# Patient Record
Sex: MALE | Race: BLACK OR AFRICAN AMERICAN | NOT HISPANIC OR LATINO | Employment: OTHER | URBAN - METROPOLITAN AREA
[De-identification: names, ages, dates, MRNs, and addresses within clinical notes are randomized per-mention and may not be internally consistent; named-entity substitution may affect disease eponyms.]

---

## 2017-01-26 ENCOUNTER — HOSPITAL ENCOUNTER (OUTPATIENT)
Dept: RADIOLOGY | Facility: CLINIC | Age: 77
Discharge: HOME | End: 2017-01-26
Attending: UROLOGY
Payer: MEDICARE

## 2017-01-26 ENCOUNTER — OFFICE VISIT (OUTPATIENT)
Dept: UROLOGY | Facility: CLINIC | Age: 77
End: 2017-01-26
Payer: MEDICARE

## 2017-01-26 VITALS — HEART RATE: 85 BPM | DIASTOLIC BLOOD PRESSURE: 86 MMHG | SYSTOLIC BLOOD PRESSURE: 136 MMHG

## 2017-01-26 DIAGNOSIS — R97.20 ELEVATED PROSTATE SPECIFIC ANTIGEN (PSA): Primary | ICD-10-CM

## 2017-01-26 DIAGNOSIS — N28.1 RENAL CYST, RIGHT: ICD-10-CM

## 2017-01-26 DIAGNOSIS — R97.20 ELEVATED PROSTATE SPECIFIC ANTIGEN (PSA): ICD-10-CM

## 2017-01-26 DIAGNOSIS — N40.1 BENIGN NON-NODULAR PROSTATIC HYPERPLASIA WITH LOWER URINARY TRACT SYMPTOMS: ICD-10-CM

## 2017-01-26 DIAGNOSIS — N52.9 ERECTILE DYSFUNCTION, UNSPECIFIED ERECTILE DYSFUNCTION TYPE: ICD-10-CM

## 2017-01-26 LAB
BILIRUB SERPL-MCNC: ABNORMAL MG/DL
BLOOD URINE, POC: ABNORMAL
COLOR, POC UA: YELLOW
GLUCOSE UR QL STRIP: ABNORMAL
KETONES UR QL STRIP: ABNORMAL
LEUKOCYTE ESTERASE URINE, POC: ABNORMAL
NITRITE, POC UA: ABNORMAL
PH, POC UA: 5
PROTEIN, POC: ABNORMAL
SPECIFIC GRAVITY, POC UA: 1.01
UROBILINOGEN, POC UA: ABNORMAL

## 2017-01-26 PROCEDURE — 76770 US EXAM ABDO BACK WALL COMP: CPT | Mod: TC,PO

## 2017-01-26 PROCEDURE — 99999 PR PBB SHADOW E&M-EST. PATIENT-LVL II: CPT | Mod: PBBFAC,,, | Performed by: UROLOGY

## 2017-01-26 PROCEDURE — 76770 US EXAM ABDO BACK WALL COMP: CPT | Mod: 26,,, | Performed by: RADIOLOGY

## 2017-01-26 PROCEDURE — 51701 INSERT BLADDER CATHETER: CPT | Mod: S$PBB,,, | Performed by: UROLOGY

## 2017-01-26 PROCEDURE — 99214 OFFICE O/P EST MOD 30 MIN: CPT | Mod: 25,S$PBB,, | Performed by: UROLOGY

## 2017-01-26 NOTE — MR AVS SNAPSHOT
[facility]   [address]  [place] [address]  Phone: [phone]                  Tyler Todd   2017 10:30 AM   Office Visit    Description:  Male : 1940   Provider:  Nuha Soto MD   Department:  [facility]           Reason for Visit     Follow-up           Diagnoses this Visit        Comments    Elevated prostate specific antigen (PSA)    -  Primary     Renal cyst, right         Benign non-nodular prostatic hyperplasia with lower urinary tract symptoms                To Do List           Future Appointments        Provider Department Dept Phone    2017 12:00 PM [facility] [phone]      Goals (5 Years of Data)     None      [facility]     [facility] -  Assistance  Registered nurses in the [facility] provide clinical advisement, health education, appointment booking, and other advisory services.  Call for this free service at [phone].             Medications           Message regarding Medications     Verify the changes and/or additions to your medication regime listed below are the same as discussed with your clinician today.  If any of these changes or additions are incorrect, please notify your healthcare provider.        STOP taking these medications     tamsulosin (FLOMAX) 0.4 mg Cp24 Take 1 capsule (0.4 mg total) by mouth 2 (two) times daily. Take one in the morning and one in the evening.           Verify that the below list of medications is an accurate representation of the medications you are currently taking.  If none reported, the list may be blank. If incorrect, please contact your healthcare provider. Carry this list with you in case of emergency.           Current Medications     alfuzosin (UROXATRAL) 10 mg Tb24 Take 1 tablet (10 mg total) by mouth daily with breakfast.    aspirin 81 mg Tab Every day    atorvastatin (LIPITOR) 20 MG tablet Take 20 mg by  mouth once daily.    brimonidine 0.1% (ALPHAGAN P) 0.1 % Drop Place 1 drop into both eyes 3 (three) times daily.    carboxymethylcellulose (REFRESH PLUS) 0.5 % Dpet 1 drop 3 (three) times daily as needed.    diabetic supplies, miscellan. Misc No Sig Provided    dorzolamide (TRUSOPT) 2 % ophthalmic solution 1 drop 3 (three) times daily.    fish oil-omega-3 fatty acids 300-1,000 mg capsule Take 2 g by mouth once daily.    LATANOPROST OPHT Apply to eye.    metformin (GLUCOPHAGE-XR) 500 MG 24 hr tablet Take 2 tablets (1,000 mg total) by mouth 2 (two) times daily with meals.    nitroGLYCERIN (NITROSTAT) 0.4 MG SL tablet As directed    travoprost, benzalkonium, (TRAVATAN) 0.004 % ophthalmic solution 1 drop every evening.    metoprolol tartrate (LOPRESSOR) 50 MG tablet Take 1 tablet (50 mg total) by mouth 2 (two) times daily.           Clinical Reference Information           Vital Signs - Last Recorded  Most recent update: 1/26/2017 10:36 AM by Sobeida Dao MA    BP Pulse                136/86 85          Blood Pressure          Most Recent Value    BP  136/86      Allergies as of 1/26/2017     Doxycycline    Sulfa (Sulfonamide Antibiotics)      Immunizations Administered on Date of Encounter - 1/26/2017     None      Orders Placed During Today's Visit      Normal Orders This Visit    POCT URINE DIPSTICK WITHOUT MICROSCOPE     Future Labs/Procedures Expected by Expires    US Retroperitoneal Complete (Kidney and  1/26/2017 1/26/2018      Smoking Cessation     If you would like to quit smoking:   You may be eligible for free services if you are a [place] resident and started smoking cigarettes before September 1, 1988.  Call the Smoking Cessation Trust (SCT) toll free at (126) 288-9151 or (090) 829-3630.   Call 4-800-QUIT-NOW if you do not meet the above criteria.

## 2017-01-26 NOTE — PROGRESS NOTES
OFFICE NOTE    CHIEF COMPLAINT:  Elevated PSA, BPH, erectile dysfunction, right renal cyst.    HISTORY OF PRESENT ILLNESS:    This 76-year-old male returns for   routine recheck.  He has a history of elevated PSA.  He had undergone a prostate   ultrasound with biopsy in September 2015 when his PSA was 5.9 and the pathology   report revealed no evidence of any malignancy.  He has had recurring episodes   of elevated PSA and he underwent an MRI of the prostate on 12/14/2016, which   revealed him to have low-grade of only 2 probability of developing prostate   cancer and the patient was informed of this.  Prostate size was 40 mL.  The   patient also has a history of BPH for which he continues to take Uroxatral, but   he states lately it has not been as effective and he is continuing to have lower   tract irritative symptoms and slowing of the stream.  He also has a history of   right renal cyst.    The patient has a history of erectile dysfunction and he did question the   possibility of taking Cialis, but it must be noted that the patient is on   nitroglycerin on a p.r.n. basis, which will make it contraindicated for him to take   Cialis and he is aware of this.    Bladder scan revealed 0 mL of residual urine.    UA negative with pH 5.0.    His last PSA was 6.47 on 11/21/2016.    FINAL IMPRESSION:  Elevated PSA, BPH, right renal cyst and erectile dysfunction.    RECOMMENDATIONS:  Renal ultrasound, subsequent cystoscopy.  Otherwise, continue   on Uroxatral 10 mg p.o. Daily, and otherwise routine recheck in four to six   months with a repeat PSA.      ROBERT  dd: 01/26/2017 12:37:09 (CST)  td: 01/26/2017 17:40:45 (CST)  Doc ID   #[id number]  Job ID #[id number]    CC:

## 2017-01-31 ENCOUNTER — TELEPHONE (OUTPATIENT)
Dept: UROLOGY | Facility: CLINIC | Age: 77
End: 2017-01-31

## 2017-01-31 NOTE — TELEPHONE ENCOUNTER
----- Message from Nuha Soto MD sent at 1/29/2017  6:28 PM CST -----  Renal U/S - right renal cyst    Rec: Cysto under anesthesia

## 2017-02-14 ENCOUNTER — OFFICE VISIT (OUTPATIENT)
Dept: UROLOGY | Facility: CLINIC | Age: 77
End: 2017-02-14
Payer: MEDICARE

## 2017-02-14 VITALS
SYSTOLIC BLOOD PRESSURE: 140 MMHG | HEIGHT: 70 IN | RESPIRATION RATE: 18 BRPM | WEIGHT: 181.44 LBS | DIASTOLIC BLOOD PRESSURE: 79 MMHG | HEART RATE: 90 BPM | BODY MASS INDEX: 25.98 KG/M2 | TEMPERATURE: 98 F

## 2017-02-14 DIAGNOSIS — N40.1 BENIGN PROSTATIC HYPERPLASIA WITH LOWER URINARY TRACT SYMPTOMS, UNSPECIFIED MORPHOLOGY: ICD-10-CM

## 2017-02-14 DIAGNOSIS — N52.9 ERECTILE DYSFUNCTION, UNSPECIFIED ERECTILE DYSFUNCTION TYPE: ICD-10-CM

## 2017-02-14 DIAGNOSIS — R97.20 ELEVATED PROSTATE SPECIFIC ANTIGEN (PSA): Primary | ICD-10-CM

## 2017-02-14 DIAGNOSIS — N28.1 RENAL CYST, RIGHT: ICD-10-CM

## 2017-02-14 PROCEDURE — 99213 OFFICE O/P EST LOW 20 MIN: CPT | Mod: PBBFAC,PO | Performed by: UROLOGY

## 2017-02-14 PROCEDURE — 99213 OFFICE O/P EST LOW 20 MIN: CPT | Mod: S$PBB,,, | Performed by: UROLOGY

## 2017-02-14 PROCEDURE — 99999 PR PBB SHADOW E&M-EST. PATIENT-LVL III: CPT | Mod: PBBFAC,,, | Performed by: UROLOGY

## 2017-02-14 NOTE — MR AVS SNAPSHOT
[facility]  [address]  [place] [address]  Phone: [phone]                  Tyler Todd   2017 3:45 PM   Office Visit    Description:  Male : 1940   Provider:  Nuha Soto MD   Department:  [facility]           Reason for Visit     Pre-op Exam           Diagnoses this Visit        Comments    Elevated prostate specific antigen (PSA)    -  Primary            To Do List           Future Appointments        Provider Department Dept Phone    2017 10:00 AM MD [facility] [phone]      Goals (5 Years of Data)     None      [facility]     [facility] -  Assistance  Registered nurses in the [facility] provide clinical advisement, health education, appointment booking, and other advisory services.  Call for this free service at [phone].             Medications           Message regarding Medications     Verify the changes and/or additions to your medication regime listed below are the same as discussed with your clinician today.  If any of these changes or additions are incorrect, please notify your healthcare provider.             Verify that the below list of medications is an accurate representation of the medications you are currently taking.  If none reported, the list may be blank. If incorrect, please contact your healthcare provider. Carry this list with you in case of emergency.           Current Medications     alfuzosin (UROXATRAL) 10 mg Tb24 Take 1 tablet (10 mg total) by mouth daily with breakfast.    aspirin 81 mg Tab Every day    atorvastatin (LIPITOR) 20 MG tablet Take 20 mg by mouth once daily.    brimonidine 0.1% (ALPHAGAN P) 0.1 % Drop Place 1 drop into both eyes 3 (three) times daily.    carboxymethylcellulose (REFRESH PLUS) 0.5 % Dpet 1 drop 3 (three) times daily as needed.    diabetic supplies, miscellan. Misc No Sig Provided    dorzolamide (TRUSOPT)  "2 % ophthalmic solution 1 drop 3 (three) times daily.    fish oil-omega-3 fatty acids 300-1,000 mg capsule Take 2 g by mouth once daily.    LATANOPROST OPHT Apply to eye.    metformin (GLUCOPHAGE-XR) 500 MG 24 hr tablet Take 2 tablets (1,000 mg total) by mouth 2 (two) times daily with meals.    metoprolol tartrate (LOPRESSOR) 50 MG tablet Take 1 tablet (50 mg total) by mouth 2 (two) times daily.    nitroGLYCERIN (NITROSTAT) 0.4 MG SL tablet As directed    travoprost, benzalkonium, (TRAVATAN) 0.004 % ophthalmic solution 1 drop every evening.           Clinical Reference Information           Your Vitals Were     BP Pulse Temp Resp Height Weight    140/79 90 98.2 °F (36.8 °C) (Oral) 18 5' 10" (1.778 m) 82.3 kg (181 lb 7 oz)    BMI                26.03 kg/m2          Blood Pressure          Most Recent Value    BP  (!)  140/79      Allergies as of 2/14/2017     Doxycycline    Sulfa (Sulfonamide Antibiotics)      Immunizations Administered on Date of Encounter - 2/14/2017     None      Orders Placed During Today's Visit     Future Labs/Procedures Expected by Expires    PROSTATE SPECIFIC ANTIGEN, DIAGNOSTIC  2/14/2017 4/15/2018      Smoking Cessation     If you would like to quit smoking:   You may be eligible for free services if you are a [place] resident and started smoking cigarettes before September 1, 1988.  Call the Smoking Cessation Trust (RUST) toll free at (890) 484-9569 or (082) 031-3406.   Call 1-800-QUIT-NOW if you do not meet the above criteria.            Language Assistance Services     ATTENTION: Language assistance services are available, free of charge. Please call [phone].      ATENCIÓN: Si habla español, tiene a moore disposición servicios gratuitos de asistencia lingüística. Llame al [phone].     OLMAN Ý: N?u b?n nói Ti?ng Vi?t, có các d?ch v? h? tr? ngôn ng? mi?n phí dành cho b?n. G?i s? [phone].         [facility] complies with applicable Federal civil rights laws and " does not discriminate on the basis of race, color, national origin, age, disability, or sex.

## 2017-02-14 NOTE — PROGRESS NOTES
OFFICE NOTE    CHIEF COMPLAINT:  Elevated PSA, BPH, erectile dysfunction.    HISTORY OF PRESENT ILLNESS:   This 76-year-old male returns for   routine recheck.  He has a history of BPH for which he has been taking Uroxatral   in the past, most recently following his last visit of 01/26/2017, but he   states when the prescription ran out, he discontinued it and did not renew it   and he states his voiding really has not changed and overall he does not consider   his voiding symptoms any major bother.  He does have some nocturia x1-2.  At the   last visit, we have been considering proceeding with cystoscopy to evaluate his   low urinary tracts, but after further discussion with the patient today, it was   decided that we will hold off and continue to observe him.  The patient also   does have a history of elevated PSA and he had undergone a prostate ultrasound   with biopsy in September 2015 and the pathology report revealed no evidence of   any malignancy.  He did undergo an MRI of the prostate in December 2016, which   revealed him to have a low-grade probability of only 2 out of 5 of developing   prostate cancer for which we are continuing to monitor him conservatively.  His   most recent PSA was 6.47 on 11/21/2016.  In terms of his erectile dysfunction,   he is not on any medication at this time.  He had been interested in taking Cialis, but since he   is on nitroglycerin he cannot take Cialis.    FINAL IMPRESSION:  BPH with low urinary tract symptoms, elevated PSA and   erectile dysfunction.    RECOMMENDATIONS:  Continue with observation in terms of his urinary symptoms as   they seem to be of no major bother to him at this time.  Recheck in four months   with repeat PSA.  In terms of his erectile dysfunction, we will continue to   manage it conservatively and wait on the patient's decision concerning other   treatment options.  We will also hold off on any cystoscopy at this time.  It   must be also noted that  he did undergo renal ultrasound, which showed a right   renal cyst, otherwise, no other significant findings.      ROD  dd: 02/14/2017 17:26:35 (CST)  td: 02/14/2017 22:38:13 (CST)  Doc ID   #[id number]  Job ID #[id number]    CC:

## 2017-06-06 ENCOUNTER — TELEPHONE (OUTPATIENT)
Dept: UROLOGY | Facility: CLINIC | Age: 77
End: 2017-06-06

## 2017-06-06 NOTE — TELEPHONE ENCOUNTER
Attempted to contact patient to assist with rescheduling appt. Voicemail not setup unable to leave message. Several message previous lerft. Also attempted to contact wife number phone rang several times than busy

## 2017-06-12 ENCOUNTER — TELEPHONE (OUTPATIENT)
Dept: UROLOGY | Facility: CLINIC | Age: 77
End: 2017-06-12

## 2017-06-12 NOTE — TELEPHONE ENCOUNTER
Attempted to contact patient in regards to rescheduling June 14th appointment. No answer left message with call back number. Also attempted to contact on wife cell number. Both numbers rang several times than busy. X 3 attempted . Unable to leave voicemail

## 2017-06-13 ENCOUNTER — TELEPHONE (OUTPATIENT)
Dept: UROLOGY | Facility: CLINIC | Age: 77
End: 2017-06-13

## 2017-06-13 NOTE — TELEPHONE ENCOUNTER
Attempted to contact patient to assist with re scheduling appointment on several different occassions . No answer home or wife number. Unable to leave voicemail.

## 2017-06-14 ENCOUNTER — TELEPHONE (OUTPATIENT)
Dept: UROLOGY | Facility: CLINIC | Age: 77
End: 2017-06-14

## 2017-06-14 NOTE — TELEPHONE ENCOUNTER
Attempted to contact patient in regard to needing to reschedule appt. Voicemail full unable to leave message. Attempted to contact wife phone rang several times than busy x2. Several past attempts have been made in regards to rescheduling appt. Provider will not be back in clinic until 6/19/17.

## 2018-02-02 RX ORDER — METFORMIN HYDROCHLORIDE 500 MG/1
TABLET, EXTENDED RELEASE ORAL
Qty: 360 TABLET | Refills: 0 | OUTPATIENT
Start: 2018-02-02

## 2018-03-22 ENCOUNTER — OFFICE VISIT (OUTPATIENT)
Dept: UROLOGY | Facility: CLINIC | Age: 78
End: 2018-03-22
Payer: MEDICARE

## 2018-03-22 VITALS
HEART RATE: 70 BPM | SYSTOLIC BLOOD PRESSURE: 130 MMHG | BODY MASS INDEX: 25.54 KG/M2 | WEIGHT: 178.38 LBS | DIASTOLIC BLOOD PRESSURE: 75 MMHG | TEMPERATURE: 99 F | HEIGHT: 70 IN | RESPIRATION RATE: 18 BRPM

## 2018-03-22 DIAGNOSIS — N28.1 RENAL CYST: ICD-10-CM

## 2018-03-22 DIAGNOSIS — R81 GLYCOSURIA: ICD-10-CM

## 2018-03-22 DIAGNOSIS — N52.9 ERECTILE DYSFUNCTION, UNSPECIFIED ERECTILE DYSFUNCTION TYPE: ICD-10-CM

## 2018-03-22 DIAGNOSIS — N40.1 BENIGN NON-NODULAR PROSTATIC HYPERPLASIA WITH LOWER URINARY TRACT SYMPTOMS: ICD-10-CM

## 2018-03-22 DIAGNOSIS — R97.20 ELEVATED PROSTATE SPECIFIC ANTIGEN (PSA): Primary | ICD-10-CM

## 2018-03-22 LAB
BILIRUB SERPL-MCNC: ABNORMAL MG/DL
BLOOD URINE, POC: ABNORMAL
COLOR, POC UA: YELLOW
GLUCOSE UR QL STRIP: 100
KETONES UR QL STRIP: ABNORMAL
LEUKOCYTE ESTERASE URINE, POC: ABNORMAL
NITRITE, POC UA: ABNORMAL
PH, POC UA: 5
PROTEIN, POC: ABNORMAL
SPECIFIC GRAVITY, POC UA: 1.01
UROBILINOGEN, POC UA: ABNORMAL

## 2018-03-22 PROCEDURE — 99999 PR PBB SHADOW E&M-EST. PATIENT-LVL IV: CPT | Mod: PBBFAC,,, | Performed by: UROLOGY

## 2018-03-22 PROCEDURE — 51798 US URINE CAPACITY MEASURE: CPT | Mod: PBBFAC,PN | Performed by: UROLOGY

## 2018-03-22 PROCEDURE — 99214 OFFICE O/P EST MOD 30 MIN: CPT | Mod: 25,S$PBB,, | Performed by: UROLOGY

## 2018-03-22 PROCEDURE — 81000 URINALYSIS NONAUTO W/SCOPE: CPT | Mod: PBBFAC,PN | Performed by: UROLOGY

## 2018-03-22 PROCEDURE — 99214 OFFICE O/P EST MOD 30 MIN: CPT | Mod: PBBFAC,PN,25 | Performed by: UROLOGY

## 2018-03-22 PROCEDURE — 81002 URINALYSIS NONAUTO W/O SCOPE: CPT | Mod: PBBFAC,PN | Performed by: UROLOGY

## 2018-03-22 RX ORDER — SILDENAFIL 100 MG/1
100 TABLET, FILM COATED ORAL DAILY PRN
Qty: 4 TABLET | Refills: 3 | Status: ON HOLD | OUTPATIENT
Start: 2018-03-22 | End: 2019-08-05 | Stop reason: HOSPADM

## 2018-03-22 RX ORDER — TAMSULOSIN HYDROCHLORIDE 0.4 MG/1
0.4 CAPSULE ORAL DAILY
Qty: 30 CAPSULE | Refills: 11 | Status: SHIPPED | OUTPATIENT
Start: 2018-03-22 | End: 2018-04-25 | Stop reason: ALTCHOICE

## 2018-03-22 NOTE — PROGRESS NOTES
OFFICE NOTE    CHIEF COMPLAINT:  BPH, elevated PSA, renal cyst, and erectile dysfunction.    HISTORY OF PRESENT ILLNESS:  This 77-year-old male returns for routine recheck.    He has a history of BPH for which he had been taking Uroxatral and Flomax in   the past, but has not been taking any recently and he has been having some   nocturia x1 to 2 and some lower tract symptoms, and he states he is interested   in trying Flomax again.  He also has a history of elevated PSA for which he   underwent a prostate ultrasound with biopsy in September 2015, there was no   evidence of any malignancy.  He did undergo MRI of the prostate in the December 2016, which revealed a low probability of only 2/5 of developing prostatic   carcinoma.  His most recent PSA was 6.47 on 11/21/2016.  We do not have another   PSA since then.  In terms of his erectile dysfunction, we have been unable to   take Cialis or Viagra since he has been on nitroglycerin, but he   states he takes the nitroglycerin very rarely and has not taken in a while and   he is interested in trying Viagra.  I did mention that he definitely needed to   contact his cardiologist, Dr. Interiano concerning this.    Other medical history update reveals that his lung cancer which he was treated   with radiation therapy and chemotherapy in 2012 appears to be in remission and   he is due to undergo followup PET scan within the next month.    PHYSICAL EXAMINATION:  ABDOMEN:  His abdomen is soft, benign and nontender.  No masses.  No hernias, no   organomegaly.  EXTERNAL GENITALIA:  Normal phallus with adequate meatus.  Testes descended and   feel normal.  No scrotal masses.  RECTAL:  Reveals a 25 g prostate with area of increased firmness over the right   lobe.    Bladder scan revealed 21 mL of post residual.    His last PSA was 6.47 on 11/21/2016.    UA reveals 1 to 2+ sugar, no cells, pH 5.0.    FINAL IMPRESSION:  BPH with low urinary tract symptoms, elevated PSA, renal    ultrasound, erectile dysfunction, and glycosuria.    RECOMMENDATION:  We will obtain a PSA and renal ultrasound.  We will repeat a   trial of Flomax 0.4 mg p.o. daily.  In terms of the Viagra, he was given a   prescription, but he was instructed to check with Dr. Interiano to see if it safe for him   to take in terms of his cardiovascular status and his medications, and we will   contact him with the above-mentioned results.  He was also advised to follow-up with his primary care physician in terms of the glycosuria.      MD/MICHAEL  dd: 03/22/2018 15:02:05 (CDT)  td: 03/23/2018 03:45:33 (CDT)  Doc ID   #[id number]  Job ID #[id number]    CC:

## 2018-03-23 ENCOUNTER — HOSPITAL ENCOUNTER (OUTPATIENT)
Dept: RADIOLOGY | Facility: HOSPITAL | Age: 78
Discharge: HOME | End: 2018-03-23
Attending: UROLOGY
Payer: MEDICARE

## 2018-03-23 DIAGNOSIS — N28.1 RENAL CYST: ICD-10-CM

## 2018-03-23 PROCEDURE — 76770 US EXAM ABDO BACK WALL COMP: CPT | Mod: 26,,, | Performed by: RADIOLOGY

## 2018-03-23 PROCEDURE — 76770 US EXAM ABDO BACK WALL COMP: CPT | Mod: TC

## 2018-04-06 ENCOUNTER — TELEPHONE (OUTPATIENT)
Dept: UROLOGY | Facility: CLINIC | Age: 78
End: 2018-04-06

## 2018-04-06 NOTE — TELEPHONE ENCOUNTER
Spoke with patient, results given with recommendation. Patient agreed to do the TRUS w/ bx. [facility] on book [facility] at the [facility]. Informed they will call him the day before to let him know what time to be there, patient verbally understood.

## 2018-04-06 NOTE — TELEPHONE ENCOUNTER
----- Message from Nuha Soto MD sent at 3/28/2018  8:44 PM CDT -----  Renal U/S - 3 cm simple right renal cyst, stable                      9 mm left AML  PSA - 9.2 up from 6.7    Rec: Need to consider repeat TRUS with prostate bx

## 2018-04-10 DIAGNOSIS — R97.20 ELEVATED PROSTATE SPECIFIC ANTIGEN (PSA): Primary | ICD-10-CM

## 2018-04-10 DIAGNOSIS — R97.20 ELEVATED PSA: ICD-10-CM

## 2018-04-10 RX ORDER — CIPROFLOXACIN 500 MG/1
500 TABLET ORAL 2 TIMES DAILY
Qty: 14 TABLET | Refills: 0 | Status: SHIPPED | OUTPATIENT
Start: 2018-04-10 | End: 2018-04-17

## 2018-04-10 RX ORDER — CEFUROXIME AXETIL 500 MG/1
500 TABLET ORAL 2 TIMES DAILY
Qty: 14 TABLET | Refills: 0 | Status: SHIPPED | OUTPATIENT
Start: 2018-04-10 | End: 2018-04-17

## 2018-04-10 NOTE — H&P
Subjective:       Patient ID: Tyler Todd is a 77 y.o. male.    Chief Complaint: Elevated PSA of 9.2 up from 6.7.  Recent renal ultrasound reveals a simple 3 cm right renal cyst and a 9 mm left AML.    Past Medical History:   Diagnosis Date    Cancer     lung cancer chemo and radiation    COPD (chronic obstructive pulmonary disease)     Coronary artery disease     s/p 3 stents    Diabetes mellitus, type 2     Hyperlipidemia     Hypertension      Past Surgical History:   Procedure Laterality Date    ANKLE SURGERY Right 1970s     Family History   Problem Relation Age of Onset    Diabetes Mother     Peripheral vascular disease Mother     Heart attack Mother     Diabetes Father     Heart attack Father     Emphysema Father     Diabetes Sister      Social History     Social History    Marital status:      Spouse name: N/A    Number of children: N/A    Years of education: N/A     Social History Main Topics    Smoking status: Current Every Day Smoker     Packs/day: 1.00     Years: 54.00     Types: Cigarettes    Smokeless tobacco: Never Used    Alcohol use No    Drug use: No    Sexual activity: No     Other Topics Concern    Not on file     Social History Narrative    No narrative on file       Current Outpatient Prescriptions   Medication Sig Dispense Refill    alfuzosin (UROXATRAL) 10 mg Tb24 Take 1 tablet (10 mg total) by mouth daily with breakfast. 30 tablet 11    aspirin 81 mg Tab Every day      atorvastatin (LIPITOR) 20 MG tablet Take 20 mg by mouth once daily.      brimonidine 0.1% (ALPHAGAN P) 0.1 % Drop Place 1 drop into both eyes 3 (three) times daily.      carboxymethylcellulose (REFRESH PLUS) 0.5 % Dpet 1 drop 3 (three) times daily as needed.      cefUROXime (CEFTIN) 500 MG tablet Take 1 tablet (500 mg total) by mouth 2 (two) times daily. 14 tablet 0    ciprofloxacin HCl (CIPRO) 500 MG tablet Take 1 tablet (500 mg total) by mouth 2 (two) times daily. 14 tablet 0     diabetic supplies, miscellan. Misc No Sig Provided      dorzolamide (TRUSOPT) 2 % ophthalmic solution 1 drop 3 (three) times daily.      fish oil-omega-3 fatty acids 300-1,000 mg capsule Take 2 g by mouth once daily.      LATANOPROST OPHT Apply to eye.      metoprolol tartrate (LOPRESSOR) 50 MG tablet Take 1 tablet (50 mg total) by mouth 2 (two) times daily. 60 tablet 11    nitroGLYCERIN (NITROSTAT) 0.4 MG SL tablet As directed      sildenafil (VIAGRA) 100 MG tablet Take 1 tablet (100 mg total) by mouth daily as needed for Erectile Dysfunction. 4 tablet 3    tamsulosin (FLOMAX) 0.4 mg Cp24 Take 1 capsule (0.4 mg total) by mouth once daily. 30 capsule 11    travoprost, benzalkonium, (TRAVATAN) 0.004 % ophthalmic solution 1 drop every evening.       No current facility-administered medications for this visit.      Review of patient's allergies indicates:   Allergen Reactions    Doxycycline Diarrhea     Severe diarrhea    Sulfa (sulfonamide antibiotics)      Other reaction(s): Itching       Review of Systems   All other systems reviewed and are negative.      Objective:      There were no vitals filed for this visit.  Physical Exam   Cardiovascular: Normal rate.    Pulmonary/Chest: Effort normal.   Abdominal: Soft.   Genitourinary:   Genitourinary Comments: Some increased firmness over the right prostatic lobe          Assessment:       1. Elevated prostate specific antigen (PSA)    2. Elevated PSA        Plan:       Transrectal ultrasound biopsy of the prostate

## 2018-04-11 RX ORDER — GENTAMICIN SULFATE 80 MG/100ML
80 INJECTION, SOLUTION INTRAVENOUS
Status: DISCONTINUED | OUTPATIENT
Start: 2018-04-12 | End: 2018-04-11

## 2018-04-12 ENCOUNTER — HOSPITAL ENCOUNTER (OUTPATIENT)
Facility: AMBULARY SURGERY CENTER | Age: 78
Discharge: HOME | End: 2018-04-12
Attending: UROLOGY | Admitting: UROLOGY
Payer: MEDICARE

## 2018-04-12 DIAGNOSIS — R97.20 ELEVATED PROSTATE SPECIFIC ANTIGEN (PSA): ICD-10-CM

## 2018-04-12 DIAGNOSIS — R97.20 ELEVATED PSA: ICD-10-CM

## 2018-04-12 PROCEDURE — 76872 US TRANSRECTAL: CPT | Mod: 26,,, | Performed by: UROLOGY

## 2018-04-12 PROCEDURE — 76872 US TRANSRECTAL: CPT | Performed by: UROLOGY

## 2018-04-12 PROCEDURE — 88305 TISSUE EXAM BY PATHOLOGIST: CPT | Mod: 26,,,

## 2018-04-12 PROCEDURE — G8907 PT DOC NO EVENTS ON DISCHARG: HCPCS | Performed by: UROLOGY

## 2018-04-12 PROCEDURE — 76942 ECHO GUIDE FOR BIOPSY: CPT | Mod: 26,59,, | Performed by: UROLOGY

## 2018-04-12 PROCEDURE — 55700 PR BIOPSY OF PROSTATE,NEEDLE/PUNCH: CPT | Mod: ,,, | Performed by: UROLOGY

## 2018-04-12 PROCEDURE — 88305 TISSUE EXAM BY PATHOLOGIST: CPT

## 2018-04-12 PROCEDURE — 55700 HC PROSTATE NEEDLE BIOPSY: CPT | Performed by: UROLOGY

## 2018-04-12 RX ORDER — PHENAZOPYRIDINE HYDROCHLORIDE 100 MG/1
200 TABLET, FILM COATED ORAL
Qty: 20 TABLET | Refills: 0 | Status: SHIPPED | OUTPATIENT
Start: 2018-04-12 | End: 2018-04-25 | Stop reason: ALTCHOICE

## 2018-04-12 RX ORDER — GENTAMICIN SULFATE 40 MG/ML
INJECTION, SOLUTION INTRAMUSCULAR; INTRAVENOUS
Status: COMPLETED
Start: 2018-04-12 | End: 2018-04-12

## 2018-04-12 RX ORDER — PHENAZOPYRIDINE HYDROCHLORIDE 100 MG/1
200 TABLET, FILM COATED ORAL ONCE
Status: DISCONTINUED | OUTPATIENT
Start: 2018-04-12 | End: 2018-04-12 | Stop reason: HOSPADM

## 2018-04-12 RX ORDER — GENTAMICIN SULFATE 40 MG/ML
80 INJECTION, SOLUTION INTRAMUSCULAR; INTRAVENOUS ONCE
Status: COMPLETED | OUTPATIENT
Start: 2018-04-12 | End: 2018-04-12

## 2018-04-12 RX ORDER — HYDROCODONE BITARTRATE AND ACETAMINOPHEN 5; 325 MG/1; MG/1
1 TABLET ORAL EVERY 4 HOURS PRN
Status: DISCONTINUED | OUTPATIENT
Start: 2018-04-12 | End: 2018-04-12 | Stop reason: HOSPADM

## 2018-04-12 RX ORDER — LIDOCAINE HYDROCHLORIDE 20 MG/ML
JELLY TOPICAL
Status: DISCONTINUED | OUTPATIENT
Start: 2018-04-12 | End: 2018-04-12 | Stop reason: HOSPADM

## 2018-04-12 RX ADMIN — GENTAMICIN SULFATE 80 MG: 40 INJECTION, SOLUTION INTRAMUSCULAR; INTRAVENOUS at 01:04

## 2018-04-12 NOTE — BRIEF OP NOTE
Operative Note     SUMMARY     Surgery Date: 4/12/2018     Surgeon(s) and Role:     * Nuah Soto MD - Primary    Pre-op Diagnosis:  Elevated prostate specific antigen (PSA) [R97.20]    Post-op Diagnosis:  Elevated prostate specific antigen (PSA) [R97.20]    Procedure(s) (LRB):  TRANSRECTAL ULTRASOUND GUIDED PROSTATE BIOPSY (N/A)    Anesthesia: Local    Findings/Key Components:  See Op Note      Estimated Blood Loss: * No values recorded between 4/12/2018  2:19 PM and 4/12/2018  2:42 PM *         Specimens     None            Discharge Note      SUMMARY     Admit Date: 4/12/2018    Attending Physician: Nuha Soto MD     Discharge Physician: Nuha Soto MD    Discharge Date: 4/12/2018     Final Diagnosis: Elevated prostate specific antigen (PSA) [R97.20]    Hospital Course: uneventful, going home same day    Disposition: Home or Self Care    Patient Instructions:   Current Discharge Medication List      START taking these medications    Details   phenazopyridine (PYRIDIUM) 100 MG tablet Take 2 tablets (200 mg total) by mouth 3 (three) times daily with meals.  Qty: 20 tablet, Refills: 0         CONTINUE these medications which have NOT CHANGED    Details   aspirin 81 mg Tab Every day      cefUROXime (CEFTIN) 500 MG tablet Take 1 tablet (500 mg total) by mouth 2 (two) times daily.  Qty: 14 tablet, Refills: 0      metoprolol tartrate (LOPRESSOR) 50 MG tablet Take 1 tablet (50 mg total) by mouth 2 (two) times daily.  Qty: 60 tablet, Refills: 11    Associated Diagnoses: Benign essential hypertension      sildenafil (VIAGRA) 100 MG tablet Take 1 tablet (100 mg total) by mouth daily as needed for Erectile Dysfunction.  Qty: 4 tablet, Refills: 3      alfuzosin (UROXATRAL) 10 mg Tb24 Take 1 tablet (10 mg total) by mouth daily with breakfast.  Qty: 30 tablet, Refills: 11      atorvastatin (LIPITOR) 20 MG tablet Take 20 mg by mouth once daily.      brimonidine 0.1% (ALPHAGAN P) 0.1 % Drop Place 1 drop into both  eyes 3 (three) times daily.      carboxymethylcellulose (REFRESH PLUS) 0.5 % Dpet 1 drop 3 (three) times daily as needed.      ciprofloxacin HCl (CIPRO) 500 MG tablet Take 1 tablet (500 mg total) by mouth 2 (two) times daily.  Qty: 14 tablet, Refills: 0      diabetic supplies, miscellan. Misc No Sig Provided    Comments: test strips and lancets      dorzolamide (TRUSOPT) 2 % ophthalmic solution 1 drop 3 (three) times daily.      fish oil-omega-3 fatty acids 300-1,000 mg capsule Take 2 g by mouth once daily.      LATANOPROST OPHT Apply to eye.      nitroGLYCERIN (NITROSTAT) 0.4 MG SL tablet As directed      tamsulosin (FLOMAX) 0.4 mg Cp24 Take 1 capsule (0.4 mg total) by mouth once daily.  Qty: 30 capsule, Refills: 11      travoprost, benzalkonium, (TRAVATAN) 0.004 % ophthalmic solution 1 drop every evening.             Discharge Procedure Orders (must include Diet, Follow-up, Activity)  Resume previous diet.  Follow up with PCP as needed.

## 2018-04-12 NOTE — OP NOTE
TRANSRECTAL PROSTATIC ULTRASOUND, TRANSRECTAL PROSTATE BIOPSY    DATE: 2018    SURGEON: Nuha Soto MD    ANESTHESIA: Local      NAME:Tyler Todd  : 1940  Diagnosis: Elevated PSA, BPH,PROSTATE NODULE    Current PSA: 9.2    [place]: Firmness over right lobe         PROCEDURE DESCRIPTION:  After informed consent was obtained and signed, the patient was brought to the ultrasound suite, and placed into left lateral decubitus position, with hips in full flexion.  The prostate was examined and measured the findings are as indicated.  US probe was placed into the rectum.  Axial and saggital views were obtained.                        TRANSRECTAL ULTRASOUND  Measurements:   Height:  25.9 mm  Width:  55.2 mm  Length:  49.4 mm  Volume: 36.9 cm3    Seminal vesicles/Ejaculatory Ducts: Normal  Outline/Symmetry of Prostate: WNL  Central Gland/Transition Zone: BPH Hyperechoic areas  Peripheral Zone: WNL  Demarcation btw. Centra and Peripheral Zones: well demarcated  Bladder: Normal  MedianLobe: Normal    BIOPSY:  Biopsies were obtained from both lobes and total of 4 on each side including base mid glad apex and lateral aspects.  Pressure was held for several minutes on each lobe for hemostasis.  Prophylactic antibiotics were given in the form of Cipro 500 mg twice day and Cefitin 500 mg twice a day times one week. Gentamycin 80 mg IM given prior to the procedure.     IMPRESSION:  Suspicion for adenocarcinoma of the prostate, BPH, Prostatic calculi    RECOMMENDATION:  1.  Instructed to return to clinic or ED should S/S including fever, chills or the inability to urinate.  2.  Return to clinic in 7-10 days for results and follow up.  3.Continue on Ceftin and Cipro til completed.

## 2018-04-12 NOTE — PLAN OF CARE
"Stable, states feels a little "weak" and rectal "pressure".  Dr Soto wants to hold pt here for half hour.  "

## 2018-04-12 NOTE — DISCHARGE INSTRUCTIONS
After your prostate biopsy    Avoid sexual activity,lifting, strenuous physical activity or exertion for 3 days     No riding mowers, tractors, bicycles, motorcycles for 2-3 weeks    You may experience blood in your urine or stool for up to 2 weeks and in your semen for up to 6 weeks.  This is a normal side effect of the procedure and will resolve.    Drink plenty of water    Take antibiotics as prescribed    If you experience any of the following conditions, please return immediately to the clinic (during office hrs) or the Emergency Room if after hours:       Fever       Inabiltiy to urinate       Severe bleeding    You may resume aspirin, anti inflammatory and blood thinners in 3 days    Results take at minimum 10 business days.  A 2 week follow up will be scheduled to review pathology reports in the clinic    During office hours, please call  and ask to speak with the nurse if you have any questions.  If after hours, call the [facility] # to be connectied t o the doctor on call

## 2018-04-13 VITALS
DIASTOLIC BLOOD PRESSURE: 86 MMHG | HEIGHT: 70 IN | TEMPERATURE: 98 F | RESPIRATION RATE: 20 BRPM | BODY MASS INDEX: 25.48 KG/M2 | OXYGEN SATURATION: 96 % | HEART RATE: 80 BPM | WEIGHT: 178 LBS | SYSTOLIC BLOOD PRESSURE: 148 MMHG

## 2018-04-18 ENCOUNTER — TELEPHONE (OUTPATIENT)
Dept: UROLOGY | Facility: CLINIC | Age: 78
End: 2018-04-18

## 2018-04-18 NOTE — TELEPHONE ENCOUNTER
----- Message from Nuha Soto MD sent at 4/16/2018  4:39 PM CDT -----  Prostate biopsy on 4/12/2018 revealed Sebastian 6 adenocarcinoma of the prostate    Rec: Keep appointment to discuss

## 2018-04-25 ENCOUNTER — OFFICE VISIT (OUTPATIENT)
Dept: UROLOGY | Facility: CLINIC | Age: 78
End: 2018-04-25
Payer: MEDICARE

## 2018-04-25 VITALS
BODY MASS INDEX: 25.54 KG/M2 | WEIGHT: 178.38 LBS | DIASTOLIC BLOOD PRESSURE: 80 MMHG | RESPIRATION RATE: 18 BRPM | HEART RATE: 85 BPM | TEMPERATURE: 98 F | HEIGHT: 70 IN | SYSTOLIC BLOOD PRESSURE: 134 MMHG

## 2018-04-25 DIAGNOSIS — D49.59 NEOPLASM OF PROSTATE: ICD-10-CM

## 2018-04-25 DIAGNOSIS — R97.20 ELEVATED PSA: Primary | ICD-10-CM

## 2018-04-25 PROCEDURE — 99214 OFFICE O/P EST MOD 30 MIN: CPT | Mod: S$PBB,,, | Performed by: UROLOGY

## 2018-04-25 PROCEDURE — 99213 OFFICE O/P EST LOW 20 MIN: CPT | Mod: PBBFAC,PN | Performed by: UROLOGY

## 2018-04-25 PROCEDURE — 99999 PR PBB SHADOW E&M-EST. PATIENT-LVL III: CPT | Mod: PBBFAC,,, | Performed by: UROLOGY

## 2018-04-25 NOTE — PROGRESS NOTES
OFFICE NOTE    CHIEF COMPLAINT: Adenocarcinoma of the prostate    This 77-year-old male returns for routine recheck.  He had undergone a   transrectal ultrasound and biopsy of the prostate on 04/12/2018 with a PSA of   9.2 and also increased area of firmness over the right lobe noted on digital   examination.  The pathology report revealed a Laurel 6 adenocarcinoma from the   biopsy site in the right mid gland.  The rest of the biopsy sites were negative.    The patient was informed of the findings.  He otherwise has recovered well   from the procedure and not experiencing any further hematuria or voiding   problems.    PAST MEDICAL HISTORY UPDATE:  Reveals that the patient continues to be monitored   by his oncologist for his history of lung cancer for which he was treated with   chemotherapy and radiation therapy in 2012, but appeared to be in remission.  He   is scheduled to undergo a PET scan.    Further evaluation and treatment of the prostatic carcinoma was discussed with   the patient and treatment options to be considered include radical   prostatectomy, radiation therapy, brachytherapy, hormonal therapy, combination   of above and also watchful waiting and surveillance, but I did mention I would   like to complete the metastatic workup to include a CT scan of the abdomen and   pelvis and bone scan and subsequently, we will make a decision concerning these.    He stated he understood and agreed.    FINAL IMPRESSION:  Adenocarcinoma of the prostate and also lung cancer.    RECOMMENDATIONS:  We will obtain CT scan of the abdomen and pelvis and bone   scan.  He was also ordered CBC and CMP.  He will make a followup appointment to   discuss the treatment options and he will also need to be evaluated by his   oncologist taking into consideration he also is being monitored for lung cancer.    This was discussed with the patient.  He stated he understood and agreed.      MICHELLE  dd: 04/25/2018 14:38:47 (CDT)  td:  04/25/2018 23:34:58 (CDT)  Doc ID   #[id number]  Job ID #[id number]    CC:

## 2018-05-01 ENCOUNTER — TELEPHONE (OUTPATIENT)
Dept: UROLOGY | Facility: CLINIC | Age: 78
End: 2018-05-01

## 2018-05-01 NOTE — TELEPHONE ENCOUNTER
Returned call and spoke with patient, informed that the MD has his results, but has not reviewed them yet, will give him a call once done, patient verbally understood.

## 2018-05-01 NOTE — TELEPHONE ENCOUNTER
----- Message from Hellen Pelayo sent at 5/1/2018 11:34 AM CDT -----  Contact: self  Patient is calling for bone scan results. Please call patient at [phone]. Thanks!

## 2018-05-02 ENCOUNTER — TELEPHONE (OUTPATIENT)
Dept: UROLOGY | Facility: CLINIC | Age: 78
End: 2018-05-02

## 2018-05-02 NOTE — TELEPHONE ENCOUNTER
Spoke with patient, results given with recommendation. He states that he had ankle surgery many of times and it has not healed properly that's what his is already aware of and will pass on doing any more imaging, follow up appt made, patient verbally understood.

## 2018-05-02 NOTE — TELEPHONE ENCOUNTER
----- Message from Nuha Soto MD sent at 5/2/2018  2:20 PM CDT -----  CT scan and bone scan show no obvious evidence of any metastatic disease but there is some increased activity in the right ankle.    Rec: Recommend we obtain x-ray of the right ankle          Subsequent appointment to discuss further treatment plan

## 2018-05-07 ENCOUNTER — TELEPHONE (OUTPATIENT)
Dept: UROLOGY | Facility: CLINIC | Age: 78
End: 2018-05-07

## 2018-05-07 NOTE — TELEPHONE ENCOUNTER
----- Message from Tamanna Zamora sent at 5/7/2018 10:51 AM CDT -----  Contact: PT  Pt Tyler Ramirez is calling to speak nurse regarding his appt, he needs to reschedule his appt sooner than 5/25/18. Please call back and advise.    Call back# [phone]  Thanks

## 2018-05-07 NOTE — TELEPHONE ENCOUNTER
Rectal pain and tingling sensation in his penis, groin pain over the weekend.  He would like to see Dr. Soto one day this week.   Advised that I will check with [facility] staff and call him back with an appt date/time.

## 2018-05-17 ENCOUNTER — OFFICE VISIT (OUTPATIENT)
Dept: PAIN MEDICINE | Facility: CLINIC | Age: 78
End: 2018-05-17
Payer: MEDICARE

## 2018-05-17 VITALS
DIASTOLIC BLOOD PRESSURE: 76 MMHG | HEIGHT: 70 IN | HEART RATE: 67 BPM | SYSTOLIC BLOOD PRESSURE: 125 MMHG | BODY MASS INDEX: 25.54 KG/M2 | WEIGHT: 178.38 LBS

## 2018-05-17 DIAGNOSIS — M51.36 DDD (DEGENERATIVE DISC DISEASE), LUMBAR: ICD-10-CM

## 2018-05-17 DIAGNOSIS — M47.896 OTHER SPONDYLOSIS, LUMBAR REGION: Primary | ICD-10-CM

## 2018-05-17 PROCEDURE — 99214 OFFICE O/P EST MOD 30 MIN: CPT | Mod: PBBFAC,PN | Performed by: ANESTHESIOLOGY

## 2018-05-17 PROCEDURE — 99204 OFFICE O/P NEW MOD 45 MIN: CPT | Mod: S$PBB,,, | Performed by: ANESTHESIOLOGY

## 2018-05-17 PROCEDURE — 99999 PR PBB SHADOW E&M-EST. PATIENT-LVL IV: CPT | Mod: PBBFAC,,, | Performed by: ANESTHESIOLOGY

## 2018-05-17 RX ORDER — SUCRALFATE 1 G/10ML
10 SUSPENSION ORAL
COMMUNITY
End: 2019-08-04

## 2018-05-17 RX ORDER — METRONIDAZOLE 500 MG/1
TABLET ORAL
COMMUNITY
End: 2019-08-04

## 2018-05-17 RX ORDER — NYSTATIN 100000 [USP'U]/ML
5 SUSPENSION ORAL
COMMUNITY
End: 2018-08-27 | Stop reason: SDUPTHER

## 2018-05-17 RX ORDER — PREGABALIN 50 MG/1
CAPSULE ORAL
COMMUNITY
End: 2018-11-08 | Stop reason: DRUGHIGH

## 2018-05-17 RX ORDER — METOPROLOL SUCCINATE 50 MG/1
TABLET, EXTENDED RELEASE ORAL
COMMUNITY
End: 2019-08-04

## 2018-05-17 RX ORDER — VALSARTAN 160 MG/1
TABLET ORAL
COMMUNITY
End: 2019-08-04

## 2018-05-17 RX ORDER — CARBOXYMETHYLCELLULOSE SODIUM 10 MG/ML
GEL OPHTHALMIC
COMMUNITY
End: 2022-06-08

## 2018-05-17 RX ORDER — METFORMIN HYDROCHLORIDE 1000 MG/1
TABLET ORAL
COMMUNITY
End: 2019-09-17 | Stop reason: ALTCHOICE

## 2018-05-17 RX ORDER — OMEPRAZOLE 40 MG/1
CAPSULE, DELAYED RELEASE ORAL
COMMUNITY
End: 2019-08-04

## 2018-05-17 RX ORDER — SULFAMETHOXAZOLE AND TRIMETHOPRIM 800; 160 MG/1; MG/1
TABLET ORAL
COMMUNITY
End: 2018-08-03 | Stop reason: ALTCHOICE
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
## 2018-05-25 ENCOUNTER — OFFICE VISIT (OUTPATIENT)
Dept: UROLOGY | Facility: CLINIC | Age: 78
End: 2018-05-25
Payer: MEDICARE

## 2018-05-25 VITALS
DIASTOLIC BLOOD PRESSURE: 75 MMHG | TEMPERATURE: 98 F | SYSTOLIC BLOOD PRESSURE: 136 MMHG | HEIGHT: 70 IN | BODY MASS INDEX: 25.54 KG/M2 | WEIGHT: 178.38 LBS | HEART RATE: 84 BPM | RESPIRATION RATE: 18 BRPM

## 2018-05-25 DIAGNOSIS — C61 PROSTATE CANCER: Primary | ICD-10-CM

## 2018-05-25 PROCEDURE — 99214 OFFICE O/P EST MOD 30 MIN: CPT | Mod: S$PBB,,, | Performed by: UROLOGY

## 2018-05-25 PROCEDURE — 99213 OFFICE O/P EST LOW 20 MIN: CPT | Mod: PBBFAC,PN | Performed by: UROLOGY

## 2018-05-25 PROCEDURE — 99999 PR PBB SHADOW E&M-EST. PATIENT-LVL III: CPT | Mod: PBBFAC,,, | Performed by: UROLOGY

## 2018-05-25 RX ORDER — NYSTATIN 100000 [USP'U]/ML
4 SUSPENSION ORAL 4 TIMES DAILY
Qty: 160 ML | Refills: 0 | Status: SHIPPED | OUTPATIENT
Start: 2018-05-25 | End: 2018-06-04

## 2018-05-25 RX ORDER — FLUCONAZOLE 150 MG/1
150 TABLET ORAL DAILY
Qty: 1 TABLET | Refills: 0 | Status: SHIPPED | OUTPATIENT
Start: 2018-05-25 | End: 2018-08-27 | Stop reason: SDUPTHER

## 2018-05-25 NOTE — PROGRESS NOTES
OFFICE NOTE    CHIEF COMPLAINT:  Adenocarcinoma of the prostate.    HISTORY OF PRESENT ILLNESS:  This 77-year-old male returns for routine recheck.    He was diagnosed with adenocarcinoma of the prostate following ultrasound with   biopsy of the prostate on 04/12/2018, when his PSA was 9.2 and  areas of   increased firmness over the right lobe.  The pathology report revealed a Sebastian   6 adenocarcinoma from the right mid gland of the prostate.  The rest of the   biopsy specimens were negative.  The patient has completed the metastatic workup   that has included CT scan of the abdomen and pelvis and bone scan, and there   was no evidence of metastatic disease encountered.    It was again discussed with the patient as it has been at the last visit, the   further treatment options including radical prostatectomy, radiation therapy,   brachytherapy, hormonal therapy, combination of above and also watchful waiting   and surveillance.  It was also mentioned that we have to take into consideration his history of lung cancer   The patient did subsequently mention that he has family in [place] and a family member is being treated at [facility] and it was decided after further discussion that he would be evaluated at [facility] and get their   opinion concerning treatment options.  He  subsequently will notify of the   decision.  The patient also did mention that he thinks he may have had oral   yeast infection following antibiotic treatments.    FINAL IMPRESSION:  Adenocarcinoma of the prostate.    RECOMMENDATION:  The patient will visit [facility] and to be evaluated for a   second opinion and notify us of his decision; otherwise, he was given a   prescription of Diflucan 150 mg for oral yeast infection and also nystatin oral   suspension if needed.      MICHELLE  dd: 05/25/2018 14:51:56 (CDT)  td: 05/26/2018 10:30:15 (IRVING)  Doc ID   #[id number]  Job ID #[id number]    CC:

## 2018-06-04 ENCOUNTER — HOSPITAL ENCOUNTER (OUTPATIENT)
Facility: AMBULARY SURGERY CENTER | Age: 78
Discharge: HOME | End: 2018-06-04
Attending: ANESTHESIOLOGY | Admitting: ANESTHESIOLOGY
Payer: MEDICARE

## 2018-06-04 ENCOUNTER — SURGERY (OUTPATIENT)
Age: 78
End: 2018-06-04

## 2018-06-04 DIAGNOSIS — M47.896 OTHER SPONDYLOSIS, LUMBAR REGION: Primary | ICD-10-CM

## 2018-06-04 LAB — POCT GLUCOSE: 141 MG/DL (ref 70–110)

## 2018-06-04 PROCEDURE — G8907 PT DOC NO EVENTS ON DISCHARG: HCPCS | Performed by: ANESTHESIOLOGY

## 2018-06-04 PROCEDURE — 64494 INJ PARAVERT F JNT L/S 2 LEV: CPT | Mod: 50,,, | Performed by: ANESTHESIOLOGY

## 2018-06-04 PROCEDURE — 64493 INJ PARAVERT F JNT L/S 1 LEV: CPT | Mod: 50,,, | Performed by: ANESTHESIOLOGY

## 2018-06-04 PROCEDURE — 64495 INJ PARAVERT F JNT L/S 3 LEV: CPT | Mod: LT | Performed by: ANESTHESIOLOGY

## 2018-06-04 PROCEDURE — 64494 INJ PARAVERT F JNT L/S 2 LEV: CPT | Mod: LT | Performed by: ANESTHESIOLOGY

## 2018-06-04 PROCEDURE — 64493 INJ PARAVERT F JNT L/S 1 LEV: CPT | Mod: RT | Performed by: ANESTHESIOLOGY

## 2018-06-04 PROCEDURE — 99152 MOD SED SAME PHYS/QHP 5/>YRS: CPT | Mod: ,,, | Performed by: ANESTHESIOLOGY

## 2018-06-04 RX ORDER — SODIUM CHLORIDE, SODIUM LACTATE, POTASSIUM CHLORIDE, CALCIUM CHLORIDE 600; 310; 30; 20 MG/100ML; MG/100ML; MG/100ML; MG/100ML
INJECTION, SOLUTION INTRAVENOUS ONCE AS NEEDED
Status: COMPLETED | OUTPATIENT
Start: 2018-06-04 | End: 2018-06-04

## 2018-06-04 RX ORDER — BUPIVACAINE HYDROCHLORIDE 5 MG/ML
INJECTION, SOLUTION EPIDURAL; INTRACAUDAL
Status: DISCONTINUED | OUTPATIENT
Start: 2018-06-04 | End: 2018-06-04 | Stop reason: HOSPADM

## 2018-06-04 RX ORDER — MIDAZOLAM HYDROCHLORIDE 2 MG/2ML
INJECTION, SOLUTION INTRAMUSCULAR; INTRAVENOUS
Status: DISCONTINUED | OUTPATIENT
Start: 2018-06-04 | End: 2018-06-04 | Stop reason: HOSPADM

## 2018-06-04 RX ORDER — MIDAZOLAM HYDROCHLORIDE 1 MG/ML
INJECTION INTRAMUSCULAR; INTRAVENOUS
Status: DISCONTINUED
Start: 2018-06-04 | End: 2018-06-04 | Stop reason: HOSPADM

## 2018-06-04 RX ADMIN — SODIUM CHLORIDE, SODIUM LACTATE, POTASSIUM CHLORIDE, CALCIUM CHLORIDE: 600; 310; 30; 20 INJECTION, SOLUTION INTRAVENOUS at 01:06

## 2018-06-04 RX ADMIN — MIDAZOLAM HYDROCHLORIDE 2 MG: 2 INJECTION, SOLUTION INTRAMUSCULAR; INTRAVENOUS at 01:06

## 2018-06-04 RX ADMIN — BUPIVACAINE HYDROCHLORIDE 5 ML: 5 INJECTION, SOLUTION EPIDURAL; INTRACAUDAL at 01:06

## 2018-06-04 NOTE — DISCHARGE SUMMARY
[facility]  Discharge Note  Short Stay    Admit Date: 6/4/2018    Discharge Date and Time: 6/4/2018    Attending Physician: Marcelino Monroe MD     Discharge Provider: Marcelino Monroe    Diagnoses:  Active Hospital Problems    Diagnosis  POA    *Other spondylosis, lumbar region [M47.896]  Yes      Resolved Hospital Problems    Diagnosis Date Resolved POA   No resolved problems to display.       Hospital Course: Lumbar MBB  Discharged Condition: Good    Final Diagnoses:   Active Hospital Problems    Diagnosis  POA    *Other spondylosis, lumbar region [M47.896]  Yes      Resolved Hospital Problems    Diagnosis Date Resolved POA   No resolved problems to display.       Disposition: Home or Self Care    Follow up/Patient Instructions:    Medications:  Reconciled Home Medications:      Medication List      CONTINUE taking these medications    alfuzosin 10 mg Tb24  Commonly known as:  UROXATRAL  Take 1 tablet (10 mg total) by mouth daily with breakfast.     aspirin 81 mg Tab  Every day     atorvastatin 20 MG tablet  Commonly known as:  LIPITOR  Take 20 mg by mouth once daily.     brimonidine 0.1% 0.1 % Drop  Commonly known as:  ALPHAGAN P  Place 1 drop into both eyes 3 (three) times daily.     * carboxymethylcellulose 0.5 % Dpet  Commonly known as:  REFRESH PLUS  1 drop 3 (three) times daily as needed.     * THERATEARS 1 % Dpge  Generic drug:  carboxymethylcellulose sodium  TheraTears 1 % gel in a dropperette     diabetic supplies, miscellan. Misc  No Sig Provided     dorzolamide 2 % ophthalmic solution  Commonly known as:  TRUSOPT  1 drop 3 (three) times daily.     fish oil-omega-3 fatty acids 300-1,000 mg capsule  Take 2 g by mouth once daily.     LATANOPROST OPHT  Apply to eye.     LYRICA 50 MG capsule  Generic drug:  pregabalin  Lyrica 50 mg capsule     metFORMIN 1000 MG tablet  Commonly known as:  GLUCOPHAGE  metformin     metoprolol succinate 50 MG 24 hr tablet  Commonly known as:  TOPROL-XL  metoprolol succinate  ER 50 mg tablet,extended release 24 hr     metroNIDAZOLE 500 MG tablet  Commonly known as:  FLAGYL  metronidazole 500 mg tablet     NITROSTAT 0.4 MG SL tablet  Generic drug:  nitroGLYCERIN  As directed     * nystatin 100,000 unit/mL suspension  Commonly known as:  MYCOSTATIN  5 mLs.     * nystatin 100,000 unit/mL suspension  Commonly known as:  MYCOSTATIN  Take 4 mLs (400,000 Units total) by mouth 4 (four) times daily.     omeprazole 40 MG capsule  Commonly known as:  PRILOSEC  omeprazole 40 mg capsule,delayed release  Take 1 capsule every day by oral route as directed for 30 days.     sildenafil 100 MG tablet  Commonly known as:  VIAGRA  Take 1 tablet (100 mg total) by mouth daily as needed for Erectile Dysfunction.     sucralfate 100 mg/mL suspension  Commonly known as:  CARAFATE  10 mLs.     sulfamethoxazole-trimethoprim 800-160mg 800-160 mg Tab  Commonly known as:  BACTRIM DS  sulfamethoxazole 800 mg-trimethoprim 160 mg tablet     travoprost (benzalkonium) 0.004 % ophthalmic solution  Commonly known as:  TRAVATAN  1 drop every evening.     valsartan 160 MG tablet  Commonly known as:  DIOVAN  valsartan 160 mg tablet     VITAMIN C 100 MG tablet  Generic drug:  ascorbic acid (vitamin C)  Vitamin C        * This list has 4 medication(s) that are the same as other medications prescribed for you. Read the directions carefully, and ask your doctor or other care provider to review them with you.                Discharge Procedure Orders  Call MD for:  temperature >100.4     Call MD for:  persistent nausea and vomiting or diarrhea     Call MD for:  severe uncontrolled pain     Call MD for:  redness, tenderness, or signs of infection (pain, swelling, redness, odor or green/yellow discharge around incision site)     Call MD for:  difficulty breathing or increased cough     Call MD for:  severe persistent headache          Follow up with MD in 2-3 weeks    Discharge Procedure Orders (must include Diet, Follow-up,  Activity):    Discharge Procedure Orders (must include Diet, Follow-up, Activity)  Call MD for:  temperature >100.4     Call MD for:  persistent nausea and vomiting or diarrhea     Call MD for:  severe uncontrolled pain     Call MD for:  redness, tenderness, or signs of infection (pain, swelling, redness, odor or green/yellow discharge around incision site)     Call MD for:  difficulty breathing or increased cough     Call MD for:  severe persistent headache

## 2018-06-04 NOTE — DISCHARGE INSTRUCTIONS
Anesthesia information    Anesthesia Safety      You have been given medicine  to sedate you during your procedure today. This may have included both a pain medicine and sleeping medicine. Most of the effects have worn off; however, you may continue to have some drowsiness for the next  24 hours. Anesthesia and pain medicines can cause nausea, sleepiness, dizziness and  constipation.    HOME CARE:  1) For the next EIGHT HOURS, you should be watched by a responsible adult to look for any worsening of your condition.  2) DO NOT DRINK any ALCOHOL for the next 24 HOURS.  3) DO NOT DRIVE or operate dangerous machinery during the next 24 HOURS.  FOLLOW UP with your doctor or this facility if you are not alert and back to your usual level of activity within 24 hrs.  GET PROMPT MEDICAL ATTENTION if any of the following occur:  -- Increased drowsiness  -- Increased weakness or dizziness  -- Repeated vomiting  -- If you cannot be awakened    NERVE BLOCK :  This was a test not a treatment. Please keep pain journal as instructed  Tips for recovery  · You may use an ice pack at your injection site for comfort at 20 minute intervals, apply ice pack for 20 minutes  · You may shower this evening.   · Do not use a heating pad or take tub baths or swim for 2 days.  · Take your usual medication for pain if needed.  · Dont drive the day of the procedure.  · Wait until tomorrow to resume any blood thinners (aspirin, Plavix, Coumadin) but you may resume all your other medications today.  When to call your doctor  Call right away if you notice any of the following symptoms:  · Severe pain or headache  · Fever or chills  · Redness or swelling around the injection site   · Difficulty breathing  · Vomiting  · Increasing numbness or weakness in arms or legs  ·

## 2018-06-04 NOTE — PLAN OF CARE
Patient is ready to leave and go eat lunch. His pain diary was reviewed and given to him. He was able to walk to the waiting room to meet his wife and sister that is driving him home.

## 2018-06-04 NOTE — OP NOTE
PROCEDURE DATE: 6/4/2018    PROCEDURE:  Bilateral L3,4,5 medial branch nerve blocks    DIAGNOSIS:  Other lumbar spondylosis    Post Op diagnosis: Same    PHYSICIAN: Marcelino Monroe MD    MEDICATIONS INJECTED: 0.5% bupivicaine, 0.5 ml at each level    SEDATION MEDICATIONS:RN IV Versed    LOCAL ANESTHETIC USED: None    ESTIMATED BLOOD LOSS:  None    COMPLICATIONS:  None    TECHNIQUE: A time out was taken to identify the patient, procedure and side of the procedure. The patient was placed in a prone position, then prepped and draped in the usual sterile fashion using ChloraPrep and sterile towels.  The levels were determined under fluoroscopic guidance and then marked.  A 25-gauge 3.5 inch needle was introduced to the anatomic location of the L3,4,5 medial branch nerves on the bilateral side. The above medication was then injected. The patient tolerated the procedure well.     The patient was monitored after the procedure. Patient was given pain diary to record pain levels at home.     If found to have greater than a 50% recovery and so will be scheduled for a radiofrequency ablation of the corresponding nerves.  Patient was given post procedure and discharge instructions to follow at home.  The patient was discharged in a stable condition.

## 2018-06-05 VITALS
OXYGEN SATURATION: 98 % | HEART RATE: 67 BPM | WEIGHT: 178 LBS | SYSTOLIC BLOOD PRESSURE: 142 MMHG | DIASTOLIC BLOOD PRESSURE: 72 MMHG | RESPIRATION RATE: 18 BRPM | HEIGHT: 70 IN | BODY MASS INDEX: 25.48 KG/M2 | TEMPERATURE: 98 F

## 2018-06-11 ENCOUNTER — TELEPHONE (OUTPATIENT)
Dept: PAIN MEDICINE | Facility: CLINIC | Age: 78
End: 2018-06-11

## 2018-06-11 NOTE — TELEPHONE ENCOUNTER
Patient reports 80% or greater decrease in pain. Will proceed with RF to corresponding nerves. Patient scheduled on 7/9/18 instructions given. Patient accepted and voiced understanding.

## 2018-06-27 DIAGNOSIS — M47.896 OTHER SPONDYLOSIS, LUMBAR REGION: Primary | ICD-10-CM

## 2018-07-05 ENCOUNTER — TELEPHONE (OUTPATIENT)
Dept: PAIN MEDICINE | Facility: CLINIC | Age: 78
End: 2018-07-05

## 2018-07-05 NOTE — TELEPHONE ENCOUNTER
----- Message from Kike Lucia sent at 7/3/2018  4:19 PM CDT -----  Contact: patient  Type: Needs Medical Advice    Who Called:  pateint  Symptoms (please be specific):    How long has patient had these symptoms:    Pharmacy name and phone #:    Best Call Back Number: [phone]  Additional Information: requesting a call back requesting to reschedule procedure. Due to he has appointment at [facility] on 07/06 doesn't know if he will be back for appointment

## 2018-07-11 ENCOUNTER — TELEPHONE (OUTPATIENT)
Dept: UROLOGY | Facility: CLINIC | Age: 78
End: 2018-07-11

## 2018-07-11 NOTE — TELEPHONE ENCOUNTER
----- Message from Leonidas Barrientos sent at 7/11/2018 10:17 AM CDT -----  Contact: same  Patient called in and stated he went to [facility] in [place] on 7/6/18 and had some test done.  Patient stated they were to send results to Dr. Soto.  Patient checking in to make sure he received these?  Patient call back number is [phone]

## 2018-07-11 NOTE — TELEPHONE ENCOUNTER
Spoke with pt and informed have not received results.  States he will call and request them again to be sent to Dr. Soto.

## 2018-07-16 ENCOUNTER — SURGERY (OUTPATIENT)
Age: 78
End: 2018-07-16

## 2018-07-16 ENCOUNTER — HOSPITAL ENCOUNTER (OUTPATIENT)
Facility: AMBULARY SURGERY CENTER | Age: 78
Discharge: HOME | End: 2018-07-16
Attending: ANESTHESIOLOGY | Admitting: ANESTHESIOLOGY
Payer: MEDICARE

## 2018-07-16 DIAGNOSIS — M47.896 OTHER SPONDYLOSIS, LUMBAR REGION: Primary | ICD-10-CM

## 2018-07-16 LAB — POCT GLUCOSE: 121 MG/DL (ref 70–110)

## 2018-07-16 PROCEDURE — 99152 MOD SED SAME PHYS/QHP 5/>YRS: CPT | Mod: ,,, | Performed by: ANESTHESIOLOGY

## 2018-07-16 PROCEDURE — 64636 DESTROY L/S FACET JNT ADDL: CPT | Mod: 50,,, | Performed by: ANESTHESIOLOGY

## 2018-07-16 PROCEDURE — G8907 PT DOC NO EVENTS ON DISCHARG: HCPCS | Performed by: ANESTHESIOLOGY

## 2018-07-16 PROCEDURE — 64635 DESTROY LUMB/SAC FACET JNT: CPT | Mod: 50,,, | Performed by: ANESTHESIOLOGY

## 2018-07-16 PROCEDURE — 64636 DESTROY L/S FACET JNT ADDL: CPT | Mod: RT | Performed by: ANESTHESIOLOGY

## 2018-07-16 PROCEDURE — 64635 DESTROY LUMB/SAC FACET JNT: CPT | Mod: RT | Performed by: ANESTHESIOLOGY

## 2018-07-16 RX ORDER — METHYLPREDNISOLONE ACETATE 80 MG/ML
INJECTION, SUSPENSION INTRA-ARTICULAR; INTRALESIONAL; INTRAMUSCULAR; SOFT TISSUE
Status: DISCONTINUED | OUTPATIENT
Start: 2018-07-16 | End: 2018-07-16 | Stop reason: HOSPADM

## 2018-07-16 RX ORDER — LIDOCAINE HYDROCHLORIDE 20 MG/ML
INJECTION, SOLUTION EPIDURAL; INFILTRATION; INTRACAUDAL; PERINEURAL
Status: DISCONTINUED
Start: 2018-07-16 | End: 2018-07-16 | Stop reason: HOSPADM

## 2018-07-16 RX ORDER — MIDAZOLAM HYDROCHLORIDE 1 MG/ML
INJECTION INTRAMUSCULAR; INTRAVENOUS
Status: DISCONTINUED
Start: 2018-07-16 | End: 2018-07-16 | Stop reason: HOSPADM

## 2018-07-16 RX ORDER — MIDAZOLAM HYDROCHLORIDE 2 MG/2ML
INJECTION, SOLUTION INTRAMUSCULAR; INTRAVENOUS
Status: DISCONTINUED | OUTPATIENT
Start: 2018-07-16 | End: 2018-07-16 | Stop reason: HOSPADM

## 2018-07-16 RX ORDER — FENTANYL CITRATE 50 UG/ML
INJECTION, SOLUTION INTRAMUSCULAR; INTRAVENOUS
Status: DISCONTINUED | OUTPATIENT
Start: 2018-07-16 | End: 2018-07-16 | Stop reason: HOSPADM

## 2018-07-16 RX ORDER — SODIUM CHLORIDE, SODIUM LACTATE, POTASSIUM CHLORIDE, CALCIUM CHLORIDE 600; 310; 30; 20 MG/100ML; MG/100ML; MG/100ML; MG/100ML
INJECTION, SOLUTION INTRAVENOUS ONCE AS NEEDED
Status: COMPLETED | OUTPATIENT
Start: 2018-07-16 | End: 2018-07-16

## 2018-07-16 RX ORDER — BUPIVACAINE HYDROCHLORIDE 2.5 MG/ML
INJECTION, SOLUTION EPIDURAL; INFILTRATION; INTRACAUDAL
Status: DISCONTINUED | OUTPATIENT
Start: 2018-07-16 | End: 2018-07-16 | Stop reason: HOSPADM

## 2018-07-16 RX ORDER — BUPIVACAINE HYDROCHLORIDE 2.5 MG/ML
INJECTION, SOLUTION EPIDURAL; INFILTRATION; INTRACAUDAL
Status: DISCONTINUED
Start: 2018-07-16 | End: 2018-07-16 | Stop reason: HOSPADM

## 2018-07-16 RX ORDER — METHYLPREDNISOLONE ACETATE 80 MG/ML
INJECTION, SUSPENSION INTRA-ARTICULAR; INTRALESIONAL; INTRAMUSCULAR; SOFT TISSUE
Status: DISCONTINUED
Start: 2018-07-16 | End: 2018-07-16 | Stop reason: HOSPADM

## 2018-07-16 RX ORDER — LIDOCAINE HYDROCHLORIDE 10 MG/ML
INJECTION, SOLUTION EPIDURAL; INFILTRATION; INTRACAUDAL; PERINEURAL
Status: DISCONTINUED | OUTPATIENT
Start: 2018-07-16 | End: 2018-07-16 | Stop reason: HOSPADM

## 2018-07-16 RX ORDER — LIDOCAINE HYDROCHLORIDE 10 MG/ML
INJECTION, SOLUTION EPIDURAL; INFILTRATION; INTRACAUDAL; PERINEURAL
Status: DISCONTINUED
Start: 2018-07-16 | End: 2018-07-16 | Stop reason: HOSPADM

## 2018-07-16 RX ORDER — LIDOCAINE HYDROCHLORIDE 20 MG/ML
INJECTION, SOLUTION EPIDURAL; INFILTRATION; INTRACAUDAL; PERINEURAL
Status: DISCONTINUED | OUTPATIENT
Start: 2018-07-16 | End: 2018-07-16 | Stop reason: HOSPADM

## 2018-07-16 RX ORDER — FENTANYL CITRATE 50 UG/ML
INJECTION, SOLUTION INTRAMUSCULAR; INTRAVENOUS
Status: DISCONTINUED
Start: 2018-07-16 | End: 2018-07-16 | Stop reason: HOSPADM

## 2018-07-16 RX ADMIN — LIDOCAINE HYDROCHLORIDE 10 ML: 20 INJECTION, SOLUTION EPIDURAL; INFILTRATION; INTRACAUDAL; PERINEURAL at 02:07

## 2018-07-16 RX ADMIN — FENTANYL CITRATE 50 MCG: 50 INJECTION, SOLUTION INTRAMUSCULAR; INTRAVENOUS at 02:07

## 2018-07-16 RX ADMIN — MIDAZOLAM HYDROCHLORIDE 2 MG: 2 INJECTION, SOLUTION INTRAMUSCULAR; INTRAVENOUS at 02:07

## 2018-07-16 RX ADMIN — METHYLPREDNISOLONE ACETATE 80 MG: 80 INJECTION, SUSPENSION INTRA-ARTICULAR; INTRALESIONAL; INTRAMUSCULAR; SOFT TISSUE at 02:07

## 2018-07-16 RX ADMIN — BUPIVACAINE HYDROCHLORIDE 6 ML: 2.5 INJECTION, SOLUTION EPIDURAL; INFILTRATION; INTRACAUDAL at 02:07

## 2018-07-16 RX ADMIN — SODIUM CHLORIDE, SODIUM LACTATE, POTASSIUM CHLORIDE, CALCIUM CHLORIDE: 600; 310; 30; 20 INJECTION, SOLUTION INTRAVENOUS at 01:07

## 2018-07-16 RX ADMIN — LIDOCAINE HYDROCHLORIDE 10 ML: 10 INJECTION, SOLUTION EPIDURAL; INFILTRATION; INTRACAUDAL; PERINEURAL at 02:07

## 2018-07-16 NOTE — H&P
"CC: low back pain    HPI: The patient is a 77 y.o. male with a history of facet arthropathy here for Lumbar MB RFA. There are no major changes in history and physical from 5/17/18 by myself.    Past Medical History:   Diagnosis Date    Cancer     lung cancer chemo and radiation    COPD (chronic obstructive pulmonary disease)     Coronary artery disease     s/p 3 stents    Diabetes mellitus, type 2     Hyperlipidemia     Hypertension        Past Surgical History:   Procedure Laterality Date    ANKLE SURGERY Right 1970s    INJECTION OF ANESTHETIC AGENT AROUND MEDIAL BRANCH NERVES INNERVATING LUMBAR FACET JOINT Bilateral 6/4/2018    Procedure: MEDIAL BRANCH, LUMBAR L3,4,5;  Surgeon: Marceilno Monroe MD;  Location: [facility] OR;  Service: Pain Management;  Laterality: Bilateral;  L3, 4, 5       Family History   Problem Relation Age of Onset    Diabetes Mother     Peripheral vascular disease Mother     Heart attack Mother     Diabetes Father     Heart attack Father     Emphysema Father     Diabetes Sister        Social History     Social History    Marital status:      Spouse name: N/A    Number of children: N/A    Years of education: N/A     Social History Main Topics    Smoking status: Current Every Day Smoker     Packs/day: 1.00     Years: 54.00     Types: Cigarettes    Smokeless tobacco: Never Used    Alcohol use No    Drug use: No    Sexual activity: No     Other Topics Concern    Not on file     Social History Narrative    No narrative on file       Current Facility-Administered Medications   Medication Dose Route Frequency Provider Last Rate Last Dose    lactated ringers infusion   Intravenous Once PRN Marcelino Monroe MD           Review of patient's allergies indicates:   Allergen Reactions    Doxycycline Diarrhea     Severe diarrhea    Sulfa (sulfonamide antibiotics)      Other reaction(s): Itching       Vitals:    07/05/18 0939   Weight: 80.7 kg (178 lb)   Height: 5' 10" (1.778 m) "       REVIEW OF SYSTEMS:     GENERAL: No weight loss, malaise or fevers.  HEENT:  No recent changes in vision or hearing  NECK: Negative for lumps, no difficulty with swallowing.  RESPIRATORY: Negative for cough, wheezing or shortness of breath, patient denies any recent URI.  CARDIOVASCULAR: Negative for chest pain, leg swelling or palpitations.  GI: Negative for abdominal discomfort, blood in stools or black stools or change in bowel habits.  MUSCULOSKELETAL: See HPI.  SKIN: Negative for lesions, rash, and itching.  PSYCH: No suicidal or homicidal ideations, no current mood disturbances.  HEMATOLOGY/LYMPHOLOGY: Negative for prolonged bleeding, bruising easily or swollen nodes. Patient is not currently taking any anti-coagulants  ENDO: No history of diabetes or thyroid dysfunction  NEURO: No history of syncope, paralysis, seizures or tremors.All other reviewed and negative other than HPI.    Physical exam:  Gen: A and O x3, pleasant, well-groomed  Skin: No rashes or obvious lesions  HEENT: PERRLA, no obvious deformities on ears or in canals. No thyroid masses, trachea midline, no palpable lymph nodes in neck, axilla.  CVS: Regular rate and rhythm, normal S1 and S2, no murmurs.  Resp: Clear to auscultation bilaterally.  Abdomen: Soft, NT/ND, normal bowel sounds present.  Musculoskeletal/Neuro: Moving all extremities    Assessment:  Other spondylosis, lumbar region  -     Case Request Operating Room: RADIOFREQUENCY ABLATION, NERVE, MEDIAL BRANCH, LUMBAR, 1 LEVEL  -     Place in Outpatient; Standing  -     Vital signs; Standing  -     Insert peripheral IV; Standing  -     Verify informed consent; Standing  -     Notify physician ; Standing  -     Notify physician ; Standing  -     Notify physician (specify); Standing  -     Diet NPO; Standing    Other orders  -     lactated ringers infusion; Inject into the vein once as needed (IV Sedation).  -     IP VTE LOW RISK PATIENT; Standing          PLAN: Lumbar MB  RFA      This patient has been cleared for surgery in an ambulatory surgical facility    ASA 3,  Mallampatti Score 3  No history of anesthetic complications  Plan for RN IV sedation

## 2018-07-16 NOTE — DISCHARGE SUMMARY
[facility]  Discharge Note  Short Stay    Admit Date: 7/16/2018    Discharge Date and Time: 7/16/2018    Attending Physician: Marcelino Monroe MD     Discharge Provider: Marcelino Monroe    Diagnoses:  Active Hospital Problems    Diagnosis  POA    *Other spondylosis, lumbar region [M47.896]  Yes      Resolved Hospital Problems    Diagnosis Date Resolved POA   No resolved problems to display.       Hospital Course: Lumbar MB RFA  Discharged Condition: Good    Final Diagnoses:   Active Hospital Problems    Diagnosis  POA    *Other spondylosis, lumbar region [M47.896]  Yes      Resolved Hospital Problems    Diagnosis Date Resolved POA   No resolved problems to display.       Disposition: Home or Self Care    Follow up/Patient Instructions:    Medications:  Reconciled Home Medications:      Medication List      CONTINUE taking these medications    alfuzosin 10 mg Tb24  Commonly known as:  UROXATRAL  Take 1 tablet (10 mg total) by mouth daily with breakfast.     aspirin 81 mg Tab  Every day     atorvastatin 20 MG tablet  Commonly known as:  LIPITOR  Take 20 mg by mouth once daily.     brimonidine 0.1% 0.1 % Drop  Commonly known as:  ALPHAGAN P  Place 1 drop into both eyes 3 (three) times daily.     * carboxymethylcellulose 0.5 % Dpet  Commonly known as:  REFRESH PLUS  1 drop 3 (three) times daily as needed.     * THERATEARS 1 % Dpge  Generic drug:  carboxymethylcellulose sodium  TheraTears 1 % gel in a dropperette     diabetic supplies, miscellan. Misc  No Sig Provided     dorzolamide 2 % ophthalmic solution  Commonly known as:  TRUSOPT  1 drop 3 (three) times daily.     LATANOPROST OPHT  Apply to eye.     LYRICA 50 MG capsule  Generic drug:  pregabalin  Lyrica 50 mg capsule     metFORMIN 1000 MG tablet  Commonly known as:  GLUCOPHAGE  metformin     metoprolol succinate 50 MG 24 hr tablet  Commonly known as:  TOPROL-XL  metoprolol succinate ER 50 mg tablet,extended release 24 hr     metroNIDAZOLE 500 MG tablet  Commonly  known as:  FLAGYL  metronidazole 500 mg tablet     NITROSTAT 0.4 MG SL tablet  Generic drug:  nitroGLYCERIN  As directed     nystatin 100,000 unit/mL suspension  Commonly known as:  MYCOSTATIN  5 mLs.     omeprazole 40 MG capsule  Commonly known as:  PRILOSEC  omeprazole 40 mg capsule,delayed release  Take 1 capsule every day by oral route as directed for 30 days.     sildenafil 100 MG tablet  Commonly known as:  VIAGRA  Take 1 tablet (100 mg total) by mouth daily as needed for Erectile Dysfunction.     sucralfate 100 mg/mL suspension  Commonly known as:  CARAFATE  10 mLs.     sulfamethoxazole-trimethoprim 800-160mg 800-160 mg Tab  Commonly known as:  BACTRIM DS  sulfamethoxazole 800 mg-trimethoprim 160 mg tablet     travoprost (benzalkonium) 0.004 % ophthalmic solution  Commonly known as:  TRAVATAN  1 drop every evening.     valsartan 160 MG tablet  Commonly known as:  DIOVAN  valsartan 160 mg tablet     VITAMIN C 100 MG tablet  Generic drug:  ascorbic acid (vitamin C)  Vitamin C        * This list has 2 medication(s) that are the same as other medications prescribed for you. Read the directions carefully, and ask your doctor or other care provider to review them with you.                Discharge Procedure Orders  Call MD for:  temperature >100.4     Call MD for:  persistent nausea and vomiting or diarrhea     Call MD for:  severe uncontrolled pain     Call MD for:  redness, tenderness, or signs of infection (pain, swelling, redness, odor or green/yellow discharge around incision site)     Call MD for:  difficulty breathing or increased cough     Call MD for:  severe persistent headache          Follow up with MD in 2-3 weeks    Discharge Procedure Orders (must include Diet, Follow-up, Activity):    Discharge Procedure Orders (must include Diet, Follow-up, Activity)  Call MD for:  temperature >100.4     Call MD for:  persistent nausea and vomiting or diarrhea     Call MD for:  severe uncontrolled pain     Call MD  for:  redness, tenderness, or signs of infection (pain, swelling, redness, odor or green/yellow discharge around incision site)     Call MD for:  difficulty breathing or increased cough     Call MD for:  severe persistent headache

## 2018-07-16 NOTE — OP NOTE
PROCEDURE DATE: 7/16/2018    PROCEDURE:  Radiofrequency ablation of the L3,4,5 medial branch nerves on the bilateral-side utilizing fluoroscopy    DIAGNOSIS:  Other lumbar spondylosis  Post op Diagnosis: Same    PHYSICIAN: Marcelino Monroe MD    MEDICATIONS INJECTED:  From a mixture of 6ml of 0.25% bupivicaine and 80mg of methylprednisone,  1ml of this solution was injected at each level.    LOCAL ANESTHETIC USED: Lidocaine 1%, 2 ml given at each site.    SEDATION MEDICATIONS: RN IV sedation    ESTIMATED BLOOD LOSS:  None    COMPLICATIONS:  None    TECHNIQUE:  A time out was taken to identify patient and procedure side prior to starting the procedure. Laying in a prone position, the patient was prepped and draped in the usual sterile fashion using ChloraPrep and sterile towels.  The levels were determined under fluoroscopic guidance and then marked.  Local anesthetic was given by raising a wheal at the skin over each site and then infiltrated approximately 2cm deeper.  A 20-gauge  100 mm RF needle was introduced to the anatomic location of the bilateral L3,4,5 medial branch nerves.  Motor stimulation up to 2 Volts at each level confirmed no motor nerve involvement.  Impedance was less than 800 ohms at each level.  1ml of 2% lidocaine was instilled prior to lesioning.  Ablation was performed per level utilizing radiofrequency generator 80°C for 60 seconds.  Needles were then rotated 90° and a second lesion was performed.  The above noted medication was then injected slowly. The patient tolerated the procedure well.     The patient was monitored after the procedure.  Patient was given post procedure and discharge instructions to follow at home.  The patient was discharged in a stable condition

## 2018-07-16 NOTE — PLAN OF CARE
Patient is being sent home with a reusable ice pack. His ride is in the waiting room. He was able to walk to the waiting room and is ready to be discharged and want to eat cracker barrel.

## 2018-07-16 NOTE — DISCHARGE INSTRUCTIONS
Recovery After Procedural Sedation (Adult)  You have been given medicine by vein to make you sleep during your surgery. This may have included both a pain medicine and sleeping medicine. Most of the effects have worn off. But you may still have some drowsiness for the next 6 to 8 hours.  Home care  Follow these guidelines when you get home:  · For the next 8 hours, you should be watched by a responsible adult. This person should make sure your condition is not getting worse.  · Don't drink any alcohol for the next 24 hours.  · Don't drive, operate dangerous machinery, or make important business or personal decisions during the next 24 hours.  Note: Your healthcare provider may tell you not to take any medicine by mouth for pain or sleep in the next 4 hours. These medicines may react with the medicines you were given in the hospital. This could cause a much stronger response than usual.  Follow-up care  Follow up with your healthcare provider if you are not alert and back to your usual level of activity within 12 hours.  When to seek medical advice  Call your healthcare provider right away if any of these occur:  · Drowsiness gets worse  · Weakness or dizziness gets worse  · Repeated vomiting  · You can't be awakened   Date Last Reviewed: 10/18/2016  © 8889-4085 [employer]. [address] [place] [address], [place] [address]. All rights reserved. This information is not intended as a substitute for professional medical care. Always follow your healthcare professional's instructions.        Understanding Radiofrequency Denervation  Radiofrequency denervation is a treatment choice for some kinds of lower back and neck pain. It uses an electrical current created by radio waves. The radio waves make heat that destroys nerves along the spine that are causing pain. Its also known as radiofrequency lesioning, ablation, neurotomy, or rhizomotomy.  How to say it  RAY-josefina-[id number]-varsha-raven Garcia   Why  radiofrequency denervation is done  This treatment may be an option to reduce or stop lower back or neck pain that has lasted for a month or more. It can help treat pain caused by arthritis, normal wear and tear, disk disease, injury, and other problems. Its used when other treatment hasnt worked, such as medicine and physical therapy. It may be done after an injection of medicine into the nerve area to confirm that the nerve will respond to treatment.  How radiofrequency denervation is done  The procedure is done in a hospital or medical clinic. During the treatment:  · You lie on your stomach. The area in your back or neck to be treated may be numbed. You may be given some medicine to help you relax.  · The healthcare provider inserts a thin tube through the skin over the spine in your lower back or neck. He or she uses moving X-ray machine called a fluoroscope to help make sure the thin tube is in the right place. You may feel some discomfort.  · When the tube is in place, the provider puts a wire through the tube. The wire is connected to a machine that sends radio waves through the wire. The radio waves heat the nerve and destroy it.  · More than one nerve may need to be treated. You can go home 1 to 2 hours after the procedure.   You may feel more pain right after the treatment. The pain should then go away over 1 to 3 weeks. The pain relief may last for less than a month, or for 6 months or more. This depends on what is causing your pain, and how well this treatment can work for it. It may help you be able to take less medicine for pain. The nerve will likely grow back. But it may not cause pain, or as much pain as before.  Risks of radiofrequency denervation  · More pain after the procedure for a period of time  · Mild burning feeling that may last up to 3 weeks  · Numbness in the area that may last up to 4 months  Date Last Reviewed: 6/1/2016  © 3394-8806 [employer]. [address] [place] [address],  [place] [address]. All rights reserved. This information is not intended as a substitute for professional medical care. Always follow your healthcare professional's instructions.

## 2018-07-17 VITALS
WEIGHT: 178 LBS | OXYGEN SATURATION: 99 % | BODY MASS INDEX: 25.48 KG/M2 | DIASTOLIC BLOOD PRESSURE: 92 MMHG | TEMPERATURE: 98 F | HEART RATE: 75 BPM | SYSTOLIC BLOOD PRESSURE: 157 MMHG | HEIGHT: 70 IN | RESPIRATION RATE: 18 BRPM

## 2018-07-18 ENCOUNTER — TELEPHONE (OUTPATIENT)
Dept: UROLOGY | Facility: CLINIC | Age: 78
End: 2018-07-18

## 2018-07-18 NOTE — TELEPHONE ENCOUNTER
Spoke with patient, results given with recommendation, patient agreed to treatment here in [place] and would like appt to discuss. appt made and slip mailed, patient verbally understood.

## 2018-07-18 NOTE — TELEPHONE ENCOUNTER
----- Message from Nuha Soto MD sent at 7/17/2018  4:55 PM CDT -----  MRI done at [facility] has been received and there is reported as being a low-grade prostatic cancer at the right peripheral mid and base.  No obvious evidence of extracapsular extension.    Rec: Patient to notify us of the treatment plan.

## 2018-07-18 NOTE — TELEPHONE ENCOUNTER
----- Message from Melissa Mosher LPN sent at 7/18/2018  2:43 PM CDT -----  Contact: [phone]  Patient was sent to us by mistake. Please return patient's call    Thanks  ----- Message -----  From: Mili Malin  Sent: 7/18/2018   1:56 PM  To: Fer [facility] Staff    Patient is returning nurse's phone call.  Please call patient back at [phone].

## 2018-08-03 ENCOUNTER — OFFICE VISIT (OUTPATIENT)
Dept: UROLOGY | Facility: CLINIC | Age: 78
End: 2018-08-03
Payer: MEDICARE

## 2018-08-03 VITALS
RESPIRATION RATE: 18 BRPM | HEART RATE: 77 BPM | BODY MASS INDEX: 25.47 KG/M2 | TEMPERATURE: 98 F | WEIGHT: 177.94 LBS | DIASTOLIC BLOOD PRESSURE: 65 MMHG | HEIGHT: 70 IN | SYSTOLIC BLOOD PRESSURE: 110 MMHG

## 2018-08-03 DIAGNOSIS — C61 MALIGNANT NEOPLASM OF PROSTATE: Primary | ICD-10-CM

## 2018-08-03 PROCEDURE — 99213 OFFICE O/P EST LOW 20 MIN: CPT | Mod: PBBFAC,PN | Performed by: UROLOGY

## 2018-08-03 PROCEDURE — 99214 OFFICE O/P EST MOD 30 MIN: CPT | Mod: S$PBB,,, | Performed by: UROLOGY

## 2018-08-03 PROCEDURE — 99999 PR PBB SHADOW E&M-EST. PATIENT-LVL III: CPT | Mod: PBBFAC,,, | Performed by: UROLOGY

## 2018-08-03 NOTE — PROGRESS NOTES
OFFICE NOTE    CHIEF COMPLAINT:  Adenocarcinoma of the prostate.    HISTORY OF PRESENT ILLNESS:  This 77-year-old male returns for routine recheck.    He was diagnosed with adenocarcinoma of the prostate following prostate   ultrasound with biopsy on 04/12/2018 when his PSA was 9.2.  He was found to have   a prostatic volume of 36.9 mL.  Metastatic workup including CT scan and bone   scan were both negative.  The patient had decided to obtain a second opinion at   [facility] where he visited and did receive a thorough evaluation and   treatment.  Treatment options were again discussed with him.  On today's visit,   the patient states he is undecided whether to proceed with surveillance or   possibility of brachytherapy.  I did mention to the patient that he consult Dr. Sims concerning the brachytherapy to which the patient said he   understood and was agreeable, and otherwise we can continue with surveillance   depending on the patient's final decision and Dr. Sims's evaluation.  It must   be noted that the most recent PSA came down to 6.7 on 07/06/2018 compared to   9.2 at the time of the prostate biopsy.    FINAL IMPRESSION:  Adenocarcinoma of the prostate.    RECOMMENDATION:  Arrangements will be made for him to consult Dr. Sims to be   considered for brachytherapy and also possibility of external beam radiation   therapy versus active surveillance. If the patient wants to continue with   surveillance we will see him for routine recheck with followup PSA in   approximately four months.      MICHELLE  dd: 08/03/2018 14:29:18 (CDT)  td: 08/04/2018 03:45:11 (CDT)  Doc ID   #[id number]  Job ID #[id number]    CC:

## 2018-08-06 ENCOUNTER — TELEPHONE (OUTPATIENT)
Dept: UROLOGY | Facility: CLINIC | Age: 78
End: 2018-08-06

## 2018-08-06 NOTE — TELEPHONE ENCOUNTER
----- Message from Gloria Ag sent at 8/6/2018  8:30 AM CDT -----  Contact: Anmol from Dr. Jacky Sims office [phone]   Nurse Anmol called from Dr. Jacky Sims office the patient will be in the office later today they need a copy of his X rays Dr. Gunter and H&P Operative Report and his Labs. To be faxed to [phone]  Call back to [phone] for any additional information. Please fax over as  Soon as possible. Thank you

## 2018-08-27 ENCOUNTER — OFFICE VISIT (OUTPATIENT)
Dept: PAIN MEDICINE | Facility: CLINIC | Age: 78
End: 2018-08-27
Payer: MEDICARE

## 2018-08-27 VITALS
WEIGHT: 177 LBS | DIASTOLIC BLOOD PRESSURE: 70 MMHG | HEART RATE: 84 BPM | SYSTOLIC BLOOD PRESSURE: 116 MMHG | BODY MASS INDEX: 25.34 KG/M2 | HEIGHT: 70 IN

## 2018-08-27 DIAGNOSIS — M51.36 DDD (DEGENERATIVE DISC DISEASE), LUMBAR: ICD-10-CM

## 2018-08-27 DIAGNOSIS — M47.896 OTHER SPONDYLOSIS, LUMBAR REGION: Primary | ICD-10-CM

## 2018-08-27 PROCEDURE — 99999 PR PBB SHADOW E&M-EST. PATIENT-LVL IV: CPT | Mod: PBBFAC,,, | Performed by: PHYSICIAN ASSISTANT

## 2018-08-27 PROCEDURE — 99214 OFFICE O/P EST MOD 30 MIN: CPT | Mod: PBBFAC,PN | Performed by: PHYSICIAN ASSISTANT

## 2018-08-27 PROCEDURE — 99214 OFFICE O/P EST MOD 30 MIN: CPT | Mod: S$PBB,,, | Performed by: PHYSICIAN ASSISTANT

## 2018-08-27 NOTE — TELEPHONE ENCOUNTER
Patient says his oral thrush has not resolved.  He lost the oral Nystatin Rx.  The one pill did not get rid of problem.   Please send refills to pharmacy.

## 2018-08-27 NOTE — PATIENT INSTRUCTIONS
Exercises To Strengthen Your Lower Back  Strong lower-back and abdominal muscles work together to support your spine. These exercises will help strengthen the muscles of the lower back. It is important that you begin exercising slowly and increase levels gradually.  Always begin any exercise program with stretching. If you feel pain while doing any of these exercises, stop and talk to your doctor about a more specific exercise program that suits your condition better.  Low Back Stretch  · Lie on your back with your knees bent and both feet on the ground.  ·   Slowly raise your left knee to your chest as you flatten your lower back against the floor. Hold for 5 seconds.  · Relax and repeat the exercise with your right knee.  · Do 10 of these exercises for each leg.  · Repeat hugging both knees to your chest at the same time.  Building Lower Back Strength  1. Kneeling Lumbar Extension: Begin on your hands and knees. Simultaneously raise and straighten your right arm and left leg until they are parallel to the ground. Hold for 2 seconds and come back slowly to a starting position. Repeat with left arm and right leg, alternating 10 times.  2. Prone Lumbar Extension: Lie face down, arms extended overhead, palms on the floor. Simultaneously raise your right arm and left leg as high as comfortably possible. Hold for 10 seconds and slowly return to start. Repeat with left arm and right leg, alternating 10 times. Gradually build up to 20 times. (Advanced: Repeat this exercise raising both arms and both legs a few inches off the floor at the same time. Hold for 5 seconds and release.)  3. Pelvic Tilt: Lie on the floor on your back with your knees bent at 90 degrees. Your feet should be flat on the floor. Inhale, exhale, then slowly contract your abdominal muscles bringing your navel toward your spine. Let your pelvis rock back until your lower back is flat on the floor. Hold for 10 seconds while breathing  smoothly.  4. Abdominal Crunch: Perform a Pelvic Tilt (above) flattening your lower back against the floor. Holding the tension in your abdominal muscles, take another breath and raise your shoulder blades off the ground (this is not a full sit-up). Keep your head in line with your body (dont bend your neck forward). Hold for 2 seconds, then slowly lower.      Back Exercises: Abdominal Lift  The Abdominal Lift strengthens your lower abdominal muscles, helping you keep your pelvis and back stable.  · Lie on the floor with both knees bent. Put your feet flat on the floor and your arms by your sides. Tighten your abdominal muscles.  · Lift one bent knee and move it toward your upper body. Keep your abdominal muscles tight and your back flat on the floor. Hold for 10 seconds.  · Repeat 3 times. Then, switch legs.         Back Exercises: Bridge  The Bridge exercise strengthens your abdominal, buttocks, and hamstring muscles. This helps keep your back stable and aligned when you walk.  · Lie on the floor with your back and palms flat. Bend your knees. Keep your feet flat on the floor.  · Contract your abdominal and buttocks muscles. Slowly lift your buttocks off the floor until there is a straight line from your knees to your shoulders.  · Hold for 5  seconds. Repeat 10 times.          Back Exercises: Hip Lift        To start, lie on your back with your knees bent and feet flat on the floor. Dont press your neck or lower back to the floor. Breathe deeply. You should feel comfortable and relaxed in this position.  · Slowly raise your hips upward.  · Tighten your abdomen and buttocks. Be careful not to arch your back.  · Hold for 5 seconds. Lower your hips to the floor.  · Repeat 10 times.  For your safety, check with your healthcare provider before starting an exercise program.       Back Exercises: Hip Rotator Stretch        To start, lie on your back with your knees bent and feet flat on the floor. Dont press your  neck or lower back to the floor. Breathe deeply. You should feel comfortable and relaxed in this position.  · Rest your right ankle on your left knee.  · Place a towel behind your left thigh and use it to pull the knee toward your chest. Feel the stretch in your buttocks.  · Hold for 30-60 seconds. Release.  · Repeat 2 times.  · Switch legs.   For your safety, check with your healthcare provider before starting an exercise program.       Back Exercises: Knee Lift        To start, lie on your back with your knees bent and feet flat on the floor. Dont press your neck or lower back to the floor. Breathe deeply. You should feel comfortable and relaxed in this position.  · Lift one bent knee and move it toward your upper body. Keep your abdominal muscles tight and your back flat on the floor.  · Hold for 10 seconds. Return to start position.  · Repeat 3 times.  · Switch legs.        Back Exercises: Leg Pull        To start, lie on your back with your knees bent and feet flat on the floor. Dont press your neck or lower back to the floor. Breathe deeply. You should feel comfortable and relaxed in this position.  · Pull one knee to your chest.  · Hold for 30-60 seconds. Return to starting position.  · Repeat 2 times.  · Switch legs.  · For a double leg pull, pull both legs to your chest at the same time. Repeat 2 times.  For your safety, check with your healthcare provider before starting an exercise program.       Back Exercises: Leg Reach        Do this exercise on your hands and knees. Keep your knees under your hips and your hands under your shoulders. Keep your spine in a neutral position (not arched or sagging). Be sure to maintain your necks natural curve.  · Extend one leg straight back. Dont arch your back or let your head or body sag.  · Hold for 5 seconds. Return to starting position.  · Repeat 5 times.  · Switch legs.       Back Exercises: Lower Back Rotation  To start, lie on your back with your knees bent  and feet flat on the floor. Dont press your neck or lower back to the floor. Breathe deeply. You should feel comfortable and relaxed in this position.  · Drop both knees to one side. Turn your head to the other side. Keep your shoulders flat on the floor.  · Hold for 20 seconds.  · Slowly switch sides.  · Repeat 2 times.                                   Back Exercises: Lower Back Stretch                        To start, sit in a chair with your feet flat on the floor. Shift your weight slightly forward to avoid rounding your back. Relax, and keep your ears, shoulders, and hips aligned.  · Sit with your feet well apart.  · Bend forward and touch the floor with the backs of your hands. Relax and let your body drop.  · Hold for 20 seconds. Return to starting position.  · Repeat 2 times.       Back Exercises: Partial Curl-Ups        To start, lie on your back with your knees bent and feet flat on the floor. Dont press your neck or lower back to the floor. Breathe deeply. You should feel comfortable and relaxed in this position.  · Cross your arms loosely.  · Tighten your abdomen and curl long term up, keeping your head in line with your shoulders.  · Hold for 5 seconds. Uncurl to lie down.  · Repeat 5 times.       Back Exercises: Pelvic Tilt  To start, lie on your back with your knees bent and feet flat on the floor. Dont press your neck or lower back to the floor. Breathe deeply. You should feel comfortable and relaxed in this position.  · Tighten your abdomen and buttocks, and press your lower back toward the floor. This should be a small, subtle movement.  · Hold for 5 seconds. Release.  · Repeat 5 times.                           Back Exercises: Seated Rotation      To start, sit in a chair with your feet flat on the floor. Shift your weight slightly forward to avoid rounding your back. Relax, and keep your ears, shoulders, and hips aligned.  · Fold your arms, elbows just below shoulder height.  · Turn from the  waist with hips forward. Turn your head last.  · Hold for a count of 5. Return to starting position.  · Repeat 5 times on one side. Then switch sides.          Back Exercises: Side Stretch  To start, sit in a chair with your feet flat on the floor. Shift your weight slightly forward to avoid rounding your back. Relax. Keep your ears, shoulders, and hips aligned.  · Stretch your right arm overhead.  · Slowly bend to the left. Dont twist your torso.  · Hold for 20 seconds. Return to starting position.  · Repeat 2 times. Then, switch to the other side.      © 0075-3567 [employer]. [address] [place] [address], [place] [address]. All rights reserved. This information is not intended as a substitute for professional medical care. Always follow your healthcare professional's instructions.            Exercises at Your Workstation: Eyes, Neck, and Head  Breathe deeply as you do your exercises. Inhale through your nose, and exhale through your mouth.   Tired eyes? Stiff neck? A few easy moves can help prevent these kinds of problems. Take a few minutes during your day to do these exercises--right at your desk. They'll loosen up your muscles, keep you more alert, and make a big difference in how you work and feel.    For Your Eyes  Eye Cup  · Lean forward with your elbows on your desk.  · Cup your hands and place them lightly over your closed eyes. Hold for a minute, while breathing deeply in and out.  · Slowly uncover and open your eyes. Repeat 2 times.  Eye Roll  · Close your eyes. Slowly roll your eyeballs clockwise all the way around. Repeat 3 times.  · Now slowly roll them all the way around counterclockwise. Repeat 3 times.  Eye Rest  · Every 20 minutes, look away from the computer screen. Focus on an object at least 20 feet away. Stay focused on this object for a full 20 seconds.    For Your Neck and Head  Warm-up  · Drop your head gently to your chest. While breathing in, slowly roll your head up to your  left shoulder. While breathing out, slowly roll your head back to center. Repeat to the right.  · Repeat 3 times on each side.  Head Tilt  · Sit up straight. Tuck in your chin.  · Slowly tip your head to the left. Return to the center. Then, tip your head to the right.  · Repeat 3 times on each side.  If you feel pain while performing these stretching exercises, please stop and consult your doctor.   Head Turn  · Sit up straight.  · Slowly turn your head and look over your left shoulder. Hold for a few seconds. Go back to the center, then repeat to your right.  · Repeat 3 times on each side.    Neck Exercises: Active Neck Rotation    To start, lie on your back, knees bent and feet flat on the floor. Keep your ears, shoulders, and hips aligned, but dont press your lower back to the floor. Rest your hands on your pelvis. Breathe deeply and relax.    · Use your neck muscles to turn your head to one side until you feel a stretch in the muscles.  · Hold for 5 seconds. Then turn to the other side.  · Repeat 5 times on each side.  Note: Keep your shoulders on the floor. Dont lift or tuck your chin as you turn your head.      Neck Exercises: Arm Lift            To start, lie on your back, knees bent and feet flat on the floor. Keep your ears, shoulders, and hips aligned, but dont press your lower back to the floor. Rest your hands on your pelvis. Breathe deeply and relax.  · Raise one arm overhead, then lower it. As you lower that arm, raise the other arm.  · Continue to move both arms in slow, smooth arcs. Keep your arms straight and your head and neck relaxed.  · Repeat 10 times with each arm.  For your safety, check with your healthcare provider before starting an exercise program.     Neck Exercises: Head Lifts     Do this exercise on your hands and knees. Keep your knees under your hips and your hands under your shoulders. Keep your spine in a neutral position (not arched or sagging). Keep your ears in line with  your shoulders. Hold for a few seconds before starting the exercise.  · Keeping your back straight, slowly drop your chin toward your chest. Tuck in your chin.  · Hold for 5 seconds. Then lift your head until your neck is level with your back.  · Hold for 5 seconds. Repeat 5 times.      Neck Exercises: Neck Flex      To start, sit in a chair with your feet flat on the floor. Your weight should be slightly forward so that youre balanced evenly on your buttocks. Relax your shoulders and keep your head level. Avoid arching your back or rounding your shoulders. Using a chair with arms may help you keep your balance.  · Rest the back of your left hand against your lower back. Place your right palm on the top of your head.  · Gently pull your head forward and down until you feel a stretch in the neck muscles. Dont force the motion.  · Hold for 20 seconds, then return to starting position. Switch arms.  © 7493-8105 [employer] [address] [place] [address]. All rights reserved. This information is not intended as a substitute for professional medical care. Always follow your healthcare professional's instructions.          Exercises: Neck Isometrics  To start, sit in a chair with your feet flat on the floor. Your weight should be slightly forward so that youre balanced evenly on your buttocks. Relax your shoulders and keep your head level. Using a chair with arms may help you keep your balance.  1. Press your palm against your forehead. Resist with your neck muscles. Hold for 10 seconds. Relax. Repeat 5 times.  2. Do the exercise again, pressing on the side of your head. Repeat 5 times. Switch sides.  3. Do the exercise again, pressing on the back of your head. Repeat 5 times.       For your safety, check with your healthcare provider before starting an exercise program.       Neck Exercises: Passive Neck Rotation        To start, lie on your back, knees bent and feet flat on the floor. Keep  your ears, shoulders, and hips aligned, but dont press your lower back to the floor. Rest your hands on your pelvis. Breathe deeply and relax.  · With your neck relaxed, place the palm of one hand on your forehead. Use your hand to turn your head to one side until you feel a stretch in the neck muscles. Do not push through pain.  · Hold for 5 seconds. Then turn to the other side.  · Repeat 5 times on each side.   Note: Keep your shoulders on the floor. Dont lift your chin as you turn your head.    Neck Exercises: Neck Glide  To start, lie on your back, knees bent and feet flat on the floor. Keep your ears, shoulders, and hips aligned. Dont press your lower back to the floor. Rest your hands on your pelvis. Breathe deeply and relax.  · Gently flatten the curve of your neck against the floor.  · Lengthen your neck as though youre growing taller. Dont jam your chin into your chest, and dont push too hard.  · Hold for 5 seconds. Release. Repeat 3 times.      © 5285-7482 [employer] [address] [place] [address], [place] [address]. All rights reserved. This information is not intended as a substitute for professional medical care. Always follow your healthcare professional's instructions.
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
## 2018-08-28 RX ORDER — NYSTATIN 100000 [USP'U]/ML
5 SUSPENSION ORAL 3 TIMES DAILY PRN
Qty: 60 ML | Refills: 0 | Status: SHIPPED | OUTPATIENT
Start: 2018-08-28 | End: 2020-02-19

## 2018-08-28 RX ORDER — FLUCONAZOLE 150 MG/1
150 TABLET ORAL DAILY
Qty: 3 TABLET | Refills: 0 | Status: SHIPPED | OUTPATIENT
Start: 2018-08-28 | End: 2018-08-29

## 2018-08-31 ENCOUNTER — TELEPHONE (OUTPATIENT)
Dept: PAIN MEDICINE | Facility: CLINIC | Age: 78
End: 2018-08-31

## 2018-08-31 NOTE — TELEPHONE ENCOUNTER
Latonia with [facility] office given the number to medical records both the phone and fax numbers. Informed Latonia that due to the fact that we do not have a release signed by the patient to discuss his medical record with her I am unable to provide her with any information. Instructed her to send request to Medical records. Voiced understanding.

## 2018-08-31 NOTE — TELEPHONE ENCOUNTER
----- Message from Elena Mata sent at 8/31/2018 10:10 AM CDT -----  Contact: Latonia andrew/ Vinay andrew/ Vinay HAAS calling to speak to the Nurse regarding patient. Please advise.   Call back    Thanks!

## 2018-11-08 ENCOUNTER — LAB VISIT (OUTPATIENT)
Dept: LAB | Facility: HOSPITAL | Age: 78
End: 2018-11-08
Attending: UROLOGY
Payer: MEDICARE

## 2018-11-08 ENCOUNTER — OFFICE VISIT (OUTPATIENT)
Dept: UROLOGY | Facility: CLINIC | Age: 78
End: 2018-11-08
Payer: MEDICARE

## 2018-11-08 VITALS
SYSTOLIC BLOOD PRESSURE: 132 MMHG | RESPIRATION RATE: 18 BRPM | DIASTOLIC BLOOD PRESSURE: 77 MMHG | BODY MASS INDEX: 26.61 KG/M2 | TEMPERATURE: 98 F | HEIGHT: 70 IN | HEART RATE: 78 BPM | WEIGHT: 185.88 LBS

## 2018-11-08 DIAGNOSIS — C61 MALIGNANT NEOPLASM OF PROSTATE: Primary | ICD-10-CM

## 2018-11-08 DIAGNOSIS — C61 MALIGNANT NEOPLASM OF PROSTATE: ICD-10-CM

## 2018-11-08 LAB
BILIRUB SERPL-MCNC: ABNORMAL MG/DL
BLOOD URINE, POC: ABNORMAL
COLOR, POC UA: ABNORMAL
COMPLEXED PSA SERPL-MCNC: 7.1 NG/ML
GLUCOSE UR QL STRIP: 100
KETONES UR QL STRIP: ABNORMAL
LEUKOCYTE ESTERASE URINE, POC: ABNORMAL
NITRITE, POC UA: ABNORMAL
PH, POC UA: 5
PROTEIN, POC: ABNORMAL
SPECIFIC GRAVITY, POC UA: 1.01
UROBILINOGEN, POC UA: ABNORMAL

## 2018-11-08 PROCEDURE — 84153 ASSAY OF PSA TOTAL: CPT

## 2018-11-08 PROCEDURE — 99213 OFFICE O/P EST LOW 20 MIN: CPT | Mod: S$PBB,,, | Performed by: UROLOGY

## 2018-11-08 PROCEDURE — 99999 PR PBB SHADOW E&M-EST. PATIENT-LVL III: CPT | Mod: PBBFAC,,, | Performed by: UROLOGY

## 2018-11-08 PROCEDURE — 81000 URINALYSIS NONAUTO W/SCOPE: CPT | Mod: PBBFAC,PN | Performed by: UROLOGY

## 2018-11-08 PROCEDURE — 36415 COLL VENOUS BLD VENIPUNCTURE: CPT

## 2018-11-08 PROCEDURE — 99213 OFFICE O/P EST LOW 20 MIN: CPT | Mod: PBBFAC,PN | Performed by: UROLOGY

## 2018-11-08 PROCEDURE — 81002 URINALYSIS NONAUTO W/O SCOPE: CPT | Mod: PBBFAC,PN | Performed by: UROLOGY

## 2018-11-08 RX ORDER — PREGABALIN 100 MG/1
100 CAPSULE ORAL 2 TIMES DAILY
Status: ON HOLD | COMMUNITY
Start: 2018-09-17 | End: 2022-06-12 | Stop reason: HOSPADM

## 2018-11-08 NOTE — PROGRESS NOTES
OFFICE NOTE    CHIEF COMPLAINT:  Adenocarcinoma of the prostate.    HISTORY OF PRESENT ILLNESS:  This 78-year-old male returns for routine recheck.    He had undergone transrectal ultrasound biopsy of the prostate on 04/12/2018   when his PSA was 9.2 and the pathology report revealed a [place] 6   adenocarcinoma in 1 out of the 8 specimens.  He has been evaluated by MD Parks and more recently by Dr. Sims for possible radiation therapy, and   both MD Parks and Dr. Sims concluded that the recommendation is to   continue with watchful waiting and close surveillance.  There are no further   additional treatments necessary at this time.  The patient is otherwise doing   well and denies any complaints.  [facility] but   we are unable to access these.  So it was finally decided that he will have   another PSA done when he leaves the office today.    UA negative with pH 5.0.    FINAL IMPRESSION:  Adenocarcinoma of the prostate.    RECOMMENDATION:  PSA and continue with active surveillance as discussed above,   and we will contact him with the PSA report.      MICHELLE  dd: 11/08/2018 11:57:07 (CST)  td: 11/09/2018 05:19:36 (CST)  Doc ID   #[id number]  Job ID #[id number]    CC:

## 2018-11-12 ENCOUNTER — TELEPHONE (OUTPATIENT)
Dept: PAIN MEDICINE | Facility: CLINIC | Age: 78
End: 2018-11-12

## 2018-11-12 NOTE — TELEPHONE ENCOUNTER
----- Message from Krishan Mayorga sent at 11/12/2018  2:27 PM CST -----  Contact: self   Patient want to speak with a nurse regarding doctor treating neck pain and burning the nerves in his neck or what type of treatment you can do for him please call back at [phone] (home)

## 2018-11-13 NOTE — TELEPHONE ENCOUNTER
Pt called in requesting a cervical MBB on his neck. He stated he has had neck pain for a few years and saw a doctor at [facility]. He will sign a KELLEY at his appt.

## 2018-11-19 ENCOUNTER — OFFICE VISIT (OUTPATIENT)
Dept: PAIN MEDICINE | Facility: CLINIC | Age: 78
End: 2018-11-19
Payer: MEDICARE

## 2018-11-19 ENCOUNTER — TELEPHONE (OUTPATIENT)
Dept: PAIN MEDICINE | Facility: CLINIC | Age: 78
End: 2018-11-19

## 2018-11-19 VITALS
BODY MASS INDEX: 26.48 KG/M2 | HEIGHT: 70 IN | DIASTOLIC BLOOD PRESSURE: 75 MMHG | HEART RATE: 78 BPM | WEIGHT: 185 LBS | SYSTOLIC BLOOD PRESSURE: 129 MMHG

## 2018-11-19 DIAGNOSIS — M79.18 MYOFASCIAL PAIN: ICD-10-CM

## 2018-11-19 DIAGNOSIS — M50.30 DDD (DEGENERATIVE DISC DISEASE), CERVICAL: Primary | ICD-10-CM

## 2018-11-19 PROCEDURE — 99999 PR PBB SHADOW E&M-EST. PATIENT-LVL IV: CPT | Mod: PBBFAC,,, | Performed by: PHYSICIAN ASSISTANT

## 2018-11-19 PROCEDURE — 99214 OFFICE O/P EST MOD 30 MIN: CPT | Mod: S$PBB,,, | Performed by: PHYSICIAN ASSISTANT

## 2018-11-19 PROCEDURE — 99214 OFFICE O/P EST MOD 30 MIN: CPT | Mod: PBBFAC,PN | Performed by: PHYSICIAN ASSISTANT

## 2018-11-19 RX ORDER — MIRTAZAPINE 30 MG/1
15 TABLET, FILM COATED ORAL
COMMUNITY
End: 2019-08-04

## 2018-11-19 RX ORDER — CYCLOBENZAPRINE HCL 10 MG
10 TABLET ORAL 3 TIMES DAILY PRN
Qty: 45 TABLET | Refills: 0 | Status: SHIPPED | OUTPATIENT
Start: 2018-11-19 | End: 2018-12-19

## 2018-11-19 NOTE — TELEPHONE ENCOUNTER
Informed patient of X-ray results and recommendations per BA Parsons.  Patient accepted and voiced understanding.

## 2018-11-19 NOTE — TELEPHONE ENCOUNTER
Cervical CT showed mild DDD  Cervical xray showed disc height loss at C4-5 and C5-6. Arthritis was not noted. We can try a cervical RACH if no improvement with PT.
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
## 2019-02-05 ENCOUNTER — OFFICE VISIT (OUTPATIENT)
Dept: PAIN MEDICINE | Facility: CLINIC | Age: 79
End: 2019-02-05
Payer: MEDICARE

## 2019-02-05 VITALS
DIASTOLIC BLOOD PRESSURE: 76 MMHG | BODY MASS INDEX: 26.48 KG/M2 | SYSTOLIC BLOOD PRESSURE: 135 MMHG | HEIGHT: 70 IN | HEART RATE: 92 BPM | WEIGHT: 185 LBS

## 2019-02-05 DIAGNOSIS — M47.896 OTHER SPONDYLOSIS, LUMBAR REGION: ICD-10-CM

## 2019-02-05 DIAGNOSIS — M50.30 DDD (DEGENERATIVE DISC DISEASE), CERVICAL: Primary | ICD-10-CM

## 2019-02-05 DIAGNOSIS — M54.12 CERVICAL RADICULOPATHY: Primary | ICD-10-CM

## 2019-02-05 DIAGNOSIS — M54.12 CERVICAL RADICULITIS: ICD-10-CM

## 2019-02-05 PROCEDURE — 99214 OFFICE O/P EST MOD 30 MIN: CPT | Mod: S$PBB,,, | Performed by: ANESTHESIOLOGY

## 2019-02-05 PROCEDURE — 99999 PR PBB SHADOW E&M-EST. PATIENT-LVL IV: CPT | Mod: PBBFAC,,, | Performed by: ANESTHESIOLOGY

## 2019-02-05 PROCEDURE — 99214 OFFICE O/P EST MOD 30 MIN: CPT | Mod: PBBFAC,PN | Performed by: ANESTHESIOLOGY

## 2019-02-05 PROCEDURE — 99999 PR PBB SHADOW E&M-EST. PATIENT-LVL IV: ICD-10-PCS | Mod: PBBFAC,,, | Performed by: ANESTHESIOLOGY

## 2019-02-05 PROCEDURE — 99214 PR OFFICE/OUTPT VISIT, EST, LEVL IV, 30-39 MIN: ICD-10-PCS | Mod: S$PBB,,, | Performed by: ANESTHESIOLOGY
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
## 2019-02-19 ENCOUNTER — HOSPITAL ENCOUNTER (OUTPATIENT)
Facility: AMBULARY SURGERY CENTER | Age: 79
Discharge: HOME | End: 2019-02-19
Attending: ANESTHESIOLOGY | Admitting: ANESTHESIOLOGY
Payer: MEDICARE

## 2019-02-19 DIAGNOSIS — M54.12 CERVICAL RADICULITIS: Primary | ICD-10-CM

## 2019-02-19 LAB — POCT GLUCOSE: 239 MG/DL (ref 70–110)

## 2019-02-19 PROCEDURE — 62321 NJX INTERLAMINAR CRV/THRC: CPT | Mod: ,,, | Performed by: ANESTHESIOLOGY

## 2019-02-19 PROCEDURE — 62321 PR INJ CERV/THORAC, W/GUIDANCE: ICD-10-PCS | Mod: ,,, | Performed by: ANESTHESIOLOGY

## 2019-02-19 PROCEDURE — 99152 MOD SED SAME PHYS/QHP 5/>YRS: CPT | Mod: ,,, | Performed by: ANESTHESIOLOGY

## 2019-02-19 PROCEDURE — 62321 NJX INTERLAMINAR CRV/THRC: CPT | Performed by: ANESTHESIOLOGY

## 2019-02-19 PROCEDURE — 99152 PR MOD CONSCIOUS SEDATION, SAME PHYS, 5+ YRS, FIRST 15 MIN: ICD-10-PCS | Mod: ,,, | Performed by: ANESTHESIOLOGY

## 2019-02-19 RX ORDER — CEFAZOLIN SODIUM 1 G/3ML
INJECTION, POWDER, FOR SOLUTION INTRAMUSCULAR; INTRAVENOUS
Status: DISCONTINUED
Start: 2019-02-19 | End: 2019-02-19 | Stop reason: HOSPADM

## 2019-02-19 RX ORDER — LIDOCAINE HYDROCHLORIDE 10 MG/ML
INJECTION, SOLUTION EPIDURAL; INFILTRATION; INTRACAUDAL; PERINEURAL
Status: DISCONTINUED
Start: 2019-02-19 | End: 2019-02-19 | Stop reason: HOSPADM

## 2019-02-19 RX ORDER — LIDOCAINE HYDROCHLORIDE 10 MG/ML
INJECTION, SOLUTION EPIDURAL; INFILTRATION; INTRACAUDAL; PERINEURAL
Status: DISCONTINUED | OUTPATIENT
Start: 2019-02-19 | End: 2020-07-18

## 2019-02-19 RX ORDER — DEXAMETHASONE SODIUM PHOSPHATE 10 MG/ML
INJECTION INTRAMUSCULAR; INTRAVENOUS
Status: DISCONTINUED | OUTPATIENT
Start: 2019-02-19 | End: 2019-02-19 | Stop reason: HOSPADM

## 2019-02-19 RX ORDER — DEXAMETHASONE SODIUM PHOSPHATE 10 MG/ML
INJECTION INTRAMUSCULAR; INTRAVENOUS
Status: DISCONTINUED
Start: 2019-02-19 | End: 2019-02-19 | Stop reason: HOSPADM

## 2019-02-19 RX ORDER — MIDAZOLAM HYDROCHLORIDE 2 MG/2ML
INJECTION, SOLUTION INTRAMUSCULAR; INTRAVENOUS
Status: DISCONTINUED | OUTPATIENT
Start: 2019-02-19 | End: 2019-02-19 | Stop reason: HOSPADM

## 2019-02-19 RX ORDER — SODIUM CHLORIDE 9 MG/ML
INJECTION, SOLUTION INTRAMUSCULAR; INTRAVENOUS; SUBCUTANEOUS
Status: SHIPPED | OUTPATIENT
Start: 2019-02-19

## 2019-02-19 RX ORDER — FENTANYL CITRATE 50 UG/ML
INJECTION, SOLUTION INTRAMUSCULAR; INTRAVENOUS
Status: DISCONTINUED | OUTPATIENT
Start: 2019-02-19 | End: 2019-02-19 | Stop reason: HOSPADM

## 2019-02-19 RX ORDER — SODIUM CHLORIDE, SODIUM LACTATE, POTASSIUM CHLORIDE, CALCIUM CHLORIDE 600; 310; 30; 20 MG/100ML; MG/100ML; MG/100ML; MG/100ML
INJECTION, SOLUTION INTRAVENOUS CONTINUOUS
Status: DISCONTINUED | OUTPATIENT
Start: 2019-02-19 | End: 2019-02-19 | Stop reason: HOSPADM

## 2019-02-19 RX ADMIN — SODIUM CHLORIDE, SODIUM LACTATE, POTASSIUM CHLORIDE, CALCIUM CHLORIDE: 600; 310; 30; 20 INJECTION, SOLUTION INTRAVENOUS at 11:02

## 2019-02-19 NOTE — PLAN OF CARE
Pt states ready to go home , stable, regis po fluids, ambulatory, denies pain, Leg raises performed in bed without diff and instructed on fall risk. PT and spouse verbalized understanding

## 2019-02-19 NOTE — DISCHARGE INSTRUCTIONS
Before leaving, please make sure you have all your personal belongings such as glasses, purses, wallets, keys, cell phones, jewelry, jackets etc Pain injection instructions:     This procedure may take a couple weeks to relieve pain    No driving for 24 hrs.   Activity as tolerated- gradually increase activities.  Dont lift over 10 lbs for 24 hrs   No heat at injection sites x 2 days. No heating pads, hot tubs, saunas, or swimming in any body of water or pool for 2 days.  Use ice pack for mild swelling and for comfort , apply for 20 minutes, remove for 20 minute intervals. No direct contact of ice itself  to skin.  May shower today. No tub baths for two days.      Resume Aspirin, Plavix, or Coumadin the day after the procedure unless otherwise instructed.   If diabetic,monitor your glucose carefully as steroids can increase your glucose level    Seek immediate medical help for:   Severe increase in your usual pain or appearance of new pain.  Prolonged (mor than 8 hours) or increasing weakness or numbness in the legs or arms.  .    Fever above 101 ,Drainage,redness,active bleeding, or increased swelling at the injection site.  Headache, shortness of breath, chest pain, or breathing problems.

## 2019-02-19 NOTE — DISCHARGE SUMMARY
[facility]  Discharge Note  Short Stay    Admit Date: 2/19/2019    Discharge Date and Time: 2/19/2019    Attending Physician: Marcelino Monroe MD     Discharge Provider: Marcelino Monroe    Diagnoses:  Active Hospital Problems    Diagnosis  POA    *Cervical radiculitis [M54.12]  Yes      Resolved Hospital Problems   No resolved problems to display.       Hospital Course: Cervical RACH  Discharged Condition: Good    Final Diagnoses:   Active Hospital Problems    Diagnosis  POA    *Cervical radiculitis [M54.12]  Yes      Resolved Hospital Problems   No resolved problems to display.       Disposition: Home or Self Care    Follow up/Patient Instructions:    Medications:  Reconciled Home Medications:      Medication List      CONTINUE taking these medications    alfuzosin 10 mg Tb24  Commonly known as:  UROXATRAL  Take 1 tablet (10 mg total) by mouth daily with breakfast.     aspirin 81 mg Tab  Every day     aspirin-calcium carbonate 81 mg-300 mg calcium(777 mg) Tab  Take 81 mg by mouth.     atorvastatin 20 MG tablet  Commonly known as:  LIPITOR  Take 20 mg by mouth once daily.     brimonidine 0.1% 0.1 % Drop  Commonly known as:  ALPHAGAN P  Place 1 drop into both eyes 3 (three) times daily.     * carboxymethylcellulose 0.5 % Dpet  Commonly known as:  REFRESH PLUS  1 drop 3 (three) times daily as needed.     * THERATEARS 1 % Dpge  Generic drug:  carboxymethylcellulose sodium  TheraTears 1 % gel in a dropperette     diabetic supplies, miscellan. Misc  No Sig Provided     dorzolamide 2 % ophthalmic solution  Commonly known as:  TRUSOPT  1 drop 3 (three) times daily.     LATANOPROST OPHT  Apply to eye.     metFORMIN 1000 MG tablet  Commonly known as:  GLUCOPHAGE  metformin     metoprolol succinate 50 MG 24 hr tablet  Commonly known as:  TOPROL-XL  metoprolol succinate ER 50 mg tablet,extended release 24 hr     metroNIDAZOLE 500 MG tablet  Commonly known as:  FLAGYL  metronidazole 500 mg tablet     mirtazapine 30 MG  tablet  Commonly known as:  REMERON  Take 15 mg by mouth.     NITROSTAT 0.4 MG SL tablet  Generic drug:  nitroGLYCERIN  As directed     nystatin 100,000 unit/mL suspension  Commonly known as:  MYCOSTATIN  Take 5 mLs (500,000 Units total) by mouth 3 (three) times daily as needed.     omeprazole 40 MG capsule  Commonly known as:  PRILOSEC  omeprazole 40 mg capsule,delayed release   Take 1 capsule every day by oral route as directed for 30 days.     pregabalin 100 MG capsule  Commonly known as:  LYRICA  Take 100 mg by mouth.     sildenafil 100 MG tablet  Commonly known as:  VIAGRA  Take 1 tablet (100 mg total) by mouth daily as needed for Erectile Dysfunction.     sucralfate 100 mg/mL suspension  Commonly known as:  CARAFATE  10 mLs.     travoprost (benzalkonium) 0.004 % ophthalmic solution  Commonly known as:  TRAVATAN  1 drop every evening.     valsartan 160 MG tablet  Commonly known as:  DIOVAN  valsartan 160 mg tablet     VITAMIN C 100 MG tablet  Generic drug:  ascorbic acid (vitamin C)  Vitamin C         * This list has 2 medication(s) that are the same as other medications prescribed for you. Read the directions carefully, and ask your doctor or other care provider to review them with you.              Discharge Procedure Orders   Call MD for:  temperature >100.4     Call MD for:  persistent nausea and vomiting or diarrhea     Call MD for:  severe uncontrolled pain     Call MD for:  redness, tenderness, or signs of infection (pain, swelling, redness, odor or green/yellow discharge around incision site)     Call MD for:  difficulty breathing or increased cough     Call MD for:  severe persistent headache        Follow up with MD in 2-3 weeks    Discharge Procedure Orders (must include Diet, Follow-up, Activity):   Discharge Procedure Orders (must include Diet, Follow-up, Activity)   Call MD for:  temperature >100.4     Call MD for:  persistent nausea and vomiting or diarrhea     Call MD for:  severe uncontrolled  pain     Call MD for:  redness, tenderness, or signs of infection (pain, swelling, redness, odor or green/yellow discharge around incision site)     Call MD for:  difficulty breathing or increased cough     Call MD for:  severe persistent headache

## 2019-02-19 NOTE — OP NOTE
PROCEDURE DATE: 2/19/2019    Procedure: C7-T1 cervical interlaminar epidural steroid injection under utilizing fluoroscopy.    Diagnosis: Cervical Degenerative Disc Diease; Cervical Radiculitis  POSTOP DIAGNOSIS: SAME    Physician: Marcelino Monroe MD    Medications injected:  Dexamethasone 10mg followed by a slow injection of 4 mL sterile, preservative-free normal saline.    Local anesthetic used: Lidocaine 1%, 2 ml.    Sedation Medications: RN IV sedation    Complications:  None    Estimated blood loss: None    Technique:  A time-out was taken to identify patient and procedure prior to starting the procedure.  With the patient laying in a prone position with the neck in a mid-flexed forward position, the area was prepped and draped in the usual sterile fashion using ChloraPrep and a fenestrated drape.  The area was determined under AP fluoroscopic guidance.  Local anesthetic was given using a 25-gauge 1.5 inch needle by raising a wheal and then infiltrating ventrally.  A 3.5 inch 20-gauge Touhy needle was introduced under fluoroscopic guidance to meet the lamina of C7.  The needle was then hinged under the lamina then advanced using loss of resistance technique.  Once the tip of the needle was in the desired position, the 1ml contrast dye Omnipaque was injected to determine placement and no uptake.  The steroid was then injected slowly followed by a slow injection of 4 mL of the sterile preservative-free normal saline.  The patient tolerated the procedure well.    The patient was monitored after the procedure and was given post-procedure and discharge instructions to follow at home. The patient was discharged in a stable condition.

## 2019-02-20 VITALS
WEIGHT: 185 LBS | HEIGHT: 70 IN | OXYGEN SATURATION: 99 % | BODY MASS INDEX: 26.48 KG/M2 | RESPIRATION RATE: 18 BRPM | HEART RATE: 64 BPM | SYSTOLIC BLOOD PRESSURE: 148 MMHG | TEMPERATURE: 97 F | DIASTOLIC BLOOD PRESSURE: 74 MMHG

## 2019-03-06 ENCOUNTER — TELEPHONE (OUTPATIENT)
Dept: UROLOGY | Facility: CLINIC | Age: 79
End: 2019-03-06

## 2019-03-06 NOTE — TELEPHONE ENCOUNTER
----- Message from Leroy Ang sent at 3/6/2019  1:38 PM CST -----  Contact: Patient  Type:  Sooner Apoointment Request    Caller is requesting a sooner appointment.  Caller declined first available appointment listed below.  Caller will not accept being placed on the waitlist and is requesting a message be sent to doctor.    Name of Caller:  patient  When is the first available appointment?  4/12/19  Symptoms:  3 month f/u /vf  Best Call Back Number:  [phone]  Additional Information:  Patient is scheduled for 3/7/19 and needs to reschedule appointment for next week. Patient

## 2019-03-12 ENCOUNTER — TELEPHONE (OUTPATIENT)
Dept: PAIN MEDICINE | Facility: CLINIC | Age: 79
End: 2019-03-12

## 2019-03-12 ENCOUNTER — OFFICE VISIT (OUTPATIENT)
Dept: PAIN MEDICINE | Facility: CLINIC | Age: 79
End: 2019-03-12
Payer: MEDICARE

## 2019-03-12 VITALS
DIASTOLIC BLOOD PRESSURE: 74 MMHG | BODY MASS INDEX: 26.48 KG/M2 | WEIGHT: 185 LBS | HEART RATE: 66 BPM | SYSTOLIC BLOOD PRESSURE: 131 MMHG | HEIGHT: 70 IN

## 2019-03-12 DIAGNOSIS — M47.896 OTHER SPONDYLOSIS, LUMBAR REGION: ICD-10-CM

## 2019-03-12 DIAGNOSIS — M79.18 MYOFASCIAL PAIN: Primary | ICD-10-CM

## 2019-03-12 DIAGNOSIS — M50.30 DDD (DEGENERATIVE DISC DISEASE), CERVICAL: ICD-10-CM

## 2019-03-12 PROCEDURE — 99213 OFFICE O/P EST LOW 20 MIN: CPT | Mod: S$PBB,,, | Performed by: PHYSICIAN ASSISTANT

## 2019-03-12 PROCEDURE — 99213 PR OFFICE/OUTPT VISIT, EST, LEVL III, 20-29 MIN: ICD-10-PCS | Mod: S$PBB,,, | Performed by: PHYSICIAN ASSISTANT

## 2019-03-12 PROCEDURE — 99999 PR PBB SHADOW E&M-EST. PATIENT-LVL IV: CPT | Mod: PBBFAC,,, | Performed by: PHYSICIAN ASSISTANT

## 2019-03-12 PROCEDURE — 99999 PR PBB SHADOW E&M-EST. PATIENT-LVL IV: ICD-10-PCS | Mod: PBBFAC,,, | Performed by: PHYSICIAN ASSISTANT

## 2019-03-12 PROCEDURE — 99214 OFFICE O/P EST MOD 30 MIN: CPT | Mod: PBBFAC,PN | Performed by: PHYSICIAN ASSISTANT

## 2019-03-12 NOTE — TELEPHONE ENCOUNTER
----- Message from Krishan Mayorga sent at 3/12/2019  9:28 AM CDT -----  Contact: self   Patient will be 10 mins late for appointment any questions please call back at [phone] (home)
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
## 2019-03-19 ENCOUNTER — OFFICE VISIT (OUTPATIENT)
Dept: UROLOGY | Facility: CLINIC | Age: 79
End: 2019-03-19
Payer: MEDICARE

## 2019-03-19 ENCOUNTER — LAB VISIT (OUTPATIENT)
Dept: LAB | Facility: HOSPITAL | Age: 79
End: 2019-03-19
Attending: UROLOGY
Payer: MEDICARE

## 2019-03-19 VITALS
HEIGHT: 70 IN | TEMPERATURE: 99 F | RESPIRATION RATE: 18 BRPM | SYSTOLIC BLOOD PRESSURE: 126 MMHG | WEIGHT: 184.94 LBS | DIASTOLIC BLOOD PRESSURE: 78 MMHG | BODY MASS INDEX: 26.48 KG/M2 | HEART RATE: 66 BPM

## 2019-03-19 DIAGNOSIS — C61 MALIGNANT NEOPLASM OF PROSTATE: ICD-10-CM

## 2019-03-19 DIAGNOSIS — C61 MALIGNANT NEOPLASM OF PROSTATE: Primary | ICD-10-CM

## 2019-03-19 DIAGNOSIS — N52.9 ERECTILE DYSFUNCTION, UNSPECIFIED ERECTILE DYSFUNCTION TYPE: ICD-10-CM

## 2019-03-19 LAB — COMPLEXED PSA SERPL-MCNC: 8.1 NG/ML

## 2019-03-19 PROCEDURE — 99214 PR OFFICE/OUTPT VISIT, EST, LEVL IV, 30-39 MIN: ICD-10-PCS | Mod: S$PBB,,, | Performed by: UROLOGY

## 2019-03-19 PROCEDURE — 36415 COLL VENOUS BLD VENIPUNCTURE: CPT

## 2019-03-19 PROCEDURE — 99214 OFFICE O/P EST MOD 30 MIN: CPT | Mod: S$PBB,,, | Performed by: UROLOGY

## 2019-03-19 PROCEDURE — 99999 PR PBB SHADOW E&M-EST. PATIENT-LVL III: CPT | Mod: PBBFAC,,, | Performed by: UROLOGY

## 2019-03-19 PROCEDURE — 84153 ASSAY OF PSA TOTAL: CPT

## 2019-03-19 PROCEDURE — 99999 PR PBB SHADOW E&M-EST. PATIENT-LVL III: ICD-10-PCS | Mod: PBBFAC,,, | Performed by: UROLOGY

## 2019-03-19 PROCEDURE — 99213 OFFICE O/P EST LOW 20 MIN: CPT | Mod: PBBFAC,PN | Performed by: UROLOGY
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
## 2019-04-09 ENCOUNTER — LAB VISIT (OUTPATIENT)
Dept: LAB | Facility: HOSPITAL | Age: 79
End: 2019-04-09
Attending: INTERNAL MEDICINE
Payer: MEDICARE

## 2019-04-09 DIAGNOSIS — C34.90: ICD-10-CM

## 2019-04-09 LAB
ALBUMIN SERPL BCP-MCNC: 3.9 G/DL (ref 3.5–5.2)
ALP SERPL-CCNC: 75 U/L (ref 38–145)
ALT SERPL W/O P-5'-P-CCNC: 9 U/L (ref 10–44)
ANION GAP SERPL CALC-SCNC: 8 MMOL/L (ref 8–16)
AST SERPL-CCNC: 16 U/L (ref 17–59)
BASOPHILS # BLD AUTO: 0.04 K/UL (ref 0–0.2)
BASOPHILS NFR BLD: 0.5 % (ref 0–1.9)
BILIRUB SERPL-MCNC: 0.4 MG/DL (ref 0.2–1.3)
BUN SERPL-MCNC: 17 MG/DL (ref 9–21)
CALCIUM SERPL-MCNC: 8.9 MG/DL (ref 8.4–10.2)
CHLORIDE SERPL-SCNC: 104 MMOL/L (ref 95–110)
CO2 SERPL-SCNC: 26 MMOL/L (ref 22–31)
CREAT SERPL-MCNC: 1.36 MG/DL (ref 0.5–1.4)
DIFFERENTIAL METHOD: ABNORMAL
EOSINOPHIL # BLD AUTO: 0.1 K/UL (ref 0–0.5)
EOSINOPHIL NFR BLD: 1.4 % (ref 0–8)
ERYTHROCYTE [DISTWIDTH] IN BLOOD BY AUTOMATED COUNT: 14.3 % (ref 11.5–14.5)
EST. GFR  (AFRICAN AMERICAN): 58 ML/MIN/1.73 M^2
EST. GFR  (NON AFRICAN AMERICAN): 50 ML/MIN/1.73 M^2
GLUCOSE SERPL-MCNC: 212 MG/DL (ref 70–110)
HCT VFR BLD AUTO: 39.4 % (ref 40–54)
HGB BLD-MCNC: 13.1 G/DL (ref 14–18)
IMM GRANULOCYTES # BLD AUTO: 0.04 K/UL (ref 0–0.04)
IMM GRANULOCYTES NFR BLD AUTO: 0.5 % (ref 0–0.5)
LYMPHOCYTES # BLD AUTO: 1.2 K/UL (ref 1–4.8)
LYMPHOCYTES NFR BLD: 16.1 % (ref 18–48)
MCH RBC QN AUTO: 32.1 PG (ref 27–31)
MCHC RBC AUTO-ENTMCNC: 33.2 G/DL (ref 32–36)
MCV RBC AUTO: 97 FL (ref 82–98)
MONOCYTES # BLD AUTO: 0.7 K/UL (ref 0.3–1)
MONOCYTES NFR BLD: 9.4 % (ref 4–15)
NEUTROPHILS # BLD AUTO: 5.5 K/UL (ref 1.8–7.7)
NEUTROPHILS NFR BLD: 72.1 % (ref 38–73)
NRBC BLD-RTO: 0 /100 WBC
PLATELET # BLD AUTO: 225 K/UL (ref 150–350)
PMV BLD AUTO: 9.5 FL (ref 9.2–12.9)
POTASSIUM SERPL-SCNC: 4.1 MMOL/L (ref 3.5–5.1)
PROT SERPL-MCNC: 6.8 G/DL (ref 6–8.4)
RBC # BLD AUTO: 4.08 M/UL (ref 4.6–6.2)
SODIUM SERPL-SCNC: 138 MMOL/L (ref 136–145)
WBC # BLD AUTO: 7.69 K/UL (ref 3.9–12.7)

## 2019-04-09 PROCEDURE — 85025 COMPLETE CBC W/AUTO DIFF WBC: CPT

## 2019-04-09 PROCEDURE — 85025 COMPLETE CBC W/AUTO DIFF WBC: CPT | Mod: PN

## 2019-04-09 PROCEDURE — 36415 COLL VENOUS BLD VENIPUNCTURE: CPT | Mod: PN

## 2019-04-09 PROCEDURE — 80053 COMPREHEN METABOLIC PANEL: CPT

## 2019-04-09 PROCEDURE — 80053 COMPREHEN METABOLIC PANEL: CPT | Mod: PN

## 2019-08-04 ENCOUNTER — HOSPITAL ENCOUNTER (OUTPATIENT)
Facility: HOSPITAL | Age: 79
Discharge: HOME | End: 2019-08-05
Attending: EMERGENCY MEDICINE | Admitting: INTERNAL MEDICINE
Payer: MEDICARE

## 2019-08-04 DIAGNOSIS — R07.89 ATYPICAL CHEST PAIN: ICD-10-CM

## 2019-08-04 DIAGNOSIS — R07.9 CHEST PAIN, UNSPECIFIED TYPE: Primary | ICD-10-CM

## 2019-08-04 DIAGNOSIS — E11.641 UNCONTROLLED TYPE 2 DIABETES MELLITUS WITH HYPOGLYCEMIA AND COMA: ICD-10-CM

## 2019-08-04 DIAGNOSIS — R07.9 CHEST PAIN: ICD-10-CM

## 2019-08-04 DIAGNOSIS — I25.10 CAD (CORONARY ARTERY DISEASE): ICD-10-CM

## 2019-08-04 LAB
ALBUMIN SERPL BCP-MCNC: 3.5 G/DL (ref 3.5–5.2)
ALP SERPL-CCNC: 59 U/L (ref 55–135)
ALT SERPL W/O P-5'-P-CCNC: 8 U/L (ref 10–44)
ANION GAP SERPL CALC-SCNC: 7 MMOL/L (ref 8–16)
AST SERPL-CCNC: 15 U/L (ref 10–40)
BASOPHILS # BLD AUTO: 0.05 K/UL (ref 0–0.2)
BASOPHILS NFR BLD: 0.8 % (ref 0–1.9)
BILIRUB SERPL-MCNC: 0.6 MG/DL (ref 0.1–1)
BNP SERPL-MCNC: 91 PG/ML (ref 0–99)
BUN SERPL-MCNC: 18 MG/DL (ref 8–23)
CALCIUM SERPL-MCNC: 8.6 MG/DL (ref 8.7–10.5)
CHLORIDE SERPL-SCNC: 109 MMOL/L (ref 95–110)
CO2 SERPL-SCNC: 24 MMOL/L (ref 23–29)
CREAT SERPL-MCNC: 1.2 MG/DL (ref 0.5–1.4)
DIFFERENTIAL METHOD: ABNORMAL
EOSINOPHIL # BLD AUTO: 0.2 K/UL (ref 0–0.5)
EOSINOPHIL NFR BLD: 3.7 % (ref 0–8)
ERYTHROCYTE [DISTWIDTH] IN BLOOD BY AUTOMATED COUNT: 14.6 % (ref 11.5–14.5)
EST. GFR  (AFRICAN AMERICAN): >60 ML/MIN/1.73 M^2
EST. GFR  (NON AFRICAN AMERICAN): 58.4 ML/MIN/1.73 M^2
ESTIMATED AVG GLUCOSE: 148 MG/DL (ref 68–131)
GLUCOSE SERPL-MCNC: 122 MG/DL (ref 70–110)
GLUCOSE SERPL-MCNC: 143 MG/DL (ref 70–110)
GLUCOSE SERPL-MCNC: 144 MG/DL (ref 70–110)
HBA1C MFR BLD HPLC: 6.8 % (ref 4.5–6.2)
HCT VFR BLD AUTO: 38.8 % (ref 40–54)
HGB BLD-MCNC: 12.7 G/DL (ref 14–18)
IMM GRANULOCYTES # BLD AUTO: 0.01 K/UL (ref 0–0.04)
IMM GRANULOCYTES NFR BLD AUTO: 0.2 % (ref 0–0.5)
LYMPHOCYTES # BLD AUTO: 1.4 K/UL (ref 1–4.8)
LYMPHOCYTES NFR BLD: 22.4 % (ref 18–48)
MCH RBC QN AUTO: 30.5 PG (ref 27–31)
MCHC RBC AUTO-ENTMCNC: 32.7 G/DL (ref 32–36)
MCV RBC AUTO: 93 FL (ref 82–98)
MONOCYTES # BLD AUTO: 0.7 K/UL (ref 0.3–1)
MONOCYTES NFR BLD: 11.6 % (ref 4–15)
NEUTROPHILS # BLD AUTO: 3.8 K/UL (ref 1.8–7.7)
NEUTROPHILS NFR BLD: 61.3 % (ref 38–73)
NRBC BLD-RTO: 0 /100 WBC
PLATELET # BLD AUTO: 208 K/UL (ref 150–350)
PMV BLD AUTO: 10.2 FL (ref 9.2–12.9)
POTASSIUM SERPL-SCNC: 4.1 MMOL/L (ref 3.5–5.1)
PROT SERPL-MCNC: 6.4 G/DL (ref 6–8.4)
RBC # BLD AUTO: 4.17 M/UL (ref 4.6–6.2)
SODIUM SERPL-SCNC: 140 MMOL/L (ref 136–145)
TROPONIN I SERPL DL<=0.01 NG/ML-MCNC: <0.03 NG/ML (ref 0.02–0.5)
WBC # BLD AUTO: 6.21 K/UL (ref 3.9–12.7)

## 2019-08-04 PROCEDURE — 84484 ASSAY OF TROPONIN QUANT: CPT | Mod: 91

## 2019-08-04 PROCEDURE — G0378 HOSPITAL OBSERVATION PER HR: HCPCS

## 2019-08-04 PROCEDURE — 80053 COMPREHEN METABOLIC PANEL: CPT

## 2019-08-04 PROCEDURE — 25000003 PHARM REV CODE 250: Performed by: EMERGENCY MEDICINE

## 2019-08-04 PROCEDURE — 93005 ELECTROCARDIOGRAM TRACING: CPT

## 2019-08-04 PROCEDURE — 96372 THER/PROPH/DIAG INJ SC/IM: CPT | Mod: 59

## 2019-08-04 PROCEDURE — 96374 THER/PROPH/DIAG INJ IV PUSH: CPT

## 2019-08-04 PROCEDURE — 83880 ASSAY OF NATRIURETIC PEPTIDE: CPT

## 2019-08-04 PROCEDURE — 94761 N-INVAS EAR/PLS OXIMETRY MLT: CPT

## 2019-08-04 PROCEDURE — 83036 HEMOGLOBIN GLYCOSYLATED A1C: CPT

## 2019-08-04 PROCEDURE — 99285 EMERGENCY DEPT VISIT HI MDM: CPT

## 2019-08-04 PROCEDURE — 63600175 PHARM REV CODE 636 W HCPCS: Performed by: EMERGENCY MEDICINE

## 2019-08-04 PROCEDURE — 85025 COMPLETE CBC W/AUTO DIFF WBC: CPT

## 2019-08-04 PROCEDURE — 25000003 PHARM REV CODE 250: Performed by: NURSE PRACTITIONER

## 2019-08-04 RX ORDER — ATORVASTATIN CALCIUM 10 MG/1
10 TABLET, FILM COATED ORAL DAILY
Status: DISCONTINUED | OUTPATIENT
Start: 2019-08-04 | End: 2019-08-05 | Stop reason: HOSPADM

## 2019-08-04 RX ORDER — NAPROXEN SODIUM 220 MG/1
243 TABLET, FILM COATED ORAL
Status: DISCONTINUED | OUTPATIENT
Start: 2019-08-04 | End: 2019-08-04

## 2019-08-04 RX ORDER — PANTOPRAZOLE SODIUM 40 MG/1
40 TABLET, DELAYED RELEASE ORAL DAILY
Status: DISCONTINUED | OUTPATIENT
Start: 2019-08-04 | End: 2019-08-05 | Stop reason: HOSPADM

## 2019-08-04 RX ORDER — NAPROXEN SODIUM 220 MG/1
324 TABLET, FILM COATED ORAL
Status: COMPLETED | OUTPATIENT
Start: 2019-08-04 | End: 2019-08-04

## 2019-08-04 RX ORDER — PREGABALIN 50 MG/1
100 CAPSULE ORAL 2 TIMES DAILY
Status: DISCONTINUED | OUTPATIENT
Start: 2019-08-04 | End: 2019-08-05 | Stop reason: HOSPADM

## 2019-08-04 RX ORDER — IBUPROFEN 800 MG/1
800 TABLET ORAL EVERY 6 HOURS PRN
Status: ON HOLD | COMMUNITY
End: 2020-03-12 | Stop reason: HOSPADM

## 2019-08-04 RX ORDER — ACETAMINOPHEN 325 MG/1
650 TABLET ORAL EVERY 8 HOURS PRN
Status: DISCONTINUED | OUTPATIENT
Start: 2019-08-04 | End: 2019-08-05 | Stop reason: HOSPADM

## 2019-08-04 RX ORDER — ASPIRIN 325 MG
325 TABLET ORAL DAILY
Status: DISCONTINUED | OUTPATIENT
Start: 2019-08-04 | End: 2019-08-05 | Stop reason: HOSPADM

## 2019-08-04 RX ORDER — AMLODIPINE BESYLATE 5 MG/1
5 TABLET ORAL DAILY
COMMUNITY
End: 2020-07-18 | Stop reason: CLARIF

## 2019-08-04 RX ORDER — ONDANSETRON 2 MG/ML
4 INJECTION INTRAMUSCULAR; INTRAVENOUS EVERY 8 HOURS PRN
Status: DISCONTINUED | OUTPATIENT
Start: 2019-08-04 | End: 2019-08-05 | Stop reason: HOSPADM

## 2019-08-04 RX ORDER — METOPROLOL TARTRATE 1 MG/ML
5 INJECTION, SOLUTION INTRAVENOUS
Status: COMPLETED | OUTPATIENT
Start: 2019-08-04 | End: 2019-08-04

## 2019-08-04 RX ORDER — NITROGLYCERIN 0.4 MG/1
0.4 TABLET SUBLINGUAL EVERY 5 MIN PRN
Status: DISCONTINUED | OUTPATIENT
Start: 2019-08-04 | End: 2019-08-05 | Stop reason: HOSPADM

## 2019-08-04 RX ORDER — SODIUM CHLORIDE 0.9 % (FLUSH) 0.9 %
10 SYRINGE (ML) INJECTION
Status: DISCONTINUED | OUTPATIENT
Start: 2019-08-04 | End: 2019-08-05 | Stop reason: HOSPADM

## 2019-08-04 RX ORDER — DORZOLAMIDE HCL 20 MG/ML
1 SOLUTION/ DROPS OPHTHALMIC 3 TIMES DAILY
Status: DISCONTINUED | OUTPATIENT
Start: 2019-08-04 | End: 2019-08-05 | Stop reason: HOSPADM

## 2019-08-04 RX ORDER — IRBESARTAN 300 MG/1
300 TABLET ORAL DAILY
Status: ON HOLD | COMMUNITY
End: 2020-03-12 | Stop reason: SDUPTHER

## 2019-08-04 RX ORDER — MORPHINE SULFATE 2 MG/ML
2 INJECTION, SOLUTION INTRAMUSCULAR; INTRAVENOUS EVERY 4 HOURS PRN
Status: DISCONTINUED | OUTPATIENT
Start: 2019-08-04 | End: 2019-08-05 | Stop reason: HOSPADM

## 2019-08-04 RX ORDER — IRBESARTAN 150 MG/1
300 TABLET ORAL NIGHTLY
Status: DISCONTINUED | OUTPATIENT
Start: 2019-08-04 | End: 2019-08-05 | Stop reason: HOSPADM

## 2019-08-04 RX ORDER — BRIMONIDINE TARTRATE 1.5 MG/ML
1 SOLUTION/ DROPS OPHTHALMIC 3 TIMES DAILY
Status: DISCONTINUED | OUTPATIENT
Start: 2019-08-04 | End: 2019-08-05 | Stop reason: HOSPADM

## 2019-08-04 RX ORDER — GLUCAGON 1 MG
1 KIT INJECTION
Status: DISCONTINUED | OUTPATIENT
Start: 2019-08-04 | End: 2019-08-05 | Stop reason: HOSPADM

## 2019-08-04 RX ORDER — ALOGLIPTIN 12.5 MG/1
TABLET, FILM COATED ORAL
COMMUNITY
End: 2020-07-18 | Stop reason: CLARIF

## 2019-08-04 RX ORDER — ALFUZOSIN HYDROCHLORIDE 10 MG/1
10 TABLET, EXTENDED RELEASE ORAL
Status: DISCONTINUED | OUTPATIENT
Start: 2019-08-05 | End: 2019-08-04

## 2019-08-04 RX ORDER — AMLODIPINE BESYLATE 5 MG/1
5 TABLET ORAL DAILY
Status: DISCONTINUED | OUTPATIENT
Start: 2019-08-04 | End: 2019-08-05 | Stop reason: HOSPADM

## 2019-08-04 RX ORDER — ASCORBIC ACID 500 MG
500 TABLET ORAL DAILY
Status: DISCONTINUED | OUTPATIENT
Start: 2019-08-04 | End: 2019-08-04

## 2019-08-04 RX ORDER — IBUPROFEN 200 MG
16 TABLET ORAL
Status: DISCONTINUED | OUTPATIENT
Start: 2019-08-04 | End: 2019-08-05 | Stop reason: HOSPADM

## 2019-08-04 RX ORDER — HYDROMORPHONE HYDROCHLORIDE 1 MG/ML
1 INJECTION, SOLUTION INTRAMUSCULAR; INTRAVENOUS; SUBCUTANEOUS EVERY 4 HOURS PRN
Status: DISCONTINUED | OUTPATIENT
Start: 2019-08-04 | End: 2019-08-05 | Stop reason: HOSPADM

## 2019-08-04 RX ORDER — ENOXAPARIN SODIUM 100 MG/ML
1 INJECTION SUBCUTANEOUS
Status: COMPLETED | OUTPATIENT
Start: 2019-08-04 | End: 2019-08-04

## 2019-08-04 RX ORDER — INSULIN ASPART 100 [IU]/ML
0-5 INJECTION, SOLUTION INTRAVENOUS; SUBCUTANEOUS
Status: DISCONTINUED | OUTPATIENT
Start: 2019-08-04 | End: 2019-08-05 | Stop reason: HOSPADM

## 2019-08-04 RX ORDER — BISACODYL 10 MG
10 SUPPOSITORY, RECTAL RECTAL DAILY PRN
Status: DISCONTINUED | OUTPATIENT
Start: 2019-08-04 | End: 2019-08-05 | Stop reason: HOSPADM

## 2019-08-04 RX ORDER — IBUPROFEN 200 MG
24 TABLET ORAL
Status: DISCONTINUED | OUTPATIENT
Start: 2019-08-04 | End: 2019-08-05 | Stop reason: HOSPADM

## 2019-08-04 RX ADMIN — BRIMONIDINE TARTRATE 1 DROP: 1.5 SOLUTION OPHTHALMIC at 08:08

## 2019-08-04 RX ADMIN — AMLODIPINE BESYLATE 5 MG: 5 TABLET ORAL at 05:08

## 2019-08-04 RX ADMIN — OXYCODONE HYDROCHLORIDE AND ACETAMINOPHEN 500 MG: 500 TABLET ORAL at 05:08

## 2019-08-04 RX ADMIN — ENOXAPARIN SODIUM 80 MG: 100 INJECTION SUBCUTANEOUS at 10:08

## 2019-08-04 RX ADMIN — ASPIRIN 81 MG 324 MG: 81 TABLET ORAL at 07:08

## 2019-08-04 RX ADMIN — PANTOPRAZOLE SODIUM 40 MG: 40 TABLET, DELAYED RELEASE ORAL at 05:08

## 2019-08-04 RX ADMIN — PREGABALIN 100 MG: 50 CAPSULE ORAL at 08:08

## 2019-08-04 RX ADMIN — METOPROLOL TARTRATE 5 MG: 1 INJECTION, SOLUTION INTRAVENOUS at 10:08

## 2019-08-04 RX ADMIN — ATORVASTATIN CALCIUM 10 MG: 10 TABLET, FILM COATED ORAL at 05:08

## 2019-08-04 RX ADMIN — IRBESARTAN 300 MG: 150 TABLET ORAL at 08:08

## 2019-08-04 RX ADMIN — NITROGLYCERIN 0.5 INCH: 20 OINTMENT TOPICAL at 07:08

## 2019-08-04 RX ADMIN — DORZOLAMIDE HCL 1 DROP: 20 SOLUTION/ DROPS OPHTHALMIC at 08:08

## 2019-08-04 NOTE — SUBJECTIVE & OBJECTIVE
Past Medical History:   Diagnosis Date    Cancer     lung cancer chemo and radiation    COPD (chronic obstructive pulmonary disease)     Coronary artery disease     s/p 3 stents    Diabetes mellitus, type 2     Hyperlipidemia     Hypertension        Past Surgical History:   Procedure Laterality Date    ANKLE SURGERY Right 1970s    Injection-steroid-epidural-cervical C7-T1 N/A 2/19/2019    Performed by Marcelino Monroe MD at [facility] OR    MEDIAL BRANCH, LUMBAR L3,4,5 Bilateral 6/4/2018    Performed by Marcelino Monroe [facility] at [facility]    RADIOFREQUENCY ABLATION, NERVE, MEDIAL BRANCH, LUMBAR, 1 LEVEL Bilateral 7/16/2018    Performed by Marcelino Monroe MD at [facility]    TRANSRECTAL ULTRASOUND GUIDED PROSTATE BIOPSY N/A 4/12/2018    Performed by Nuha Soto MD at [facility] OR       Review of patient's allergies indicates:   Allergen Reactions    Doxycycline Diarrhea     Severe diarrhea    Sulfa (sulfonamide antibiotics) Rash     Other reaction(s): Itching    Sulfasalazine Rash     Rash and itching mild       Current Facility-Administered Medications on File Prior to Encounter   Medication    lidocaine (PF) 10 mg/ml (1%) injection    omnipaque 300 iohexol    sodium chloride 0.9% injection     Current Outpatient Medications on File Prior to Encounter   Medication Sig    alogliptin (NESINA) 12.5 mg Tab Take by mouth.    amLODIPine (NORVASC) 5 MG tablet Take 5 mg by mouth once daily.    aspirin 81 mg Tab Every day    atorvastatin (LIPITOR) 20 MG tablet Take 10 mg by mouth once daily.     ibuprofen (ADVIL,MOTRIN) 800 MG tablet Take 800 mg by mouth every 6 (six) hours as needed for Pain.    irbesartan (AVAPRO) 300 MG tablet Take 300 mg by mouth every evening.    LATANOPROST OPHT Apply to eye.    metFORMIN (GLUCOPHAGE) 1000 MG tablet metformin    METOPROLOL SUCCINATE ORAL Take 50 mg by mouth 2 (two) times daily.    nitroGLYCERIN (NITROSTAT) 0.4 MG SL tablet As directed    pregabalin (LYRICA) 100 MG capsule Take 100 mg  by mouth.    alfuzosin (UROXATRAL) 10 mg Tb24 Take 1 tablet (10 mg total) by mouth daily with breakfast.    ascorbic acid, vitamin C, (VITAMIN C) 100 MG tablet Vitamin C    aspirin-calcium carbonate 81 mg-300 mg calcium(777 mg) Tab Take 81 mg by mouth.    brimonidine 0.1% (ALPHAGAN P) 0.1 % Drop Place 1 drop into both eyes 3 (three) times daily.    carboxymethylcellulose (REFRESH PLUS) 0.5 % Dpet 1 drop 3 (three) times daily as needed.    carboxymethylcellulose sodium (THERATEARS) 1 % DpGe TheraTears 1 % gel in a dropperette    diabetic supplies, miscellBrille24. Misc No Sig Provided    dorzolamide (TRUSOPT) 2 % ophthalmic solution 1 drop 3 (three) times daily.    nystatin (MYCOSTATIN) 100,000 unit/mL suspension Take 5 mLs (500,000 Units total) by mouth 3 (three) times daily as needed.    sildenafil (VIAGRA) 100 MG tablet Take 1 tablet (100 mg total) by mouth daily as needed for Erectile Dysfunction.    [DISCONTINUED] metoprolol succinate (TOPROL-XL) 50 MG 24 hr tablet metoprolol succinate ER 50 mg tablet,extended release 24 hr    [DISCONTINUED] metroNIDAZOLE (FLAGYL) 500 MG tablet metronidazole 500 mg tablet    [DISCONTINUED] mirtazapine (REMERON) 30 MG tablet Take 15 mg by mouth.    [DISCONTINUED] omeprazole (PRILOSEC) 40 MG capsule omeprazole 40 mg capsule,delayed release   Take 1 capsule every day by oral route as directed for 30 days.    [DISCONTINUED] sucralfate (CARAFATE) 100 mg/mL suspension 10 mLs.    [DISCONTINUED] travoprost, benzalkonium, (TRAVATAN) 0.004 % ophthalmic solution 1 drop every evening.    [DISCONTINUED] valsartan (DIOVAN) 160 MG tablet valsartan 160 mg tablet     Family History     Problem Relation (Age of Onset)    Diabetes Mother, Father, Sister    Emphysema Father    Heart attack Mother, Father    Peripheral vascular disease Mother        Tobacco Use    Smoking status: Current Every Day Smoker     Packs/day: 1.00     Years: 54.00     Pack years: 54.00     Types: Cigarettes     Smokeless tobacco: Never Used   Substance and Sexual Activity    Alcohol use: No    Drug use: No    Sexual activity: Never     Review of Systems   Constitutional: Negative for appetite change, chills, diaphoresis, fatigue, fever and unexpected weight change.   HENT: Negative for congestion, dental problem, drooling, ear discharge, ear pain, facial swelling, hearing loss, mouth sores, nosebleeds, postnasal drip, rhinorrhea, sinus pressure, sinus pain, sneezing, sore throat, tinnitus, trouble swallowing and voice change.    Eyes: Negative for photophobia, pain, discharge, redness, itching and visual disturbance.   Respiratory: Positive for chest tightness. Negative for apnea, cough, choking, shortness of breath, wheezing and stridor.    Cardiovascular: Positive for chest pain. Negative for palpitations and leg swelling.   Gastrointestinal: Negative for abdominal distention, abdominal pain, blood in stool, constipation, diarrhea, nausea and vomiting.        Ingestion type pain   Endocrine: Negative for polydipsia, polyphagia and polyuria.   Genitourinary: Negative for decreased urine volume, difficulty urinating, dysuria, flank pain, frequency, hematuria and urgency.   Musculoskeletal: Negative for arthralgias, back pain, gait problem, joint swelling, neck pain and neck stiffness.   Skin: Negative for rash and wound.   Neurological: Negative for dizziness, tremors, seizures, syncope, speech difficulty, weakness, light-headedness, numbness and headaches.   Hematological: Does not bruise/bleed easily.   Psychiatric/Behavioral: Negative for agitation, behavioral problems, confusion, hallucinations and sleep disturbance. The patient is not nervous/anxious.      Objective:     Vital Signs (Most Recent):  Temp: 97.7 °F (36.5 °C) (08/04/19 1503)  Pulse: 67 (08/04/19 1503)  Resp: 20 (08/04/19 1503)  BP: 123/74 (08/04/19 1510)  SpO2: 100 % (08/04/19 1503) Vital Signs (24h Range):  Temp:  [97.7 °F (36.5 °C)-98.2 °F (36.8 °C)]  97.7 °F (36.5 °C)  Pulse:  [65-78] 67  Resp:  [16-29] 20  SpO2:  [98 %-100 %] 100 %  BP: (109-136)/(57-89) 123/74     Weight: 78.6 kg (173 lb 4.5 oz)  Body mass index is 24.86 kg/m².    Physical Exam   Constitutional: He is oriented to person, place, and time. He appears well-developed.   HENT:   Head: Normocephalic.   Eyes: Pupils are equal, round, and reactive to light.   Neck: Normal range of motion.   Cardiovascular: Normal rate, regular rhythm and normal heart sounds. Exam reveals no gallop and no friction rub.   No murmur heard.  Pulmonary/Chest: Effort normal and breath sounds normal. No stridor. No respiratory distress. He has no wheezes. He has no rales. He exhibits no tenderness.   Abdominal: Soft. Bowel sounds are normal. There is no tenderness. There is no rebound and no guarding.   Neurological: He is alert and oriented to person, place, and time.   Skin: Skin is warm and dry. Capillary refill takes less than 2 seconds.   Psychiatric: He has a normal mood and affect.   Vitals reviewed.        CRANIAL NERVES     CN III, IV, VI   Pupils are equal, round, and reactive to light.    CN V   Facial sensation intact.     CN VII   Facial expression full, symmetric.     CN VIII   CN VIII normal.     CN IX, X   CN IX normal.     CN XII   CN XII normal.        Significant Labs:   BMP:   Recent Labs   Lab 08/04/19  0610   *      K 4.1      CO2 24   BUN 18   CREATININE 1.2   CALCIUM 8.6*     CBC:   Recent Labs   Lab 08/04/19  0610   WBC 6.21   HGB 12.7*   HCT 38.8*        CMP:   Recent Labs   Lab 08/04/19  0610      K 4.1      CO2 24   *   BUN 18   CREATININE 1.2   CALCIUM 8.6*   PROT 6.4   ALBUMIN 3.5   BILITOT 0.6   ALKPHOS 59   AST 15   ALT 8*   ANIONGAP 7*   EGFRNONAA 58.4*     Cardiac Markers:   Recent Labs   Lab 08/04/19  0610   BNP 91     Coagulation: No results for input(s): PT, INR, APTT in the last 48 hours.  Lipid Panel: No results for input(s): CHOL, HDL,  LDLCALC, TRIG, CHOLHDL in the last 48 hours.  Magnesium: No results for input(s): MG in the last 48 hours.  Prealbumin: No results for input(s): PREALBUMIN in the last 48 hours.  Troponin:   Recent Labs   Lab 08/04/19  0610 08/04/19  1040   TROPONINI <0.030 <0.030     TSH: No results for input(s): TSH in the last 4320 hours.  Urine Studies: No results for input(s): COLORU, APPEARANCEUA, PHUR, SPECGRAV, PROTEINUA, GLUCUA, KETONESU, BILIRUBINUA, OCCULTUA, NITRITE, UROBILINOGEN, LEUKOCYTESUR, RBCUA, WBCUA, BACTERIA, SQUAMEPITHEL, HYALINECASTS in the last 48 hours.    Invalid input(s): EMILYR  All pertinent labs within the past 24 hours have been reviewed.    Significant Imaging:

## 2019-08-04 NOTE — PLAN OF CARE
Problem: Adult Inpatient Plan of Care  Goal: Plan of Care Review  Outcome: Ongoing (interventions implemented as appropriate)     08/04/19 1700   Plan of Care Review   Plan of Care Reviewed With patient   Progress improving   Outcome Summary oriented pt to unit, POC discussed, qustions answered

## 2019-08-04 NOTE — ED PROVIDER NOTES
Encounter Date: 8/4/2019       History     Chief Complaint   Patient presents with    Chest Pain     78-year-old male who has a history of coronary artery disease for which she has 3 stents, diabetes mellitus with neuropathy, hypercholesterolemia, prior lung cancer, current prostate cancer, presents emergency room with a history that he woke this morning approximately 2:00 a.m. with precordial chest pain. Patient states it felt like indigestion and pressure.  He said he tried belching with no success to relieve his pain. He did take nitroglycerin with inadequate relief.  Patient did not have any vomiting on had diaphoresis.  His pain is not affected by respiration or movement.  He does admit to some recent poor exercise tolerance.  His cardiologist is .  No fever chills.        Review of patient's allergies indicates:   Allergen Reactions    Doxycycline Diarrhea     Severe diarrhea    Sulfa (sulfonamide antibiotics) Rash     Other reaction(s): Itching    Sulfasalazine Rash     Rash and itching mild     Past Medical History:   Diagnosis Date    Cancer     lung cancer chemo and radiation    COPD (chronic obstructive pulmonary disease)     Coronary artery disease     s/p 3 stents    Diabetes mellitus, type 2     Hyperlipidemia     Hypertension      Past Surgical History:   Procedure Laterality Date    ANKLE SURGERY Right 1970s    Injection-steroid-epidural-cervical C7-T1 N/A 2/19/2019    Performed by [facility] at [facility]    MEDIAL BRANCH, LUMBAR L3,4,5 Bilateral 6/4/2018    Performed by [facility] at [facility]    RADIOFREQUENCY ABLATION, NERVE, MEDIAL BRANCH, LUMBAR, 1 LEVEL Bilateral 7/16/2018    Performed by [facility]ino [facility] at [facility]    TRANSRECTAL ULTRASOUND GUIDED PROSTATE BIOPSY N/A 4/12/2018    Performed by Nuha Soto MD at [facility] OR     Family History   Problem Relation Age of Onset    Diabetes Mother     Peripheral vascular disease Mother     Heart attack Mother      Diabetes Father     Heart attack Father     Emphysema Father     Diabetes Sister      Social History     Tobacco Use    Smoking status: Current Every Day Smoker     Packs/day: 1.00     Years: 54.00     Pack years: 54.00     Types: Cigarettes    Smokeless tobacco: Never Used   Substance Use Topics    Alcohol use: No    Drug use: No     Review of Systems   Constitutional: Negative for fatigue and fever.   HENT: Negative for congestion, ear pain, rhinorrhea, sinus pain and sore throat.    Eyes: Negative for pain.   Respiratory: Positive for cough and chest tightness. Negative for shortness of breath.    Cardiovascular: Positive for chest pain. Negative for palpitations and leg swelling.   Gastrointestinal: Positive for diarrhea and nausea. Negative for abdominal pain, constipation and vomiting.   Genitourinary: Positive for difficulty urinating. Negative for flank pain, frequency and hematuria.   Musculoskeletal: Negative for back pain and neck pain.   Skin: Negative for pallor, rash and wound.   Neurological: Negative for headaches.   All other systems reviewed and are negative.      Physical Exam     Initial Vitals [08/04/19 0544]   BP Pulse Resp Temp SpO2   120/63 78 16 98.2 °F (36.8 °C) 98 %      MAP       --         Physical Exam    Constitutional: He appears well-developed and well-nourished. He is not diaphoretic. No distress.   HENT:   Head: Normocephalic and atraumatic.   Nose: Nose normal.   Mouth/Throat: Oropharynx is clear and moist.   Eyes: Conjunctivae and EOM are normal. Pupils are equal, round, and reactive to light. Right eye exhibits no discharge. Left eye exhibits no discharge. No scleral icterus.   Neck: Normal range of motion. Neck supple. No tracheal deviation present. No JVD present.   Cardiovascular: Normal rate, regular rhythm, normal heart sounds and intact distal pulses. Exam reveals no gallop and no friction rub.    No murmur heard.  Pulmonary/Chest: Breath sounds normal. No  respiratory distress. He has no wheezes. He has no rhonchi. He has no rales. He exhibits no tenderness.   Abdominal: Soft. Bowel sounds are normal. He exhibits no distension and no mass. There is no tenderness. There is no rebound and no guarding.   Musculoskeletal: Normal range of motion. He exhibits no edema or tenderness.   Lymphadenopathy:     He has no cervical adenopathy.   Neurological: He is alert and oriented to person, place, and time. He has normal strength. No cranial nerve deficit or sensory deficit. GCS score is 15. GCS eye subscore is 4. GCS verbal subscore is 5. GCS motor subscore is 6.   Skin: Skin is warm and dry. Capillary refill takes less than 2 seconds. No rash noted. No erythema. No pallor.   Psychiatric: He has a normal mood and affect. His behavior is normal. Judgment and thought content normal.         ED Course   Procedures  Labs Reviewed   CBC W/ AUTO DIFFERENTIAL   COMPREHENSIVE METABOLIC PANEL   TROPONIN I   B-TYPE NATRIURETIC PEPTIDE          Imaging Results    None                       Attending Attestation:             Attending ED Notes:   This 78-year-old male who has a history of coronary artery disease, diabetes and current prostate cancer, presented with complaints of chest pain. His initial EKG showed no evidence of any acute abnormalities.  The EKG has a sinus rhythm with no ectopy or ischemic changes.  Initial cardiac enzymes are normal. During the ED course the patient was given aspirin topical nitrates.  I later spoke to Dr. Pimentel who is covering for Dr. Jayant villalta the requested the patient be given full-dose Lovenox as well as 1 dose of Lopressor IV with plans on admitting the patient to the [facility] medicine service.  The patient's clinical presentation is consistent with an anginal episode.  At 10:43 a.m. I did speak [facility] Medicine who has accepted the patient for admission.             Clinical Impression:       ICD-10-CM ICD-9-CM   1. Chest pain, unspecified type  R07.9 786.50   2. Chest pain R07.9 786.50                                Lazaro Packer Jr., MD  08/04/19 1044

## 2019-08-04 NOTE — HOSPITAL COURSE
He was admitted for further evaluation of ACS. Monitored on cardiac telemetry, EKG with non specific ST changes, cardiac enzymes trended and non elevated. Cardiology was consulted and he did have nuclear stress test without any reversible ischemia or infarct, no regional wall motion abnormalities. His chest pain resolved prior to discharge. He stated his diabetes has been uncontrolled recently with HBA1C 9% with recent increase in metformin dosage however he was experiencing weight loss and diarrhea related to same, counseled on taking medication with food to reduce GI side effects as well as given contact details for endocrinologist as per request. Etiology of chest discomfort likely GI vs MSK. He was identified as current smoker and smoking cessation counseling performed, prescribed nicotine patch. Medically stable for discharge. Discharge plan including medications, follow up as well as return precautions explained to the patient on discharge.     Discharge examination:  Lying in bed, comfortable, calm  Regular heart rhythm, no reproducible chest wall tenderness to palpation  Not on supplemental O2, good air entry bilaterally  Abdomen soft and non tender

## 2019-08-04 NOTE — CONSULTS
This 78-year-old  gentleman is admitted with chest discomfort.  The patient awoke at 4:00 a.m. with a pressure/balloon type sensation in the left lower parasternal area that lasted 4-5 sec.  He tried to to belch with little success.  Pain recurred again for 5 sec.  In the ED, he had another bout of pain.  EKGs and troponin x2 within normal limits.  He was given metoprolol 5 mg IV in the ED.  He is on nitroglycerin ointment.  He has been pain-free since.  Approximately 1 week ago, the patient had similar pain when he lifted a heavy box.  He has been asymptomatic since. he reported relief of the discomfort with belching, finally.   The patient had a dizzy spell around 2000 -2002 when 2 coronary stents were deployed.  He had another weak spell-new syncope in 20,04-,1/3 stent was deployed.  He has had no chest pain or tightness.  He does not use nitroglycerin.  The patient's exercise capacity is limited by shortness of breath to 2-3 block walk.  There is no exertional chest pain or tightness.    Past history:  The patient does have diabetes mellitus, hypertension and dyslipidemia.  COPD.  Cervical radiculopathy and rhizotomy.  Good.  Prostate CA-watchful observation.  Lung cancer in 2014; the patient had chemotherapy and radiation; PET scan 3 months ago was unremarkable.  GERD.    Social history:  The patient stopped smoking approximately 5-6 days ago.  He has a nicotine patch.  He used to work as a .    Review of systems:  Patient has lost approximately 6-8 lb in the last 6 months or so.  He attributes this to metformin which is increased from 1 g daily to 2 g daily.  Worsened he does have diarrhea.  He has reflux and is on PPI.  He does get water brash.  There is no history of hematochezia melena.    Medication;  Amlodipine 10 12.5 mg daily amlodipine 5 mg daily aspirin 81 mg daily atorvastatin 20 mg daily ibuprofen 800 mg every 6 hr p.r.n. for pain there was at 1039 mg daily metformin 2 g daily  metoprolol ER 50 mg daily nitroglycerin p.r.n. sublingual.  Pregabalin 100 mg P. alfuzosin 10 mg daily.  Vitamin-C.  Aspirin calcium carbonate calcium daily.  Brimonidine drops in each eye.  Carboxymethylcellulose.  Dorzolamide 2.5%.  Nystatin suspension p.r.n..  Sildenafil p.r.n..  Metronidazole 500 mg mirtazapine 15 mg p.r.n..  Omeprazole 40 mg daily    Physical examination:  This is a well-built   gentleman in no acute distress  Eyes:  [place] conjunctiva; there is no scleral icterus or pallor.  Throat:  No masses are observed.  Neck:  Carotids equal without bruits.  There is no jugular venous distention thyromegaly.  Chest:  Air entry is equal bilaterally.  There are no rales or rhonchi.  Heart:  S1-S2 are normal.  The where no murmurs  gallops or rubs.  Abdomen:  Liver is not palpable.  Extremities:  There is no clubbing cyanosis or edema.    ECG reveals sinus rhythm with mild nonspecific ST-T abnormality.  Hemoglobin 12.7 hematocrit 38.8 WBC count 6200 pain control and 8000.  Sodium 140 potassium 4.1 BUN 18 creatinine 1.2 calcium 8.6 total protein 6.4 albumin 3.5.  Troponin x2 are within normal limits.    Impression:  1.  Atypical chest pain-probably musculoskeletal pain  2.  Coronary stents in 2002 and 2004  3.  Hypertension; dyslipidemia; diabetes mellitus; glaucoma    The patient's pain is atypical for ACS/angina.  The best course of action would be a Lexiscan Myoview stress test in the morning for further evaluation.  Nitroglycerin paste will be discontinued

## 2019-08-04 NOTE — H&P
[facility]  History & Physical    Patient Name: Tyler Todd  MRN: [id number]  Admission Date: 8/4/2019  Attending Physician: Jose Thompson MD   Primary Care Provider: Andres Diane MD         Patient information was obtained from patient and ER records.     Subjective:     Principal Problem:Chest pain    Chief Complaint:   Chief Complaint   Patient presents with    Chest Pain        HPI: 78-year-old male who has a history of coronary artery disease for which she has 3 stents, diabetes mellitus with neuropathy, hypercholesterolemia, prior lung cancer, current prostate cancer, presents emergency room with a history that he woke this morning approximately 2:00 a.m. with precordial chest pain. Patient states it felt like indigestion and pressure.  He said he tried belching with no success to relieve his pain. He did take nitroglycerin with inadequate relief.  Patient did not have any vomiting on had diaphoresis.  His pain is not affected by respiration or movement.  He does admit to some recent poor exercise tolerance.  His cardiologist is .  No fever chills.       Past Medical History:   Diagnosis Date    Cancer     lung cancer chemo and radiation    COPD (chronic obstructive pulmonary disease)     Coronary artery disease     s/p 3 stents    Diabetes mellitus, type 2     Hyperlipidemia     Hypertension        Past Surgical History:   Procedure Laterality Date    ANKLE SURGERY Right 1970s    Injection-steroid-epidural-cervical C7-T1 N/A 2/19/2019    Performed by Marcelino Monroe MD at [facility] OR    MEDIAL BRANCH, LUMBAR L3,4,5 Bilateral 6/4/2018    Performed by Marcelino Monroe MD at [facility] OR    RADIOFREQUENCY ABLATION, NERVE, MEDIAL BRANCH, LUMBAR, 1 LEVEL Bilateral 7/16/2018    Performed by Marcelino Monroe MD at [facility] OR    TRANSRECTAL ULTRASOUND GUIDED PROSTATE BIOPSY N/A 4/12/2018    Performed by Nuha Soto MD at [facility] OR       Review of patient's allergies indicates:    Allergen Reactions    Doxycycline Diarrhea     Severe diarrhea    Sulfa (sulfonamide antibiotics) Rash     Other reaction(s): Itching    Sulfasalazine Rash     Rash and itching mild       Current Facility-Administered Medications on File Prior to Encounter   Medication    lidocaine (PF) 10 mg/ml (1%) injection    omnipaque 300 iohexol    sodium chloride 0.9% injection     Current Outpatient Medications on File Prior to Encounter   Medication Sig    alogliptin (NESINA) 12.5 mg Tab Take by mouth.    amLODIPine (NORVASC) 5 MG tablet Take 5 mg by mouth once daily.    aspirin 81 mg Tab Every day    atorvastatin (LIPITOR) 20 MG tablet Take 10 mg by mouth once daily.     ibuprofen (ADVIL,MOTRIN) 800 MG tablet Take 800 mg by mouth every 6 (six) hours as needed for Pain.    irbesartan (AVAPRO) 300 MG tablet Take 300 mg by mouth every evening.    LATANOPROST OPHT Apply to eye.    metFORMIN (GLUCOPHAGE) 1000 MG tablet metformin    METOPROLOL SUCCINATE ORAL Take 50 mg by mouth 2 (two) times daily.    nitroGLYCERIN (NITROSTAT) 0.4 MG SL tablet As directed    pregabalin (LYRICA) 100 MG capsule Take 100 mg by mouth.    alfuzosin (UROXATRAL) 10 mg Tb24 Take 1 tablet (10 mg total) by mouth daily with breakfast.    ascorbic acid, vitamin C, (VITAMIN C) 100 MG tablet Vitamin C    aspirin-calcium carbonate 81 mg-300 mg calcium(777 mg) Tab Take 81 mg by mouth.    brimonidine 0.1% (ALPHAGAN P) 0.1 % Drop Place 1 drop into both eyes 3 (three) times daily.    carboxymethylcellulose (REFRESH PLUS) 0.5 % Dpet 1 drop 3 (three) times daily as needed.    carboxymethylcellulose sodium (THERATEARS) 1 % DpGe TheraTears 1 % gel in a dropperette    diabetic supplies, miscellan. Misc No Sig Provided    dorzolamide (TRUSOPT) 2 % ophthalmic solution 1 drop 3 (three) times daily.    nystatin (MYCOSTATIN) 100,000 unit/mL suspension Take 5 mLs (500,000 Units total) by mouth 3 (three) times daily as needed.    sildenafil  (VIAGRA) 100 MG tablet Take 1 tablet (100 mg total) by mouth daily as needed for Erectile Dysfunction.    [DISCONTINUED] metoprolol succinate (TOPROL-XL) 50 MG 24 hr tablet metoprolol succinate ER 50 mg tablet,extended release 24 hr    [DISCONTINUED] metroNIDAZOLE (FLAGYL) 500 MG tablet metronidazole 500 mg tablet    [DISCONTINUED] mirtazapine (REMERON) 30 MG tablet Take 15 mg by mouth.    [DISCONTINUED] omeprazole (PRILOSEC) 40 MG capsule omeprazole 40 mg capsule,delayed release   Take 1 capsule every day by oral route as directed for 30 days.    [DISCONTINUED] sucralfate (CARAFATE) 100 mg/mL suspension 10 mLs.    [DISCONTINUED] travoprost, benzalkonium, (TRAVATAN) 0.004 % ophthalmic solution 1 drop every evening.    [DISCONTINUED] valsartan (DIOVAN) 160 MG tablet valsartan 160 mg tablet     Family History     Problem Relation (Age of Onset)    Diabetes Mother, Father, Sister    Emphysema Father    Heart attack Mother, Father    Peripheral vascular disease Mother        Tobacco Use    Smoking status: Current Every Day Smoker     Packs/day: 1.00     Years: 54.00     Pack years: 54.00     Types: Cigarettes    Smokeless tobacco: Never Used   Substance and Sexual Activity    Alcohol use: No    Drug use: No    Sexual activity: Never     Review of Systems   Constitutional: Negative for appetite change, chills, diaphoresis, fatigue, fever and unexpected weight change.   HENT: Negative for congestion, dental problem, drooling, ear discharge, ear pain, facial swelling, hearing loss, mouth sores, nosebleeds, postnasal drip, rhinorrhea, sinus pressure, sinus pain, sneezing, sore throat, tinnitus, trouble swallowing and voice change.    Eyes: Negative for photophobia, pain, discharge, redness, itching and visual disturbance.   Respiratory: Positive for chest tightness. Negative for apnea, cough, choking, shortness of breath, wheezing and stridor.    Cardiovascular: Positive for chest pain. Negative for palpitations  and leg swelling.   Gastrointestinal: Negative for abdominal distention, abdominal pain, blood in stool, constipation, diarrhea, nausea and vomiting.        Ingestion type pain   Endocrine: Negative for polydipsia, polyphagia and polyuria.   Genitourinary: Negative for decreased urine volume, difficulty urinating, dysuria, flank pain, frequency, hematuria and urgency.   Musculoskeletal: Negative for arthralgias, back pain, gait problem, joint swelling, neck pain and neck stiffness.   Skin: Negative for rash and wound.   Neurological: Negative for dizziness, tremors, seizures, syncope, speech difficulty, weakness, light-headedness, numbness and headaches.   Hematological: Does not bruise/bleed easily.   Psychiatric/Behavioral: Negative for agitation, behavioral problems, confusion, hallucinations and sleep disturbance. The patient is not nervous/anxious.      Objective:     Vital Signs (Most Recent):  Temp: 97.7 °F (36.5 °C) (08/04/19 1503)  Pulse: 67 (08/04/19 1503)  Resp: 20 (08/04/19 1503)  BP: 123/74 (08/04/19 1510)  SpO2: 100 % (08/04/19 1503) Vital Signs (24h Range):  Temp:  [97.7 °F (36.5 °C)-98.2 °F (36.8 °C)] 97.7 °F (36.5 °C)  Pulse:  [65-78] 67  Resp:  [16-29] 20  SpO2:  [98 %-100 %] 100 %  BP: (109-136)/(57-89) 123/74     Weight: 78.6 kg (173 lb 4.5 oz)  Body mass index is 24.86 kg/m².    Physical Exam   Constitutional: He is oriented to person, place, and time. He appears well-developed.   HENT:   Head: Normocephalic.   Eyes: Pupils are equal, round, and reactive to light.   Neck: Normal range of motion.   Cardiovascular: Normal rate, regular rhythm and normal heart sounds. Exam reveals no gallop and no friction rub.   No murmur heard.  Pulmonary/Chest: Effort normal and breath sounds normal. No stridor. No respiratory distress. He has no wheezes. He has no rales. He exhibits no tenderness.   Abdominal: Soft. Bowel sounds are normal. There is no tenderness. There is no rebound and no guarding.    Neurological: He is alert and oriented to person, place, and time.   Skin: Skin is warm and dry. Capillary refill takes less than 2 seconds.   Psychiatric: He has a normal mood and affect.   Vitals reviewed.        CRANIAL NERVES     CN III, IV, VI   Pupils are equal, round, and reactive to light.    CN V   Facial sensation intact.     CN VII   Facial expression full, symmetric.     CN VIII   CN VIII normal.     CN IX, X   CN IX normal.     CN XII   CN XII normal.        Significant Labs:   BMP:   Recent Labs   Lab 08/04/19  0610   *      K 4.1      CO2 24   BUN 18   CREATININE 1.2   CALCIUM 8.6*     CBC:   Recent Labs   Lab 08/04/19  0610   WBC 6.21   HGB 12.7*   HCT 38.8*        CMP:   Recent Labs   Lab 08/04/19  0610      K 4.1      CO2 24   *   BUN 18   CREATININE 1.2   CALCIUM 8.6*   PROT 6.4   ALBUMIN 3.5   BILITOT 0.6   ALKPHOS 59   AST 15   ALT 8*   ANIONGAP 7*   EGFRNONAA 58.4*     Cardiac Markers:   Recent Labs   Lab 08/04/19  0610   BNP 91     Coagulation: No results for input(s): PT, INR, APTT in the last 48 hours.  Lipid Panel: No results for input(s): CHOL, HDL, LDLCALC, TRIG, CHOLHDL in the last 48 hours.  Magnesium: No results for input(s): MG in the last 48 hours.  Prealbumin: No results for input(s): PREALBUMIN in the last 48 hours.  Troponin:   Recent Labs   Lab 08/04/19  0610 08/04/19  1040   TROPONINI <0.030 <0.030     TSH: No results for input(s): TSH in the last 4320 hours.  Urine Studies: No results for input(s): COLORU, APPEARANCEUA, PHUR, SPECGRAV, PROTEINUA, GLUCUA, KETONESU, BILIRUBINUA, OCCULTUA, NITRITE, UROBILINOGEN, LEUKOCYTESUR, RBCUA, WBCUA, BACTERIA, SQUAMEPITHEL, HYALINECASTS in the last 48 hours.    Invalid input(s): WRIGHTSUR  All pertinent labs within the past 24 hours have been reviewed.    Significant Imaging:      Assessment/Plan:     * Chest pain  - trend CE/Trop  - 2 D echo complete  - Consult Cards Dr. Pedro Pablo Pimentel  -  ASA,O2-statin-nitrates  - LMWH      Diabetes mellitus type II, uncontrolled  - diabetic cardiac diet  - low dose novolog s/s insulin  - Accuchecks       CKD (chronic kidney disease), stage III  - monitor- appears stable      Thrombocytopenia  - monitor        VTE Risk Mitigation (From admission, onward)        Ordered     IP VTE LOW RISK PATIENT  Once      08/04/19 1501     Place sequential compression device  Until discontinued      08/04/19 1501             Candace Bermudez NP  Department of Hospital Medicine   [facility]

## 2019-08-04 NOTE — HPI
Mr. Todd is a 78 years old male who presented to the ED with chief complaint of chest pain. Patient reported he awoke his chest discomfort, felt like pressure sensation and ingestion. He said he tried to belch without any success. He took NGT without any improvement. He states he had similar episode last week when he lifted heavy box. He has known history of CAD with previous PCI, follows with Dr. Interiano.

## 2019-08-04 NOTE — ASSESSMENT & PLAN NOTE
- trend CE/Trop  - 2 D echo complete  - Consult Cards Dr. Pedro Pablo Pimentel  - ASA,O2-statin-nitrates  - LMWH

## 2019-08-05 ENCOUNTER — CLINICAL SUPPORT (OUTPATIENT)
Dept: CARDIOLOGY | Facility: HOSPITAL | Age: 79
End: 2019-08-05
Attending: SPECIALIST
Payer: MEDICARE

## 2019-08-05 ENCOUNTER — HOSPITAL ENCOUNTER (OUTPATIENT)
Dept: RADIOLOGY | Facility: HOSPITAL | Age: 79
Discharge: HOME | End: 2019-08-05
Attending: SPECIALIST
Payer: MEDICARE

## 2019-08-05 VITALS
WEIGHT: 171.88 LBS | DIASTOLIC BLOOD PRESSURE: 66 MMHG | BODY MASS INDEX: 24.61 KG/M2 | HEIGHT: 70 IN | TEMPERATURE: 97 F | HEART RATE: 64 BPM | OXYGEN SATURATION: 99 % | RESPIRATION RATE: 17 BRPM | SYSTOLIC BLOOD PRESSURE: 108 MMHG

## 2019-08-05 VITALS — WEIGHT: 171.94 LBS | HEIGHT: 70 IN | BODY MASS INDEX: 24.61 KG/M2

## 2019-08-05 PROBLEM — Z72.0 NICOTINE ABUSE: Chronic | Status: ACTIVE | Noted: 2019-08-05

## 2019-08-05 PROBLEM — I25.10 CAD (CORONARY ARTERY DISEASE): Chronic | Status: ACTIVE | Noted: 2019-08-05

## 2019-08-05 PROBLEM — R07.89 ATYPICAL CHEST PAIN: Status: RESOLVED | Noted: 2019-08-05 | Resolved: 2019-08-05

## 2019-08-05 PROBLEM — Z71.6 ENCOUNTER FOR SMOKING CESSATION COUNSELING: Chronic | Status: ACTIVE | Noted: 2019-08-05

## 2019-08-05 PROBLEM — R07.9 CHEST PAIN: Status: RESOLVED | Noted: 2019-08-04 | Resolved: 2019-08-05

## 2019-08-05 PROBLEM — R07.89 ATYPICAL CHEST PAIN: Status: ACTIVE | Noted: 2019-08-05

## 2019-08-05 LAB
ALBUMIN SERPL BCP-MCNC: 3.3 G/DL (ref 3.5–5.2)
ALP SERPL-CCNC: 59 U/L (ref 55–135)
ALT SERPL W/O P-5'-P-CCNC: 8 U/L (ref 10–44)
ANION GAP SERPL CALC-SCNC: 6 MMOL/L (ref 8–16)
AST SERPL-CCNC: 15 U/L (ref 10–40)
BASOPHILS # BLD AUTO: 0.04 K/UL (ref 0–0.2)
BASOPHILS NFR BLD: 0.6 % (ref 0–1.9)
BILIRUB SERPL-MCNC: 0.6 MG/DL (ref 0.1–1)
BUN SERPL-MCNC: 20 MG/DL (ref 8–23)
CALCIUM SERPL-MCNC: 8.5 MG/DL (ref 8.7–10.5)
CHLORIDE SERPL-SCNC: 105 MMOL/L (ref 95–110)
CO2 SERPL-SCNC: 27 MMOL/L (ref 23–29)
CREAT SERPL-MCNC: 1.3 MG/DL (ref 0.5–1.4)
DIFFERENTIAL METHOD: ABNORMAL
EOSINOPHIL # BLD AUTO: 0.2 K/UL (ref 0–0.5)
EOSINOPHIL NFR BLD: 3.5 % (ref 0–8)
ERYTHROCYTE [DISTWIDTH] IN BLOOD BY AUTOMATED COUNT: 14.5 % (ref 11.5–14.5)
EST. GFR  (AFRICAN AMERICAN): >60 ML/MIN/1.73 M^2
EST. GFR  (NON AFRICAN AMERICAN): 53 ML/MIN/1.73 M^2
GLUCOSE SERPL-MCNC: 131 MG/DL (ref 70–110)
GLUCOSE SERPL-MCNC: 156 MG/DL (ref 70–110)
GLUCOSE SERPL-MCNC: 157 MG/DL (ref 70–110)
HCT VFR BLD AUTO: 34.7 % (ref 40–54)
HGB BLD-MCNC: 11.3 G/DL (ref 14–18)
IMM GRANULOCYTES # BLD AUTO: 0.01 K/UL (ref 0–0.04)
IMM GRANULOCYTES NFR BLD AUTO: 0.2 % (ref 0–0.5)
LYMPHOCYTES # BLD AUTO: 1.6 K/UL (ref 1–4.8)
LYMPHOCYTES NFR BLD: 23.7 % (ref 18–48)
MCH RBC QN AUTO: 30.5 PG (ref 27–31)
MCHC RBC AUTO-ENTMCNC: 32.6 G/DL (ref 32–36)
MCV RBC AUTO: 94 FL (ref 82–98)
MONOCYTES # BLD AUTO: 0.8 K/UL (ref 0.3–1)
MONOCYTES NFR BLD: 11.9 % (ref 4–15)
NEUTROPHILS # BLD AUTO: 3.9 K/UL (ref 1.8–7.7)
NEUTROPHILS NFR BLD: 60.1 % (ref 38–73)
NRBC BLD-RTO: 0 /100 WBC
PLATELET # BLD AUTO: 193 K/UL (ref 150–350)
PMV BLD AUTO: 10.1 FL (ref 9.2–12.9)
POTASSIUM SERPL-SCNC: 4 MMOL/L (ref 3.5–5.1)
PROT SERPL-MCNC: 5.9 G/DL (ref 6–8.4)
RBC # BLD AUTO: 3.7 M/UL (ref 4.6–6.2)
SODIUM SERPL-SCNC: 138 MMOL/L (ref 136–145)
WBC # BLD AUTO: 6.55 K/UL (ref 3.9–12.7)

## 2019-08-05 PROCEDURE — 85025 COMPLETE CBC W/AUTO DIFF WBC: CPT

## 2019-08-05 PROCEDURE — 25000003 PHARM REV CODE 250: Performed by: NURSE PRACTITIONER

## 2019-08-05 PROCEDURE — G0378 HOSPITAL OBSERVATION PER HR: HCPCS

## 2019-08-05 PROCEDURE — 80053 COMPREHEN METABOLIC PANEL: CPT

## 2019-08-05 PROCEDURE — 93306 TTE W/DOPPLER COMPLETE: CPT

## 2019-08-05 PROCEDURE — 63600175 PHARM REV CODE 636 W HCPCS: Performed by: SPECIALIST

## 2019-08-05 PROCEDURE — A9502 TC99M TETROFOSMIN: HCPCS

## 2019-08-05 PROCEDURE — 93017 CV STRESS TEST TRACING ONLY: CPT

## 2019-08-05 RX ORDER — REGADENOSON 0.08 MG/ML
0.4 INJECTION, SOLUTION INTRAVENOUS ONCE
Status: COMPLETED | OUTPATIENT
Start: 2019-08-05 | End: 2019-08-05

## 2019-08-05 RX ORDER — IBUPROFEN 200 MG
1 TABLET ORAL DAILY
Qty: 14 PATCH | Refills: 0 | Status: SHIPPED | OUTPATIENT
Start: 2019-08-05 | End: 2019-08-19

## 2019-08-05 RX ADMIN — ASPIRIN 325 MG ORAL TABLET 325 MG: 325 PILL ORAL at 09:08

## 2019-08-05 RX ADMIN — PREGABALIN 100 MG: 50 CAPSULE ORAL at 09:08

## 2019-08-05 RX ADMIN — ATORVASTATIN CALCIUM 10 MG: 10 TABLET, FILM COATED ORAL at 09:08

## 2019-08-05 RX ADMIN — AMLODIPINE BESYLATE 5 MG: 5 TABLET ORAL at 09:08

## 2019-08-05 RX ADMIN — REGADENOSON 0.4 MG: 0.08 INJECTION, SOLUTION INTRAVENOUS at 11:08

## 2019-08-05 RX ADMIN — DORZOLAMIDE HCL 1 DROP: 20 SOLUTION/ DROPS OPHTHALMIC at 09:08

## 2019-08-05 RX ADMIN — PANTOPRAZOLE SODIUM 40 MG: 40 TABLET, DELAYED RELEASE ORAL at 05:08

## 2019-08-05 RX ADMIN — BRIMONIDINE TARTRATE 1 DROP: 1.5 SOLUTION OPHTHALMIC at 09:08

## 2019-08-05 NOTE — PROGRESS NOTES
MYOCARDIAL PERFUSION STRESS IMAGES OBTAINED AT 12:30    -MYOCARDIAL PERFUSION STRESS TEST COMPLETED    NEIL RUSH

## 2019-08-05 NOTE — PLAN OF CARE
08/04/19 1903   Patient Assessment/Suction   Level of Consciousness (AVPU) alert   Respiratory Effort Normal   Expansion/Accessory Muscles/Retractions no use of accessory muscles   All Lung Fields Breath Sounds clear   PRE-TX-O2   O2 Device (Oxygen Therapy) room air   SpO2 99 %   $ Pulse Oximetry - Multiple Charge Pulse Oximetry - Multiple   Pulse 72   Resp 16   d/c pulse ox/o2 not indicated

## 2019-08-05 NOTE — NURSING
Pt d/c instructions given, pt verbalized understanding, pt has all personal items, pt d/c via w/c to private vehicle

## 2019-08-05 NOTE — ASSESSMENT & PLAN NOTE
HBA1C 9.3% as per patient, metformin dose increased but experiencing weight loss and diarrhea.   Counseled take metformin with meals to decrease GI side effects, referral to endocrinology

## 2019-08-05 NOTE — DISCHARGE SUMMARY
Replaced by [facility]  Discharge Summary      Patient Name: Tyler Todd  MRN: [id number]  Admission Date: 8/4/2019  Hospital Length of Stay: 0 days  Discharge Date and Time:  08/05/2019 1:49 PM  Attending Physician: Eliana Flores MD   Discharging Provider: Eliana Flores MD  Primary Care Provider: Andres Diane MD      HPI:   Mr. Todd is a 78 years old male who presented to the ED with chief complaint of chest pain. Patient reported he awoke his chest discomfort, felt like pressure sensation and ingestion. He said he tried to belch without any success. He took NGT without any improvement. He states he had similar episode last week when he lifted heavy box. He has known history of CAD with previous PCI, follows with Dr. Interiano.     * No surgery found *      Hospital Course:   He was admitted for further evaluation of ACS. Monitored on cardiac telemetry, EKG with non specific ST changes, cardiac enzymes trended and non elevated. Cardiology was consulted and he did have nuclear stress test without any reversible ischemia or infarct, no regional wall motion abnormalities. His chest pain resolved prior to discharge. He stated his diabetes has been uncontrolled recently with HBA1C 9% with recent increase in metformin dosage however he was experiencing weight loss and diarrhea related to same, counseled on taking medication with food to reduce GI side effects as well as given contact details for endocrinologist as per request. Etiology of chest discomfort likely GI vs MSK. He was identified as current smoker and smoking cessation counseling performed, prescribed nicotine patch. Medically stable for discharge. Discharge plan including medications, follow up as well as return precautions explained to the patient on discharge.     Discharge examination:  Lying in bed, comfortable, calm  Regular heart rhythm, no reproducible chest wall tenderness to palpation  Not on supplemental O2, good air  entry bilaterally  Abdomen soft and non tender     Consults:   Consults (From admission, onward)        Status Ordering Provider     Inpatient consult to Cardiology  Once     Provider:  Chavo Pimentel MD    Completed MYCHAL HAMPTON     Inpatient consult to Cardiology  Once     Provider:  Chavo Pimentel MD    Acknowledged MYCHAL HAMPTON     IP consult to case management  Once     Provider:  (Not yet assigned)    Completed JOSLYN BROWN          * Atypical chest pain  Likely GI vs MSK. Nuclear stress negative. Resolved prior to discharge.       CAD (coronary artery disease)  CAD with prior PCI 2002 and 2004. Follow up Dr. Interiano.       Diabetes mellitus type II, uncontrolled  HBA1C 9.3% as per patient, metformin dose increased but experiencing weight loss and diarrhea.   Counseled take metformin with meals to decrease GI side effects, referral to endocrinology      Nicotine abuse  At least 1 ppd      Encounter for smoking cessation counseling  Counseled on smoking cessation, nicotine patch prescribed.         Final Active Diagnoses:    Diagnosis Date Noted POA    PRINCIPAL PROBLEM:  Atypical chest pain [R07.89] 08/05/2019 Yes    CAD (coronary artery disease) [I25.10] 08/05/2019 Yes     Chronic    Diabetes mellitus type II, uncontrolled [E11.65] 11/19/2012 Yes    Nicotine abuse [Z72.0] 08/05/2019 Yes     Chronic    Encounter for smoking cessation counseling [Z71.6] 08/05/2019 Not Applicable     Chronic      Problems Resolved During this Admission:       Discharged Condition: good    Disposition: Home or Self Care    Follow Up:  Follow-up Information     Jayson Interiano MD. Schedule an appointment as soon as possible for a visit in 2 weeks.    Specialty:  Cardiology  Contact information:  [address] [address]  [phone]             Randi Breaux MD. Schedule an appointment as soon as possible for a visit in 2 weeks.    Specialty:  Endocrinology  Why:  DM  Contact  information:  [address]  [address]  [facility] [address]  [phone]             Andres Diane MD. Schedule an appointment as soon as possible for a visit in 2 weeks.    Specialty:  Internal Medicine  Contact information:  [address]  [place] [id number] [phone]                 Patient Instructions:      Ambulatory referral to Endocrinology   Referral Priority: Routine Referral Type: Consultation   Requested Specialty: Endocrinology   Number of Visits Requested: 1     Diet diabetic     Activity as tolerated       Significant Diagnostic Studies: Labs:   BMP:   Recent Labs   Lab 08/04/19 0610 08/05/19 0429   * 157*    138   K 4.1 4.0    105   CO2 24 27   BUN 18 20   CREATININE 1.2 1.3   CALCIUM 8.6* 8.5*   , CMP   Recent Labs   Lab 08/04/19 0610 08/05/19 0429    138   K 4.1 4.0    105   CO2 24 27   * 157*   BUN 18 20   CREATININE 1.2 1.3   CALCIUM 8.6* 8.5*   PROT 6.4 5.9*   ALBUMIN 3.5 3.3*   BILITOT 0.6 0.6   ALKPHOS 59 59   AST 15 15   ALT 8* 8*   ANIONGAP 7* 6*   ESTGFRAFRICA >60.0 >60.0   EGFRNONAA 58.4* 53.0*   , Lipid Panel   Lab Results   Component Value Date    CHOL 149 11/20/2012    HDL 31 (L) 11/20/2012    LDLCALC 98.0 11/20/2012    TRIG 98 11/20/2012    CHOLHDL 20.8 11/20/2012   , Troponin   Recent Labs   Lab 08/04/19  2044   TROPONINI <0.030    and A1C:   Recent Labs   Lab 08/04/19  1517   HGBA1C 6.8*   X-ray Chest Ap Portable    Result Date: 8/5/2019  EXAMINATION: XR CHEST AP PORTABLE CLINICAL HISTORY: sob; FINDINGS: Portable chest at 854 compared with 01/27/2019 shows normal cardiomediastinal silhouette. Lungs are clear. Pulmonary vasculature is normal. No acute osseous abnormality.     Negative chest. Electronically signed by: Eddy Smith MD Date:    08/05/2019 Time:    10:24    Nm Myocardial Perfusion Spect Multi Pharmacologic    Result Date: 8/5/2019  EXAMINATION: NM MYOCARDIAL PERFUSION SPECT MULTI PHARM CLINICAL HISTORY: Chest pain,  chronic, low to mod probability of CAD; TECHNIQUE: 10.5 mCi technetium 99m tetrofosmin was administered IV for the rest portion of the exam, with 26.3 mCi for the stress portion of the exam, following a 1 day protocol. The patient was stressed with regadenoson 0.4 mg IV. FINDINGS: No prior studies for comparison.  There is normal and homogeneous radiotracer uptake by the left ventricular myocardium, with no photopenic defects to suggest pharmacologically induced reversible ischemia or infarct. There are no wall motion abnormalities on the gated cine images, with no abnormal transient left ventricular dilatation on stress images. Ejection fraction is calculated at 54 %. The polar map images confirm the planar SPECT findings.     1.   No evidence of pharmacologically induced reversible ischemia or infarct. 2.   Normal left ventricular wall motion. 3.   Calculated ejection fraction of 54 %. Electronically signed by: Bo Hendrix MD Date:    08/05/2019 Time:    13:11        Pending Diagnostic Studies:     Procedure Component Value Units Date/Time    Transthoracic echo (TTE) Complete [[id number]]  (Abnormal) Resulted:  08/05/19 0826    Order Status:  Sent Lab Status:  In process Updated:  08/05/19 0826     BSA 1.96 m2      TDI SEPTAL 0.04 m/s      LV LATERAL E/E' RATIO 8.86 m/s      LV SEPTAL E/E' RATIO 15.50 m/s      AORTIC VALVE CUSP SEPERATION 1.77 cm      TDI LATERAL 0.07 m/s      PV PEAK VELOCITY 79.44 cm/s      LVIDD 4.42 cm      IVS 1.36 cm      PW 1.36 cm      Ao root annulus 3.33 cm      LVIDS 3.57 cm      FS 19 %      LV mass 231.60 g      LA size 3.47 cm      RVDD 182.00 cm      Left Ventricle Relative Wall Thickness 0.62 cm      AV mean gradient 2 mmHg      AV valve area 2.91 cm2      AV Velocity Ratio 76.30     AV index (prosthetic) 0.78     E/A ratio 0.86     Mean e' 0.06 m/s      E wave decelartion time 236.20 msec      IVRT 93.93 msec      LVOT diameter 2.18 cm      LVOT area 3.7 cm2      LVOT peak angelica  81.64 m/s      LVOT peak VTI 16.27 cm      Ao peak julián 1.07 m/s      Ao VTI 20.87 cm      LVOT stroke volume 60.70 cm3      AV peak gradient 5 mmHg      E/E' ratio 11.27 m/s      MV Peak E Julián 0.62 m/s      TR Max Julián 2.38 m/s      MV Peak A Julián 0.72 m/s      LV Mass Index 118 g/m2      Triscuspid Valve Regurgitation Peak Gradient 23 mmHg     Narrative:       · Concentric left ventricular hypertrophy.       Impression:                Medications:  Reconciled Home Medications:      Medication List      START taking these medications    nicotine 21 mg/24 hr  Commonly known as:  NICODERM CQ  Place 1 patch onto the skin once daily. for 14 days        CHANGE how you take these medications    METOPROLOL SUCCINATE ORAL  Take 50 mg by mouth 2 (two) times daily.  What changed:  Another medication with the same name was removed. Continue taking this medication, and follow the directions you see here.        CONTINUE taking these medications    alfuzosin 10 mg Tb24  Commonly known as:  UROXATRAL  Take 1 tablet (10 mg total) by mouth daily with breakfast.     alogliptin 12.5 mg Tab  Commonly known as:  NESINA  Take by mouth.     amLODIPine 5 MG tablet  Commonly known as:  NORVASC  Take 5 mg by mouth once daily.     aspirin 81 mg Tab  Every day     aspirin-calcium carbonate 81 mg-300 mg calcium(777 mg) Tab  Take 81 mg by mouth.     atorvastatin 20 MG tablet  Commonly known as:  LIPITOR  Take 10 mg by mouth once daily.     brimonidine 0.1% 0.1 % Drop  Commonly known as:  ALPHAGAN P  Place 1 drop into both eyes 3 (three) times daily.     * carboxymethylcellulose 0.5 % Dpet  Commonly known as:  REFRESH PLUS  1 drop 3 (three) times daily as needed.     * THERATEARS 1 % Dpge  Generic drug:  carboxymethylcellulose sodium  TheraTears 1 % gel in a dropperette     diabetic supplies, miscellan. Misc  No Sig Provided     dorzolamide 2 % ophthalmic solution  Commonly known as:  TRUSOPT  1 drop 3 (three) times daily.     ibuprofen 800 MG  tablet  Commonly known as:  ADVIL,MOTRIN  Take 800 mg by mouth every 6 (six) hours as needed for Pain.     irbesartan 300 MG tablet  Commonly known as:  AVAPRO  Take 300 mg by mouth every evening.     LATANOPROST OPHT  Apply to eye.     metFORMIN 1000 MG tablet  Commonly known as:  GLUCOPHAGE  metformin     NITROSTAT 0.4 MG SL tablet  Generic drug:  nitroGLYCERIN  As directed     nystatin 100,000 unit/mL suspension  Commonly known as:  MYCOSTATIN  Take 5 mLs (500,000 Units total) by mouth 3 (three) times daily as needed.     pregabalin 100 MG capsule  Commonly known as:  LYRICA  Take 100 mg by mouth.     VITAMIN C 100 MG tablet  Generic drug:  ascorbic acid (vitamin C)  Vitamin C         * This list has 2 medication(s) that are the same as other medications prescribed for you. Read the directions carefully, and ask your doctor or other care provider to review them with you.            STOP taking these medications    metroNIDAZOLE 500 MG tablet  Commonly known as:  FLAGYL     mirtazapine 30 MG tablet  Commonly known as:  REMERON     omeprazole 40 MG capsule  Commonly known as:  PRILOSEC     sildenafil 100 MG tablet  Commonly known as:  VIAGRA     sucralfate 100 mg/mL suspension  Commonly known as:  CARAFATE     travoprost (benzalkonium) 0.004 % ophthalmic solution  Commonly known as:  TRAVATAN     valsartan 160 MG tablet  Commonly known as:  DIOVAN            Indwelling Lines/Drains at time of discharge:   Lines/Drains/Airways          None          Time spent on the discharge of patient: 34 minutes  Patient was seen and examined on the date of discharge and determined to be suitable for discharge.         Eliana Flores MD  Department of Hospital Medicine  Counts include 234 beds at the [facility]

## 2019-08-05 NOTE — PLAN OF CARE
Problem: Fall Injury Risk  Goal: Absence of Fall and Fall-Related Injury  Outcome: Ongoing (interventions implemented as appropriate)  Fall precautions explained to pt. Pt verbalized  understanding of precautions and safety instructions. Bed in low, locked position, call light within reach, bed alarm active and audible, instructed to call nursing staff for assistance with ambulation. Personal items within a safe distance for reach. Pt remains free from falls this shift.

## 2019-08-07 LAB
AORTIC ROOT ANNULUS: 3.33 CM
AORTIC VALVE CUSP SEPERATION: 1.77 CM
AV INDEX (PROSTH): 0.78
AV MEAN GRADIENT: 2 MMHG
AV PEAK GRADIENT: 5 MMHG
AV VALVE AREA: 2.91 CM2
AV VELOCITY RATIO: 76.3
BSA FOR ECHO PROCEDURE: 1.96 M2
CV ECHO LV RWT: 0.62 CM
CV STRESS BASE HR: 66 BPM
DIASTOLIC BLOOD PRESSURE: 67 MMHG
DOP CALC AO PEAK VEL: 1.07 M/S
DOP CALC AO VTI: 20.87 CM
DOP CALC LVOT AREA: 3.7 CM2
DOP CALC LVOT DIAMETER: 2.18 CM
DOP CALC LVOT PEAK VEL: 81.64 M/S
DOP CALC LVOT STROKE VOLUME: 60.7 CM3
DOP CALCLVOT PEAK VEL VTI: 16.27 CM
E WAVE DECELERATION TIME: 236.2 MSEC
E/A RATIO: 0.86
E/E' RATIO: 11.27 M/S
ECHO LV POSTERIOR WALL: 1.36 CM (ref 0.6–1.1)
FRACTIONAL SHORTENING: 19 % (ref 28–44)
INTERVENTRICULAR SEPTUM: 1.36 CM (ref 0.6–1.1)
IVRT: 93.93 MSEC
LEFT ATRIUM SIZE: 3.47 CM
LEFT INTERNAL DIMENSION IN SYSTOLE: 3.57 CM (ref 2.1–4)
LEFT VENTRICLE MASS INDEX: 118 G/M2
LEFT VENTRICULAR INTERNAL DIMENSION IN DIASTOLE: 4.42 CM (ref 3.5–6)
LEFT VENTRICULAR MASS: 231.6 G
LV LATERAL E/E' RATIO: 8.86 M/S
LV SEPTAL E/E' RATIO: 15.5 M/S
MV PEAK A VEL: 0.72 M/S
MV PEAK E VEL: 0.62 M/S
OHS CV CPX 85 PERCENT MAX PREDICTED HEART RATE MALE: 122
OHS CV CPX MAX PREDICTED HEART RATE: 144
OHS CV CPX PATIENT IS FEMALE: 0
OHS CV CPX PATIENT IS MALE: 1
OHS CV CPX PEAK DIASTOLIC BLOOD PRESSURE: 61 MMHG
OHS CV CPX PEAK HEAR RATE: 105 BPM
OHS CV CPX PEAK RATE PRESSURE PRODUCT: NORMAL
OHS CV CPX PEAK SYSTOLIC BLOOD PRESSURE: 118 MMHG
OHS CV CPX PERCENT MAX PREDICTED HEART RATE ACHIEVED: 73
OHS CV CPX RATE PRESSURE PRODUCT PRESENTING: 6666
PISA TR MAX VEL: 2.38 M/S
PV PEAK VELOCITY: 79.44 CM/S
RIGHT VENTRICULAR END-DIASTOLIC DIMENSION: 182 CM
STRESS ECHO TARGET HR: 122.4 BPM
SYSTOLIC BLOOD PRESSURE: 101 MMHG
TDI LATERAL: 0.07 M/S
TDI SEPTAL: 0.04 M/S
TDI: 0.06 M/S
TR MAX PG: 23 MMHG

## 2019-09-17 ENCOUNTER — OFFICE VISIT (OUTPATIENT)
Dept: UROLOGY | Facility: CLINIC | Age: 79
End: 2019-09-17
Payer: MEDICARE

## 2019-09-17 VITALS
HEART RATE: 72 BPM | RESPIRATION RATE: 18 BRPM | HEIGHT: 70 IN | WEIGHT: 177.5 LBS | DIASTOLIC BLOOD PRESSURE: 71 MMHG | TEMPERATURE: 99 F | SYSTOLIC BLOOD PRESSURE: 145 MMHG | BODY MASS INDEX: 25.41 KG/M2

## 2019-09-17 DIAGNOSIS — R97.20 ELEVATED PSA: ICD-10-CM

## 2019-09-17 DIAGNOSIS — C61 PROSTATE CANCER: Primary | ICD-10-CM

## 2019-09-17 PROCEDURE — 99999 PR PBB SHADOW E&M-EST. PATIENT-LVL III: CPT | Mod: PBBFAC,,, | Performed by: UROLOGY

## 2019-09-17 PROCEDURE — 99213 OFFICE O/P EST LOW 20 MIN: CPT | Mod: PBBFAC,PN | Performed by: UROLOGY

## 2019-09-17 PROCEDURE — 99999 PR PBB SHADOW E&M-EST. PATIENT-LVL III: ICD-10-PCS | Mod: PBBFAC,,, | Performed by: UROLOGY

## 2019-09-17 PROCEDURE — 99214 OFFICE O/P EST MOD 30 MIN: CPT | Mod: S$PBB,,, | Performed by: UROLOGY

## 2019-09-17 PROCEDURE — 99214 PR OFFICE/OUTPT VISIT, EST, LEVL IV, 30-39 MIN: ICD-10-PCS | Mod: S$PBB,,, | Performed by: UROLOGY

## 2019-09-17 RX ORDER — LATANOPROST 50 UG/ML
1 SOLUTION/ DROPS OPHTHALMIC NIGHTLY
COMMUNITY
End: 2022-06-08

## 2019-09-17 RX ORDER — METFORMIN HYDROCHLORIDE 1000 MG/1
1000 TABLET ORAL
COMMUNITY
End: 2020-02-06 | Stop reason: ALTCHOICE

## 2019-09-17 RX ORDER — TADALAFIL 5 MG/1
5 TABLET ORAL DAILY
Qty: 30 TABLET | Refills: 11 | Status: SHIPPED | OUTPATIENT
Start: 2019-09-17 | End: 2020-02-06

## 2019-09-17 RX ORDER — ATORVASTATIN CALCIUM 10 MG/1
10 TABLET, FILM COATED ORAL DAILY
Refills: 3 | COMMUNITY
Start: 2019-06-20 | End: 2021-02-02 | Stop reason: SDUPTHER

## 2019-09-17 NOTE — PROGRESS NOTES
OFFICE NOTE  [unfilled]  [id number]  9/17/2019       CHIEF COMPLAINT:.   Adenocarcinoma prostate     HISTORY OF PRESENT ILLNESS:  .  This 78-year-old male returns for recheck.  He has history of adenocarcinoma prostate that was diagnosed on 04/12/2018 when the PSA was 9.2 and prostatic volume was 36.9 mL.  Most recent PSA was 7.0 on 07/2019 that compares to 8.1 on 03/2019.  He was evaluated by Dr. Sims and also at [facility] and it was eventually decided to manage him with watchful waiting and active surveillance.  He has been experiencing some difficulty emptying the bladder and slowing of urinary stream but he has been unable to tolerate Flomax in the past as it causes him some dizziness and we did discuss a trial on Cialis 5 mg p.o. q.d. to which he appeared agreeable.  On today's visit he states he is showing some interest in possibly undergoing brachytherapy or external beam radiation therapy for which he will be seeing Dr. Sims to discuss this.    No other changes general health since his last visit    Physical exam:  Abdomen - soft benign nontender no masses no hernias no organomegaly                             External genitalia - normal phallus with adequate meatus testes descended and feel normal no scrotal mass                             Rectal - 25 g smooth prostate no nodules normal sphincter tone         PSA - 7.0 on 07/2019       FINAL IMPRESSION:  Adenocarcinoma prostate, lower urinary tract symptoms.       RECOMMENDATIONS:  Patient will make an appointment with Dr. Sims to discuss external beam radiation therapy or brachytherapy.  Trial of Cialis 5 mg p.o. q.d. to see if this helps his urinary symptoms.                                           Otherwise routine recheck in 6 months and patient will notify us of the response to the Cialis.

## 2019-09-30 ENCOUNTER — HOSPITAL ENCOUNTER (OUTPATIENT)
Dept: RADIOLOGY | Facility: HOSPITAL | Age: 79
Discharge: HOME | End: 2019-09-30
Attending: UROLOGY
Payer: MEDICARE

## 2019-09-30 DIAGNOSIS — C61 PROSTATE CANCER: ICD-10-CM

## 2019-09-30 PROCEDURE — 76770 US EXAM ABDO BACK WALL COMP: CPT | Mod: 26,,, | Performed by: RADIOLOGY

## 2019-09-30 PROCEDURE — 76770 US EXAM ABDO BACK WALL COMP: CPT | Mod: TC

## 2019-09-30 PROCEDURE — 76770 US RETROPERITONEAL COMPLETE: ICD-10-PCS | Mod: 26,,, | Performed by: RADIOLOGY

## 2019-10-02 ENCOUNTER — TELEPHONE (OUTPATIENT)
Dept: UROLOGY | Facility: CLINIC | Age: 79
End: 2019-10-02

## 2019-10-02 NOTE — TELEPHONE ENCOUNTER
Call placed to give US results and advise keep 6 month f/u israel. No answer, message left with call back number.

## 2019-10-02 NOTE — TELEPHONE ENCOUNTER
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

## 2019-11-11 ENCOUNTER — LAB VISIT (OUTPATIENT)
Dept: LAB | Facility: HOSPITAL | Age: 79
End: 2019-11-11
Attending: INTERNAL MEDICINE
Payer: MEDICARE

## 2019-11-11 DIAGNOSIS — C34.90 CARCINOMA OF LUNG, UNSPECIFIED LATERALITY: ICD-10-CM

## 2019-11-11 DIAGNOSIS — C34.90 MALIGNANT NEOPLASM OF BRONCHUS AND LUNG: Primary | ICD-10-CM

## 2019-11-11 LAB
ALBUMIN SERPL BCP-MCNC: 3.7 G/DL (ref 3.5–5.2)
ALP SERPL-CCNC: 55 U/L (ref 55–135)
ALT SERPL W/O P-5'-P-CCNC: 9 U/L (ref 10–44)
ANION GAP SERPL CALC-SCNC: 10 MMOL/L (ref 8–16)
AST SERPL-CCNC: 15 U/L (ref 10–40)
BASOPHILS # BLD AUTO: 0.04 K/UL (ref 0–0.2)
BASOPHILS NFR BLD: 0.6 % (ref 0–1.9)
BILIRUB SERPL-MCNC: 0.6 MG/DL (ref 0.1–1)
BUN SERPL-MCNC: 16 MG/DL (ref 8–23)
CALCIUM SERPL-MCNC: 8.5 MG/DL (ref 8.7–10.5)
CHLORIDE SERPL-SCNC: 105 MMOL/L (ref 95–110)
CO2 SERPL-SCNC: 26 MMOL/L (ref 23–29)
CREAT SERPL-MCNC: 1.4 MG/DL (ref 0.5–1.4)
DIFFERENTIAL METHOD: ABNORMAL
EOSINOPHIL # BLD AUTO: 0.2 K/UL (ref 0–0.5)
EOSINOPHIL NFR BLD: 2.4 % (ref 0–8)
ERYTHROCYTE [DISTWIDTH] IN BLOOD BY AUTOMATED COUNT: 15.2 % (ref 11.5–14.5)
EST. GFR  (AFRICAN AMERICAN): 55.6 ML/MIN/1.73 M^2
EST. GFR  (NON AFRICAN AMERICAN): 48.1 ML/MIN/1.73 M^2
GLUCOSE SERPL-MCNC: 171 MG/DL (ref 70–110)
HCT VFR BLD AUTO: 39.1 % (ref 40–54)
HGB BLD-MCNC: 12.8 G/DL (ref 14–18)
IMM GRANULOCYTES # BLD AUTO: 0.02 K/UL (ref 0–0.04)
IMM GRANULOCYTES NFR BLD AUTO: 0.3 % (ref 0–0.5)
LYMPHOCYTES # BLD AUTO: 1.2 K/UL (ref 1–4.8)
LYMPHOCYTES NFR BLD: 19.8 % (ref 18–48)
MCH RBC QN AUTO: 31.1 PG (ref 27–31)
MCHC RBC AUTO-ENTMCNC: 32.7 G/DL (ref 32–36)
MCV RBC AUTO: 95 FL (ref 82–98)
MONOCYTES # BLD AUTO: 0.7 K/UL (ref 0.3–1)
MONOCYTES NFR BLD: 10.4 % (ref 4–15)
NEUTROPHILS # BLD AUTO: 4.2 K/UL (ref 1.8–7.7)
NEUTROPHILS NFR BLD: 66.5 % (ref 38–73)
NRBC BLD-RTO: 0 /100 WBC
PLATELET # BLD AUTO: 197 K/UL (ref 150–350)
PMV BLD AUTO: 9 FL (ref 9.2–12.9)
POTASSIUM SERPL-SCNC: 4.1 MMOL/L (ref 3.5–5.1)
PROT SERPL-MCNC: 6.5 G/DL (ref 6–8.4)
RBC # BLD AUTO: 4.11 M/UL (ref 4.6–6.2)
SODIUM SERPL-SCNC: 141 MMOL/L (ref 136–145)
WBC # BLD AUTO: 6.26 K/UL (ref 3.9–12.7)

## 2019-11-11 PROCEDURE — 36415 COLL VENOUS BLD VENIPUNCTURE: CPT

## 2019-11-11 PROCEDURE — 80053 COMPREHEN METABOLIC PANEL: CPT

## 2019-11-11 PROCEDURE — 85025 COMPLETE CBC W/AUTO DIFF WBC: CPT

## 2019-12-15 ENCOUNTER — HOSPITAL ENCOUNTER (EMERGENCY)
Facility: HOSPITAL | Age: 79
Discharge: HOME | End: 2019-12-15
Attending: EMERGENCY MEDICINE
Payer: MEDICARE

## 2019-12-15 VITALS
OXYGEN SATURATION: 100 % | HEIGHT: 70 IN | TEMPERATURE: 98 F | DIASTOLIC BLOOD PRESSURE: 63 MMHG | HEART RATE: 74 BPM | RESPIRATION RATE: 16 BRPM | SYSTOLIC BLOOD PRESSURE: 126 MMHG | WEIGHT: 172 LBS | BODY MASS INDEX: 24.62 KG/M2

## 2019-12-15 DIAGNOSIS — N41.0 ACUTE PROSTATITIS: Primary | ICD-10-CM

## 2019-12-15 LAB
ALBUMIN SERPL BCP-MCNC: 3.8 G/DL (ref 3.5–5.2)
ALP SERPL-CCNC: 65 U/L (ref 55–135)
ALT SERPL W/O P-5'-P-CCNC: 7 U/L (ref 10–44)
ANION GAP SERPL CALC-SCNC: 5 MMOL/L (ref 8–16)
APTT PPP: 31.1 SEC (ref 23.6–33.3)
AST SERPL-CCNC: 15 U/L (ref 10–40)
BASOPHILS # BLD AUTO: 0.05 K/UL (ref 0–0.2)
BASOPHILS NFR BLD: 0.8 % (ref 0–1.9)
BILIRUB SERPL-MCNC: 0.8 MG/DL (ref 0.1–1)
BILIRUB UR QL STRIP: NEGATIVE
BUN SERPL-MCNC: 12 MG/DL (ref 8–23)
CALCIUM SERPL-MCNC: 8.6 MG/DL (ref 8.7–10.5)
CHLORIDE SERPL-SCNC: 104 MMOL/L (ref 95–110)
CLARITY UR: CLEAR
CO2 SERPL-SCNC: 30 MMOL/L (ref 23–29)
COLOR UR: YELLOW
CREAT SERPL-MCNC: 1.2 MG/DL (ref 0.5–1.4)
DIFFERENTIAL METHOD: ABNORMAL
EOSINOPHIL # BLD AUTO: 0.1 K/UL (ref 0–0.5)
EOSINOPHIL NFR BLD: 2.1 % (ref 0–8)
ERYTHROCYTE [DISTWIDTH] IN BLOOD BY AUTOMATED COUNT: 14.7 % (ref 11.5–14.5)
EST. GFR  (AFRICAN AMERICAN): >60 ML/MIN/1.73 M^2
EST. GFR  (NON AFRICAN AMERICAN): 58 ML/MIN/1.73 M^2
GLUCOSE SERPL-MCNC: 140 MG/DL (ref 70–110)
GLUCOSE UR QL STRIP: NEGATIVE
HCT VFR BLD AUTO: 39.7 % (ref 40–54)
HGB BLD-MCNC: 13 G/DL (ref 14–18)
HGB UR QL STRIP: NEGATIVE
IMM GRANULOCYTES # BLD AUTO: 0.02 K/UL (ref 0–0.04)
IMM GRANULOCYTES NFR BLD AUTO: 0.3 % (ref 0–0.5)
INR PPP: 1.1
KETONES UR QL STRIP: NEGATIVE
LEUKOCYTE ESTERASE UR QL STRIP: NEGATIVE
LYMPHOCYTES # BLD AUTO: 1.2 K/UL (ref 1–4.8)
LYMPHOCYTES NFR BLD: 19.5 % (ref 18–48)
MCH RBC QN AUTO: 30.8 PG (ref 27–31)
MCHC RBC AUTO-ENTMCNC: 32.7 G/DL (ref 32–36)
MCV RBC AUTO: 94 FL (ref 82–98)
MONOCYTES # BLD AUTO: 0.6 K/UL (ref 0.3–1)
MONOCYTES NFR BLD: 10.1 % (ref 4–15)
NEUTROPHILS # BLD AUTO: 4.1 K/UL (ref 1.8–7.7)
NEUTROPHILS NFR BLD: 67.2 % (ref 38–73)
NITRITE UR QL STRIP: NEGATIVE
NRBC BLD-RTO: 0 /100 WBC
PH UR STRIP: 8 [PH] (ref 5–8)
PLATELET # BLD AUTO: 215 K/UL (ref 150–350)
PMV BLD AUTO: 9.7 FL (ref 9.2–12.9)
POTASSIUM SERPL-SCNC: 4 MMOL/L (ref 3.5–5.1)
PROT SERPL-MCNC: 6.7 G/DL (ref 6–8.4)
PROT UR QL STRIP: NEGATIVE
PROTHROMBIN TIME: 14 SEC (ref 10.6–14.8)
RBC # BLD AUTO: 4.22 M/UL (ref 4.6–6.2)
SODIUM SERPL-SCNC: 139 MMOL/L (ref 136–145)
SP GR UR STRIP: 1.01 (ref 1–1.03)
URN SPEC COLLECT METH UR: NORMAL
UROBILINOGEN UR STRIP-ACNC: NEGATIVE EU/DL
WBC # BLD AUTO: 6.05 K/UL (ref 3.9–12.7)

## 2019-12-15 PROCEDURE — 85025 COMPLETE CBC W/AUTO DIFF WBC: CPT

## 2019-12-15 PROCEDURE — 99285 EMERGENCY DEPT VISIT HI MDM: CPT | Mod: 25

## 2019-12-15 PROCEDURE — 51798 US URINE CAPACITY MEASURE: CPT

## 2019-12-15 PROCEDURE — 85610 PROTHROMBIN TIME: CPT

## 2019-12-15 PROCEDURE — 80053 COMPREHEN METABOLIC PANEL: CPT

## 2019-12-15 PROCEDURE — 85730 THROMBOPLASTIN TIME PARTIAL: CPT

## 2019-12-15 PROCEDURE — 81003 URINALYSIS AUTO W/O SCOPE: CPT

## 2019-12-15 RX ORDER — CEPHALEXIN 500 MG/1
500 CAPSULE ORAL EVERY 8 HOURS
Qty: 30 CAPSULE | Refills: 0 | Status: SHIPPED | OUTPATIENT
Start: 2019-12-15 | End: 2019-12-25

## 2019-12-15 RX ORDER — NYSTATIN 100000 [USP'U]/ML
500000 SUSPENSION ORAL 4 TIMES DAILY
Qty: 1 BOTTLE | Refills: 0 | Status: SHIPPED | OUTPATIENT
Start: 2019-12-15 | End: 2019-12-25

## 2019-12-15 NOTE — ED PROVIDER NOTES
Encounter Date: 12/15/2019       History     Chief Complaint   Patient presents with    Urinary Retention     TROUBLE URINATING X COUPLE WEEKS     Patient with a history of lung cancer and prostate cancer.  Presumed remission currently.  Patient reports 2-3 day history of slow urination.  Patient has some vague suprapubic and right flank pain. No fever or chills. Currently pain is very mild.  Patient has been intolerant to Flomax.  Patient prescribed Cialis for ED BPH but could not afford prescription.  Patient believes he is not emptying his bladder fully.  There is no fever or chills. No diarrhea.        Review of patient's allergies indicates:   Allergen Reactions    Doxycycline Diarrhea     Severe diarrhea    Sulfa (sulfonamide antibiotics) Rash     Other reaction(s): Itching    Sulfasalazine Rash     Rash and itching mild     Past Medical History:   Diagnosis Date    Cancer     lung cancer chemo and radiation    COPD (chronic obstructive pulmonary disease)     Coronary artery disease     s/p 3 stents    Diabetes mellitus, type 2     Hyperlipidemia     Hypertension     Prostate CA      Past Surgical History:   Procedure Laterality Date    ANKLE SURGERY Right 1970s    EPIDURAL STEROID INJECTION INTO CERVICAL SPINE N/A 2/19/2019    Procedure: Injection-steroid-epidural-cervical C7-T1;  Surgeon: Marcelino Monroe MD;  Location: [facility] OR;  Service: Pain Management;  Laterality: N/A;    INJECTION OF ANESTHETIC AGENT AROUND MEDIAL BRANCH NERVES INNERVATING LUMBAR FACET JOINT Bilateral 6/4/2018    Procedure: MEDIAL BRANCH, LUMBAR L3,4,5;  Surgeon: Marcelino Monroe MD;  Location: [facility] OR;  Service: Pain Management;  Laterality: Bilateral;  L3, 4, 5    RADIOFREQUENCY ABLATION OF LUMBAR MEDIAL BRANCH NERVE AT SINGLE LEVEL Bilateral 7/16/2018    Procedure: RADIOFREQUENCY ABLATION, NERVE, MEDIAL BRANCH, LUMBAR, 1 LEVEL;  Surgeon: Marcelino Monroe MD;  Location: [facility] OR;  Service: Pain Management;  Laterality: Bilateral;  L3,  4, 5  lumbar  [facility]   [id number]  80 degrees for 75 seconds x2     Family History   Problem Relation Age of Onset    Diabetes Mother     Peripheral vascular disease Mother     Heart attack Mother     Diabetes Father     Heart attack Father     Emphysema Father     Diabetes Sister      Social History     Tobacco Use    Smoking status: Current Every Day Smoker     Packs/day: 1.00     Years: 54.00     Pack years: 54.00     Types: Cigarettes    Smokeless tobacco: Never Used   Substance Use Topics    Alcohol use: No    Drug use: No     Review of Systems   Constitutional: Negative for chills and fever.   HENT: Negative for sore throat.    Eyes: Negative for photophobia and visual disturbance.   Respiratory: Negative for shortness of breath.    Cardiovascular: Negative for chest pain.   Gastrointestinal: Positive for diarrhea. Negative for vomiting.   Genitourinary: Positive for flank pain and frequency. Negative for dysuria and hematuria.   Musculoskeletal: Negative for joint swelling.   Skin: Negative for rash.   Neurological: Negative for weakness and headaches.   Psychiatric/Behavioral: Negative for confusion.       Physical Exam     Initial Vitals [12/15/19 1048]   BP Pulse Resp Temp SpO2   127/65 76 18 98.3 °F (36.8 °C) 99 %      MAP       --         Physical Exam    Nursing note and vitals reviewed.  Constitutional: He is not diaphoretic. No distress.   HENT:   Head: Normocephalic and atraumatic.   Eyes: Conjunctivae are normal.   Neck: Normal range of motion.   Cardiovascular: Regular rhythm.   Pulmonary/Chest: Breath sounds normal.   Abdominal: Soft. Bowel sounds are normal.   Mild suprapubic tenderness with no guarding or rebound   Genitourinary:   Genitourinary Comments: Normal male external genitalia   Musculoskeletal: Normal range of motion.   Skin: No rash noted.   Psychiatric: He has a normal mood and affect.         ED Course   Procedures  Labs Reviewed   CBC  W/ AUTO DIFFERENTIAL - Abnormal; Notable for the following components:       Result Value    RBC 4.22 (*)     Hemoglobin 13.0 (*)     Hematocrit 39.7 (*)     RDW 14.7 (*)     All other components within normal limits   COMPREHENSIVE METABOLIC PANEL - Abnormal; Notable for the following components:    CO2 30 (*)     Glucose 140 (*)     Calcium 8.6 (*)     ALT 7 (*)     Anion Gap 5 (*)     eGFR if non  58.0 (*)     All other components within normal limits   APTT   PROTIME-INR   URINALYSIS, REFLEX TO URINE CULTURE          Imaging Results          CT Renal Stone Study ABD Pelvis WO (Final result)  Result time 12/15/19 13:08:27    Final result by Bo Hendrix MD (12/15/19 13:08:27)                 Impression:      1. A 4 mm nonobstructing left renal calculus, with no ureteral calculi or hydroureteronephrosis.  2. Additional observations as above.      Electronically signed by: Bo Hendrix MD  Date:    12/15/2019  Time:    13:08             Narrative:    EXAMINATION:  CT RENAL STONE STUDY ABD PELVIS WO    CLINICAL HISTORY:  Urinary retention, flank pain, recurrent stone disease suspected;Flank pain, stone disease suspected;    TECHNIQUE:  CMS MANDATED QUALITY DATA-CT RADIATION DOSE-436    All CT scans at this facility use dose modulation, iterative reconstruction, and or weight-based dosing when appropriate to reduce radiation dose to as low as reasonably achievable.    Non-contrast axial images were obtained. Non-enhanced study is tailored for the detection of urolithiasis, and is insensitive for abnormalities of the solid organs, vasculature and hollow viscera.    COMPARISON:  Multiple prior exams including ultrasound of 09/30/2019.    FINDINGS:  CT ABDOMEN: The lung bases are clear.  There is a small sliding-type hiatal hernia.  There are no calcified gallstones, with the unenhanced liver, spleen, pancreas and adrenal glands unremarkable.  There is a 4 mm nonobstructing left upper pole renal  calculus, with no right renal calculi, ureteral calculi, or hydroureteronephrosis.  Circumscribed low-density right renal lesion is suggestive of a cyst is unchanged, with stable bilateral perinephric fluid density stranding.  The unenhanced kidneys are otherwise unremarkable.    Scattered calcified plaque involves normal-caliber abdominal aorta and iliac arteries, with no bowel obstruction, ascites, or intraperitoneal free air.  The appendix is within normal limits.    There is no ascites or intraperitoneal free air.  Tiny fat containing umbilical hernia is without complicating features.    CT PELVIS: There are no distal ureteral or bladder calculi, with scattered pelvic arterial vascular calcifications.  The unenhanced sigmoid colon, rectum and urinary bladder unremarkable, with the prostate gland mildly enlarged.  There are small bilateral fat containing inguinal hernias without complicating features.    The unenhanced extraperitoneal soft tissues are unremarkable.  There is degenerative facet arthropathy in the lumbar spine, with degenerative narrowing of both hip joint spaces.  No acute osseous abnormality.                                 Medical Decision Making:   History:   Old Medical Records: I decided to obtain old medical records.  Clinical Tests:   Lab Tests: Reviewed  Radiological Study: Reviewed  ED Management:  Patient presents with urinary symptoms.  Bladder scan with no significant urinary retention.  Given suprapubic with flank pain CT obtained to rule out other urinary pathology.  This is unremarkable for ureteral calculus.  Patient with possible prostatitis.  Patient states he is intolerant to most BPH medication.  Will defer further BPH medicines to urologist.  He has see Dr. Soto tomorrow.  Will begin antibiotics for possible prostatitis.                                 Clinical Impression:       ICD-10-CM ICD-9-CM   1. Acute prostatitis N41.0 601.0                             Rudy VILLALOBOS  MD [place]  12/15/19 0635       Rudy Cochran MD  12/15/19 0211

## 2020-01-06 ENCOUNTER — TELEPHONE (OUTPATIENT)
Dept: UROLOGY | Facility: CLINIC | Age: 80
End: 2020-01-06

## 2020-01-06 NOTE — TELEPHONE ENCOUNTER
Returned call and spoke with patient, states he wants to discuss with the MD treatment options and let him know what other MDs advised, soonest appt made, patient verbally understood.

## 2020-01-06 NOTE — TELEPHONE ENCOUNTER
----- Message from Ml Farmer sent at 1/6/2020 10:14 AM CST -----  Contact: Patient  Type:  Sooner Apoointment Request    Caller is requesting a sooner appointment.  Caller declined first available appointment listed below.  Caller will not accept being placed on the waitlist and is requesting a message be sent to doctor.    Name of Caller:  Pt  When is the first available appointment? 3/17/2020  Symptoms: Follow up   Best Call Back Number: [phone]  Additional Information:  Pt states he needs a sooner appt and can't wait until first available appt on 317/2020. Pt prefers an appt this week. Please call and advise.

## 2020-01-15 ENCOUNTER — TELEPHONE (OUTPATIENT)
Dept: UROLOGY | Facility: CLINIC | Age: 80
End: 2020-01-15

## 2020-01-15 NOTE — TELEPHONE ENCOUNTER
----- Message from Mili Malin sent at 1/15/2020  3:40 PM CST -----  Contact: vitaly andrew/ [facility] ph#[phone] ext [phone]  Time sensitive   vitaly andrew/ [facility] ph#[phone] ext [phone]  Requesting a most recent progress note  Patient is there waiting   Fax#[phone]

## 2020-01-16 ENCOUNTER — TELEPHONE (OUTPATIENT)
Dept: UROLOGY | Facility: CLINIC | Age: 80
End: 2020-01-16

## 2020-01-16 DIAGNOSIS — D49.59 NEOPLASM OF PROSTATE: ICD-10-CM

## 2020-01-16 NOTE — TELEPHONE ENCOUNTER
Call placed to inform patient besides the CT, an order for bone scan has been placed message left with number given to radiology to contact to schedule. Will attempt to call back again.

## 2020-01-21 ENCOUNTER — HOSPITAL ENCOUNTER (OUTPATIENT)
Dept: RADIOLOGY | Facility: HOSPITAL | Age: 80
Discharge: HOME | End: 2020-01-21
Attending: UROLOGY
Payer: MEDICARE

## 2020-01-21 DIAGNOSIS — D49.59 NEOPLASM OF PROSTATE: ICD-10-CM

## 2020-01-21 PROCEDURE — 25500020 PHARM REV CODE 255: Performed by: UROLOGY

## 2020-01-21 PROCEDURE — 74178 CT ABD&PLV WO CNTR FLWD CNTR: CPT | Mod: 26,,, | Performed by: RADIOLOGY

## 2020-01-21 PROCEDURE — 74178 CT ABDOMEN PELVIS W WO CONTRAST: ICD-10-PCS | Mod: 26,,, | Performed by: RADIOLOGY

## 2020-01-21 PROCEDURE — 74178 CT ABD&PLV WO CNTR FLWD CNTR: CPT | Mod: TC

## 2020-01-21 RX ADMIN — IOHEXOL 1000 ML: 9 SOLUTION ORAL at 11:01

## 2020-01-21 RX ADMIN — IOHEXOL 75 ML: 350 INJECTION, SOLUTION INTRAVENOUS at 11:01

## 2020-01-23 ENCOUNTER — HOSPITAL ENCOUNTER (OUTPATIENT)
Dept: RADIOLOGY | Facility: HOSPITAL | Age: 80
Discharge: HOME | End: 2020-01-23
Attending: UROLOGY
Payer: MEDICARE

## 2020-01-23 DIAGNOSIS — D49.59 NEOPLASM OF PROSTATE: ICD-10-CM

## 2020-01-23 PROCEDURE — A9503 TC99M MEDRONATE: HCPCS

## 2020-01-23 PROCEDURE — 78306 BONE IMAGING WHOLE BODY: CPT | Mod: 26,,, | Performed by: RADIOLOGY

## 2020-01-23 PROCEDURE — 78306 NM BONE SCAN WHOLE BODY: ICD-10-PCS | Mod: 26,,, | Performed by: RADIOLOGY

## 2020-02-06 ENCOUNTER — OFFICE VISIT (OUTPATIENT)
Dept: UROLOGY | Facility: CLINIC | Age: 80
End: 2020-02-06
Payer: MEDICARE

## 2020-02-06 VITALS
TEMPERATURE: 98 F | BODY MASS INDEX: 24.61 KG/M2 | HEART RATE: 85 BPM | HEIGHT: 70 IN | DIASTOLIC BLOOD PRESSURE: 72 MMHG | RESPIRATION RATE: 18 BRPM | WEIGHT: 171.94 LBS | SYSTOLIC BLOOD PRESSURE: 139 MMHG

## 2020-02-06 DIAGNOSIS — D49.59 NEOPLASM OF PROSTATE: Primary | ICD-10-CM

## 2020-02-06 DIAGNOSIS — N32.89 BLADDER WALL THICKENING: ICD-10-CM

## 2020-02-06 DIAGNOSIS — C61 PROSTATE CANCER: ICD-10-CM

## 2020-02-06 PROCEDURE — 99214 PR OFFICE/OUTPT VISIT, EST, LEVL IV, 30-39 MIN: ICD-10-PCS | Mod: S$PBB,,, | Performed by: UROLOGY

## 2020-02-06 PROCEDURE — 99999 PR PBB SHADOW E&M-EST. PATIENT-LVL III: ICD-10-PCS | Mod: PBBFAC,,, | Performed by: UROLOGY

## 2020-02-06 PROCEDURE — 99999 PR PBB SHADOW E&M-EST. PATIENT-LVL III: CPT | Mod: PBBFAC,,, | Performed by: UROLOGY

## 2020-02-06 PROCEDURE — 99213 OFFICE O/P EST LOW 20 MIN: CPT | Mod: PBBFAC,PN | Performed by: UROLOGY

## 2020-02-06 PROCEDURE — 99214 OFFICE O/P EST MOD 30 MIN: CPT | Mod: S$PBB,,, | Performed by: UROLOGY

## 2020-02-06 RX ORDER — METOPROLOL SUCCINATE 50 MG/1
TABLET, EXTENDED RELEASE ORAL
Status: ON HOLD | COMMUNITY
Start: 2017-11-01 | End: 2020-03-12 | Stop reason: SDUPTHER

## 2020-02-06 RX ORDER — METFORMIN HYDROCHLORIDE 500 MG/1
1000 TABLET, EXTENDED RELEASE ORAL 2 TIMES DAILY
Status: ON HOLD | COMMUNITY
Start: 2020-01-28 | End: 2020-03-12 | Stop reason: SDUPTHER

## 2020-02-06 NOTE — PROGRESS NOTES
OFFICE NOTE  [unfilled]  [id number]  2/6/2020       CHIEF COMPLAINT:   adenocarcinoma prostate     HISTORY OF PRESENT ILLNESS:   this 79-year-old male returns routine recheck.  He has history adenocarcinoma prostate that was diagnosed on 04/12/2018 when the PSA was 9.2 any other prostatic volume of 36.9 mL..  Metastatic workup was negative.  He had been evaluated by Dr. Goodwin and also at [facility] eventually was decided to continue to manage him with active surveillance and watchful waiting.  On follow-up with Dr. Sims the patient states that Dr. Sims had suggested repeating a biopsy but the patient  is refusing to undergo another biopsy.  On today's visit he states he is leaning towards undergoing external beam radiation therapy but would like to do it in [place].  His most recent PSA was 8.52 on 12/23/2019 that compares to 8.1 on 03/19/2019.  It must be noted that the CT scan that he had done did reveal a nonobstructing 3 mm left renal calculus and some bladder wall thickening and it had been suggested that he undergo cystoscopy to evaluate the bladder wall thickening.       PSA  - 8.52 on 12/23/2019 compared to 8.1 on 03/19/2019       FINAL IMPRESSION:   adenocarcinoma prostate, bladder wall thickening on CT scan     RECOMMENDATIONS:   consult Dr. Escobar concerning radiation therapy.  Cystoscopy that the patient prefers to do under general anesthesia that we scheduled for 02/24/2020 to complete evaluation of the bladder.

## 2020-02-17 ENCOUNTER — SOCIAL WORK (OUTPATIENT)
Dept: HEMATOLOGY/ONCOLOGY | Facility: CLINIC | Age: 80
End: 2020-02-17

## 2020-02-17 ENCOUNTER — DOCUMENTATION ONLY (OUTPATIENT)
Dept: RADIATION ONCOLOGY | Facility: CLINIC | Age: 80
End: 2020-02-17

## 2020-02-17 ENCOUNTER — OFFICE VISIT (OUTPATIENT)
Dept: RADIATION ONCOLOGY | Facility: CLINIC | Age: 80
End: 2020-02-17
Payer: MEDICARE

## 2020-02-17 ENCOUNTER — TELEPHONE (OUTPATIENT)
Dept: UROLOGY | Facility: CLINIC | Age: 80
End: 2020-02-17

## 2020-02-17 VITALS
DIASTOLIC BLOOD PRESSURE: 73 MMHG | BODY MASS INDEX: 25.83 KG/M2 | WEIGHT: 180 LBS | OXYGEN SATURATION: 99 % | SYSTOLIC BLOOD PRESSURE: 119 MMHG | HEART RATE: 72 BPM | RESPIRATION RATE: 18 BRPM | TEMPERATURE: 98 F

## 2020-02-17 DIAGNOSIS — C61 ADENOCARCINOMA OF PROSTATE: Primary | ICD-10-CM

## 2020-02-17 PROCEDURE — 99205 OFFICE O/P NEW HI 60 MIN: CPT | Mod: S$GLB,,, | Performed by: RADIOLOGY

## 2020-02-17 PROCEDURE — 99205 PR OFFICE/OUTPT VISIT, NEW, LEVL V, 60-74 MIN: ICD-10-PCS | Mod: S$GLB,,, | Performed by: RADIOLOGY

## 2020-02-17 NOTE — PROGRESS NOTES
Tyler Todd  [id number]  1940  2/17/2020  No referring provider defined for this encounter.    DIAGNOSIS: Cancer Staging  Adenocarcinoma of prostate  Staging form: Prostate, AJCC 8th Edition  - Clinical: Stage I (cT1c, cN0, cM0, PSA: 8.5, Grade Group: 1) - Signed by Rudy Escobar Jr., MD on 2/17/2020    Additional HPI   PSA  5/5/20  4.9  7/20/20 5.6    TREATMENT SITE(S): prostate/prox SVs    INTENT: CURATIVE    TREATMENT SETTING: RT ALONE (no ADT)     MODALITY: PHOTON    TECHNIQUE:  INTENSITY MODULATED RADIOTHERAPY (IMRT)    IMRT MEDICAL NECESSITY: Target volume irregular shape/proximate to critical structures, Narrow margins needed to protect adjacent structures, High precision necessary, Conventional planning maneuvers insufficient, Concave target volume with critical tissues in concavity and Dose escalation exceeds conventional treatment planning    I have personally performed treatment planning for the patient, reviewing relevant history/physical and imaging. I have defined GTV, CTV, PTV and organs at risk.     In order to accomplish this plan, I am ordering:  SIMULATION: CT SIM FOR PLACEMENT OF TREATMENT FIELDS    CONTRAST: none (CKD)    TO ACCOMPLISH REPRODUCIBLE POSITION: VACLOC and FULL BLADDER    DEVICES FOR BEAM SHAPING: CUSTOMIZED MLC    CUSTOMIZED BOLUS: none    IMAGING: CBCT daily    I have ordered a weekly physics check.    SPECIAL PHYSICS CONSULT: NO  REASON: N/A    SPECIAL TREATMENT CIRCUMSTANCE: NO  Concurrent or recent administration of chemotherapeutic agents which are known potent radiosensitizers and thus will require vigilant monitoring for exaggerated radiation toxicities.    LABS: PSA LAST WEEK OF RT    ANTICIPATED PRESCRIPTION TO ISOCENTER: 5400cGy/30frx to prostate/prox SVs + 2520cGy/14frx to prostate    ENERGY: 6X    TREATMENT: DAILY    PHYSICIAN: Rudy Escobar Jr, MD

## 2020-02-17 NOTE — PROGRESS NOTES
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  Dr. Soto took the patient for  transrectal ultrasound biopsy in September 2015 with 0 of 8 cores containing disease.  He continued to be maintained on an active surveillance protocol with PSAs with as below with repeat transrectal ultrasound and biopsy from April 2018 revealed:   - [place] 3 + 3 adenocarcinoma: RM 30%   - 1/8 cores+    He presented to [facility] for 2nd opinion, noting clear AFRICA by Dr. Villeda. He underwent MRI of the prostate revealing low-grade prostate cancer at right peripheral mid and base without extracapsular extension.  There is broad capsular abutment however.  A slightly prominent left obturator lymph node 6 x 8 mm in size is appreciated.  Ultimately he was recommended to continue to undergo active surveillance.    Patient was recommended to undergo repeat biopsy by Dr. Sims @ [facility] but is refusing; he recommended EBRT standard fractionation should patient be incline to treatment. Staging CT abdomen pelvis demonstrates enlarged prostate gland with diffuse bladder wall thickening, no lymphadenopathy.  Bone scan was negative.    Patient discussed with Dr. Soto and is inquiring about proceeding with radiotherapy.  He is planned for cystoscopy 2/24/20.  He is not taking Flomax or daily Cialis as prescribed.    PSA  12/19 8.52  3/19 8.1  11/18 7.1  3/18 9.2  2/16 6.7  7/15 5.9  9/14 4.8    AUA 31  QOL 4  IIEF 10    PMHx: [id number] mediastinal (lung) carcinoma s/p CRT to 66Gy (+carbo-taxol) ending 4/20/12 @ [facility] + 4c consolidative carbo-taxol with clear PET/CT 4/19 (per Dr. Mast).    Review of Systems    Constitutional: Negative for appetite change, chills, fever and unexpected weight change.   HENT:   Negative for lump/mass, mouth sores, sore throat, trouble swallowing and voice change.    Eyes: Negative for eye problems and icterus.   Respiratory: Negative for chest tightness, cough, hemoptysis and shortness of breath.    Cardiovascular: Negative for chest pain and leg swelling.   Gastrointestinal: Negative for abdominal distention, abdominal pain, blood in stool, constipation, diarrhea, nausea and vomiting.   Genitourinary: Positive for difficulty urinating, dysuria, frequency and nocturia. Negative for bladder incontinence and hematuria.    Musculoskeletal: Negative for back pain, gait problem, neck pain and neck stiffness.   Neurological: Negative for extremity weakness, gait problem, headaches, numbness and seizures.   Hematological: Negative for adenopathy.     Past Medical History:   Diagnosis Date    Cancer     lung cancer chemo and radiation    COPD (chronic obstructive pulmonary disease)     Coronary artery disease     s/p 3 stents    Diabetes mellitus, type 2     Hyperlipidemia     Hypertension     Prostate CA      Past Surgical History:   Procedure Laterality Date    ANKLE SURGERY Right 1970s    EPIDURAL STEROID INJECTION INTO CERVICAL SPINE N/A 2/19/2019    Procedure: Injection-steroid-epidural-cervical C7-T1;  Surgeon: Marcelino Monroe MD;  Location: [facility] OR;  Service: Pain Management;  Laterality: N/A;    INJECTION OF ANESTHETIC AGENT AROUND MEDIAL BRANCH NERVES INNERVATING LUMBAR FACET JOINT Bilateral 6/4/2018    Procedure: MEDIAL BRANCH, LUMBAR L3,4,5;  Surgeon: Marcelino Monroe MD;  Location: [facility] OR;  Service: Pain Management;  Laterality: Bilateral;  L3, 4, 5    RADIOFREQUENCY ABLATION OF LUMBAR MEDIAL BRANCH NERVE AT SINGLE LEVEL Bilateral 7/16/2018    Procedure: RADIOFREQUENCY ABLATION, NERVE, MEDIAL BRANCH, LUMBAR, 1 LEVEL;  Surgeon: Marcelino Monroe MD;  Location: [facility] OR;  Service: Pain  Management;  Laterality: Bilateral;  L3, 4, 5  lumbar  [employer] pain management generator   [id number]  80 degrees for 75 seconds x2     Social History     Socioeconomic History    Marital status:      Spouse name: Not on file    Number of children: Not on file    Years of education: Not on file    Highest education level: Not on file   Occupational History    Not on file   Social Needs    Financial resource strain: Not on file    Food insecurity:     Worry: Not on file     Inability: Not on file    Transportation needs:     Medical: Not on file     Non-medical: Not on file   Tobacco Use    Smoking status: Current Every Day Smoker     Packs/day: 1.00     Years: 54.00     Pack years: 54.00     Types: Cigarettes    Smokeless tobacco: Never Used   Substance and Sexual Activity    Alcohol use: No    Drug use: No    Sexual activity: Never   Lifestyle    Physical activity:     Days per week: Not on file     Minutes per session: Not on file    Stress: Not on file   Relationships    Social connections:     Talks on phone: Not on file     Gets together: Not on file     Attends Yazdanism service: Not on file     Active member of club or organization: Not on file     Attends meetings of clubs or organizations: Not on file     Relationship status: Not on file   Other Topics Concern    Not on file   Social History Narrative    Not on file     Family History   Problem Relation Age of Onset    Diabetes Mother     Peripheral vascular disease Mother     Heart attack Mother     Diabetes Father     Heart attack Father     Emphysema Father     Diabetes Sister        PRIOR HISTORY OF CHEMOTHERAPY OR RADIOTHERAPY: Please see HPI for patients prior oncologic history.    Medication List with Changes/Refills   Current Medications    ALOGLIPTIN (NESINA) 12.5 MG TAB    Take by mouth.    AMLODIPINE (NORVASC) 5 MG TABLET    Take 5 mg by mouth once daily.    ASCORBIC ACID, VITAMIN C, (VITAMIN C) 100 MG  TABLET    Vitamin C    ASPIRIN 81 MG TAB    Every day    ASPIRIN-CALCIUM CARBONATE 81 MG-300 MG CALCIUM(777 MG) TAB    Take 81 mg by mouth.    ATORVASTATIN (LIPITOR) 10 MG TABLET    Take 10 mg by mouth once daily.    BRIMONIDINE 0.1% (ALPHAGAN P) 0.1 % DROP    Place 1 drop into both eyes 3 (three) times daily.    CARBOXYMETHYLCELLULOSE (REFRESH PLUS) 0.5 % DPET    1 drop 3 (three) times daily as needed.    CARBOXYMETHYLCELLULOSE SODIUM (THERATEARS) 1 % DPGE    TheraTears 1 % gel in a dropperette    DIABETIC SUPPLIES, MISCELLAN. MISC    No Sig Provided    DORZOLAMIDE (TRUSOPT) 2 % OPHTHALMIC SOLUTION    1 drop 3 (three) times daily.    IBUPROFEN (ADVIL,MOTRIN) 800 MG TABLET    Take 800 mg by mouth every 6 (six) hours as needed for Pain.    IRBESARTAN (AVAPRO) 300 MG TABLET    Take 300 mg by mouth every evening.    LATANOPROST 0.005 % OPHTHALMIC SOLUTION    Apply 1 drop to eye.    METFORMIN (GLUCOPHAGE-XR) 500 MG XR 24HR TABLET    Take 1,000 mg by mouth 2 (two) times daily.    METOPROLOL SUCCINATE (TOPROL-XL) 50 MG 24 HR TABLET    metoprolol succinate ER 50 mg tablet,extended release 24 hr    NITROGLYCERIN (NITROSTAT) 0.4 MG SL TABLET    As directed    NYSTATIN (MYCOSTATIN) 100,000 UNIT/ML SUSPENSION    Take 5 mLs (500,000 Units total) by mouth 3 (three) times daily as needed.    PREGABALIN (LYRICA) 100 MG CAPSULE    Take 100 mg by mouth.     Review of patient's allergies indicates:   Allergen Reactions    Doxycycline Diarrhea     Severe diarrhea    Sulfa (sulfonamide antibiotics) Rash     Other reaction(s): Itching    Sulfasalazine Rash     Rash and itching mild       QUALITY OF LIFE: 80%- Normal Activity with Effort: Some Symptoms of Disease    Vitals:    02/17/20 1415   BP: 119/73   Pulse: 72   Resp: 18   Temp: 97.6 °F (36.4 °C)   TempSrc: Oral   SpO2: 99%   Weight: 81.6 kg (180 lb)   PainSc: 0-No pain     Body mass index is 25.83 kg/m².    PHYSICAL EXAM:   GENERAL: alert; in no apparent distress.   HEAD:  normocephalic, atraumatic.  EYES: pupils are equal, round, reactive to light and accommodation. Sclera anicteric. Conjunctiva not injected.   NOSE/THROAT: no nasal erythema or rhinorrhea. Oropharynx pink, without erythema, ulcerations or thrush.   NECK: no cervical motion rigidity; supple with no masses.  CHEST: Patient is speaking comfortably on room air with normal work of breathing without using accessory muscles of respiration.  ABDOMEN: soft, nontender, nondistended.   MUSCULOSKELETAL: no tenderness to palpation along the spine or scapulae. Normal range of motion.  NEUROLOGIC: cranial nerves II-XII intact bilaterally. Strength 5/5 in bilateral upper and lower extremities. No sensory deficits appreciated. Normal gait.  EXTREMITIES: no clubbing, cyanosis, edema.  SKIN: no erythema, rashes, ulcerations noted.     REVIEW OF IMAGING/PATHOLOGY/LABS: Please see HPI. All images reviewed personally by dictating physician.     ASSESSMENT: 79 y.o. male with very low risk prostate adenocarcinoma, [id number] GS 3+3 (4/18: single core, 30%), iPSA 8.52.  PLAN:  Tyler Todd presents with slowly rising PSA as documented above most recently 8.5 in December of 2019.  He has a positive biopsy from April 2018 revealing low volume [place] 3 + 3 disease with a clear digital rectal exam at that time.  He presented to [facility] for 2nd opinion with MRI of the prostate revealing a low-grade tumor without extracapsular extension however noting broad capsular abutment and a incidental left obturator lymph node for which he was recommended to continue on surveillance.  He then followed up with Dr. Sims at [facility] who recommended repeat biopsy which he is refusing.  He discussed treatment at that time recommending external beam radiotherapy should patient elect to proceed.  He has continued to follow with Dr. Soto and has clear staging studies and now would like to undergo treatment.  To further clarify his circumstance I employed the  [facility] nomogram with results as below:    15yr PCSS 99%  10yr bPFS 85%  OC  60%  EPE  39%  LN+  1%  SVI  1%    The predictive model illustrate his low risk status with minimal likelihood of lymphatic or seminal vesicle involvement and exceptional prostate cancer specific survival.  I relayed this information to the patient today taking note of his medical history including a diagnosis of lung cancer status post definitive chemoradiation therapy followed by consolidative systemic therapy and thus far clear PET-CT scans.  I explained that this malignancy should not be expected to compromise his life but better information could be obtained by updated biopsy, again he is refusing.  I discussed the option of further surveillance by PSA in which case crossing the threshold of 10 would prompt us to recommend treatment.  Again he is refusing further surveillance.  He would like to undergo treatment.      Today we discussed options of surgery, noting he is felt to be a poor surgical candidate as well as cryotherapy and brachytherapy both of which I would recommend against given his AUA symptomatology score > 30.  If he would like to proceed with treatment and is refusing further workup by repeat biopsy, I would recommend definitive radiotherapy to 7740 cGy in 43 fractions given his poor urinary function which I am concerned would be exacerbated by a hypofractionated treatment.  I explained that daily radiotherapy is delivered with an image guided technique requiring gold fiducial placement and he does have a cystoscopy planned by Dr. Soto to further investigate his poor urinary function.  Perhaps fiduicial could be placed concurrently.  I do not recommend hormone deprivation in his case.  Lastly I recommended taking his medications as directed as this may help with his urinary function.  At the end of our discussion, he expressed understanding and would like to proceed with radiotherapy.  I will carbon  copy Dr. Soto to coordinate care    I carefully explained the process of simulation and treatment delivery with weekly physician visits.     We discussed the risks and benefits of the above treatment and have gone over in detail the acute and late toxicities of radiation therapy to the prostate/prox SVs. The patient expressed  understanding and has signed a consent form which is included in the patients chart.     The patient has our contact information and understands that they are free to contact us at any time with questions or concerns regarding radiation therapy.    DISPOSITION: RTC FOR CT SIM after fiducials    I have personally seen and evaluated this patient. Greater than 50% of this time was spent discussing coordination of care and/or counseling.    PHYSICIAN: Rudy Escobar Jr, MD    Thank you for the opportunity to meet and consult with Tyler Todd.   Please feel free to contact me to discuss the above recommendation further.

## 2020-02-17 NOTE — Clinical Note
Dr. HAMILTON--Pt refusing further w/u and wants to undergo RT.No ADT needed.Place fiducials at planned cystoscopy?

## 2020-02-19 ENCOUNTER — HOSPITAL ENCOUNTER (OUTPATIENT)
Dept: PREADMISSION TESTING | Facility: HOSPITAL | Age: 80
Discharge: HOME | End: 2020-02-19
Attending: UROLOGY
Payer: MEDICARE

## 2020-02-19 DIAGNOSIS — N41.1 CHRONIC PROSTATITIS: Primary | ICD-10-CM

## 2020-02-19 LAB
ALBUMIN SERPL BCP-MCNC: 3.9 G/DL (ref 3.5–5.2)
ALP SERPL-CCNC: 88 U/L (ref 55–135)
ALT SERPL W/O P-5'-P-CCNC: 9 U/L (ref 10–44)
ANION GAP SERPL CALC-SCNC: 9 MMOL/L (ref 8–16)
APTT BLDCRRT: 30.5 SEC (ref 21–32)
AST SERPL-CCNC: 13 U/L (ref 10–40)
BASOPHILS # BLD AUTO: 0.05 K/UL (ref 0–0.2)
BASOPHILS NFR BLD: 0.7 % (ref 0–1.9)
BILIRUB SERPL-MCNC: 0.6 MG/DL (ref 0.1–1)
BUN SERPL-MCNC: 18 MG/DL (ref 8–23)
CALCIUM SERPL-MCNC: 9.2 MG/DL (ref 8.7–10.5)
CHLORIDE SERPL-SCNC: 105 MMOL/L (ref 95–110)
CO2 SERPL-SCNC: 26 MMOL/L (ref 23–29)
CREAT SERPL-MCNC: 1.5 MG/DL (ref 0.5–1.4)
DIFFERENTIAL METHOD: ABNORMAL
EOSINOPHIL # BLD AUTO: 0.2 K/UL (ref 0–0.5)
EOSINOPHIL NFR BLD: 2.4 % (ref 0–8)
ERYTHROCYTE [DISTWIDTH] IN BLOOD BY AUTOMATED COUNT: 14.6 % (ref 11.5–14.5)
EST. GFR  (AFRICAN AMERICAN): 51 ML/MIN/1.73 M^2
EST. GFR  (NON AFRICAN AMERICAN): 44 ML/MIN/1.73 M^2
GLUCOSE SERPL-MCNC: 168 MG/DL (ref 70–110)
HCT VFR BLD AUTO: 41.7 % (ref 40–54)
HGB BLD-MCNC: 13.4 G/DL (ref 14–18)
IMM GRANULOCYTES # BLD AUTO: 0.02 K/UL (ref 0–0.04)
INR PPP: 1 (ref 0.8–1.2)
LYMPHOCYTES # BLD AUTO: 1.4 K/UL (ref 1–4.8)
LYMPHOCYTES NFR BLD: 20.2 % (ref 18–48)
MCH RBC QN AUTO: 30.9 PG (ref 27–31)
MCHC RBC AUTO-ENTMCNC: 32.1 G/DL (ref 32–36)
MCV RBC AUTO: 96 FL (ref 82–98)
MONOCYTES # BLD AUTO: 0.7 K/UL (ref 0.3–1)
MONOCYTES NFR BLD: 10.3 % (ref 4–15)
NEUTROPHILS # BLD AUTO: 4.5 K/UL (ref 1.8–7.7)
NEUTROPHILS NFR BLD: 66.1 % (ref 38–73)
NRBC BLD-RTO: 0 /100 WBC
PLATELET # BLD AUTO: 210 K/UL (ref 150–350)
PMV BLD AUTO: 9.4 FL (ref 9.2–12.9)
POTASSIUM SERPL-SCNC: 4.2 MMOL/L (ref 3.5–5.1)
PROT SERPL-MCNC: 7.1 G/DL (ref 6–8.4)
PROTHROMBIN TIME: 10.6 SEC (ref 9–12.5)
RBC # BLD AUTO: 4.34 M/UL (ref 4.6–6.2)
SODIUM SERPL-SCNC: 140 MMOL/L (ref 136–145)
WBC # BLD AUTO: 6.79 K/UL (ref 3.9–12.7)

## 2020-02-19 PROCEDURE — 36415 COLL VENOUS BLD VENIPUNCTURE: CPT

## 2020-02-19 PROCEDURE — 99900104 DSU ONLY-NO CHARGE-EA ADD'L HR (STAT)

## 2020-02-19 PROCEDURE — 99900103 DSU ONLY-NO CHARGE-INITIAL HR (STAT)

## 2020-02-19 PROCEDURE — 85610 PROTHROMBIN TIME: CPT

## 2020-02-19 PROCEDURE — 80053 COMPREHEN METABOLIC PANEL: CPT

## 2020-02-19 PROCEDURE — 85730 THROMBOPLASTIN TIME PARTIAL: CPT

## 2020-02-19 PROCEDURE — 85025 COMPLETE CBC W/AUTO DIFF WBC: CPT

## 2020-02-19 NOTE — DISCHARGE INSTRUCTIONS
To confirm, Your doctor has instructed you that surgery is scheduled for:   2/24/20  Deena  [phone]    Please report to [facility], [facility] the morning of surgery. You must check-in and receive a wristband before going to your procedure.    Pre-Op will call the afternoon prior to surgery between 1:00 and 6:00 PM with the final arrival time.  Phone number: [phone]    PLEASE NOTE:  The surgery schedule has many variables which may affect the time of your surgery case.  Family members should be available if your surgery time changes.  Plan to be here the day of your procedure between 4-6 hours.    MEDICATIONS:  TAKE ONLY THESE MEDICATIONS WITH A SMALL SIP OF WATER THE MORNING OF YOUR PROCEDURE:    LYRICA, LIPITOR, METOPROLOL    DO NOT TAKE THESE MEDICATIONS 5-7 DAYS PRIOR to your procedure or per your surgeon's request: ASPIRIN, ALEVE, ADVIL, IBUPROFEN, FISH OIL VITAMIN E(AREDS2) , HERBALS AS OF NOW.   (May take Tylenol)                                          NO METFORMIN EVENTING OR MORNING BEFORE SURGERY.                                          NO IRBESARTAN OR DIABETIC MEDICINE AM OF SURGERY.     ONLY if you are prescribed any types of blood thinners such as:  Aspirin, Coumadin, Plavix, Pradaxa, Xarelto, Aggrenox, Effient, Eliquis, Savasya, Brilinta, or any other, ask your surgeon whether you should stop taking them and how long before surgery you should stop.  You may also need to verify with the prescribing physician if it is ok to stop your medication.      INSTRUCTIONS IMPORTANT!!  · Do not eat or drink anything between midnight and the time of your procedure- this includes gum, mints, and candy.  · Do not smoke or drink alcoholic beverages 24 hours prior to your procedure.  · Shower the night before AND the morning of your procedure with a Chlorhexidine wash such as Hibiclens or Dial antibacterial soap from the neck down.  Do not get it on your face or in your eyes.  You  may use your own shampoo and face wash. This helps your skin to be as bacteria free as possible.    · If you wear contact lenses, dentures, hearing aids or glasses, bring a container to put them in during surgery and give to a family member for safe keeping.  Please leave all jewelry, piercing's and valuables at home.   · DO NOT remove hair from the surgery site.  Do not shave the incision site unless you are given specific instructions to do so.    · ONLY if you have been diagnosed with sleep apnea please bring your C-PAP machine.  · ONLY if you wear home oxygen please bring your portable oxygen tank the day of your procedure.  · ONLY if you have a history of OPEN HEART SURGERY you will need a clearance from your Cardiologist per Anesthesia.      · ONLY for patients requiring bowel prep, written instructions will be given by your doctor's office.  · ONLY if you have a neuro stimulator, please bring the controller with you the morning of surgery  · ONLY if a type and screen test is needed before surgery, please return:  · If your doctor has scheduled you for an overnight stay, bring a small overnight bag with any personal items you need.  · Make arrangements in advance for transportation home by a responsible adult.  It is not safe to drive a vehicle during the 24 hours after anesthesia.      · Visiting hours are 10:00AM to 8:30PM.  For the safety of all patients, visitors under the age of 12 are not allowed above the first floor of the hospital.    · All [facility] facilities and properties are tobacco free.  Smoking is NOT allowed.       If you have any questions about these instructions, call Pre-Op Admit  Nursing at [phone] or the Pre-Op Day Surgery Unit at [phone].

## 2020-02-20 DIAGNOSIS — N32.89 BLADDER WALL THICKENING: ICD-10-CM

## 2020-02-20 DIAGNOSIS — C61 PROSTATE CANCER: Primary | ICD-10-CM

## 2020-02-20 NOTE — H&P
Subjective:       Patient ID: Tyler Todd is a 79 y.o. male.    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  He was evaluated by Radiation Oncology and the plan is from to receive external beam radiation therapy but will need placement of prostate markers before hand.  He was also noted to have some thickening of the bladder wall on CT scan also noted to have a 3 mm left renal calculus.  He is scheduled to undergo cystoscopy and transrectal ultrasound will placement of gold markers.    Past Medical History:   Diagnosis Date    Cancer 2012    lung cancer chemo and radiation    COPD (chronic obstructive pulmonary disease)     Coronary artery disease 2002, 2004    s/p 3 stents;  Dr. Interiano    Diabetes mellitus, type 2     Hyperlipidemia     Hypertension     Prostate CA      Past Surgical History:   Procedure Laterality Date    ANKLE SURGERY Right 1970s    EPIDURAL STEROID INJECTION INTO CERVICAL SPINE N/A 2/19/2019    Procedure: Injection-steroid-epidural-cervical C7-T1;  Surgeon: Marcelino Monroe MD;  Location: [facility] OR;  Service: Pain Management;  Laterality: N/A;    INJECTION OF ANESTHETIC AGENT AROUND MEDIAL BRANCH NERVES INNERVATING LUMBAR FACET JOINT Bilateral 6/4/2018    Procedure: MEDIAL BRANCH, LUMBAR L3,4,5;  Surgeon: Marcelino Monroe MD;  Location: [facility] OR;  Service: Pain Management;  Laterality: Bilateral;  L3, 4, 5    RADIOFREQUENCY ABLATION OF LUMBAR MEDIAL BRANCH NERVE AT SINGLE LEVEL Bilateral 7/16/2018    Procedure: RADIOFREQUENCY ABLATION, NERVE, MEDIAL BRANCH, LUMBAR, 1 LEVEL;  Surgeon: Marcelino Monroe MD;  Location: [facility] OR;  Service: Pain Management;  Laterality: Bilateral;  L3, 4, 5  lumbar  [employer] pain management generator  SN [id number]  80 degrees for 75 seconds x2     Family History   Problem Relation Age of Onset    Diabetes Mother     Peripheral vascular disease Mother     Heart attack Mother     Diabetes Father     Heart  attack Father     Emphysema Father     Diabetes Sister      Social History     Socioeconomic History    Marital status:      Spouse name: Not on file    Number of children: Not on file    Years of education: Not on file    Highest education level: Not on file   Occupational History    Not on file   Social Needs    Financial resource strain: Not on file    Food insecurity:     Worry: Not on file     Inability: Not on file    Transportation needs:     Medical: Not on file     Non-medical: Not on file   Tobacco Use    Smoking status: Current Every Day Smoker     Packs/day: 1.00     Years: 57.00     Pack years: 57.00     Types: Cigarettes    Smokeless tobacco: Never Used   Substance and Sexual Activity    Alcohol use: No    Drug use: No    Sexual activity: Never   Lifestyle    Physical activity:     Days per week: Not on file     Minutes per session: Not on file    Stress: Not on file   Relationships    Social connections:     Talks on phone: Not on file     Gets together: Not on file     Attends Yarsanism service: Not on file     Active member of club or organization: Not on file     Attends meetings of clubs or organizations: Not on file     Relationship status: Not on file   Other Topics Concern    Not on file   Social History Narrative    Not on file       Current Outpatient Medications   Medication Sig Dispense Refill    alogliptin (NESINA) 12.5 mg Tab Take by mouth.      amLODIPine (NORVASC) 5 MG tablet Take 5 mg by mouth once daily.      ascorbic acid, vitamin C, (VITAMIN C) 100 MG tablet Vitamin C      aspirin 81 mg Tab Take 1 tablet by mouth once daily. Every day      atorvastatin (LIPITOR) 10 MG tablet Take 10 mg by mouth once daily.  3    brimonidine 0.1% (ALPHAGAN P) 0.1 % Drop Place 1 drop into both eyes 3 (three) times daily.      carboxymethylcellulose (REFRESH PLUS) 0.5 % Dpet 1 drop 3 (three) times daily as needed.      carboxymethylcellulose sodium (THERATEARS) 1 %  DpGe TheraTears 1 % gel in a dropperette      diabetic supplies, miscellan. Misc No Sig Provided      dorzolamide (TRUSOPT) 2 % ophthalmic solution 1 drop 3 (three) times daily.      ibuprofen (ADVIL,MOTRIN) 800 MG tablet Take 800 mg by mouth every 6 (six) hours as needed for Pain.      irbesartan (AVAPRO) 300 MG tablet Take 300 mg by mouth once daily.       latanoprost 0.005 % ophthalmic solution Apply 1 drop to eye.      metFORMIN (GLUCOPHAGE-XR) 500 MG XR 24hr tablet Take 1,000 mg by mouth 2 (two) times daily.      metoprolol succinate (TOPROL-XL) 50 MG 24 hr tablet metoprolol succinate ER 50 mg tablet,extended release 24 hr      nitroGLYCERIN (NITROSTAT) 0.4 MG SL tablet As directed      pregabalin (LYRICA) 100 MG capsule Take 100 mg by mouth 2 (two) times daily.       vitC-E-zn- [id number] 250 mg-200 unit -12.5 mg-1 mg Cap Take by mouth.       No current facility-administered medications for this visit.      Facility-Administered Medications Ordered in Other Visits   Medication Dose Route Frequency Provider Last Rate Last Dose    lidocaine (PF) 10 mg/ml (1%) injection    PRN Marcelino Monroe MD   3 mL at 02/19/19 1233    sodium chloride 0.9% injection    PRN Marcelino Monroe MD   4 mL at 02/19/19 1234     Review of patient's allergies indicates:   Allergen Reactions    Doxycycline Diarrhea     Severe diarrhea    Sulfa (sulfonamide antibiotics) Rash     Other reaction(s): Itching    Sulfasalazine Rash     Rash and itching mild       Review of Systems   All other systems reviewed and are negative.      Objective:      There were no vitals filed for this visit.  Physical Exam   Cardiovascular: Normal rate.   Pulmonary/Chest: Effort normal.   Abdominal: Soft.   Genitourinary: Prostate normal and penis normal.          Assessment:       1. Prostate cancer    2. Bladder wall thickening        Plan:       Cystoscopy and transrectal ultrasound with gold marker placements

## 2020-02-21 ENCOUNTER — ANESTHESIA EVENT (OUTPATIENT)
Dept: SURGERY | Facility: HOSPITAL | Age: 80
End: 2020-02-21
Payer: MEDICARE

## 2020-02-21 NOTE — PROGRESS NOTES
Met with patient to complete the [facility]CC[facility] Distress Screening; he indicated a rating of 3.  Patient stated that he is diagnosed with PTSD and [facility]  I provided him with my contact information in the event he needs supportive services.

## 2020-02-24 ENCOUNTER — ANESTHESIA (OUTPATIENT)
Dept: SURGERY | Facility: HOSPITAL | Age: 80
End: 2020-02-24
Payer: MEDICARE

## 2020-02-24 ENCOUNTER — HOSPITAL ENCOUNTER (OUTPATIENT)
Facility: HOSPITAL | Age: 80
Discharge: HOME | End: 2020-02-24
Attending: UROLOGY | Admitting: UROLOGY
Payer: MEDICARE

## 2020-02-24 DIAGNOSIS — N32.89 BLADDER WALL THICKENING: ICD-10-CM

## 2020-02-24 DIAGNOSIS — C61 PROSTATE CANCER: ICD-10-CM

## 2020-02-24 PROCEDURE — 25000003 PHARM REV CODE 250: Performed by: ANESTHESIOLOGY

## 2020-02-24 PROCEDURE — 27200651 HC AIRWAY, LMA: Performed by: ANESTHESIOLOGY

## 2020-02-24 PROCEDURE — 55876 PLACE RT DEVICE/MARKER PROS: CPT | Mod: 51,,, | Performed by: UROLOGY

## 2020-02-24 PROCEDURE — 71000039 HC RECOVERY, EACH ADD'L HOUR: Performed by: UROLOGY

## 2020-02-24 PROCEDURE — 99900103 DSU ONLY-NO CHARGE-INITIAL HR (STAT): Performed by: UROLOGY

## 2020-02-24 PROCEDURE — 37000009 HC ANESTHESIA EA ADD 15 MINS: Performed by: UROLOGY

## 2020-02-24 PROCEDURE — 63600175 PHARM REV CODE 636 W HCPCS: Performed by: UROLOGY

## 2020-02-24 PROCEDURE — 36000706: Performed by: UROLOGY

## 2020-02-24 PROCEDURE — 71000033 HC RECOVERY, INTIAL HOUR: Performed by: UROLOGY

## 2020-02-24 PROCEDURE — 63600175 PHARM REV CODE 636 W HCPCS: Performed by: NURSE ANESTHETIST, CERTIFIED REGISTERED

## 2020-02-24 PROCEDURE — 71000015 HC POSTOP RECOV 1ST HR: Performed by: UROLOGY

## 2020-02-24 PROCEDURE — 76872 US TRANSRECTAL: CPT | Mod: 26,,, | Performed by: UROLOGY

## 2020-02-24 PROCEDURE — 74420 UROGRAPHY RTRGR +-KUB: CPT | Mod: 26,59,, | Performed by: UROLOGY

## 2020-02-24 PROCEDURE — 74420 PR  X-RAY RETROGRADE PYELOGRAM: ICD-10-PCS | Mod: 26,59,, | Performed by: UROLOGY

## 2020-02-24 PROCEDURE — D9220A PRA ANESTHESIA: ICD-10-PCS | Mod: ANES,,, | Performed by: ANESTHESIOLOGY

## 2020-02-24 PROCEDURE — 36000707: Performed by: UROLOGY

## 2020-02-24 PROCEDURE — D9220A PRA ANESTHESIA: Mod: ANES,,, | Performed by: ANESTHESIOLOGY

## 2020-02-24 PROCEDURE — A4648 IMPLANTABLE TISSUE MARKER: HCPCS | Performed by: UROLOGY

## 2020-02-24 PROCEDURE — D9220A PRA ANESTHESIA: Mod: CRNA,,, | Performed by: NURSE ANESTHETIST, CERTIFIED REGISTERED

## 2020-02-24 PROCEDURE — 52005 PR CYSTOURETHROSCOPY,URETER CATHETER: ICD-10-PCS | Mod: ,,, | Performed by: UROLOGY

## 2020-02-24 PROCEDURE — C1758 CATHETER, URETERAL: HCPCS | Performed by: UROLOGY

## 2020-02-24 PROCEDURE — 76872 PR US TRANSRECTAL: ICD-10-PCS | Mod: 26,,, | Performed by: UROLOGY

## 2020-02-24 PROCEDURE — D9220A PRA ANESTHESIA: ICD-10-PCS | Mod: CRNA,,, | Performed by: NURSE ANESTHETIST, CERTIFIED REGISTERED

## 2020-02-24 PROCEDURE — 37000008 HC ANESTHESIA 1ST 15 MINUTES: Performed by: UROLOGY

## 2020-02-24 PROCEDURE — 25000003 PHARM REV CODE 250: Performed by: UROLOGY

## 2020-02-24 PROCEDURE — 52005 CYSTO W/URTRL CATHJ: CPT | Mod: ,,, | Performed by: UROLOGY

## 2020-02-24 PROCEDURE — 99900104 DSU ONLY-NO CHARGE-EA ADD'L HR (STAT): Performed by: UROLOGY

## 2020-02-24 PROCEDURE — 55876 PR PLACE RADIOTHER DEVICE/MARKER, PROSTATE: ICD-10-PCS | Mod: 51,,, | Performed by: UROLOGY

## 2020-02-24 DEVICE — MARKERS GOLD SOFT TISSUE: Type: IMPLANTABLE DEVICE | Site: PROSTATE | Status: FUNCTIONAL

## 2020-02-24 RX ORDER — LIDOCAINE HYDROCHLORIDE 20 MG/ML
INJECTION INTRAVENOUS
Status: DISCONTINUED | OUTPATIENT
Start: 2020-02-24 | End: 2020-02-24

## 2020-02-24 RX ORDER — GENTAMICIN SULFATE 80 MG/100ML
80 INJECTION, SOLUTION INTRAVENOUS
Status: COMPLETED | OUTPATIENT
Start: 2020-02-24 | End: 2020-02-24

## 2020-02-24 RX ORDER — PHENYLEPHRINE HYDROCHLORIDE 10 MG/ML
INJECTION INTRAVENOUS
Status: DISCONTINUED | OUTPATIENT
Start: 2020-02-24 | End: 2020-02-24

## 2020-02-24 RX ORDER — CEFUROXIME AXETIL 500 MG/1
500 TABLET ORAL 2 TIMES DAILY
Qty: 20 TABLET | Refills: 0 | Status: ON HOLD | OUTPATIENT
Start: 2020-02-24 | End: 2020-03-13 | Stop reason: HOSPADM

## 2020-02-24 RX ORDER — MIDAZOLAM HYDROCHLORIDE 1 MG/ML
INJECTION, SOLUTION INTRAMUSCULAR; INTRAVENOUS
Status: DISCONTINUED | OUTPATIENT
Start: 2020-02-24 | End: 2020-02-24

## 2020-02-24 RX ORDER — HYDROCODONE BITARTRATE AND ACETAMINOPHEN 5; 325 MG/1; MG/1
1 TABLET ORAL
Qty: 20 TABLET | Refills: 0 | Status: ON HOLD | OUTPATIENT
Start: 2020-02-24 | End: 2020-03-12 | Stop reason: SDUPTHER

## 2020-02-24 RX ORDER — ONDANSETRON 2 MG/ML
4 INJECTION INTRAMUSCULAR; INTRAVENOUS ONCE AS NEEDED
Status: DISCONTINUED | OUTPATIENT
Start: 2020-02-24 | End: 2020-02-24 | Stop reason: HOSPADM

## 2020-02-24 RX ORDER — LIDOCAINE HYDROCHLORIDE 20 MG/ML
JELLY TOPICAL
Status: DISCONTINUED | OUTPATIENT
Start: 2020-02-24 | End: 2020-02-24 | Stop reason: HOSPADM

## 2020-02-24 RX ORDER — OXYCODONE HYDROCHLORIDE 5 MG/1
5 TABLET ORAL ONCE AS NEEDED
Status: COMPLETED | OUTPATIENT
Start: 2020-02-24 | End: 2020-02-24

## 2020-02-24 RX ORDER — LIDOCAINE HYDROCHLORIDE 10 MG/ML
1 INJECTION, SOLUTION EPIDURAL; INFILTRATION; INTRACAUDAL; PERINEURAL ONCE
Status: COMPLETED | OUTPATIENT
Start: 2020-02-24 | End: 2020-02-24

## 2020-02-24 RX ORDER — FENTANYL CITRATE 50 UG/ML
25 INJECTION, SOLUTION INTRAMUSCULAR; INTRAVENOUS EVERY 5 MIN PRN
Status: DISCONTINUED | OUTPATIENT
Start: 2020-02-24 | End: 2020-02-24 | Stop reason: HOSPADM

## 2020-02-24 RX ORDER — PROPOFOL 10 MG/ML
VIAL (ML) INTRAVENOUS
Status: DISCONTINUED | OUTPATIENT
Start: 2020-02-24 | End: 2020-02-24

## 2020-02-24 RX ORDER — CIPROFLOXACIN 500 MG/1
500 TABLET ORAL 2 TIMES DAILY
Qty: 20 TABLET | Refills: 0 | Status: SHIPPED | OUTPATIENT
Start: 2020-02-24 | End: 2020-03-10 | Stop reason: ALTCHOICE

## 2020-02-24 RX ORDER — FENTANYL CITRATE 50 UG/ML
INJECTION, SOLUTION INTRAMUSCULAR; INTRAVENOUS
Status: DISCONTINUED | OUTPATIENT
Start: 2020-02-24 | End: 2020-02-24

## 2020-02-24 RX ORDER — ONDANSETRON 2 MG/ML
INJECTION INTRAMUSCULAR; INTRAVENOUS
Status: DISCONTINUED | OUTPATIENT
Start: 2020-02-24 | End: 2020-02-24

## 2020-02-24 RX ORDER — PHENAZOPYRIDINE HYDROCHLORIDE 100 MG/1
200 TABLET, FILM COATED ORAL
Qty: 20 TABLET | Refills: 0 | Status: SHIPPED | OUTPATIENT
Start: 2020-02-24 | End: 2020-07-18 | Stop reason: CLARIF

## 2020-02-24 RX ORDER — HYDROCODONE BITARTRATE AND ACETAMINOPHEN 5; 325 MG/1; MG/1
1 TABLET ORAL EVERY 4 HOURS PRN
Status: CANCELLED | OUTPATIENT
Start: 2020-02-24

## 2020-02-24 RX ORDER — PHENAZOPYRIDINE HYDROCHLORIDE 200 MG/1
200 TABLET, FILM COATED ORAL ONCE
Status: CANCELLED | OUTPATIENT
Start: 2020-02-24 | End: 2020-02-24

## 2020-02-24 RX ADMIN — FENTANYL CITRATE 100 MCG: 50 INJECTION, SOLUTION INTRAMUSCULAR; INTRAVENOUS at 07:02

## 2020-02-24 RX ADMIN — SODIUM CHLORIDE, SODIUM GLUCONATE, SODIUM ACETATE, POTASSIUM CHLORIDE, MAGNESIUM CHLORIDE, SODIUM PHOSPHATE, DIBASIC, AND POTASSIUM PHOSPHATE: .53; .5; .37; .037; .03; .012; .00082 INJECTION, SOLUTION INTRAVENOUS at 06:02

## 2020-02-24 RX ADMIN — LIDOCAINE HYDROCHLORIDE 100 MG: 20 INJECTION, SOLUTION INTRAVENOUS at 07:02

## 2020-02-24 RX ADMIN — LIDOCAINE HYDROCHLORIDE 10 MG: 10 INJECTION, SOLUTION EPIDURAL; INFILTRATION; INTRACAUDAL; PERINEURAL at 06:02

## 2020-02-24 RX ADMIN — GENTAMICIN SULFATE 80 MG: 80 INJECTION, SOLUTION INTRAVENOUS at 07:02

## 2020-02-24 RX ADMIN — OXYCODONE 5 MG: 5 TABLET ORAL at 09:02

## 2020-02-24 RX ADMIN — PHENYLEPHRINE HYDROCHLORIDE 100 MCG: 10 INJECTION INTRAVENOUS at 07:02

## 2020-02-24 RX ADMIN — ONDANSETRON 4 MG: 2 INJECTION, SOLUTION INTRAMUSCULAR; INTRAVENOUS at 07:02

## 2020-02-24 RX ADMIN — MIDAZOLAM 2 MG: 1 INJECTION INTRAMUSCULAR; INTRAVENOUS at 07:02

## 2020-02-24 RX ADMIN — PROPOFOL 120 MG: 10 INJECTION, EMULSION INTRAVENOUS at 07:02

## 2020-02-24 NOTE — ANESTHESIA PREPROCEDURE EVALUATION
02/24/2020  Tyler Todd is a 79 y.o., male.    Anesthesia Evaluation    I have reviewed the Patient Summary Reports.    I have reviewed the Nursing Notes.   I have reviewed the Medications.     Review of Systems  Anesthesia Hx:  No problems with previous Anesthesia    Social:  Smoker    Hematology/Oncology:        Current/Recent Cancer. -- Cancer in past history:  Other (see Oncology comments)   EENT/Dental:EENT/Dental Normal   Cardiovascular:   Hypertension CAD   hyperlipidemia    Pulmonary:   COPD Carcinoma of lung    Renal/:   Chronic Renal Disease, CRI    Musculoskeletal:   Arthritis     Neurological:   Neuromuscular Disease,    Endocrine:   Diabetes, type 2        Physical Exam  General:  Well nourished    Airway/Jaw/Neck:  Airway Findings: Mouth Opening: Normal Tongue: Normal  General Airway Assessment: Adult  Mallampati: II  Improves to I with phonation.  TM Distance: Normal, at least 6 cm        Eyes/Ears/Nose:  EYES/EARS/NOSE FINDINGS: Normal   Dental:  Dental Findings: In tact, Periodontal disease, Severe   Chest/Lungs:  Chest/Lungs Findings: Clear to auscultation, Normal Respiratory Rate     Heart/Vascular:  Heart Findings: Rate: Normal  Rhythm: Regular Rhythm        Mental Status:  Mental Status Findings:  Cooperative, Alert and Oriented         Anesthesia Plan  Type of Anesthesia, risks & benefits discussed:  Anesthesia Type:  general  Patient's Preference:   Intra-op Monitoring Plan: standard ASA monitors  Intra-op Monitoring Plan Comments:   Post Op Pain Control Plan: IV/PO Opioids PRN and multimodal analgesia  Post Op Pain Control Plan Comments:   Induction:   IV  Beta Blocker:  Patient is on a Beta-Blocker and has received one dose within the past 24 hours (No further documentation required).       Informed Consent: Patient understands risks and agrees with Anesthesia plan.  Questions  answered. Anesthesia consent signed with patient.  ASA Score: 3     Day of Surgery Review of History & Physical: I have interviewed and examined the patient. I have reviewed the patient's H&P dated:    H&P update referred to the surgeon.         Ready For Surgery From Anesthesia Perspective.

## 2020-02-24 NOTE — OP NOTE
Operative note -     Preoperative diagnosis - adenocarcinoma prostate. Bladder wall thickening.     Postop diagnosis - adenocarcinoma prostate, complete right ureteral duplication    Procedure -  cystourethroscopy right retrograde pyelograms, transrectal ultrasound with prostatic gold marker placements.     Surgeon- Dr. Soto     Anesthesia- general    Procedure - the patient was brought to the cystoscopy suite and after adequate induction of general anesthesia he was placed in lithotomy position. Genitalia were prepped and draped in usual manner.  Plane fluoroscopic images were obtained of the abdomen and pelvis and no significant findings were encountered.  A 22 [place] cystoscope sheath with Foroblique lens was inserted under direct vision into the urethra.  Examination of the anterior urethra was unremarkable.  Insert was advanced into the prostatic urethra where the verumontanum was encountered.  There was evidence of some moderate trilobar prostatic hyperplasia no actual significant obstructing.  The interior of the bladder was then examined.  Examination of the trigone revealed a single slit-like left ureteral orifice but on the right side there was evidence of 2 right ureteral orifices lying adjacent to each other.  The bladder mucosa was examined with the 30 and 70 degree lenses and bladder mucosa was completely smooth throughout with no trabeculation or diverticula no cellules and no lesions and no hemorrhagic sites.  An 8 [place] cone-tip ureteral catheter was then introduced initially into the more medially located right ureteral orifice and contrast was injected and in this fashion a right retrograde pyelogram was obtained of this ureter.  Ureter had normal appearance and contrast passed up into the upper pole collecting system.  Subsequently a right retrograde pyelogram was obtained of the other right ureteral orifice and this was consistent with the collecting system to the lower pole of the right  kidney.  There is clear evidence of complete right ureteral duplication but otherwise but collecting systems and ureters had essentially normal appearance.  Prior imaging studies had revealed an unremarkable left kidney.  Subsequently the cystoscope was removed and the transrectal ultrasound probe was inserted into the rectum.  The seminal vesicles and the prostate within fully visualized.  Seminal vesicles had a normal symmetrical appearance.  Examination of the prostate was able to visualize the central gland and the peripheral zone with clear demarcation between them.  Prostatic measurements were then obtained which revealed a width of 52.7 mm height of 30.2 mm length of 47 mm and a volume of 39.2 cc.  The transrectal ultrasound probe was then used to position gold markers in the prostate 1 in the left base 1 in the left apex and 1 in the right lateral lobe.  Plain x-rays were obtained which revealed good positioning of all the gold markers.  Patient having tolerated procedure well was then transferred to the recovery room in stable condition.

## 2020-02-24 NOTE — DISCHARGE SUMMARY
Operative Note     SUMMARY     Surgery Date: 2/24/2020     Surgeon(s) and Role:     * Nuha Soto MD - Primary    Pre-op Diagnosis:  Prostate cancer [C61]  Bladder wall thickening [N32.89]    Post-op Diagnosis:  Prostate cancer [C61]  Bladder wall thickening [N32.89]    Procedure(s) (LRB):  CYSTOSCOPY (N/A)  ULTRASOUND, PROSTATE, RECTAL APPROACH, WITH GOLD FIDUCIAL MARKER INSERTION (N/A)    Anesthesia: General    Findings/Key Components:  See Op Note      Estimated Blood Loss: * No values recorded between 2/24/2020 12:00 AM and 2/24/2020  7:19 AM *         Specimens (From admission, onward)    None            Discharge Note      SUMMARY     Admit Date: 2/24/2020    Attending Physician: Nuha Soto MD     Discharge Physician: Nuha Soto MD    Discharge Date: 2/24/2020     Final Diagnosis: Prostate cancer [C61]  Bladder wall thickening [N32.89]    Hospital Course: uneventful, going home same day    Disposition: Home or Self Care    Patient Instructions:   Current Discharge Medication List      CONTINUE these medications which have NOT CHANGED    Details   alogliptin (NESINA) 12.5 mg Tab Take by mouth.      amLODIPine (NORVASC) 5 MG tablet Take 5 mg by mouth once daily.      ascorbic acid, vitamin C, (VITAMIN C) 100 MG tablet Vitamin C      aspirin 81 mg Tab Take 1 tablet by mouth once daily. Every day      brimonidine 0.1% (ALPHAGAN P) 0.1 % Drop Place 1 drop into both eyes 3 (three) times daily.      carboxymethylcellulose (REFRESH PLUS) 0.5 % Dpet 1 drop 3 (three) times daily as needed.      carboxymethylcellulose sodium (THERATEARS) 1 % DpGe TheraTears 1 % gel in a dropperette      diabetic supplies, miscellan. Misc No Sig Provided    Comments: test strips and lancets      dorzolamide (TRUSOPT) 2 % ophthalmic solution 1 drop 3 (three) times daily.      ibuprofen (ADVIL,MOTRIN) 800 MG tablet Take 800 mg by mouth every 6 (six) hours as needed for Pain.      irbesartan (AVAPRO) 300 MG tablet Take 300  mg by mouth once daily.       latanoprost 0.005 % ophthalmic solution Apply 1 drop to eye.      metFORMIN (GLUCOPHAGE-XR) 500 MG XR 24hr tablet Take 1,000 mg by mouth 2 (two) times daily.      metoprolol succinate (TOPROL-XL) 50 MG 24 hr tablet metoprolol succinate ER 50 mg tablet,extended release 24 hr      pregabalin (LYRICA) 100 MG capsule Take 100 mg by mouth 2 (two) times daily.       vitC-E-zn- AVP-xjzo-zdxcre 250 mg-200 unit -12.5 mg-1 mg Cap Take by mouth.      atorvastatin (LIPITOR) 10 MG tablet Take 10 mg by mouth once daily.  Refills: 3      nitroGLYCERIN (NITROSTAT) 0.4 MG SL tablet As directed             Discharge Procedure Orders (must include Diet, Follow-up, Activity)  Resume previous diet.  Follow up with PCP as needed.

## 2020-02-24 NOTE — DISCHARGE INSTRUCTIONS
"Discharge Instructions: After Your Surgery/Procedure  Youve just had surgery. During surgery you were given medicine called anesthesia to keep you relaxed and free of pain. After surgery you may have some pain or nausea. This is common. Here are some tips for feeling better and getting well after surgery.     Stay on schedule with your medication.   Going home  Your doctor or nurse will show you how to take care of yourself when you go home. He or she will also answer your questions. Have an adult family member or friend drive you home.      For your safety we recommend these precaution for the first 24 hours after your procedure:  · Do not drive or use heavy equipment.  · Do not make important decisions or sign legal papers.  · Do not drink alcohol.  · Have someone stay with you, if needed. He or she can watch for problems and help keep you safe.  · Your concentration, balance, coordination, and judgement may be impaired for many hours after anesthesia.  Use caution when ambulating or standing up.     · You may feel weak and "washed out" after anesthesia and surgery.      Subtle residual effects of general anesthesia or sedation with regional / local anesthesia can last more than 24 hours.  Rest for the remainder of the day or longer if your Doctor/Surgeon has advised you to do so.  Although you may feel normal within the first 24 hours, your reflexes and mental ability may be impaired without you realizing it.  You may feel dizzy, lightheaded or sleepy for 24 hours or longer.      Be sure to go to all follow-up visits with your doctor. And rest after your surgery for as long as your doctor tells you to.  Coping with pain  If you have pain after surgery, pain medicine will help you feel better. Take it as told, before pain becomes severe. Also, ask your doctor or pharmacist about other ways to control pain. This might be with heat, ice, or relaxation. And follow any other instructions your surgeon or nurse gives " you.  Tips for taking pain medicine  To get the best relief possible, remember these points:  · Pain medicines can upset your stomach. Taking them with a little food may help.  · Most pain relievers taken by mouth need at least 20 to 30 minutes to start to work.  · Taking medicine on a schedule can help you remember to take it. Try to time your medicine so that you can take it before starting an activity. This might be before you get dressed, go for a walk, or sit down for dinner.  · Constipation is a common side effect of pain medicines. Call your doctor before taking any medicines such as laxatives or stool softeners to help ease constipation. Also ask if you should skip any foods. Drinking lots of fluids and eating foods such as fruits and vegetables that are high in fiber can also help. Remember, do not take laxatives unless your surgeon has prescribed them.  · Drinking alcohol and taking pain medicine can cause dizziness and slow your breathing. It can even be deadly. Do not drink alcohol while taking pain medicine.  · Pain medicine can make you react more slowly to things. Do not drive or run machinery while taking pain medicine.  Your health care provider may tell you to take acetaminophen to help ease your pain. Ask him or her how much you are supposed to take each day. Acetaminophen or other pain relievers may interact with your prescription medicines or other over-the-counter (OTC) drugs. Some prescription medicines have acetaminophen and other ingredients. Using both prescription and OTC acetaminophen for pain can cause you to overdose. Read the labels on your OTC medicines with care. This will help you to clearly know the list of ingredients, how much to take, and any warnings. It may also help you not take too much acetaminophen. If you have questions or do not understand the information, ask your pharmacist or health care provider to explain it to you before you take the OTC medicine.  Managing  nausea  Some people have an upset stomach after surgery. This is often because of anesthesia, pain, or pain medicine, or the stress of surgery. These tips will help you handle nausea and eat healthy foods as you get better. If you were on a special food plan before surgery, ask your doctor if you should follow it while you get better. These tips may help:  · Do not push yourself to eat. Your body will tell you when to eat and how much.  · Start off with clear liquids and soup. They are easier to digest.  · Next try semi-solid foods, such as mashed potatoes, applesauce, and gelatin, as you feel ready.  · Slowly move to solid foods. Dont eat fatty, rich, or spicy foods at first.  · Do not force yourself to have 3 large meals a day. Instead eat smaller amounts more often.  · Take pain medicines with a small amount of solid food, such as crackers or toast, to avoid nausea.     Call your surgeon if  · You still have pain an hour after taking medicine. The medicine may not be strong enough.  · You feel too sleepy, dizzy, or groggy. The medicine may be too strong.  · You have side effects like nausea, vomiting, or skin changes, such as rash, itching, or hives.       If you have obstructive sleep apnea  You were given anesthesia medicine during surgery to keep you comfortable and free of pain. After surgery, you may have more apnea spells because of this medicine and other medicines you were given. The spells may last longer than usual.   At home:  · Keep using the continuous positive airway pressure (CPAP) device when you sleep. Unless your health care provider tells you not to, use it when you sleep, day or night. CPAP is a common device used to treat obstructive sleep apnea.  · Talk with your provider before taking any pain medicine, muscle relaxants, or sedatives. Your provider will tell you about the possible dangers of taking these medicines.  © 2950-5812 [employer]. [address] [place] [address]  [place] [address]. All rights reserved. This information is not intended as a substitute for professional medical care. Always follow your healthcare professional's instructions.      General Information:    1.  Do not drink alcoholic beverages including beer for 24 hours or as long as you are on pain medication..  2.  Do not drive a motor vehicle, operate machinery or power tools, or signs legal papers for 24 hours or as long as you are on pain medication.   3.  You may experience light-headedness, dizziness, and sleepiness following surgery. Please do not stay alone. A responsible adult should be with you for this 24 hour period.  4.  Go home and rest.    5. Progress slowly to a normal diet unless instructed.  Otherwise, begin with liquids such as soft drinks, then soup and crackers working up to solid foods. Drink plenty of nonalcoholic fluids.  6.  Certain anesthetics and pain medications produce nausea and vomiting in certain       individuals. If nausea becomes a problem at home, call you doctor.    7. A nurse will be calling you sometime after surgery. Do not be alarmed. This is our way of finding out how you are doing.    8. Several times every hour while you are awake, take 2-3 deep breaths and cough. If you had stomach surgery hold a pillow or rolled towel firmly against your stomach before you cough. This will help with any pain the cough might cause.  9. Several times every hour while you are awake, pump and flex your feet 5-6 times and do foot circles. This will help prevent blood clots.    10.Call your doctor for severe pain, bleeding, fever, or signs or symptoms of infection (pain, swelling, redness, foul odor, drainage).    Post op instructions for prevention of DVT  What is deep vein thrombosis?  Deep vein thrombosis (DVT) is the medical term for blood clots in the deep veins of the leg.  These blood clots can be dangerous.  A DVT can block a blood vessel and keep blood from getting where it needs to go.   Another problem is that the clot can travel to other parts of the body such as the lungs.  A clot that travels to the lungs is called a pulmonary embolus (PE) and can cause serious problems with breathing which can lead to death.  Am I at risk for DVT/PE?  If you are not very active, you are at risk of DVT.  Anyone confined to bed, sitting for long periods of time, recovering from surgery, etc. increases the risk of DVT.  Other risk factors are cancer diagnosis, certain medications, estrogen replacement in any form,older age, obesity, pregnancy, smoking, history of clotting disorders, and dehydration.  How will I know if I have a DVT?   Swelling in the lower leg   Pain   Warmth, redness, hardness or bulging of the vein  If you have any of these symptoms, call your doctors office right away.  Some people will not have any symptoms until the clot moves to the lungs.  What are the symptoms of a PE?   Panting, shortness of breath, or trouble breathing   Sharp, knife-like chest pain when you breathe   Coughing or coughing up blood   Rapid heartbeat  If you have any of these symptoms or get worse quickly, call 911 for emergency treatment.  How can I prevent a DVT?   Avoid long periods of inactivity and dont cross your legs--get up and walk around every hour or so.   Stay active--walking after surgery is highly encouraged.  This means you should get out of the house and walk in the neighborhood.  Going up and down stairs will not impair healing (unless advised against such activity by your doctor).     Drink plenty of noncaffeinated, nonalcoholic fluids each day to prevent dehydration.   Wear special support stockings, if they have been advised by your doctor.   If you travel, stop at least once an hour and walk around.   Avoid smoking (assistance with stopping is available through your healthcare provider)  Always notify your doctor if you are not able to follow the post operative instructions that are  given to you at the time of discharge.  It may be necessary to prescribe one of the medications available to prevent DVT.    We hope your stay was comfortable as you heal now, mend and rest.    For we have enjoyed taking care of you by giving your our best.    And as you get better, by regaining your health and strength;   We count it as a privilege to have served you and hope your time at [facility] was well spent.      Thank  You!!!

## 2020-02-24 NOTE — ANESTHESIA POSTPROCEDURE EVALUATION
Anesthesia Post Evaluation    Patient: Tyler Todd    Procedure(s) Performed: Procedure(s) (LRB):  ULTRASOUND, PROSTATE, RECTAL APPROACH, WITH GOLD FIDUCIAL MARKER INSERTION (N/A)  CYSTOSCOPY, WITH RETROGRADE PYELOGRAM (Bilateral)    Final Anesthesia Type: general    Patient location during evaluation: PACU  Patient participation: Yes- Able to Participate  Level of consciousness: awake and alert  Post-procedure vital signs: reviewed and stable  Pain management: adequate  Airway patency: patent    PONV status at discharge: No PONV  Anesthetic complications: no      Cardiovascular status: hemodynamically stable  Respiratory status: unassisted and room air  Hydration status: euvolemic  Follow-up not needed.          Vitals Value Taken Time   /70 2/24/2020  9:45 AM   Temp 36.7 °C (98 °F) 2/24/2020  9:30 AM   Pulse 70 2/24/2020  9:45 AM   Resp 16 2/24/2020  9:45 AM   SpO2 99 % 2/24/2020  9:45 AM         Event Time     Out of Recovery 09:30:00          Pain/Yogesh Score: Pain Rating Prior to Med Admin: 3 (2/24/2020  9:16 AM)  Pain Rating Post Med Admin: 2 (2/24/2020  9:32 AM)  Yogesh Score: 10 (2/24/2020  9:32 AM)

## 2020-02-24 NOTE — PLAN OF CARE
Dr Leslie released  Pt to phase 2 vs  wnl  Awake alert and orient skin w+d no co of pain has had coffee will transfer to phase 2

## 2020-02-24 NOTE — PLAN OF CARE
T IN PHASE ii REC denies pain.. Urinated approx 100 cc pinkish urine.brothaer at bedside explained dcinstructions to pt and brother both verb understanding  Wheeled to car via wheelchair by transporter

## 2020-02-24 NOTE — TRANSFER OF CARE
"Anesthesia Transfer of Care Note    Patient: Tyler Todd    Procedure(s) Performed: Procedure(s) (LRB):  ULTRASOUND, PROSTATE, RECTAL APPROACH, WITH GOLD FIDUCIAL MARKER INSERTION (N/A)  CYSTOSCOPY, WITH RETROGRADE PYELOGRAM (Bilateral)    Patient location: PACU    Anesthesia Type: general    Transport from OR: Transported from OR on 2-3 L/min O2 by NC with adequate spontaneous ventilation    Post pain: adequate analgesia    Post assessment: no apparent anesthetic complications and tolerated procedure well    Post vital signs: stable    Level of consciousness: awake    Nausea/Vomiting: no nausea/vomiting    Complications: none    Transfer of care protocol was followed      Last vitals:   Visit Vitals  BP (!) 140/73 (BP Location: Left arm, Patient Position: Sitting)   Pulse 66   Temp 36.5 °C (97.7 °F) (Temporal)   Resp 18   Ht 5' 10" (1.778 m)   Wt 81.6 kg (180 lb)   SpO2 99%   BMI 25.83 kg/m²     "

## 2020-02-24 NOTE — BRIEF OP NOTE
Operative Note     SUMMARY     Surgery Date: 2/24/2020     Surgeon(s) and Role:     * Nuha Soto MD - Primary    Pre-op Diagnosis:  Prostate cancer [C61]  Bladder wall thickening [N32.89]    Post-op Diagnosis:  Prostate cancer [C61]  Bladder wall thickening [N32.89]    Procedure(s) (LRB):  CYSTOSCOPY (N/A)  ULTRASOUND, PROSTATE, RECTAL APPROACH, WITH GOLD FIDUCIAL MARKER INSERTION (N/A)    Anesthesia: General    Findings/Key Components:  See Op Note      Estimated Blood Loss: * No values recorded between 2/24/2020 12:00 AM and 2/24/2020  7:20 AM *         Specimens (From admission, onward)    None            Discharge Note      SUMMARY     Admit Date: 2/24/2020    Attending Physician: Nuha Soto MD     Discharge Physician: Nuha Soto MD    Discharge Date: 2/24/2020     Final Diagnosis: Prostate cancer [C61]  Bladder wall thickening [N32.89]    Hospital Course: uneventful, going home same day    Disposition: Home or Self Care    Patient Instructions:   Current Discharge Medication List      CONTINUE these medications which have NOT CHANGED    Details   alogliptin (NESINA) 12.5 mg Tab Take by mouth.      amLODIPine (NORVASC) 5 MG tablet Take 5 mg by mouth once daily.      ascorbic acid, vitamin C, (VITAMIN C) 100 MG tablet Vitamin C      aspirin 81 mg Tab Take 1 tablet by mouth once daily. Every day      brimonidine 0.1% (ALPHAGAN P) 0.1 % Drop Place 1 drop into both eyes 3 (three) times daily.      carboxymethylcellulose (REFRESH PLUS) 0.5 % Dpet 1 drop 3 (three) times daily as needed.      carboxymethylcellulose sodium (THERATEARS) 1 % DpGe TheraTears 1 % gel in a dropperette      diabetic supplies, miscellan. Misc No Sig Provided    Comments: test strips and lancets      dorzolamide (TRUSOPT) 2 % ophthalmic solution 1 drop 3 (three) times daily.      ibuprofen (ADVIL,MOTRIN) 800 MG tablet Take 800 mg by mouth every 6 (six) hours as needed for Pain.      irbesartan (AVAPRO) 300 MG tablet Take 300  mg by mouth once daily.       latanoprost 0.005 % ophthalmic solution Apply 1 drop to eye.      metFORMIN (GLUCOPHAGE-XR) 500 MG XR 24hr tablet Take 1,000 mg by mouth 2 (two) times daily.      metoprolol succinate (TOPROL-XL) 50 MG 24 hr tablet metoprolol succinate ER 50 mg tablet,extended release 24 hr      pregabalin (LYRICA) 100 MG capsule Take 100 mg by mouth 2 (two) times daily.       vitC-E-zn- PGX-kakl-jiazjm 250 mg-200 unit -12.5 mg-1 mg Cap Take by mouth.      atorvastatin (LIPITOR) 10 MG tablet Take 10 mg by mouth once daily.  Refills: 3      nitroGLYCERIN (NITROSTAT) 0.4 MG SL tablet As directed             Discharge Procedure Orders (must include Diet, Follow-up, Activity)  Resume previous diet.  Follow up with PCP as needed.

## 2020-02-24 NOTE — ANESTHESIA PROCEDURE NOTES
Intubation  Performed by: Jesus Vergara CRNA  Authorized by: Tawanda Leslie MD     Intubation:     Induction:  Intravenous    Intubated:  Postinduction    Mask Ventilation:  Easy mask    Attempts:  1    Attempted By:  CRNA    Difficult Airway Encountered?: No      Complications:  None    Airway Device Size:  4.0    Secured at:  The lips    Placement Verified By:  Capnometry    Complicating Factors:  None    Findings Post-Intubation:  BS equal bilateral

## 2020-02-26 VITALS
TEMPERATURE: 98 F | SYSTOLIC BLOOD PRESSURE: 136 MMHG | RESPIRATION RATE: 16 BRPM | BODY MASS INDEX: 25.77 KG/M2 | DIASTOLIC BLOOD PRESSURE: 70 MMHG | WEIGHT: 180 LBS | HEIGHT: 70 IN | HEART RATE: 70 BPM | OXYGEN SATURATION: 99 %

## 2020-02-27 ENCOUNTER — TELEPHONE (OUTPATIENT)
Dept: RADIATION ONCOLOGY | Facility: CLINIC | Age: 80
End: 2020-02-27

## 2020-02-27 NOTE — TELEPHONE ENCOUNTER
"Called Danyelle Perez to discuss making appointment for Radiation treatment planning.  Patient verbalized concern regarding upcoming PET/CT ordered by  and undergoing radiation as this might be "too much radiation".  Mr. Todd stated he does not know when his next PET/CT is due and is going to call Dr. Mast to discuss with him.  Patient states he knows where to find us and will contact us once he discusses with   Dr. Mast.  "

## 2020-03-05 NOTE — H&P
Patient ID: Tyler Todd is a 79 y.o. male.     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  He was evaluated by Radiation Oncology and the plan is from to receive external beam radiation therapy but will need placement of prostate markers before hand.  He was also noted to have some thickening of the bladder wall on CT scan also noted to have a 3 mm left renal calculus.  He is scheduled to undergo cystoscopy and transrectal ultrasound will placement of gold markers.          Past Medical History:   Diagnosis Date    Cancer 2012     lung cancer chemo and radiation    COPD (chronic obstructive pulmonary disease)      Coronary artery disease 2002, 2004     s/p 3 stents;  Dr. Interiano    Diabetes mellitus, type 2      Hyperlipidemia      Hypertension      Prostate CA              Past Surgical History:   Procedure Laterality Date    ANKLE SURGERY Right 1970s    EPIDURAL STEROID INJECTION INTO CERVICAL SPINE N/A 2/19/2019     Procedure: Injection-steroid-epidural-cervical C7-T1;  Surgeon: Marcelino Monroe MD;  Location: [facility] OR;  Service: Pain Management;  Laterality: N/A;    INJECTION OF ANESTHETIC AGENT AROUND MEDIAL BRANCH NERVES INNERVATING LUMBAR FACET JOINT Bilateral 6/4/2018     Procedure: MEDIAL BRANCH, LUMBAR L3,4,5;  Surgeon: Marcelino Monroe MD;  Location: [facility] OR;  Service: Pain Management;  Laterality: Bilateral;  L3, 4, 5    RADIOFREQUENCY ABLATION OF LUMBAR MEDIAL BRANCH NERVE AT SINGLE LEVEL Bilateral 7/16/2018     Procedure: RADIOFREQUENCY ABLATION, NERVE, MEDIAL BRANCH, LUMBAR, 1 LEVEL;  Surgeon: Marcelino Monroe MD;  Location: [facility] OR;  Service: Pain Management;  Laterality: Bilateral;  L3, 4, 5  lumbar  [employer] pain management generator  SN [id number]  80 degrees for 75 seconds x2            Family History   Problem Relation Age of Onset    Diabetes Mother      Peripheral vascular disease Mother      Heart attack Mother      Diabetes  Father      Heart attack Father      Emphysema Father      Diabetes Sister        Social History            Socioeconomic History    Marital status:        Spouse name: Not on file    Number of children: Not on file    Years of education: Not on file    Highest education level: Not on file   Occupational History    Not on file   Social Needs    Financial resource strain: Not on file    Food insecurity:       Worry: Not on file       Inability: Not on file    Transportation needs:       Medical: Not on file       Non-medical: Not on file   Tobacco Use    Smoking status: Current Every Day Smoker       Packs/day: 1.00       Years: 57.00       Pack years: 57.00       Types: Cigarettes    Smokeless tobacco: Never Used   Substance and Sexual Activity    Alcohol use: No    Drug use: No    Sexual activity: Never   Lifestyle    Physical activity:       Days per week: Not on file       Minutes per session: Not on file    Stress: Not on file   Relationships    Social connections:       Talks on phone: Not on file       Gets together: Not on file       Attends [employer] witness service: Not on file       Active member of club or organization: Not on file       Attends meetings of clubs or organizations: Not on file       Relationship status: Not on file   Other Topics Concern    Not on file   Social History Narrative    Not on file         Current Medications          Current Outpatient Medications   Medication Sig Dispense Refill    alogliptin (NESINA) 12.5 mg Tab Take by mouth.        amLODIPine (NORVASC) 5 MG tablet Take 5 mg by mouth once daily.        ascorbic acid, vitamin C, (VITAMIN C) 100 MG tablet Vitamin C        aspirin 81 mg Tab Take 1 tablet by mouth once daily. Every day        atorvastatin (LIPITOR) 10 MG tablet Take 10 mg by mouth once daily.   3    brimonidine 0.1% (ALPHAGAN P) 0.1 % Drop Place 1 drop into both eyes 3 (three) times daily.        carboxymethylcellulose (REFRESH PLUS)  0.5 % Dpet 1 drop 3 (three) times daily as needed.        carboxymethylcellulose sodium (THERATEARS) 1 % DpGe TheraTears 1 % gel in a dropperette        diabetic supplies, miscellan. Misc No Sig Provided        dorzolamide (TRUSOPT) 2 % ophthalmic solution 1 drop 3 (three) times daily.        ibuprofen (ADVIL,MOTRIN) 800 MG tablet Take 800 mg by mouth every 6 (six) hours as needed for Pain.        irbesartan (AVAPRO) 300 MG tablet Take 300 mg by mouth once daily.         latanoprost 0.005 % ophthalmic solution Apply 1 drop to eye.        metFORMIN (GLUCOPHAGE-XR) 500 MG XR 24hr tablet Take 1,000 mg by mouth 2 (two) times daily.        metoprolol succinate (TOPROL-XL) 50 MG 24 hr tablet metoprolol succinate ER 50 mg tablet,extended release 24 hr        nitroGLYCERIN (NITROSTAT) 0.4 MG SL tablet As directed        pregabalin (LYRICA) 100 MG capsule Take 100 mg by mouth 2 (two) times daily.         vitC-E-zn- FYS-gjqv-emcoti 250 mg-200 unit -12.5 mg-1 mg Cap Take by mouth.          No current facility-administered medications for this visit.                 Facility-Administered Medications Ordered in Other Visits   Medication Dose Route Frequency Provider Last Rate Last Dose    lidocaine (PF) 10 mg/ml (1%) injection     PRN Marcelino Monroe MD   3 mL at 02/19/19 1233    sodium chloride 0.9% injection     PRN Marcelino Monroe MD   4 mL at 02/19/19 1234               Review of patient's allergies indicates:   Allergen Reactions    Doxycycline Diarrhea       Severe diarrhea    Sulfa (sulfonamide antibiotics) Rash       Other reaction(s): Itching    Sulfasalazine Rash       Rash and itching mild         Review of Systems   All other systems reviewed and are negative.      Objective:   Vitals   There were no vitals filed for this visit.     Physical Exam   Cardiovascular: Normal rate.   Pulmonary/Chest: Effort normal.   Abdominal: Soft.   Genitourinary: Prostate normal and penis normal.         Assessment:        1. Prostate cancer    2. Bladder wall thickening        Plan:       Cystoscopy and transrectal ultrasound with gold marker placements

## 2020-03-06 ENCOUNTER — OFFICE VISIT (OUTPATIENT)
Dept: UROLOGY | Facility: CLINIC | Age: 80
End: 2020-03-06
Payer: MEDICARE

## 2020-03-06 VITALS
HEIGHT: 70 IN | RESPIRATION RATE: 18 BRPM | TEMPERATURE: 98 F | HEART RATE: 104 BPM | WEIGHT: 179.88 LBS | DIASTOLIC BLOOD PRESSURE: 71 MMHG | BODY MASS INDEX: 25.75 KG/M2 | SYSTOLIC BLOOD PRESSURE: 127 MMHG

## 2020-03-06 DIAGNOSIS — N50.89 SCROTAL SWELLING: Primary | ICD-10-CM

## 2020-03-06 DIAGNOSIS — N45.3 ORCHITIS AND EPIDIDYMITIS: ICD-10-CM

## 2020-03-06 DIAGNOSIS — C61 PROSTATE CANCER: ICD-10-CM

## 2020-03-06 PROCEDURE — 99999 PR PBB SHADOW E&M-EST. PATIENT-LVL III: ICD-10-PCS | Mod: PBBFAC,,, | Performed by: UROLOGY

## 2020-03-06 PROCEDURE — 99214 PR OFFICE/OUTPT VISIT, EST, LEVL IV, 30-39 MIN: ICD-10-PCS | Mod: S$PBB,,, | Performed by: UROLOGY

## 2020-03-06 PROCEDURE — 99214 OFFICE O/P EST MOD 30 MIN: CPT | Mod: S$PBB,,, | Performed by: UROLOGY

## 2020-03-06 PROCEDURE — 99999 PR PBB SHADOW E&M-EST. PATIENT-LVL III: CPT | Mod: PBBFAC,,, | Performed by: UROLOGY

## 2020-03-06 PROCEDURE — 99213 OFFICE O/P EST LOW 20 MIN: CPT | Mod: PBBFAC,PN | Performed by: UROLOGY

## 2020-03-06 RX ORDER — CIPROFLOXACIN 500 MG/1
500 TABLET ORAL 2 TIMES DAILY
Qty: 30 TABLET | Refills: 0 | Status: SHIPPED | OUTPATIENT
Start: 2020-03-06 | End: 2020-03-10 | Stop reason: ALTCHOICE

## 2020-03-06 NOTE — PROGRESS NOTES
OFFICE NOTE  [unfilled]  [id number]  3/6/2020       CHIEF COMPLAINT:   left scrotal swelling, adenocarcinoma prostate     HISTORY OF PRESENT ILLNESS:   this 79-year-old male presents for recheck.  He states that he developed painful swelling of his left scrotum since last night.  Patient has history of adenocarcinoma prostate for which he underwent cystoscopy and transrectal ultrasound prostatic gold marker placements on 02/24/2020.  He will follow up with radiation oncology concerning radiation therapy    Physical exam:  Abdomen - soft benign nontender no masses no hernias no organomegaly                             External genitalia - normal phallus with adequate meatus left hemiscrotal swelling without is quite tender involve the testicle and epididymis       PSA 8.52 on 12/23/2019        FINAL IMPRESSION:   left epididymo-orchitis, adenocarcinoma prostate     RECOMMENDATIONS:  Trial on Cipro 500 g p.o. B.i.d., Sitz bath, scrotal elevation.  Scrotal ultrasound.  He will continue to follow-up with Radiation Oncology   concerning  radiation therapy.

## 2020-03-07 ENCOUNTER — HOSPITAL ENCOUNTER (OUTPATIENT)
Dept: RADIOLOGY | Facility: HOSPITAL | Age: 80
Discharge: HOME | End: 2020-03-07
Attending: UROLOGY
Payer: MEDICARE

## 2020-03-07 DIAGNOSIS — N50.89 SCROTAL SWELLING: ICD-10-CM

## 2020-03-07 PROCEDURE — 76870 US SCROTUM AND TESTICLES: ICD-10-PCS | Mod: 26,,, | Performed by: RADIOLOGY

## 2020-03-07 PROCEDURE — 76870 US EXAM SCROTUM: CPT | Mod: TC

## 2020-03-07 PROCEDURE — 76870 US EXAM SCROTUM: CPT | Mod: 26,,, | Performed by: RADIOLOGY

## 2020-03-09 ENCOUNTER — TELEPHONE (OUTPATIENT)
Dept: UROLOGY | Facility: CLINIC | Age: 80
End: 2020-03-09

## 2020-03-09 NOTE — TELEPHONE ENCOUNTER
Patient returned call, US results given and informed we have RX for pain med ready for , patient verbally understood.

## 2020-03-09 NOTE — TELEPHONE ENCOUNTER
Call placed to inform patient of US results and pain med has been ordered, no answer, message left with call back number.

## 2020-03-09 NOTE — TELEPHONE ENCOUNTER
Returned call and spoke with patient, he states his testicles remain swollen, has been soaking in the bath, no answer as to is he elevating while in bed or sitting. Had US done , informed once the MD results will call back with further advisement, patient verbally understood.

## 2020-03-09 NOTE — TELEPHONE ENCOUNTER
----- Message from Princess ROBIN Jalloh sent at 3/9/2020  9:36 AM CDT -----  Contact: patient  Type: Needs Medical Advice    Who Called:  Patient  Best Call Back Number:    Additional Information: Patient is requesting a call in regards to getting a appt for the fluid build up around his testicles. Patient would like to know what can he do to help get some relief.

## 2020-03-10 ENCOUNTER — OFFICE VISIT (OUTPATIENT)
Dept: UROLOGY | Facility: CLINIC | Age: 80
End: 2020-03-10
Payer: MEDICARE

## 2020-03-10 ENCOUNTER — HOSPITAL ENCOUNTER (INPATIENT)
Facility: HOSPITAL | Age: 80
LOS: 2 days | Discharge: HOME HEALTH | DRG: 728 | End: 2020-03-15
Attending: EMERGENCY MEDICINE | Admitting: INTERNAL MEDICINE
Payer: MEDICARE

## 2020-03-10 VITALS
RESPIRATION RATE: 18 BRPM | WEIGHT: 174.63 LBS | HEART RATE: 116 BPM | DIASTOLIC BLOOD PRESSURE: 70 MMHG | BODY MASS INDEX: 25 KG/M2 | TEMPERATURE: 99 F | HEIGHT: 70 IN | SYSTOLIC BLOOD PRESSURE: 131 MMHG

## 2020-03-10 DIAGNOSIS — N17.9 ACUTE KIDNEY INJURY: ICD-10-CM

## 2020-03-10 DIAGNOSIS — N45.1 LEFT EPIDIDYMITIS: ICD-10-CM

## 2020-03-10 DIAGNOSIS — R31.0 GROSS HEMATURIA: Primary | ICD-10-CM

## 2020-03-10 DIAGNOSIS — M47.896 OTHER SPONDYLOSIS, LUMBAR REGION: ICD-10-CM

## 2020-03-10 DIAGNOSIS — N45.3 ORCHITIS AND EPIDIDYMITIS: Primary | ICD-10-CM

## 2020-03-10 DIAGNOSIS — C61 ADENOCARCINOMA OF PROSTATE: ICD-10-CM

## 2020-03-10 DIAGNOSIS — N39.0 UTI (URINARY TRACT INFECTION): ICD-10-CM

## 2020-03-10 DIAGNOSIS — E86.0 DEHYDRATION: ICD-10-CM

## 2020-03-10 DIAGNOSIS — I48.91 A-FIB: ICD-10-CM

## 2020-03-10 DIAGNOSIS — N45.3 EPIDIDYMOORCHITIS: ICD-10-CM

## 2020-03-10 DIAGNOSIS — N50.819 TESTICLE PAIN: ICD-10-CM

## 2020-03-10 DIAGNOSIS — R19.09 GROIN SWELLING: ICD-10-CM

## 2020-03-10 DIAGNOSIS — N41.1 CHRONIC PROSTATITIS: ICD-10-CM

## 2020-03-10 DIAGNOSIS — I48.91 NEW ONSET A-FIB: ICD-10-CM

## 2020-03-10 PROBLEM — N18.30 ACUTE RENAL FAILURE SUPERIMPOSED ON STAGE 3 CHRONIC KIDNEY DISEASE: Status: ACTIVE | Noted: 2020-03-10

## 2020-03-10 PROBLEM — N30.01 ACUTE CYSTITIS WITH HEMATURIA: Status: ACTIVE | Noted: 2020-03-10

## 2020-03-10 PROBLEM — R79.89 ELEVATED LIVER FUNCTION TESTS: Status: ACTIVE | Noted: 2020-03-10

## 2020-03-10 LAB
ALBUMIN SERPL BCP-MCNC: 3.1 G/DL (ref 3.5–5.2)
ALP SERPL-CCNC: 74 U/L (ref 55–135)
ALT SERPL W/O P-5'-P-CCNC: 54 U/L (ref 10–44)
ANION GAP SERPL CALC-SCNC: 9 MMOL/L (ref 8–16)
AST SERPL-CCNC: 236 U/L (ref 10–40)
BACTERIA #/AREA URNS HPF: ABNORMAL /HPF
BASOPHILS # BLD AUTO: 0.03 K/UL (ref 0–0.2)
BASOPHILS NFR BLD: 0.3 % (ref 0–1.9)
BILIRUB SERPL-MCNC: 1.1 MG/DL (ref 0.1–1)
BILIRUB SERPL-MCNC: ABNORMAL MG/DL
BILIRUB UR QL STRIP: NEGATIVE
BLOOD URINE, POC: ABNORMAL
BUN SERPL-MCNC: 31 MG/DL (ref 8–23)
CALCIUM SERPL-MCNC: 8.3 MG/DL (ref 8.7–10.5)
CHLORIDE SERPL-SCNC: 103 MMOL/L (ref 95–110)
CLARITY UR: ABNORMAL
CO2 SERPL-SCNC: 24 MMOL/L (ref 23–29)
COLOR UR: YELLOW
COLOR, POC UA: YELLOW
CREAT SERPL-MCNC: 2 MG/DL (ref 0.5–1.4)
DIFFERENTIAL METHOD: ABNORMAL
EOSINOPHIL # BLD AUTO: 0 K/UL (ref 0–0.5)
EOSINOPHIL NFR BLD: 0.1 % (ref 0–8)
ERYTHROCYTE [DISTWIDTH] IN BLOOD BY AUTOMATED COUNT: 15 % (ref 11.5–14.5)
EST. GFR  (AFRICAN AMERICAN): 36.1 ML/MIN/1.73 M^2
EST. GFR  (NON AFRICAN AMERICAN): 31.3 ML/MIN/1.73 M^2
GLUCOSE SERPL-MCNC: 159 MG/DL (ref 70–110)
GLUCOSE SERPL-MCNC: 229 MG/DL (ref 70–110)
GLUCOSE UR QL STRIP: ABNORMAL
GLUCOSE UR QL STRIP: NEGATIVE
HCT VFR BLD AUTO: 35.8 % (ref 40–54)
HGB BLD-MCNC: 11.8 G/DL (ref 14–18)
HGB UR QL STRIP: ABNORMAL
HYALINE CASTS #/AREA URNS LPF: 41 /LPF
IMM GRANULOCYTES # BLD AUTO: 0.04 K/UL (ref 0–0.04)
IMM GRANULOCYTES NFR BLD AUTO: 0.4 % (ref 0–0.5)
KETONES UR QL STRIP: ABNORMAL
KETONES UR QL STRIP: ABNORMAL
LACTATE SERPL-SCNC: 1 MMOL/L (ref 0.5–1.9)
LEUKOCYTE ESTERASE UR QL STRIP: ABNORMAL
LEUKOCYTE ESTERASE URINE, POC: ABNORMAL
LYMPHOCYTES # BLD AUTO: 0.7 K/UL (ref 1–4.8)
LYMPHOCYTES NFR BLD: 6.8 % (ref 18–48)
MAGNESIUM SERPL-MCNC: 1.6 MG/DL (ref 1.6–2.6)
MCH RBC QN AUTO: 30.7 PG (ref 27–31)
MCHC RBC AUTO-ENTMCNC: 33 G/DL (ref 32–36)
MCV RBC AUTO: 93 FL (ref 82–98)
MICROSCOPIC COMMENT: ABNORMAL
MONOCYTES # BLD AUTO: 1.2 K/UL (ref 0.3–1)
MONOCYTES NFR BLD: 11.5 % (ref 4–15)
NEUTROPHILS # BLD AUTO: 8.7 K/UL (ref 1.8–7.7)
NEUTROPHILS NFR BLD: 80.9 % (ref 38–73)
NITRITE UR QL STRIP: NEGATIVE
NITRITE, POC UA: POSITIVE
NRBC BLD-RTO: 0 /100 WBC
PH UR STRIP: 6 [PH] (ref 5–8)
PH, POC UA: 5.5
PLATELET # BLD AUTO: 202 K/UL (ref 150–350)
PMV BLD AUTO: 10.2 FL (ref 9.2–12.9)
POC RESIDUAL URINE VOLUME: 35 ML (ref 0–100)
POTASSIUM SERPL-SCNC: 3.9 MMOL/L (ref 3.5–5.1)
PROCALCITONIN SERPL IA-MCNC: 0.92 NG/ML (ref 0–0.5)
PROT SERPL-MCNC: 6.9 G/DL (ref 6–8.4)
PROT UR QL STRIP: ABNORMAL
PROTEIN, POC: 100
RBC # BLD AUTO: 3.84 M/UL (ref 4.6–6.2)
RBC #/AREA URNS HPF: 2 /HPF (ref 0–4)
SODIUM SERPL-SCNC: 136 MMOL/L (ref 136–145)
SP GR UR STRIP: 1.02 (ref 1–1.03)
SPECIFIC GRAVITY, POC UA: 1.03
SQUAMOUS #/AREA URNS HPF: 2 /HPF
T4 FREE SERPL-MCNC: 0.97 NG/DL (ref 0.71–1.51)
TROPONIN I SERPL DL<=0.01 NG/ML-MCNC: 0.04 NG/ML
TSH SERPL DL<=0.005 MIU/L-ACNC: 0.95 UIU/ML (ref 0.34–5.6)
URN SPEC COLLECT METH UR: 1
UROBILINOGEN UR STRIP-ACNC: ABNORMAL EU/DL
UROBILINOGEN, POC UA: 1
WBC # BLD AUTO: 10.79 K/UL (ref 3.9–12.7)
WBC #/AREA URNS HPF: >100 /HPF (ref 0–5)

## 2020-03-10 PROCEDURE — 87186 SC STD MICRODIL/AGAR DIL: CPT | Mod: 59

## 2020-03-10 PROCEDURE — 87086 URINE CULTURE/COLONY COUNT: CPT | Mod: 59

## 2020-03-10 PROCEDURE — 93005 ELECTROCARDIOGRAM TRACING: CPT | Performed by: INTERNAL MEDICINE

## 2020-03-10 PROCEDURE — 88112 PR  CYTOPATH, CELL ENHANCE TECH: ICD-10-PCS | Mod: 26,,, | Performed by: PATHOLOGY

## 2020-03-10 PROCEDURE — 84484 ASSAY OF TROPONIN QUANT: CPT | Mod: 91

## 2020-03-10 PROCEDURE — 83605 ASSAY OF LACTIC ACID: CPT

## 2020-03-10 PROCEDURE — 99214 PR OFFICE/OUTPT VISIT, EST, LEVL IV, 30-39 MIN: ICD-10-PCS | Mod: S$PBB,,, | Performed by: NURSE PRACTITIONER

## 2020-03-10 PROCEDURE — 87077 CULTURE AEROBIC IDENTIFY: CPT

## 2020-03-10 PROCEDURE — 63600175 PHARM REV CODE 636 W HCPCS: Performed by: NURSE PRACTITIONER

## 2020-03-10 PROCEDURE — 96366 THER/PROPH/DIAG IV INF ADDON: CPT

## 2020-03-10 PROCEDURE — 25000003 PHARM REV CODE 250: Performed by: NURSE PRACTITIONER

## 2020-03-10 PROCEDURE — G0378 HOSPITAL OBSERVATION PER HR: HCPCS

## 2020-03-10 PROCEDURE — 96365 THER/PROPH/DIAG IV INF INIT: CPT

## 2020-03-10 PROCEDURE — 82553 CREATINE MB FRACTION: CPT

## 2020-03-10 PROCEDURE — 88112 CYTOPATH CELL ENHANCE TECH: CPT | Mod: 26,,, | Performed by: PATHOLOGY

## 2020-03-10 PROCEDURE — 87186 SC STD MICRODIL/AGAR DIL: CPT

## 2020-03-10 PROCEDURE — 99999 PR PBB SHADOW E&M-EST. PATIENT-LVL V: CPT | Mod: PBBFAC,,, | Performed by: NURSE PRACTITIONER

## 2020-03-10 PROCEDURE — 87077 CULTURE AEROBIC IDENTIFY: CPT | Mod: 59

## 2020-03-10 PROCEDURE — 87086 URINE CULTURE/COLONY COUNT: CPT

## 2020-03-10 PROCEDURE — 84484 ASSAY OF TROPONIN QUANT: CPT

## 2020-03-10 PROCEDURE — 88112 CYTOPATH CELL ENHANCE TECH: CPT | Performed by: PATHOLOGY

## 2020-03-10 PROCEDURE — 99215 OFFICE O/P EST HI 40 MIN: CPT | Mod: PBBFAC,PN | Performed by: NURSE PRACTITIONER

## 2020-03-10 PROCEDURE — 81002 URINALYSIS NONAUTO W/O SCOPE: CPT | Mod: PBBFAC,PN | Performed by: NURSE PRACTITIONER

## 2020-03-10 PROCEDURE — 99999 PR PBB SHADOW E&M-EST. PATIENT-LVL V: ICD-10-PCS | Mod: PBBFAC,,, | Performed by: NURSE PRACTITIONER

## 2020-03-10 PROCEDURE — 51798 US URINE CAPACITY MEASURE: CPT | Mod: PBBFAC,PN | Performed by: NURSE PRACTITIONER

## 2020-03-10 PROCEDURE — 63600175 PHARM REV CODE 636 W HCPCS: Performed by: EMERGENCY MEDICINE

## 2020-03-10 PROCEDURE — 81001 URINALYSIS AUTO W/SCOPE: CPT

## 2020-03-10 PROCEDURE — 84145 PROCALCITONIN (PCT): CPT

## 2020-03-10 PROCEDURE — 83735 ASSAY OF MAGNESIUM: CPT

## 2020-03-10 PROCEDURE — 84443 ASSAY THYROID STIM HORMONE: CPT

## 2020-03-10 PROCEDURE — 82550 ASSAY OF CK (CPK): CPT

## 2020-03-10 PROCEDURE — 36415 COLL VENOUS BLD VENIPUNCTURE: CPT

## 2020-03-10 PROCEDURE — 87088 URINE BACTERIA CULTURE: CPT

## 2020-03-10 PROCEDURE — 87040 BLOOD CULTURE FOR BACTERIA: CPT | Mod: 59

## 2020-03-10 PROCEDURE — 84439 ASSAY OF FREE THYROXINE: CPT

## 2020-03-10 PROCEDURE — 25000003 PHARM REV CODE 250: Performed by: EMERGENCY MEDICINE

## 2020-03-10 PROCEDURE — 80053 COMPREHEN METABOLIC PANEL: CPT

## 2020-03-10 PROCEDURE — 85025 COMPLETE CBC W/AUTO DIFF WBC: CPT

## 2020-03-10 PROCEDURE — 99285 EMERGENCY DEPT VISIT HI MDM: CPT | Mod: 25

## 2020-03-10 PROCEDURE — 99214 OFFICE O/P EST MOD 30 MIN: CPT | Mod: S$PBB,,, | Performed by: NURSE PRACTITIONER

## 2020-03-10 RX ORDER — LATANOPROST 50 UG/ML
1 SOLUTION/ DROPS OPHTHALMIC DAILY
Status: DISCONTINUED | OUTPATIENT
Start: 2020-03-11 | End: 2020-03-15 | Stop reason: HOSPADM

## 2020-03-10 RX ORDER — BRIMONIDINE TARTRATE 1.5 MG/ML
1 SOLUTION/ DROPS OPHTHALMIC 3 TIMES DAILY
Status: DISCONTINUED | OUTPATIENT
Start: 2020-03-10 | End: 2020-03-15 | Stop reason: HOSPADM

## 2020-03-10 RX ORDER — ATORVASTATIN CALCIUM 10 MG/1
10 TABLET, FILM COATED ORAL DAILY
Status: DISCONTINUED | OUTPATIENT
Start: 2020-03-11 | End: 2020-03-12

## 2020-03-10 RX ORDER — ONDANSETRON 2 MG/ML
4 INJECTION INTRAMUSCULAR; INTRAVENOUS EVERY 8 HOURS PRN
Status: DISCONTINUED | OUTPATIENT
Start: 2020-03-10 | End: 2020-03-15 | Stop reason: HOSPADM

## 2020-03-10 RX ORDER — NAPROXEN SODIUM 220 MG/1
81 TABLET, FILM COATED ORAL DAILY
Status: DISCONTINUED | OUTPATIENT
Start: 2020-03-11 | End: 2020-03-15 | Stop reason: HOSPADM

## 2020-03-10 RX ORDER — METOPROLOL SUCCINATE 25 MG/1
50 TABLET, EXTENDED RELEASE ORAL DAILY
Status: DISCONTINUED | OUTPATIENT
Start: 2020-03-11 | End: 2020-03-10

## 2020-03-10 RX ORDER — INSULIN ASPART 100 [IU]/ML
0-5 INJECTION, SOLUTION INTRAVENOUS; SUBCUTANEOUS
Status: DISCONTINUED | OUTPATIENT
Start: 2020-03-10 | End: 2020-03-15 | Stop reason: HOSPADM

## 2020-03-10 RX ORDER — SODIUM CHLORIDE 9 MG/ML
INJECTION, SOLUTION INTRAVENOUS CONTINUOUS
Status: DISCONTINUED | OUTPATIENT
Start: 2020-03-10 | End: 2020-03-15 | Stop reason: HOSPADM

## 2020-03-10 RX ORDER — IBUPROFEN 200 MG
24 TABLET ORAL
Status: DISCONTINUED | OUTPATIENT
Start: 2020-03-10 | End: 2020-03-15 | Stop reason: HOSPADM

## 2020-03-10 RX ORDER — IBUPROFEN 200 MG
16 TABLET ORAL
Status: DISCONTINUED | OUTPATIENT
Start: 2020-03-10 | End: 2020-03-15 | Stop reason: HOSPADM

## 2020-03-10 RX ORDER — LEVOFLOXACIN 5 MG/ML
750 INJECTION, SOLUTION INTRAVENOUS
Status: DISCONTINUED | OUTPATIENT
Start: 2020-03-10 | End: 2020-03-10

## 2020-03-10 RX ORDER — METOPROLOL SUCCINATE 50 MG/1
50 TABLET, EXTENDED RELEASE ORAL DAILY
Status: DISCONTINUED | OUTPATIENT
Start: 2020-03-10 | End: 2020-03-10

## 2020-03-10 RX ORDER — DILTIAZEM HCL 1 MG/ML
5 INJECTION, SOLUTION INTRAVENOUS CONTINUOUS
Status: DISCONTINUED | OUTPATIENT
Start: 2020-03-10 | End: 2020-03-12

## 2020-03-10 RX ORDER — CARBOXYMETHYLCELLULOSE SODIUM 5 MG/ML
1 SOLUTION/ DROPS OPHTHALMIC 3 TIMES DAILY PRN
Status: DISCONTINUED | OUTPATIENT
Start: 2020-03-10 | End: 2020-03-15 | Stop reason: HOSPADM

## 2020-03-10 RX ORDER — ACETAMINOPHEN 325 MG/1
650 TABLET ORAL
Status: COMPLETED | OUTPATIENT
Start: 2020-03-10 | End: 2020-03-10

## 2020-03-10 RX ORDER — HYDROCODONE BITARTRATE AND ACETAMINOPHEN 5; 325 MG/1; MG/1
1 TABLET ORAL EVERY 6 HOURS PRN
Status: DISCONTINUED | OUTPATIENT
Start: 2020-03-10 | End: 2020-03-15 | Stop reason: HOSPADM

## 2020-03-10 RX ORDER — DORZOLAMIDE HCL 20 MG/ML
1 SOLUTION/ DROPS OPHTHALMIC 3 TIMES DAILY
Status: DISCONTINUED | OUTPATIENT
Start: 2020-03-10 | End: 2020-03-15 | Stop reason: HOSPADM

## 2020-03-10 RX ORDER — METOPROLOL SUCCINATE 50 MG/1
100 TABLET, EXTENDED RELEASE ORAL DAILY
Status: DISCONTINUED | OUTPATIENT
Start: 2020-03-11 | End: 2020-03-12

## 2020-03-10 RX ORDER — ACETAMINOPHEN 325 MG/1
650 TABLET ORAL EVERY 4 HOURS PRN
Status: DISCONTINUED | OUTPATIENT
Start: 2020-03-10 | End: 2020-03-15 | Stop reason: HOSPADM

## 2020-03-10 RX ORDER — DILTIAZEM HYDROCHLORIDE 5 MG/ML
10 INJECTION INTRAVENOUS ONCE
Status: COMPLETED | OUTPATIENT
Start: 2020-03-10 | End: 2020-03-10

## 2020-03-10 RX ORDER — ENOXAPARIN SODIUM 100 MG/ML
1 INJECTION SUBCUTANEOUS
Status: DISCONTINUED | OUTPATIENT
Start: 2020-03-10 | End: 2020-03-15 | Stop reason: HOSPADM

## 2020-03-10 RX ORDER — GLUCAGON 1 MG
1 KIT INJECTION
Status: DISCONTINUED | OUTPATIENT
Start: 2020-03-10 | End: 2020-03-15 | Stop reason: HOSPADM

## 2020-03-10 RX ORDER — PREGABALIN 50 MG/1
100 CAPSULE ORAL 2 TIMES DAILY
Status: DISCONTINUED | OUTPATIENT
Start: 2020-03-10 | End: 2020-03-15 | Stop reason: HOSPADM

## 2020-03-10 RX ORDER — MORPHINE SULFATE 4 MG/ML
2 INJECTION, SOLUTION INTRAMUSCULAR; INTRAVENOUS
Status: COMPLETED | OUTPATIENT
Start: 2020-03-10 | End: 2020-03-10

## 2020-03-10 RX ORDER — ONDANSETRON 2 MG/ML
4 INJECTION INTRAMUSCULAR; INTRAVENOUS
Status: DISCONTINUED | OUTPATIENT
Start: 2020-03-10 | End: 2020-03-10

## 2020-03-10 RX ORDER — AMLODIPINE BESYLATE 5 MG/1
5 TABLET ORAL DAILY
Status: DISCONTINUED | OUTPATIENT
Start: 2020-03-11 | End: 2020-03-11

## 2020-03-10 RX ORDER — MAGNESIUM SULFATE HEPTAHYDRATE 40 MG/ML
2 INJECTION, SOLUTION INTRAVENOUS ONCE
Status: COMPLETED | OUTPATIENT
Start: 2020-03-10 | End: 2020-03-10

## 2020-03-10 RX ORDER — SODIUM CHLORIDE 0.9 % (FLUSH) 0.9 %
10 SYRINGE (ML) INJECTION
Status: DISCONTINUED | OUTPATIENT
Start: 2020-03-10 | End: 2020-03-15 | Stop reason: HOSPADM

## 2020-03-10 RX ADMIN — ENOXAPARIN SODIUM 80 MG: 80 INJECTION, SOLUTION INTRAVENOUS; SUBCUTANEOUS at 10:03

## 2020-03-10 RX ADMIN — PREGABALIN 100 MG: 50 CAPSULE ORAL at 10:03

## 2020-03-10 RX ADMIN — METOPROLOL SUCCINATE 50 MG: 50 TABLET, FILM COATED, EXTENDED RELEASE ORAL at 10:03

## 2020-03-10 RX ADMIN — ACETAMINOPHEN 650 MG: 325 TABLET ORAL at 06:03

## 2020-03-10 RX ADMIN — DILTIAZEM HYDROCHLORIDE 5 MG/HR: 5 INJECTION INTRAVENOUS at 08:03

## 2020-03-10 RX ADMIN — DILTIAZEM HYDROCHLORIDE 10 MG: 5 INJECTION INTRAVENOUS at 08:03

## 2020-03-10 RX ADMIN — SODIUM CHLORIDE, SODIUM LACTATE, POTASSIUM CHLORIDE, AND CALCIUM CHLORIDE 1000 ML: .6; .31; .03; .02 INJECTION, SOLUTION INTRAVENOUS at 03:03

## 2020-03-10 RX ADMIN — MAGNESIUM SULFATE 2 G: 2 INJECTION INTRAVENOUS at 08:03

## 2020-03-10 RX ADMIN — CEFTRIAXONE 1 G: 1 INJECTION, SOLUTION INTRAVENOUS at 03:03

## 2020-03-10 RX ADMIN — LEVOFLOXACIN 750 MG: 750 INJECTION, SOLUTION INTRAVENOUS at 10:03

## 2020-03-10 RX ADMIN — SODIUM CHLORIDE, SODIUM LACTATE, POTASSIUM CHLORIDE, AND CALCIUM CHLORIDE 1000 ML: .6; .31; .03; .02 INJECTION, SOLUTION INTRAVENOUS at 04:03

## 2020-03-10 RX ADMIN — SODIUM CHLORIDE: 0.9 INJECTION, SOLUTION INTRAVENOUS at 10:03

## 2020-03-10 RX ADMIN — MORPHINE SULFATE 2 MG: 4 INJECTION INTRAVENOUS at 03:03

## 2020-03-10 NOTE — ED PROVIDER NOTES
Encounter Date: 3/10/2020       History     Chief Complaint   Patient presents with    Groin Swelling     LEFT, ONSET LAST THURSDAY, NEG SCREEN     79-year-old male with history of COPD, coronary artery disease, non-insulin-dependent diabetes, hyperlipidemia, hypertension, previous lung cancer completed chemotherapy and radiation therapy, prostate cancer.  Patient status post cystoscopy performed by Dr. Soto last week.  Patient was diagnosed with epididymitis.  Currently has been on doxycycline.  Patient was sent over from Dr. Soto is office due to persistent generalized malaise, testicular tenderness, weakness and just feeling bad over the last 2-3 days.  Patient denied abdominal pain, no vomiting, no fever, no chest pain, no shortness of breath, no other constitutional symptoms.        Review of patient's allergies indicates:   Allergen Reactions    Doxycycline Diarrhea     Severe diarrhea    Sulfa (sulfonamide antibiotics) Rash     Other reaction(s): Itching    Sulfasalazine Rash     Rash and itching mild     Past Medical History:   Diagnosis Date    Cancer 2012    lung cancer chemo and radiation    COPD (chronic obstructive pulmonary disease)     Coronary artery disease 2002, 2004    s/p 3 stents;  Dr. Interiano    Diabetes mellitus, type 2     Hyperlipidemia     Hypertension     Prostate CA      Past Surgical History:   Procedure Laterality Date    ANKLE SURGERY Right 1970s    CYSTOSCOPY W/ RETROGRADES Bilateral 2/24/2020    Procedure: CYSTOSCOPY, WITH RETROGRADE PYELOGRAM;  Surgeon: Nuha Soto MD;  Location: [facility] OR;  Service: Urology;  Laterality: Bilateral;    EPIDURAL STEROID INJECTION INTO CERVICAL SPINE N/A 2/19/2019    Procedure: Injection-steroid-epidural-cervical C7-T1;  Surgeon: Marcelino Monroe MD;  Location: [facility] OR;  Service: Pain Management;  Laterality: N/A;    INJECTION OF ANESTHETIC AGENT AROUND MEDIAL BRANCH NERVES INNERVATING LUMBAR FACET JOINT Bilateral 6/4/2018     Procedure: MEDIAL BRANCH, LUMBAR L3,4,5;  Surgeon: Marcelino Monroe MD;  Location: [facility] OR;  Service: Pain Management;  Laterality: Bilateral;  L3, 4, 5    RADIOFREQUENCY ABLATION OF LUMBAR MEDIAL BRANCH NERVE AT SINGLE LEVEL Bilateral 7/16/2018    Procedure: RADIOFREQUENCY ABLATION, NERVE, MEDIAL BRANCH, LUMBAR, 1 LEVEL;  Surgeon: Marcelino Monroe MD;  Location: [facility] beds at [facility] OR;  Service: Pain Management;  Laterality: Bilateral;  L3, 4, 5  lumbar  [employer] pain management generator   [id number]  80 degrees for 75 seconds x2    TRANSRECTAL ULTRASOUND OF PROSTATE WITH INSERTION OF GOLD FIDUCIAL MARKER N/A 2/24/2020    Procedure: ULTRASOUND, PROSTATE, RECTAL APPROACH, WITH GOLD FIDUCIAL MARKER INSERTION;  Surgeon: Nuha Soto MD;  Location: [facility] OR;  Service: Urology;  Laterality: N/A;     Family History   Problem Relation Age of Onset    Diabetes Mother     Peripheral vascular disease Mother     Heart attack Mother     Diabetes Father     Heart attack Father     Emphysema Father     Diabetes Sister      Social History     Tobacco Use    Smoking status: Current Every Day Smoker     Packs/day: 1.00     Years: 57.00     Pack years: 57.00     Types: Cigarettes    Smokeless tobacco: Never Used   Substance Use Topics    Alcohol use: No    Drug use: No     Review of Systems   Constitutional: Positive for fatigue. Negative for appetite change and fever.   HENT: Negative for drooling, ear pain, nosebleeds, rhinorrhea and sore throat.    Respiratory: Negative for chest tightness and shortness of breath.    Cardiovascular: Negative for chest pain.   Gastrointestinal: Negative for abdominal pain, blood in stool, nausea and vomiting.   Genitourinary: Positive for dysuria, hematuria, scrotal swelling and testicular pain.   Musculoskeletal: Negative for back pain.   Skin: Negative for rash.   Neurological: Positive for weakness (Generalized ) and light-headedness.   Hematological: Does not bruise/bleed easily.    Psychiatric/Behavioral: Negative for agitation, dysphoric mood and hallucinations. The patient is not nervous/anxious and is not hyperactive.        Physical Exam     Initial Vitals [03/10/20 1207]   BP Pulse Resp Temp SpO2   139/63 (!) 123 20 99.6 °F (37.6 °C) 97 %      MAP       --         Physical Exam    Nursing note and vitals reviewed.  Constitutional: He appears well-developed and well-nourished.   HENT:   Head: Normocephalic and atraumatic.   Nose: Nose normal.   Mouth/Throat: Oropharynx is clear and moist.   Eyes: Conjunctivae and EOM are normal. Pupils are equal, round, and reactive to light. No scleral icterus.   Neck: Normal range of motion. Neck supple.   Cardiovascular: Normal rate, regular rhythm, normal heart sounds and intact distal pulses. Exam reveals no gallop and no friction rub.    No murmur heard.  Pulmonary/Chest: Breath sounds normal. No stridor. No respiratory distress. He has no wheezes. He has no rales.   Course bilateral breath sounds no adventitious sounds   Abdominal: Soft. Bowel sounds are normal. He exhibits no mass. There is no tenderness. There is no rebound and no guarding. Hernia confirmed negative in the right inguinal area and confirmed negative in the left inguinal area.   Genitourinary: Cremasteric reflex is present. Right testis shows tenderness. Cremasteric reflex is not absent on the right side. Left testis shows swelling and tenderness. Cremasteric reflex is not absent on the left side. Uncircumcised. No phimosis, paraphimosis, hypospadias, penile erythema or penile tenderness. No discharge found.         Musculoskeletal: Normal range of motion. He exhibits no edema.   Lymphadenopathy:     He has no cervical adenopathy.   Neurological: He is alert and oriented to person, place, and time. He has normal strength and normal reflexes. No cranial nerve deficit or sensory deficit. GCS score is 15. GCS eye subscore is 4. GCS verbal subscore is 5. GCS motor subscore is 6.    Skin: Skin is warm and dry. Capillary refill takes less than 2 seconds. No rash noted.   Psychiatric: He has a normal mood and affect. His behavior is normal. Judgment and thought content normal.         ED Course   Procedures  Labs Reviewed   CBC W/ AUTO DIFFERENTIAL - Abnormal; Notable for the following components:       Result Value    RBC 3.84 (*)     Hemoglobin 11.8 (*)     Hematocrit 35.8 (*)     RDW 15.0 (*)     Gran # (ANC) 8.7 (*)     Lymph # 0.7 (*)     Mono # 1.2 (*)     Gran% 80.9 (*)     Lymph% 6.8 (*)     All other components within normal limits   COMPREHENSIVE METABOLIC PANEL - Abnormal; Notable for the following components:    Glucose 159 (*)     BUN, Bld 31 (*)     Creatinine 2.0 (*)     Calcium 8.3 (*)     Albumin 3.1 (*)     Total Bilirubin 1.1 (*)      (*)     ALT 54 (*)     eGFR if  36.1 (*)     eGFR if non  31.3 (*)     All other components within normal limits   URINALYSIS, REFLEX TO URINE CULTURE - Abnormal; Notable for the following components:    Appearance, UA Cloudy (*)     Protein, UA 2+ (*)     Ketones, UA 1+ (*)     Occult Blood UA 3+ (*)     Urobilinogen, UA 2.0-3.0 (*)     Leukocytes, UA 3+ (*)     All other components within normal limits    Narrative:     Preferred Collection Type->Urine, Clean Catch  Specimen Source->Urine   TROPONIN I - Abnormal; Notable for the following components:    Troponin I 0.045 (*)     All other components within normal limits   PROCALCITONIN - Abnormal; Notable for the following components:    Procalcitonin 0.92 (*)     All other components within normal limits   URINALYSIS MICROSCOPIC - Abnormal; Notable for the following components:    WBC, UA >100 (*)     Hyaline Casts, UA 41 (*)     All other components within normal limits    Narrative:     Preferred Collection Type->Urine, Clean Catch  Specimen Source->Urine   CULTURE, BLOOD   CULTURE, BLOOD   CULTURE, URINE   LACTIC ACID, PLASMA   MAGNESIUM   URINALYSIS,  REFLEX TO URINE CULTURE   LACTIC ACID, PLASMA        ECG Results          EKG 12-lead (In process)  Result time 03/10/20 16:13:31    In process by Interface, Lab In [facility] (03/10/20 16:13:31)                 Narrative:    Test Reason : N39.0,    Vent. Rate : 099 BPM     Atrial Rate : 099 BPM     P-R Int : 144 ms          QRS Dur : 072 ms      QT Int : 340 ms       P-R-T Axes : 071 -07 063 degrees     QTc Int : 436 ms    Normal sinus rhythm  Normal ECG  When compared with ECG of 04-AUG-2019 05:48,  No significant change was found    Referred By: [email or web address]   SELF           Confirmed By:                             Imaging Results          X-Ray Chest AP Portable (Final result)  Result time 03/10/20 14:22:17    Final result by Smita Lo IV, MD (03/10/20 14:22:17)                 Impression:      No acute cardiopulmonary disease.      Electronically signed by: Smita Lo IV., MD  Date:    03/10/2020  Time:    14:22             Narrative:    EXAMINATION:  XR CHEST AP PORTABLE    CLINICAL HISTORY:  Sepsis;    COMPARISON:  08/05/2019.    FINDINGS:  The heart and mediastinum appear unremarkable. There is no acute pulmonary vascular engorgement.  There is arteriosclerotic calcification within the aortic arch.    The lungs are clear with no infiltrate, volume loss or pleural fluid accumulation observed.                               US Scrotum And Testicles (Final result)  Result time 03/10/20 13:20:45    Final result by Julien Brian MD (03/10/20 13:20:45)                 Impression:      1.  Sonographic findings of moderate left-sided epididymo-orchitis.    2.  Moderate left and small right hydrocele.    3.  Tiny left-sided epididymal head cyst.    4.  Small cyst adjacent to the right testicle (favored to represent epididymal cyst and less likely a tuna coal or intra testicular cyst).    5.  No evidence of spermatic cord torsion, and no intra testicular mass.      Electronically signed by: Julien Brian  MD  Date:    03/10/2020  Time:    13:20             Narrative:    CLINICAL HISTORY:  ([id number])78 y/o  (1940) M    Edema, unspecified    TECHNIQUE:  (A#[id number], exam time 3/10/2020 13:11)    US SCROTUM AND TESTICLES [id number]    Ultrasound examination of the genitalia was performed using grayscale and color Doppler    COMPARISON:  Ultrasound most recently from 03/07/2020.    FINDINGS:  RIGHT:    Right testicle: Normal in size and echotexture. Normal vascularity on color Doppler.  8 x 8 x 7 mm cyst medially adjacent to the right testicle within linear internal septation, favored to represent an epididymal cyst and less likely a tuna coal or intra testicular cyst.    Testicular size: 4.2 x 2.7 x 1.9 cm    Right epididymis: Visualized portions are normal. Normal vascularity on color Doppler.    Epididymal head size: 9 mm    Other: There is a small right-sided hydrocele.  Tiny right-sided varicocele was better appreciated on the previous exam.    LEFT:    Left testicle: Increased in vascularity and heterogeneous in echotexture with no discrete intra testicular mass identified.    Testicular size: 4.3 x 3.0 x 2.7 cm    Left epididymis: Increased vascularity, size and heterogeneous echotexture.  3 mm epididymal head cyst.    Epididymal head size: 12 mm    Other: Moderate-sized left hydrocele.                                 Medical Decision Making:   Initial Assessment:   79-year-old male with history of COPD, coronary artery disease, non-insulin-dependent diabetes, hypertension, hyperlipidemia, epididymitis.  Patient sent from Neurology our office with complaint of by lateral testicular pain generalized malaise over the last 3-4 days.  Differential Diagnosis:   Epididymo-orchitis, bacteremia, pyelonephritis, cystitis,  Clinical Tests:   Lab Tests: Ordered and Reviewed  Radiological Study: Ordered and Reviewed  Medical Tests: Ordered and Reviewed                                 Clinical Impression:       ICD-10-CM  ICD-9-CM   1. Orchitis and epididymitis N45.3 604.90   2. UTI (urinary tract infection) N39.0 599.0   3. Acute kidney injury N17.9 584.9             ED Disposition Condition    Admit                           Rudy Moore MD  03/10/20 1820       Rudy Moore MD  03/10/20 1822

## 2020-03-10 NOTE — ED NOTES
Reports testicular swelling, unresolved. States testicular and bladder US several days ago at another facility.  Reluctant to repeat US.  Otherwise MAEW. Alert and ambulatory.

## 2020-03-10 NOTE — PROGRESS NOTES
"[facility] Note  Staff: ANTONY Ayala-C    PCP: Dr. Andres Diane    Chief Complaint: "Walk-in"--Continuous testicle swelling and pain-unrelieved    Subjective:        HPI: Tyler Todd is a 79 y.o. male presents today as a "walk-in" pt demanding to be seen today for gross hematuria and testicle pains.  He is accompanied by family member to office visit.  The pt initially contacted our office yesterday c/o testicles remaining swollen, called pt back yesterday at 1:25 pm to notify him that there would be a RX for pain medication by Dr. Soto waiting for him to .  Pt never picked up pain RX yesterday.  Therefore he shows up today wanting to be seen immediately for continuous testicle pain and swelling.  Pt here today requesting if fluid from testicles can be "drained" FOR HIM TODAY.  I explained to this pt and family today that we do not do that kind of treatment in the office, especially during a possible infection at this time due to worsening of his complaints.    The pt was last seen by Dr. Soto on 03/06/2020 for left scrotal swelling, adenocarcinoma of the prostate.  Pt was prescribed Cipro 500 mg BID x 03/06/2020 for infection, but pt states today he stopped taking antibiotic after two days due to upset stomach.    US of Scrotum and Testicles performed on 03/07/2020 showed:  IMPRESSION:  1. Findings suspicious for epididymitis on the left.  Associated moderate   left hydrocele.  2. Right epididymal head cyst.  3. Cluster of cysts versus septated cystic lesion in the right testicle.  4. Isolated right varicocele, which could indicate more proximal occlusion   in the retroperitoneum.    REVIEW OF SYSTEMS:  A comprehensive 10 system review was performed and is negative except as noted above in HPI    PMHx:  Past Medical History:   Diagnosis Date    Cancer 2012    lung cancer chemo and radiation    COPD (chronic obstructive pulmonary disease)     Coronary artery disease " 2002, 2004    s/p 3 stents;  Dr. Interiano    Diabetes mellitus, type 2     Hyperlipidemia     Hypertension     Prostate CA      PSHx:  Past Surgical History:   Procedure Laterality Date    ANKLE SURGERY Right 1970s    CYSTOSCOPY W/ RETROGRADES Bilateral 2/24/2020    Procedure: CYSTOSCOPY, WITH RETROGRADE PYELOGRAM;  Surgeon: Nuha Soto MD;  Location: [facility];  Service: Urology;  Laterality: Bilateral;    EPIDURAL STEROID INJECTION INTO CERVICAL SPINE N/A 2/19/2019    Procedure: Injection-steroid-epidural-cervical C7-T1;  Surgeon: Marcelino Monroe MD;  Location: [facility];  Service: Pain Management;  Laterality: N/A;    INJECTION OF ANESTHETIC AGENT AROUND MEDIAL BRANCH NERVES INNERVATING LUMBAR FACET JOINT Bilateral 6/4/2018    Procedure: MEDIAL BRANCH, LUMBAR L3,4,5;  Surgeon: Marcelino Monroe MD;  Location: [facility];  Service: Pain Management;  Laterality: Bilateral;  L3, 4, 5    RADIOFREQUENCY ABLATION OF LUMBAR MEDIAL BRANCH NERVE AT SINGLE LEVEL Bilateral 7/16/2018    Procedure: RADIOFREQUENCY ABLATION, NERVE, MEDIAL BRANCH, LUMBAR, 1 LEVEL;  Surgeon: Marcelino Monroe MD;  Location: [facility];  Service: Pain Management;  Laterality: Bilateral;  L3, 4, 5  lumbar  payever pain management generator  SN [id number]  80 degrees for 75 seconds x2    TRANSRECTAL ULTRASOUND OF PROSTATE WITH INSERTION OF GOLD FIDUCIAL MARKER N/A 2/24/2020    Procedure: ULTRASOUND, PROSTATE, RECTAL APPROACH, WITH GOLD FIDUCIAL MARKER INSERTION;  Surgeon: Nuha Soto MD;  Location: [facility] OR;  Service: Urology;  Laterality: N/A;     Allergies:  Doxycycline; Sulfa (sulfonamide antibiotics); and Sulfasalazine    Medications: reviewed   Objective:     Vitals:    03/10/20 1035   BP: 131/70   Pulse: (!) 116   Resp: 18   Temp: 99 °F (37.2 °C)     General:WDWN in NAD  Eyes: PERRLA, normal conjunctiva  Respiratory: no increased work on breathing, clear to auscultation  Cardiovascular: regular rate and rhythm. No obvious extremity edema.  GI:  palpation of masses. No tenderness. No hepatosplenomegaly to palpation.  Musculoskeletal: normal range of motion of bilateral upper extremities. Normal muscle strength and tone.  Skin: no obvious rashes or lesions. No tightening of skin noted.  Neurologic: CN grossly normal. Normal sensation.   Psychiatric: awake, alert and oriented x 3. Mood and affect normal. Cooperative.     Exam performed by Dr. Soto on 03/06/2020:  Abdomen - soft benign nontender no masses no hernias no organomegaly                             External genitalia - normal phallus with adequate meatus left hemiscrotal swelling without is quite tender involve the testicle and epididymis    PVR by bladder scan performed by MA today:  35 mL    LABS REVIEW:  UA today:  Clean Catch-abnormal  Color:Yellow  Spec. Grav.  1.030  PH  5.5  Leukocytes-trace  Nitrites-positive  100 Protein  Trace ketones  Small bilirubin  Large amt of blood  Assessment:       1. Gross hematuria    2. Testicle pain    3. Left epididymitis          Plan:   Ongoing testicle swelling and pain:    1.  Urine culture and cytology to be performed due to gross hematuria today, but more than likely originates from current Epididymitis since he has not been taking his antibiotics at this time.    2.  The following instructions were given to the patient to assist with discomfort:  -Use jockstrap or the best supportive underwear he can get for relief of pressure.  -Alternate ice packs/heating pad to areas.  -Take OTC anti-inflammatories  -Sit in a warm tub of water greater than 15 minutes  -Elevate Scrotal Sac    *I have consulted with Dr. Soto during clinic visit today due to pt and family requests.  Dr. Soto has spoken with pt and family in office today and both have agreed that best POC at this time is to go through [facility] emergency room for further evaluation and treatment including but not limited to--possible IV antibiotic therapy if his symptoms are worsening.      F/u  TBD [facility]: Francisco Curtis, ANTONY-C

## 2020-03-11 ENCOUNTER — CLINICAL SUPPORT (OUTPATIENT)
Dept: CARDIOLOGY | Facility: HOSPITAL | Age: 80
DRG: 728 | End: 2020-03-11
Attending: INTERNAL MEDICINE
Payer: MEDICARE

## 2020-03-11 LAB
ALBUMIN SERPL BCP-MCNC: 2.6 G/DL (ref 3.5–5.2)
ALP SERPL-CCNC: 66 U/L (ref 55–135)
ALT SERPL W/O P-5'-P-CCNC: 51 U/L (ref 10–44)
ANION GAP SERPL CALC-SCNC: 12 MMOL/L (ref 8–16)
AORTIC ROOT ANNULUS: 3.2 CM
AORTIC VALVE CUSP SEPERATION: 1.78 CM
AST SERPL-CCNC: 198 U/L (ref 10–40)
AV INDEX (PROSTH): 0.77
AV MEAN GRADIENT: 4 MMHG
AV PEAK GRADIENT: 7 MMHG
AV VALVE AREA: 3.18 CM2
AV VELOCITY RATIO: 80.85
BASOPHILS # BLD AUTO: 0.03 K/UL (ref 0–0.2)
BASOPHILS NFR BLD: 0.3 % (ref 0–1.9)
BILIRUB SERPL-MCNC: 0.8 MG/DL (ref 0.1–1)
BSA FOR ECHO PROCEDURE: 1.99 M2
BUN SERPL-MCNC: 28 MG/DL (ref 8–23)
CALCIUM SERPL-MCNC: 7.9 MG/DL (ref 8.7–10.5)
CHLORIDE SERPL-SCNC: 104 MMOL/L (ref 95–110)
CK MB SERPL-MCNC: 2.8 NG/ML (ref 0.1–6.5)
CK MB SERPL-MCNC: 3.2 NG/ML (ref 0.1–6.5)
CK MB SERPL-MCNC: 3.3 NG/ML (ref 0.1–6.5)
CK MB SERPL-MCNC: 4.1 NG/ML (ref 0.1–6.5)
CK MB SERPL-MCNC: 4.8 NG/ML (ref 0.1–6.5)
CK SERPL-CCNC: 4999 U/L (ref 20–200)
CK SERPL-CCNC: 5696 U/L (ref 20–200)
CK SERPL-CCNC: 6133 U/L (ref 20–200)
CK SERPL-CCNC: 7203 U/L (ref 20–200)
CK SERPL-CCNC: 7533 U/L (ref 20–200)
CO2 SERPL-SCNC: 21 MMOL/L (ref 23–29)
CREAT SERPL-MCNC: 1.7 MG/DL (ref 0.5–1.4)
CRP SERPL-MCNC: 20.61 MG/DL (ref 0–0.75)
CV ECHO LV RWT: 0.86 CM
DIFFERENTIAL METHOD: ABNORMAL
DOP CALC AO PEAK VEL: 1.3 M/S
DOP CALC AO VTI: 27.48 CM
DOP CALC LVOT AREA: 4.2 CM2
DOP CALC LVOT DIAMETER: 2.3 CM
DOP CALC LVOT PEAK VEL: 105.11 M/S
DOP CALC LVOT STROKE VOLUME: 87.37 CM3
DOP CALCLVOT PEAK VEL VTI: 21.04 CM
E WAVE DECELERATION TIME: 265.04 MSEC
E/A RATIO: 0.83
E/E' RATIO: 11.18 M/S
ECHO LV POSTERIOR WALL: 1.68 CM (ref 0.6–1.1)
EOSINOPHIL # BLD AUTO: 0 K/UL (ref 0–0.5)
EOSINOPHIL NFR BLD: 0.3 % (ref 0–8)
ERYTHROCYTE [DISTWIDTH] IN BLOOD BY AUTOMATED COUNT: 15 % (ref 11.5–14.5)
EST. GFR  (AFRICAN AMERICAN): 44 ML/MIN/1.73 M^2
EST. GFR  (NON AFRICAN AMERICAN): 38.1 ML/MIN/1.73 M^2
FRACTIONAL SHORTENING: 30 % (ref 28–44)
GLUCOSE SERPL-MCNC: 182 MG/DL (ref 70–110)
GLUCOSE SERPL-MCNC: 247 MG/DL (ref 70–110)
GLUCOSE SERPL-MCNC: 248 MG/DL (ref 70–110)
GLUCOSE SERPL-MCNC: 250 MG/DL (ref 70–110)
HCT VFR BLD AUTO: 34.3 % (ref 40–54)
HGB BLD-MCNC: 11.2 G/DL (ref 14–18)
IMM GRANULOCYTES # BLD AUTO: 0.06 K/UL (ref 0–0.04)
IMM GRANULOCYTES NFR BLD AUTO: 0.6 % (ref 0–0.5)
INTERVENTRICULAR SEPTUM: 1.35 CM (ref 0.6–1.1)
IVRT: 74.59 MSEC
LEFT ATRIUM SIZE: 3.7 CM
LEFT INTERNAL DIMENSION IN SYSTOLE: 2.72 CM (ref 2.1–4)
LEFT VENTRICLE DIASTOLIC VOLUME INDEX: 31.7 ML/M2
LEFT VENTRICLE DIASTOLIC VOLUME: 62.9 ML
LEFT VENTRICLE MASS INDEX: 115 G/M2
LEFT VENTRICLE SYSTOLIC VOLUME INDEX: 12.3 ML/M2
LEFT VENTRICLE SYSTOLIC VOLUME: 24.5 ML
LEFT VENTRICULAR INTERNAL DIMENSION IN DIASTOLE: 3.91 CM (ref 3.5–6)
LEFT VENTRICULAR MASS: 228.97 G
LV LATERAL E/E' RATIO: 10.56 M/S
LV SEPTAL E/E' RATIO: 11.88 M/S
LYMPHOCYTES # BLD AUTO: 1 K/UL (ref 1–4.8)
LYMPHOCYTES NFR BLD: 10.8 % (ref 18–48)
MAGNESIUM SERPL-MCNC: 1.8 MG/DL (ref 1.6–2.6)
MCH RBC QN AUTO: 30.4 PG (ref 27–31)
MCHC RBC AUTO-ENTMCNC: 32.7 G/DL (ref 32–36)
MCV RBC AUTO: 93 FL (ref 82–98)
MONOCYTES # BLD AUTO: 1.1 K/UL (ref 0.3–1)
MONOCYTES NFR BLD: 11.9 % (ref 4–15)
MV PEAK A VEL: 1.14 M/S
MV PEAK E VEL: 0.95 M/S
NEUTROPHILS # BLD AUTO: 7.3 K/UL (ref 1.8–7.7)
NEUTROPHILS NFR BLD: 76.1 % (ref 38–73)
NRBC BLD-RTO: 0 /100 WBC
PISA TR MAX VEL: 2.93 M/S
PLATELET # BLD AUTO: 187 K/UL (ref 150–350)
PMV BLD AUTO: 10.2 FL (ref 9.2–12.9)
POTASSIUM SERPL-SCNC: 3.8 MMOL/L (ref 3.5–5.1)
PROT SERPL-MCNC: 5.9 G/DL (ref 6–8.4)
PULM VEIN S/D RATIO: 1.36
PV PEAK D VEL: 48.5 M/S
PV PEAK S VEL: 66.1 M/S
PV PEAK VELOCITY: 88 CM/S
RA PRESSURE: 3 MMHG
RBC # BLD AUTO: 3.68 M/UL (ref 4.6–6.2)
RIGHT VENTRICULAR END-DIASTOLIC DIMENSION: 267 CM
SODIUM SERPL-SCNC: 137 MMOL/L (ref 136–145)
TDI LATERAL: 0.09 M/S
TDI SEPTAL: 0.08 M/S
TDI: 0.09 M/S
TR MAX PG: 34 MMHG
TROPONIN I SERPL DL<=0.01 NG/ML-MCNC: 0.07 NG/ML
TROPONIN I SERPL DL<=0.01 NG/ML-MCNC: 0.15 NG/ML
TROPONIN I SERPL DL<=0.01 NG/ML-MCNC: 0.2 NG/ML
TROPONIN I SERPL DL<=0.01 NG/ML-MCNC: 0.27 NG/ML
TROPONIN I SERPL DL<=0.01 NG/ML-MCNC: 0.35 NG/ML
TV REST PULMONARY ARTERY PRESSURE: 37 MMHG
WBC # BLD AUTO: 9.58 K/UL (ref 3.9–12.7)

## 2020-03-11 PROCEDURE — G0378 HOSPITAL OBSERVATION PER HR: HCPCS

## 2020-03-11 PROCEDURE — 63600175 PHARM REV CODE 636 W HCPCS: Performed by: NURSE PRACTITIONER

## 2020-03-11 PROCEDURE — 36415 COLL VENOUS BLD VENIPUNCTURE: CPT

## 2020-03-11 PROCEDURE — 83735 ASSAY OF MAGNESIUM: CPT

## 2020-03-11 PROCEDURE — 99223 PR INITIAL HOSPITAL CARE,LEVL III: ICD-10-PCS | Mod: ,,, | Performed by: UROLOGY

## 2020-03-11 PROCEDURE — 63600175 PHARM REV CODE 636 W HCPCS: Performed by: INTERNAL MEDICINE

## 2020-03-11 PROCEDURE — 80053 COMPREHEN METABOLIC PANEL: CPT

## 2020-03-11 PROCEDURE — 86140 C-REACTIVE PROTEIN: CPT

## 2020-03-11 PROCEDURE — 25000003 PHARM REV CODE 250: Performed by: NURSE PRACTITIONER

## 2020-03-11 PROCEDURE — 94761 N-INVAS EAR/PLS OXIMETRY MLT: CPT

## 2020-03-11 PROCEDURE — 99900035 HC TECH TIME PER 15 MIN (STAT)

## 2020-03-11 PROCEDURE — 82553 CREATINE MB FRACTION: CPT | Mod: 91

## 2020-03-11 PROCEDURE — 99223 1ST HOSP IP/OBS HIGH 75: CPT | Mod: ,,, | Performed by: UROLOGY

## 2020-03-11 PROCEDURE — 93005 ELECTROCARDIOGRAM TRACING: CPT | Performed by: INTERNAL MEDICINE

## 2020-03-11 PROCEDURE — 82550 ASSAY OF CK (CPK): CPT

## 2020-03-11 PROCEDURE — 25000003 PHARM REV CODE 250: Performed by: INTERNAL MEDICINE

## 2020-03-11 PROCEDURE — 84484 ASSAY OF TROPONIN QUANT: CPT

## 2020-03-11 PROCEDURE — 93306 TTE W/DOPPLER COMPLETE: CPT

## 2020-03-11 PROCEDURE — 85025 COMPLETE CBC W/AUTO DIFF WBC: CPT

## 2020-03-11 RX ADMIN — ATORVASTATIN CALCIUM 10 MG: 10 TABLET, FILM COATED ORAL at 08:03

## 2020-03-11 RX ADMIN — PIPERACILLIN SODIUM AND TAZOBACTAM SODIUM 3.38 G: 3; .375 INJECTION, POWDER, LYOPHILIZED, FOR SOLUTION INTRAVENOUS at 08:03

## 2020-03-11 RX ADMIN — ACETAMINOPHEN 650 MG: 325 TABLET ORAL at 04:03

## 2020-03-11 RX ADMIN — DORZOLAMIDE HYDROCHLORIDE 1 DROP: 20 SOLUTION/ DROPS OPHTHALMIC at 03:03

## 2020-03-11 RX ADMIN — SODIUM CHLORIDE: 0.9 INJECTION, SOLUTION INTRAVENOUS at 08:03

## 2020-03-11 RX ADMIN — ASPIRIN 81 MG 81 MG: 81 TABLET ORAL at 08:03

## 2020-03-11 RX ADMIN — ENOXAPARIN SODIUM 80 MG: 80 INJECTION, SOLUTION INTRAVENOUS; SUBCUTANEOUS at 08:03

## 2020-03-11 RX ADMIN — LATANOPROST 1 DROP: 50 SOLUTION OPHTHALMIC at 08:03

## 2020-03-11 RX ADMIN — PIPERACILLIN SODIUM AND TAZOBACTAM SODIUM 3.38 G: 3; .375 INJECTION, POWDER, LYOPHILIZED, FOR SOLUTION INTRAVENOUS at 04:03

## 2020-03-11 RX ADMIN — HYDROCODONE BITARTRATE AND ACETAMINOPHEN 1 TABLET: 5; 325 TABLET ORAL at 08:03

## 2020-03-11 RX ADMIN — DILTIAZEM HYDROCHLORIDE 2.5 MG/HR: 5 INJECTION INTRAVENOUS at 06:03

## 2020-03-11 RX ADMIN — BRIMONIDINE TARTRATE 1 DROP: 1.5 SOLUTION OPHTHALMIC at 09:03

## 2020-03-11 RX ADMIN — DORZOLAMIDE HYDROCHLORIDE 1 DROP: 20 SOLUTION/ DROPS OPHTHALMIC at 08:03

## 2020-03-11 RX ADMIN — HYDROCODONE BITARTRATE AND ACETAMINOPHEN 1 TABLET: 5; 325 TABLET ORAL at 11:03

## 2020-03-11 RX ADMIN — ENOXAPARIN SODIUM 80 MG: 80 INJECTION, SOLUTION INTRAVENOUS; SUBCUTANEOUS at 09:03

## 2020-03-11 RX ADMIN — PIPERACILLIN SODIUM AND TAZOBACTAM SODIUM 3.38 G: 3; .375 INJECTION, POWDER, LYOPHILIZED, FOR SOLUTION INTRAVENOUS at 11:03

## 2020-03-11 RX ADMIN — BRIMONIDINE TARTRATE 1 DROP: 1.5 SOLUTION OPHTHALMIC at 03:03

## 2020-03-11 RX ADMIN — BRIMONIDINE TARTRATE 1 DROP: 1.5 SOLUTION OPHTHALMIC at 08:03

## 2020-03-11 RX ADMIN — PIPERACILLIN SODIUM AND TAZOBACTAM SODIUM 3.38 G: 3; .375 INJECTION, POWDER, LYOPHILIZED, FOR SOLUTION INTRAVENOUS at 03:03

## 2020-03-11 RX ADMIN — PREGABALIN 100 MG: 50 CAPSULE ORAL at 08:03

## 2020-03-11 RX ADMIN — METOPROLOL SUCCINATE 100 MG: 50 TABLET, FILM COATED, EXTENDED RELEASE ORAL at 08:03

## 2020-03-11 RX ADMIN — DORZOLAMIDE HYDROCHLORIDE 1 DROP: 20 SOLUTION/ DROPS OPHTHALMIC at 09:03

## 2020-03-11 RX ADMIN — PREGABALIN 100 MG: 50 CAPSULE ORAL at 09:03

## 2020-03-11 NOTE — ASSESSMENT & PLAN NOTE
It appears that patient is suffering with this issue for a long time and was recently on antibiotics  Continue levaquin and Zosyn - renally dose  Consult urology-pending evaluation  Continue IV hydration

## 2020-03-11 NOTE — HOSPITAL COURSE
03/11:  Patient was seen and examined bedside.  He converted back to sinus rhythm and currently heart rate 60-80.  Denies any new symptoms except some discomfort in scrotum.  No acute events overnight as per nursing staff.  Off Cardizem drip now.  Waiting for Urology evaluation    03/12:  Patient was seen and examined at bedside.  Still has significant discomfort in scrotal area as well as perianal area.  Still spiking high-grade temperature.  Remains in sinus rhythm with rate controlled on current regimen of beta-blocker.  Remains on weight based Lovenox.  Urology recommended to continue same management and wait till fever resolves.  No new recommendations from Cardiology.     03/13:  Patient was seen and examined bedside.  Still spikes high-grade temperature.  Repeat blood cultures were sent.  Remains on Zosyn and weight based Lovenox.  Remains in sinus rhythm.  Reports significant deconditioning and weakness as well as scrotal pain otherwise denies any new symptoms.  No other acute events overnight as per nursing staff.  Breathing comfortably on room air.     03/14:  Patient was seen and examined at bedside.  Still spikes low-grade temperature.  Scrotal erythema and swelling has improved.  Still becomes very dyspneic upon minimal exertion.  Coughing dark sputum.  No other acute events overnight as per nursing staff.  Remained in sinus rhythm.     03/15:  Patient was seen and examined at bedside.  Denies any new symptoms except his usual aches and pains and heated arguments with his wife.  No acute events overnight as per nursing staff.  Remained in sinus rhythm.  Breathing comfortably on room air.  Walking in the room and took shower by himself.  Scrotal swelling and erythema is significantly improved in last 48 hr.  No fevers for almost 48 hrs.  Was cleared for discharge by Infectious Disease.       In summary,   79-year-old gentleman with multiple chronic problems presented to emergency room with atrial  fibrillation with RVR, was found to have moderate to severe left orchitis with hydrocele.  It was very difficult to treat this patient due to his behavior and constant argument upon every intervention and many times refusal to follow nursing instructions.  He was evaluated by Cardiology, Urology and Infectious Disease.  He quickly converted back to sinus rhythm and was placed on beta-blocker and weight based Lovenox while inpatient.  It took a while for his fever to subside on IV antibiotics.  Finally improved remarkably in last 24-48 hours and was discharged home in hemodynamically stable condition with a new prescription of Keflex for total 28 days.  He was advised to see his regular urologist, regular cardiologist(who can further up titrate his Eliquis to 5 mg b.i.d. if no bleeding occurs and patient will need 30 day monitor).  We arranged home health as well as walker upon discharge.  Patient has multiple chronic aches and pains, heavy smoking dependence, osteoarthritis and back pains.  Very difficult compliance.     Review of systems:  Mild discomfort in left scrotal area, chronic arthralgia otherwise comprehensive review of system negative    Physical Exam   Constitutional: He is oriented to person, place, and time. He appears well-developed and well-nourished. He appears distressed.   HENT:   Head: Normocephalic and atraumatic.   Eyes: Pupils are equal, round, and reactive to light. Conjunctivae and EOM are normal.   Neck: Normal range of motion. Neck supple.   Cardiovascular: Intact distal pulses.   regular rate and rhythm   Pulmonary/Chest: Effort normal and breath sounds normal.   Mild crackles in bilateral bases   Abdominal: Soft. Bowel sounds are normal. There is no tenderness.   Genitourinary : Scrotum mildly edematous with erythema-significantly better and erythema is completely resolved  Musculoskeletal: Normal range of motion.   No CVA tenderness   Neurological: He is alert and oriented to person,  place, and time.   Skin: Skin is warm and dry. Capillary refill takes less than 2 seconds.   Psychiatric: He has a normal mood and affect. His behavior is normal. Judgment and thought content normal.     Nursing note and vitals reviewed.

## 2020-03-11 NOTE — CONSULTS
[facility] Consult Note - [facility]  Staff: MD Tali  Group:Tali/Charles/Shine/Devon  Referring provider and please cc: Jae  PCP: Andres Diane MD    [facility] Utilization:       Subjective:          Tyler Todd is a 79 y.o. male is a long time pt of .     He had undergone transrectal ultrasound biopsy of the prostate on 04/12/2018   when his PSA was 9.2 and the pathology report revealed a Sebastian 6   adenocarcinoma in 1 out of the 8 specimens with volume of 36g. has been evaluated by MD Parks and more recently by Dr. Sims for possible radiation therapy, and   both MD Parks and Dr. Sims concluded that the recommendation is to   continue with watchful waiting. Metastatic workup was negative. Eventually pt decided that he did want to proceed with XRT.     He underwent gold markers, cystoscopy with RGP on 2/28/20. 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 trus volume of 39.2g. He was given azo and a course of ceftin from 2/24 to take for 10 days. On 3/6 he came to the clinic c/o L testicular pain. He was diagnosed with L epdidiymo-orchitis and was started on cipro twice a day and given recs for this. He underwent an us scrotum on 3/7 confirming this. On 3/9 he called the clinic asking for results due to pain and he was rx pain meds for his L testicular pain.     He came to clinic on 3/10 asking to be seen for worsening pain and gross hematuria. He had only taken 2 days of the cipro due to nausea/stomach pain. ua showed likely uti with nit+urine.  saw pt and he recommended he go to ER for IV antibiotic therapy.     Interval history:   He was admitted to hospital medicine at [facility] and they started him on zosyn. Pt stated pain is improving. He did spike a fever to 101.4 and has continued to have chills throughout the day. Wbc was normal on admission.  BS n the 240s.     Pt was very frustrated bc he did not understand what his  diagnosis was and why he needed to either finish the antibiotics or let the clinic know. He has been putting a hot pack on his testicle.     Urinalysis void:   Lab Results   Component Value Date    COLORU Yellow 03/10/2020    APPEARANCEUA Cloudy (A) 03/10/2020    SPECGRAV 1.025 03/10/2020    PROTEINUA 2+ (A) 03/10/2020    GLUCUA Negative 03/10/2020    OCCULTUA 3+ (A) 03/10/2020    NITRITE Negative 03/10/2020    LEUKOCYTESUR 3+ (A) 03/10/2020        Urine history:   Component Urine Culture, Routine   Latest Ref Rng & Units    3/10/2020 GRAM NEGATIVE ARUN, NON-LACTOSE  (A) . . .   7/15/2014 No growth   4/25/2013 11/20/2012 NO SIGNIFICANT GROWTH   2/10/2011 MULTIPLE ORGANISMS ISOLATED. NONE IN PREDOMINANCE  REPEAT IF CLINICALLY NECESSARY.   12/6/2010 NO GROWTH AFTER 48 HOURS.           REVIEW OF SYSTEMS:  General ROS: +fevers, +chills  Psychological ROS: no depression  Endocrine ROS: no heat or cold  Respiratory ROS: no SOB  Cardiovascular ROS: no CP  Gastrointestinal ROS: no abdominal pain, no constipation, no diarrhea, noBRBPR  Musculoskeletal ROS: no muscle pain  Neurological ROS: no headaches  Dermatological ROS: no rashes  HEENT: noglasses, no sinus   ROS: per HPI     PMHx:  Past Medical History:   Diagnosis Date    Cancer 2012    lung cancer chemo and radiation    COPD (chronic obstructive pulmonary disease)     Coronary artery disease 2002, 2004    s/p 3 stents;  Dr. Interiano    Diabetes mellitus, type 2     Hyperlipidemia     Hypertension     Prostate CA       Kidney stones: left renal stone    PSHx:  Past Surgical History:   Procedure Laterality Date    ANKLE SURGERY Right 1970s    CYSTOSCOPY W/ RETROGRADES Bilateral 2/24/2020    Procedure: CYSTOSCOPY, WITH RETROGRADE PYELOGRAM;  Surgeon: Nuha Soto MD;  Location: [facility];  Service: Urology;  Laterality: Bilateral;    EPIDURAL STEROID INJECTION INTO CERVICAL SPINE N/A 2/19/2019    Procedure: Injection-steroid-epidural-cervical  C7-T1;  Surgeon: Marcelino Monroe MD;  Location: [facility] OR;  Service: Pain Management;  Laterality: N/A;    INJECTION OF ANESTHETIC AGENT AROUND MEDIAL BRANCH NERVES INNERVATING LUMBAR FACET JOINT Bilateral 6/4/2018    Procedure: MEDIAL BRANCH, LUMBAR L3,4,5;  Surgeon: Marcelino Monroe MD;  Location: [facility] OR;  Service: Pain Management;  Laterality: Bilateral;  L3, 4, 5    RADIOFREQUENCY ABLATION OF LUMBAR MEDIAL BRANCH NERVE AT SINGLE LEVEL Bilateral 7/16/2018    Procedure: RADIOFREQUENCY ABLATION, NERVE, MEDIAL BRANCH, LUMBAR, 1 LEVEL;  Surgeon: Marcelino Monroe MD;  Location: [facility] OR;  Service: Pain Management;  Laterality: Bilateral;  L3, 4, 5  lumbar  [place]Â® pain management generator  SN [id number]  80 degrees for 75 seconds x2    TRANSRECTAL ULTRASOUND OF PROSTATE WITH INSERTION OF GOLD FIDUCIAL MARKER N/A 2/24/2020    Procedure: ULTRASOUND, PROSTATE, RECTAL APPROACH, WITH GOLD FIDUCIAL MARKER INSERTION;  Surgeon: Nuha Soto MD;  Location: [facility] OR;  Service: Urology;  Laterality: N/A;         Stents/Valves/Foreign Bodies/Cardiac Evaluation/Cardiologist:   GI:      Fam Hx:   malignancies: .   Kidney stones:    Family History   Problem Relation Age of Onset    Diabetes Mother     Peripheral vascular disease Mother     Heart attack Mother     Diabetes Father     Heart attack Father     Emphysema Father     Diabetes Sister            Soc Hx:  Social History     Tobacco Use    Smoking status: Current Every Day Smoker     Packs/day: 1.00     Years: 57.00     Pack years: 57.00     Types: Cigarettes    Smokeless tobacco: Never Used   Substance Use Topics    Alcohol use: No    Drug use: No         Allergies:  Doxycycline; Sulfa (sulfonamide antibiotics); and Sulfasalazine    Anticoagulation/Aspirin:     Objective:     Vitals:    03/11/20 1640   BP:    Pulse:    Resp:    Temp: 99.9 °F (37.7 °C)         Physical Exam:  Gen: well developed, well nourished  Neuro: awake,  alert and oriented,  Psych: no  distress,  Cooperative but frustrated/angry  HEENT: no nasal discharge, normal cephalic, atraumatic  Eyes: conjunctiva clear without discharge,  glasses   Skin: clear  Resp: non-labored  Cv:no pallor, no lower extremity edema  MSK: normal cephalic atraumatic, normal range of motion, normal muscle tone      Pelvic exam  L testicle/epididymitis very tender to palpation with overlying scrotal wall thickening. Right side normal     LABS REVIEW:  Recent Labs   Lab 02/19/20  1032 03/10/20  1538 03/11/20  0437   WBC 6.79 10.79 9.58   Hemoglobin 13.4 L 11.8 L 11.2 L   Hematocrit 41.7 35.8 L 34.3 L   Platelets 210 202 187   ]  Recent Labs   Lab 02/19/20  1032 03/10/20  1538 03/11/20  0437   Sodium 140 136 137   Potassium 4.2 3.9 3.8   Chloride 105 103 104   CO2 26 24 21 L   BUN, Bld 18 31 H 28 H   Creatinine 1.5 H 2.0 H 1.7 H   Glucose 168 H 159 H 248 H   Calcium 9.2 8.3 L 7.9 L   Magnesium  --  1.6 1.8   Alkaline Phosphatase 88 74 66   Total Protein 7.1 6.9 5.9 L   Albumin 3.9 3.1 L 2.6 L   Total Bilirubin 0.6 1.1 H 0.8   AST 13 236 H 198 H   ALT 9 L 54 H 51 H   ]    Lab Results   Component Value Date    HGBA1C 6.8 (H) 08/04/2019          Pertinent urologic PATHOLOGY REVIEW:  See hpi      Pertinent Urologic RADIOGRAPHIC REVIEW:  See hpie      Assessment:     Tyler Todd is a 79 y.o. male with Gl 6 prostate cancer s/p recent gold markers and cystoscopy now with L epidiymo-orchitis and complicated UTI    urology consulted for:  L epididymo-orchitis     Plan:     The patient is currently on Zosyn which is appropriate for his UTI until the culture returns.  He is febrile with chills still in this exam still shows pretty significant tenderness.  I did see that he had a hot pad and asked him to change just ice and elevate the scrotum.    I let him know that he would not be discharged until he has been afebrile for at least 24 hr and he is testicular pain has improved significantly.     I also let him know that when he goes  home he should take the antibiotics for at least 2 weeks and that if he cannot tolerate antibiotics or decide to stop taking that he needs to notify the clinic or he will have infection that worsened again.  Also told him that he should wear tight-fitting underwear and ice the scrotum    He will need to make a follow-up in 3-4 weeks after discharge with Dr. Soto      We had a long conversation about his frustrations    Did discuss with him that there was a miscommunication between the patient and Dr. Soto as the patient did not know that he had a left testicular infection requiring antibiotics explaining his pain and infection and that it was a known risk of the procedure    Explained to the patient that this is a possible risk from the procedure that he had    Also explained that Dr. Soto had diagnosed him with left epididymal orchitis when he saw him on March 3rd and had prescribed antibiotics which was appropriate treatment however Dr. Soto did not know that the patient had discontinued the antibiotics.      By the end either due to comprehension were because the patient was still frustrated from the whole situation it was not completely clear that he new that Dr. Soto had diagnosed him with testicular infection and treated him appropriately with antibiotics.  I did encourage him to stay with Dr. Soto as he has been with him for over 13 years and he has been very happy with him in the past      Khadijah Cesar MD

## 2020-03-11 NOTE — ASSESSMENT & PLAN NOTE
levaquin - renally dose  Consult Dr. Soto   Blood/urine cultures drawn in ED   Testicular US - 1.  Sonographic findings of moderate left-sided epididymo-orchitis.    2.  Moderate left and small right hydrocele.    3.  Tiny left-sided epididymal head cyst.    4.  Small cyst adjacent to the right testicle (favored to represent epididymal cyst and less likely a tuna coal or intra testicular cyst).    5.  No evidence of spermatic cord torsion, and no intra testicular mass.

## 2020-03-11 NOTE — ED NOTES
URINATED ON FLOOR AND STATES DOES NOT LIKE TAPE FROM PULSE OX.. UNABLE TO OBTAIN NEW  OUTPUT AMOUNT.  NO C/O INCREASE IN TESTICULAR SIZE.

## 2020-03-11 NOTE — HPI
Mr. Ramirez presents today with complaints of swelling of his scrotum. It is moderate. It is associated with malaise, fever, chills, hematuria, and recent urological procedure. He denies N/V/D, dizziness, chest pain, SOB, or LOC. He has a history of CAD with stents, lung ca, prostate ca, NIDDM, HTN, and COPD. He had a cystoscopy on 2/24/2020 with Dr. Soto and developed urological symptoms about a week after. He was treated with cipro from epididymitis, but did not finish this because it upset his stomach. He continued to have symptoms and decided to come to the ED today for treatment. While in the ED he went into afib RVR which he's never been in previously, but remained asymptomatic denying SOB, chest pain, and palpitations. He is a patient of Dr. Interiano, but has seen Dr. NEVILLE Pimentel in the past and had a negative stress test in 2019.

## 2020-03-11 NOTE — SUBJECTIVE & OBJECTIVE
Interval History: Patient was seen and examined bedside.  He converted back to sinus rhythm and currently heart rate 60-80.  Denies any new symptoms except some discomfort in scrotum.  No acute events overnight as per nursing staff.  Off Cardizem drip now.  Waiting for Urology evaluation      Review of Systems   Constitutional: Negative for chills, diaphoresis and fatigue.   HENT: Negative for congestion, ear pain, sore throat and trouble swallowing.    Eyes: Negative for pain, discharge and visual disturbance.   Respiratory: Negative for cough, chest tightness, shortness of breath and wheezing.    Cardiovascular: Negative for chest pain, palpitations and leg swelling.   Gastrointestinal: Positive for abdominal pain. Negative for abdominal distention, blood in stool, constipation, diarrhea, nausea and vomiting.   Endocrine: Negative for polydipsia, polyphagia and polyuria.   Genitourinary: Positive for dysuria, flank pain, frequency and scrotal swelling. Negative for urgency.   Musculoskeletal: Negative for back pain, joint swelling, neck pain and neck stiffness.   Skin: Negative for rash and wound.   Allergic/Immunologic: Negative for immunocompromised state.   Neurological: Negative for dizziness, syncope, speech difficulty, weakness, light-headedness, numbness and headaches.   Hematological: Negative for adenopathy.   Psychiatric/Behavioral: Negative for confusion and suicidal ideas. The patient is not nervous/anxious.    All other systems reviewed and are negative.    Objective:     Vital Signs (Most Recent):  Temp: 98.9 °F (37.2 °C) (03/11/20 1135)  Pulse: 72 (03/11/20 1135)  Resp: (!) 24 (03/11/20 1135)  BP: (!) 98/56 (03/11/20 1135)  SpO2: 96 % (03/11/20 1135) Vital Signs (24h Range):  Temp:  [98.5 °F (36.9 °C)-101.6 °F (38.7 °C)] 98.9 °F (37.2 °C)  Pulse:  [] 72  Resp:  [18-28] 24  SpO2:  [94 %-100 %] 96 %  BP: ()/(51-80) 98/56     Weight: 80.5 kg (177 lb 7.5 oz)  Body mass index is 25.46  kg/m².    Intake/Output Summary (Last 24 hours) at 3/11/2020 1522  Last data filed at 3/11/2020 0500  Gross per 24 hour   Intake 2200 ml   Output 200 ml   Net 2000 ml      Physical Exam   Constitutional: He is oriented to person, place, and time. He appears well-developed and well-nourished.   HENT:   Head: Normocephalic and atraumatic.   Eyes: Pupils are equal, round, and reactive to light. Conjunctivae and EOM are normal.   Neck: Normal range of motion. Neck supple.   Cardiovascular: Intact distal pulses.   regular rate and rhythm   Pulmonary/Chest: Effort normal and breath sounds normal.   Abdominal: Soft. Bowel sounds are normal. There is no tenderness.   Genitourinary:   Genitourinary Comments: Scrotum mildly edematous with erythema    Musculoskeletal: Normal range of motion.   No CVA tenderness   Neurological: He is alert and oriented to person, place, and time.   Skin: Skin is warm and dry. Capillary refill takes less than 2 seconds.   Psychiatric: He has a normal mood and affect. His behavior is normal. Judgment and thought content normal.   Nursing note and vitals reviewed.      Significant Labs: All pertinent labs within the past 24 hours have been reviewed.    Significant Imaging: I have reviewed all pertinent imaging results/findings within the past 24 hours.

## 2020-03-11 NOTE — SUBJECTIVE & OBJECTIVE
Past Medical History:   Diagnosis Date    Cancer 2012    lung cancer chemo and radiation    COPD (chronic obstructive pulmonary disease)     Coronary artery disease 2002, 2004    s/p 3 stents;  Dr. Interiano    Diabetes mellitus, type 2     Hyperlipidemia     Hypertension     Prostate CA        Past Surgical History:   Procedure Laterality Date    ANKLE SURGERY Right 1970s    CYSTOSCOPY W/ RETROGRADES Bilateral 2/24/2020    Procedure: CYSTOSCOPY, WITH RETROGRADE PYELOGRAM;  Surgeon: Nuha Soto MD;  Location: [facility] OR;  Service: Urology;  Laterality: Bilateral;    EPIDURAL STEROID INJECTION INTO CERVICAL SPINE N/A 2/19/2019    Procedure: Injection-steroid-epidural-cervical C7-T1;  Surgeon: Marcelino Monroe MD;  Location: [facility] OR;  Service: Pain Management;  Laterality: N/A;    INJECTION OF ANESTHETIC AGENT AROUND MEDIAL BRANCH NERVES INNERVATING LUMBAR FACET JOINT Bilateral 6/4/2018    Procedure: MEDIAL BRANCH, LUMBAR L3,4,5;  Surgeon: Marcelino Monroe MD;  Location: [facility];  Service: Pain Management;  Laterality: Bilateral;  L3, 4, 5    RADIOFREQUENCY ABLATION OF LUMBAR MEDIAL BRANCH NERVE AT SINGLE LEVEL Bilateral 7/16/2018    Procedure: RADIOFREQUENCY ABLATION, NERVE, MEDIAL BRANCH, LUMBAR, 1 LEVEL;  Surgeon: Marcelino Monroe MD;  Location: [facility];  Service: Pain Management;  Laterality: Bilateral;  L3, 4, 5  lumbar  [employer] pain management generator  SN [id number]  80 degrees for 75 seconds x2    TRANSRECTAL ULTRASOUND OF PROSTATE WITH INSERTION OF GOLD FIDUCIAL MARKER N/A 2/24/2020    Procedure: ULTRASOUND, PROSTATE, RECTAL APPROACH, WITH GOLD FIDUCIAL MARKER INSERTION;  Surgeon: Nuha Soto MD;  Location: [facility];  Service: Urology;  Laterality: N/A;       Review of patient's allergies indicates:   Allergen Reactions    Doxycycline Diarrhea     Severe diarrhea    Sulfa (sulfonamide antibiotics) Rash     Other reaction(s): Itching    Sulfasalazine Rash     Rash and itching mild       Current  Facility-Administered Medications on File Prior to Encounter   Medication    lidocaine (PF) 10 mg/ml (1%) injection    sodium chloride 0.9% injection     Current Outpatient Medications on File Prior to Encounter   Medication Sig    amLODIPine (NORVASC) 5 MG tablet Take 5 mg by mouth once daily.    aspirin 81 mg Tab Take 1 tablet by mouth once daily. Every day    atorvastatin (LIPITOR) 10 MG tablet Take 10 mg by mouth once daily.    brimonidine 0.1% (ALPHAGAN P) 0.1 % Drop Place 1 drop into both eyes 3 (three) times daily.    carboxymethylcellulose (REFRESH PLUS) 0.5 % Dpet 1 drop 3 (three) times daily as needed.    carboxymethylcellulose sodium (THERATEARS) 1 % DpGe TheraTears 1 % gel in a dropperette    dorzolamide (TRUSOPT) 2 % ophthalmic solution 1 drop 3 (three) times daily.    irbesartan (AVAPRO) 300 MG tablet Take 300 mg by mouth once daily.     latanoprost 0.005 % ophthalmic solution Apply 1 drop to eye.    metFORMIN (GLUCOPHAGE-XR) 500 MG XR 24hr tablet Take 1,000 mg by mouth 2 (two) times daily.    metoprolol succinate (TOPROL-XL) 50 MG 24 hr tablet metoprolol succinate ER 50 mg tablet,extended release 24 hr    pregabalin (LYRICA) 100 MG capsule Take 100 mg by mouth 2 (two) times daily.     alogliptin (NESINA) 12.5 mg Tab Take by mouth.    ascorbic acid, vitamin C, (VITAMIN C) 100 MG tablet Vitamin C    cefUROXime (CEFTIN) 500 MG tablet Take 1 tablet (500 mg total) by mouth 2 (two) times daily.    diabetic supplies, miscellan. Misc No Sig Provided    HYDROcodone-acetaminophen (NORCO) 5-325 mg per tablet Take 1 tablet by mouth every 4 to 6 hours as needed for Pain.    ibuprofen (ADVIL,MOTRIN) 800 MG tablet Take 800 mg by mouth every 6 (six) hours as needed for Pain.    nitroGLYCERIN (NITROSTAT) 0.4 MG SL tablet As directed    phenazopyridine (PYRIDIUM) 100 MG tablet Take 2 tablets (200 mg total) by mouth 3 (three) times daily with meals.    vitC-E-zn- ZPN-nrhs-zgffiy 250 mg-200 unit  -12.5 mg-1 mg Cap Take by mouth.    [DISCONTINUED] ciprofloxacin HCl (CIPRO) 500 MG tablet Take 1 tablet (500 mg total) by mouth 2 (two) times daily.    [DISCONTINUED] ciprofloxacin HCl (CIPRO) 500 MG tablet Take 1 tablet (500 mg total) by mouth 2 (two) times daily.     Family History     Problem Relation (Age of Onset)    Diabetes Mother, Father, Sister    Emphysema Father    Heart attack Mother, Father    Peripheral vascular disease Mother        Tobacco Use    Smoking status: Current Every Day Smoker     Packs/day: 1.00     Years: 57.00     Pack years: 57.00     Types: Cigarettes    Smokeless tobacco: Never Used   Substance and Sexual Activity    Alcohol use: No    Drug use: No    Sexual activity: Never     Review of Systems   Constitutional: Positive for fever. Negative for chills, diaphoresis and fatigue.   HENT: Negative for congestion, ear pain, sore throat and trouble swallowing.    Eyes: Negative for pain, discharge and visual disturbance.   Respiratory: Negative for cough, chest tightness, shortness of breath and wheezing.    Cardiovascular: Negative for chest pain, palpitations and leg swelling.   Gastrointestinal: Positive for abdominal pain. Negative for abdominal distention, blood in stool, constipation, diarrhea, nausea and vomiting.   Endocrine: Negative for polydipsia, polyphagia and polyuria.   Genitourinary: Positive for dysuria, flank pain, frequency and scrotal swelling. Negative for urgency.   Musculoskeletal: Negative for back pain, joint swelling, neck pain and neck stiffness.   Skin: Negative for rash and wound.   Allergic/Immunologic: Negative for immunocompromised state.   Neurological: Negative for dizziness, syncope, speech difficulty, weakness, light-headedness, numbness and headaches.   Hematological: Negative for adenopathy.   Psychiatric/Behavioral: Negative for confusion and suicidal ideas. The patient is not nervous/anxious.    All other systems reviewed and are  negative.    Objective:     Vital Signs (Most Recent):  Temp: (!) 101.4 °F (38.6 °C) (03/10/20 1854)  Pulse: (!) 169 (03/10/20 2100)  Resp: (!) 25 (03/10/20 2100)  BP: (!) 114/56 (03/10/20 2100)  SpO2: (!) 94 % (03/10/20 2100) Vital Signs (24h Range):  Temp:  [99 °F (37.2 °C)-101.6 °F (38.7 °C)] 101.4 °F (38.6 °C)  Pulse:  [103-169] 169  Resp:  [18-28] 25  SpO2:  [94 %-97 %] 94 %  BP: (114-161)/(56-80) 114/56     Weight: 80.7 kg (178 lb)  Body mass index is 25.54 kg/m².    Physical Exam   Constitutional: He is oriented to person, place, and time. He appears well-developed and well-nourished.   HENT:   Head: Normocephalic and atraumatic.   Eyes: Pupils are equal, round, and reactive to light. Conjunctivae and EOM are normal.   Neck: Normal range of motion. Neck supple.   Cardiovascular: Intact distal pulses.   Irregular rate and rhythm   Pulmonary/Chest: Effort normal and breath sounds normal.   Abdominal: Soft. Bowel sounds are normal. There is no tenderness.   Genitourinary:   Genitourinary Comments: Scrotum mildly edematous with erythema    Musculoskeletal: Normal range of motion.   No CVA tenderness   Neurological: He is alert and oriented to person, place, and time.   Skin: Skin is warm and dry. Capillary refill takes less than 2 seconds.   Psychiatric: He has a normal mood and affect. His behavior is normal. Judgment and thought content normal.   Nursing note and vitals reviewed.        CRANIAL NERVES     CN III, IV, VI   Pupils are equal, round, and reactive to light.  Extraocular motions are normal.        Significant Labs:   CBC:   Recent Labs   Lab 03/10/20  1538   WBC 10.79   HGB 11.8*   HCT 35.8*        CMP:   Recent Labs   Lab 03/10/20  1538      K 3.9      CO2 24   *   BUN 31*   CREATININE 2.0*   CALCIUM 8.3*   PROT 6.9   ALBUMIN 3.1*   BILITOT 1.1*   ALKPHOS 74   *   ALT 54*   ANIONGAP 9   EGFRNONAA 31.3*     Troponin:   Recent Labs   Lab 03/10/20  1538   TROPONINI  0.045*       Significant Imaging: EKG: I have reviewed all pertinent results/findings within the past 24 hours and my personal findings are: afib RVR

## 2020-03-11 NOTE — ASSESSMENT & PLAN NOTE
Converted to sinus rhythm, off Cardizem drip now   Consult Dr. NEVILLE Pimentel  Continue weight based Lovenox and Toprol-XL.  If needed sotalol can be considered

## 2020-03-11 NOTE — CONSULTS
This 79-year-old  gentleman is admitted with epididymal orchitis.  I have been consulted for atrial fibrillation.    The patient has prostate cancer; he had cystoscopy and prostate markers placed approximately 3 weeks ago in anticipation of radiation.  He developed acute orchitis and was admitted from the ED.  He was noted to have atrial fibrillation with rapid ventricular response; he was treated with intravenous diltiazem.  He has since converted back to sinus rhythm.  There is no definite history of prior atrial fibrillation; however, the patient feels that he may have had short similar episodes in the past.  The patient had dizzy spells in [id number] and 2 coronary stents were deployed; he had another weak spell and a 3rd stent was deployed a year or 2 later.  The patient has not used nitroglycerin.  He does not report chest pain tightness or pressure with exertion.  His exercise capacity is limited primarily by shortness of breath.  He has had normal stress test with Dr. Interiano.  The patient underwent a Lexiscan Myoview stress test August 2019 which was negative for ischemia; there were no wall motion abnormalities; LVEF 54%.  Echocardiogram revealed mild mitral regurgitation and LVEF 60%.    Past history:  Diabetes mellitus.  Hypertension.  Dyslipidemia.  COPD-patient continues to smoke.  Cervical radiculopathy and resort to me.  Prostate cancer.  Lung cancer 2014; he had chemotherapy and radiation..  Aortic PET scans.  GERD and reflux; colon polyps.    Social history:   The patient continues to smoke a pack of cigarettes a day.  Used to work as a .  He lives in [place].    Review of systems:  The patient has lost some weight lately.  He sleeps on 1 pillow; he denies paroxysmal nocturnal dyspnea or orthopnea.  He has a chronic cough and sputum expectoration.  He has occasional episodes of wheezing.  The patient denies hematemesis hematochezia melena; does have  reflux    Medication:  Amlodipine 5 mg daily aspirin 81 mg daily atorvastatin 10 mg daily Alphagan 0.1% drops t.i.d. refresh Plus drops t.i.d. 30 years.  Trusopt 2% t.i.d. latanoprost 0.005% 1 drop daily metformin ER 1000 mg b.i.d..  Metoprolol XL 50 mg daily.  Lyrica 100 mg b.i.d..  Alogliopitin 12.5 mg daily ascorbic acid 100 mg daily cefuroxime  500 mg b.i.d. Norco 5 q.6h p.r.n. ibuprofen 800 mg q.6h p.r.n. nitroglycerin 0.4 mg p.r.n. Pyridium 200 mg t.i.d. vitamin-C    Physical examination:    This is a well-built  gentleman in no acute distress.  Blood pressure 98/56, respiratory 24 per minute , heart rate 72 per minute 98.9° F O2 sats 96%  Eyes:  Conjunctiva muddy there is no scleral icterus  Throat:  No masses are observed  Neck:  Carotids equal without bruits.  There is no jugular venous distention or thyromegaly  Chest:  Air entry is equal bilaterally.  There are no rales or rhonchi.  Heart:  S1-S2 well heard.  There are no murmurs gallops rubs.  Abdomen:  Soft; nontender.  No palpable organomegaly.  Extremities:  No clubbing cyanosis or edema.    Hemoglobin 11.2 med crit 34.3 WBC count is 9580 granulocytes 76% lymphocytes 11% platelet count [id number] sodium 137 potassium 3.8 BUN 28 creatinine 1.7 glucose 150 size 9-248 magnesium 1.8 total protein 5.9 albumin 2.6 CPK is 7533 with MB 4.1%.  Troponin 0.075 and 0.267.  TSH 0.95 free T4 0.97 procalcitonin 0.92  Chest x-ray was reviewed and is unremarkable.  ECG reveals atrial fibrillation with ventricular rate of 182 ppm.  There is ST-T sagging in leads 1 and aVL.  Repeat ECG this morning reveals sinus rhythm.  The tracing is within normal limits.    Impression:  1.  Paroxysmal atrial fibrillation with RVR; now in sinus rhythm  2.  Coronary stents 2002 and 2004  3.  Hypertension; dyslipidemia; diabetes mellitus; glaucoma; COPD-current cigarette smoker  4.  Prostate cancer; acute epididymal orchitis    The patient is presently in sinus rhythm.  The  mild troponin elevation is probably did secondary to demand ischemia and RVR.  Continue metoprolol ER 50 mg daily.  If he has recurrent atrial fibrillation, will consider sotalol.  He will need a 30 day event monitor as an outpatient to exclude PAF, once discharged.  Please call if can be of further assistance in his management.  Thank you

## 2020-03-11 NOTE — H&P
[facility] Medicine  History & Physical    DOS: 03/10/2020  7:48 PM      Patient Name: Tyler Todd  MRN: [id number]  Admission Date: 3/10/2020  Attending Physician: Dr. Flores  Primary Care Provider: Andres Diane MD         Patient information was obtained from patient and ER records.     Subjective:     Principal Problem:New onset a-fib    Chief Complaint:   Chief Complaint   Patient presents with    Groin Swelling     LEFT, ONSET LAST THURSDAY, NEG SCREEN        HPI: Mr. Todd presents today with complaints of swelling of his scrotum. It is moderate. It is associated with malaise, fever, chills, hematuria, and recent urological procedure. He denies N/V/D, dizziness, chest pain, SOB, or LOC. He has a history of CAD with stents, lung ca, prostate ca, NIDDM, HTN, and COPD. He had a cystoscopy on 2/24/2020 with Dr. Soto and developed urological symptoms about a week after. He was treated with cipro from epididymitis, but did not finish this because it upset his stomach. He continued to have symptoms and decided to come to the ED today for treatment. While in the ED he went into afib RVR which he's never been in previously, but remained asymptomatic denying SOB, chest pain, and palpitations. He is a patient of Dr. Interiano, but has seen Dr. NEVILLE Pimentel in the past and had a negative stress test in 2019.     Past Medical History:   Diagnosis Date    Cancer 2012    lung cancer chemo and radiation    COPD (chronic obstructive pulmonary disease)     Coronary artery disease 2002, 2004    s/p 3 stents;  Dr. Interiano    Diabetes mellitus, type 2     Hyperlipidemia     Hypertension     Prostate CA        Past Surgical History:   Procedure Laterality Date    ANKLE SURGERY Right 1970s    CYSTOSCOPY W/ RETROGRADES Bilateral 2/24/2020    Procedure: CYSTOSCOPY, WITH RETROGRADE PYELOGRAM;  Surgeon: Nuha Soto MD;  Location: [facility];  Service: Urology;  Laterality: Bilateral;    EPIDURAL  STEROID INJECTION INTO CERVICAL SPINE N/A 2/19/2019    Procedure: Injection-steroid-epidural-cervical C7-T1;  Surgeon: Marcelino Monroe MD;  Location: [facility] OR;  Service: Pain Management;  Laterality: N/A;    INJECTION OF ANESTHETIC AGENT AROUND MEDIAL BRANCH NERVES INNERVATING LUMBAR FACET JOINT Bilateral 6/4/2018    Procedure: MEDIAL BRANCH, LUMBAR L3,4,5;  Surgeon: Marcelino Monroe MD;  Location: [facility] OR;  Service: Pain Management;  Laterality: Bilateral;  L3, 4, 5    RADIOFREQUENCY ABLATION OF LUMBAR MEDIAL BRANCH NERVE AT SINGLE LEVEL Bilateral 7/16/2018    Procedure: RADIOFREQUENCY ABLATION, NERVE, MEDIAL BRANCH, LUMBAR, 1 LEVEL;  Surgeon: Marcelino Monroe MD;  Location: [facility] OR;  Service: Pain Management;  Laterality: Bilateral;  L3, 4, 5  lumbar  [employer] pain management generator  SN [id number]  80 degrees for 75 seconds x2    TRANSRECTAL ULTRASOUND OF PROSTATE WITH INSERTION OF GOLD FIDUCIAL MARKER N/A 2/24/2020    Procedure: ULTRASOUND, PROSTATE, RECTAL APPROACH, WITH GOLD FIDUCIAL MARKER INSERTION;  Surgeon: Nuha Soto MD;  Location: [facility] OR;  Service: Urology;  Laterality: N/A;       Review of patient's allergies indicates:   Allergen Reactions    Doxycycline Diarrhea     Severe diarrhea    Sulfa (sulfonamide antibiotics) Rash     Other reaction(s): Itching    Sulfasalazine Rash     Rash and itching mild       Current Facility-Administered Medications on File Prior to Encounter   Medication    lidocaine (PF) 10 mg/ml (1%) injection    sodium chloride 0.9% injection     Current Outpatient Medications on File Prior to Encounter   Medication Sig    amLODIPine (NORVASC) 5 MG tablet Take 5 mg by mouth once daily.    aspirin 81 mg Tab Take 1 tablet by mouth once daily. Every day    atorvastatin (LIPITOR) 10 MG tablet Take 10 mg by mouth once daily.    brimonidine 0.1% (ALPHAGAN P) 0.1 % Drop Place 1 drop into both eyes 3 (three) times daily.    carboxymethylcellulose (REFRESH PLUS) 0.5 % Dpet 1 drop  3 (three) times daily as needed.    carboxymethylcellulose sodium (THERATEARS) 1 % DpGe TheraTears 1 % gel in a dropperette    dorzolamide (TRUSOPT) 2 % ophthalmic solution 1 drop 3 (three) times daily.    irbesartan (AVAPRO) 300 MG tablet Take 300 mg by mouth once daily.     latanoprost 0.005 % ophthalmic solution Apply 1 drop to eye.    metFORMIN (GLUCOPHAGE-XR) 500 MG XR 24hr tablet Take 1,000 mg by mouth 2 (two) times daily.    metoprolol succinate (TOPROL-XL) 50 MG 24 hr tablet metoprolol succinate ER 50 mg tablet,extended release 24 hr    pregabalin (LYRICA) 100 MG capsule Take 100 mg by mouth 2 (two) times daily.     alogliptin (NESINA) 12.5 mg Tab Take by mouth.    ascorbic acid, vitamin C, (VITAMIN C) 100 MG tablet Vitamin C    cefUROXime (CEFTIN) 500 MG tablet Take 1 tablet (500 mg total) by mouth 2 (two) times daily.    diabetic supplies, miscellan. Misc No Sig Provided    HYDROcodone-acetaminophen (NORCO) 5-325 mg per tablet Take 1 tablet by mouth every 4 to 6 hours as needed for Pain.    ibuprofen (ADVIL,MOTRIN) 800 MG tablet Take 800 mg by mouth every 6 (six) hours as needed for Pain.    nitroGLYCERIN (NITROSTAT) 0.4 MG SL tablet As directed    phenazopyridine (PYRIDIUM) 100 MG tablet Take 2 tablets (200 mg total) by mouth 3 (three) times daily with meals.    vitC-E-zn- PXQ-yoln-gdcoav 250 mg-200 unit -12.5 mg-1 mg Cap Take by mouth.    [DISCONTINUED] ciprofloxacin HCl (CIPRO) 500 MG tablet Take 1 tablet (500 mg total) by mouth 2 (two) times daily.    [DISCONTINUED] ciprofloxacin HCl (CIPRO) 500 MG tablet Take 1 tablet (500 mg total) by mouth 2 (two) times daily.     Family History     Problem Relation (Age of Onset)    Diabetes Mother, Father, Sister    Emphysema Father    Heart attack Mother, Father    Peripheral vascular disease Mother        Tobacco Use    Smoking status: Current Every Day Smoker     Packs/day: 1.00     Years: 57.00     Pack years: 57.00     Types: Cigarettes     Smokeless tobacco: Never Used   Substance and Sexual Activity    Alcohol use: No    Drug use: No    Sexual activity: Never     Review of Systems   Constitutional: Positive for fever. Negative for chills, diaphoresis and fatigue.   HENT: Negative for congestion, ear pain, sore throat and trouble swallowing.    Eyes: Negative for pain, discharge and visual disturbance.   Respiratory: Negative for cough, chest tightness, shortness of breath and wheezing.    Cardiovascular: Negative for chest pain, palpitations and leg swelling.   Gastrointestinal: Positive for abdominal pain. Negative for abdominal distention, blood in stool, constipation, diarrhea, nausea and vomiting.   Endocrine: Negative for polydipsia, polyphagia and polyuria.   Genitourinary: Positive for dysuria, flank pain, frequency and scrotal swelling. Negative for urgency.   Musculoskeletal: Negative for back pain, joint swelling, neck pain and neck stiffness.   Skin: Negative for rash and wound.   Allergic/Immunologic: Negative for immunocompromised state.   Neurological: Negative for dizziness, syncope, speech difficulty, weakness, light-headedness, numbness and headaches.   Hematological: Negative for adenopathy.   Psychiatric/Behavioral: Negative for confusion and suicidal ideas. The patient is not nervous/anxious.    All other systems reviewed and are negative.    Objective:     Vital Signs (Most Recent):  Temp: (!) 101.4 °F (38.6 °C) (03/10/20 1854)  Pulse: (!) 169 (03/10/20 2100)  Resp: (!) 25 (03/10/20 2100)  BP: (!) 114/56 (03/10/20 2100)  SpO2: (!) 94 % (03/10/20 2100) Vital Signs (24h Range):  Temp:  [99 °F (37.2 °C)-101.6 °F (38.7 °C)] 101.4 °F (38.6 °C)  Pulse:  [103-169] 169  Resp:  [18-28] 25  SpO2:  [94 %-97 %] 94 %  BP: (114-161)/(56-80) 114/56     Weight: 80.7 kg (178 lb)  Body mass index is 25.54 kg/m².    Physical Exam   Constitutional: He is oriented to person, place, and time. He appears well-developed and well-nourished.    HENT:   Head: Normocephalic and atraumatic.   Eyes: Pupils are equal, round, and reactive to light. Conjunctivae and EOM are normal.   Neck: Normal range of motion. Neck supple.   Cardiovascular: Intact distal pulses.   Irregular rate and rhythm   Pulmonary/Chest: Effort normal and breath sounds normal.   Abdominal: Soft. Bowel sounds are normal. There is no tenderness.   Genitourinary:   Genitourinary Comments: Scrotum mildly edematous with erythema    Musculoskeletal: Normal range of motion.   No CVA tenderness   Neurological: He is alert and oriented to person, place, and time.   Skin: Skin is warm and dry. Capillary refill takes less than 2 seconds.   Psychiatric: He has a normal mood and affect. His behavior is normal. Judgment and thought content normal.   Nursing note and vitals reviewed.        CRANIAL NERVES     CN III, IV, VI   Pupils are equal, round, and reactive to light.  Extraocular motions are normal.        Significant Labs:   CBC:   Recent Labs   Lab 03/10/20  1538   WBC 10.79   HGB 11.8*   HCT 35.8*        CMP:   Recent Labs   Lab 03/10/20  1538      K 3.9      CO2 24   *   BUN 31*   CREATININE 2.0*   CALCIUM 8.3*   PROT 6.9   ALBUMIN 3.1*   BILITOT 1.1*   ALKPHOS 74   *   ALT 54*   ANIONGAP 9   EGFRNONAA 31.3*     Troponin:   Recent Labs   Lab 03/10/20  1538   TROPONINI 0.045*       Significant Imaging: EKG: I have reviewed all pertinent results/findings within the past 24 hours and my personal findings are: afib RVR    Assessment/Plan:     * New onset a-fib  Admit to cardiology A   Cardizem bolus 10 mg given in ED  Cardizem gtt   Give his metoprolol now   Replace mag with a 2 gram mag rider now then further electrolyte replacement per SS   Consult Dr. NEVILLE Pimentel  Echo in AM   Trend troponins - first slightly elevated which is likely d/t decreased renal clearance  Wt based lovenox - monitor hematuria      Acute renal failure superimposed on stage 3 chronic kidney  disease  May be d/t dehydration vs. Infection   IV fluid bolus given in ED   Continue with maint. Fluids  Hold nephrotoxic meds - ARB/NSAID  Renally dose all meds   Repeat level in AM       Elevated liver function tests  Dehydration vs. Other?   Repeat CMP in AM       Epididymoorchitis  levaquin - renally dose  Consult Dr. Soto   Blood/urine cultures drawn in ED   Testicular US - 1.  Sonographic findings of moderate left-sided epididymo-orchitis.    2.  Moderate left and small right hydrocele.    3.  Tiny left-sided epididymal head cyst.    4.  Small cyst adjacent to the right testicle (favored to represent epididymal cyst and less likely a tuna coal or intra testicular cyst).    5.  No evidence of spermatic cord torsion, and no intra testicular mass.      Dehydration  Fluid bolus given in ED  Continue with maint. Fluids  Repeat labs in AM       Diabetes mellitus type II, uncontrolled  accuchecks ACHS with low dose ISS      VTE Risk Mitigation (From admission, onward)         Ordered     enoxaparin injection 80 mg  Every 12 hours (non-standard times)      03/10/20 2117     Place sequential compression device  Until discontinued      03/10/20 1924     IP VTE HIGH RISK PATIENT  Once      03/10/20 1924                   Keisha Coreas, NP  Department of Hospital Medicine   [facility]

## 2020-03-11 NOTE — ASSESSMENT & PLAN NOTE
May be d/t dehydration vs. Infection   IV fluid bolus given in ED   Continue with maint. Fluids  Hold nephrotoxic meds - ARB/NSAID  Renally dose all meds   Repeat level in AM

## 2020-03-11 NOTE — ASSESSMENT & PLAN NOTE
Improving with IV hydration  Hold nephrotoxic meds - ARB/NSAID  Renally dose all meds   Repeat level in AM

## 2020-03-11 NOTE — ASSESSMENT & PLAN NOTE
Admit to cardiology A   Cardizem bolus 10 mg given in ED  Cardizem gtt   Give his metoprolol now   Replace mag with a 2 gram mag rider now then further electrolyte replacement per SS   Consult Dr. NEVILLE Pimentel  Echo in AM   Trend troponins - first slightly elevated which is likely d/t decreased renal clearance  Wt based lovenox - monitor hematuria

## 2020-03-11 NOTE — ED NOTES
PENDING BED ASSIGNMENT.  IVFS CONTINUE.  PT NOW KEEPING ARM STRAIGHT.  NO REPORTED TESTICULAR CHANGES.

## 2020-03-11 NOTE — PROGRESS NOTES
[facility] Medicine  Progress Note    DOS: 03/11/2020    Patient Name: Tyler Todd  MRN: [id number]  Patient Class: OP- Observation   Admission Date: 3/10/2020  Length of Stay: 0 days  Attending Physician: Norma Rowe MD  Primary Care Provider: Andres Diane MD        Subjective:     Principal Problem:New onset a-fib        HPI:  Mr. Todd presents today with complaints of swelling of his scrotum. It is moderate. It is associated with malaise, fever, chills, hematuria, and recent urological procedure. He denies N/V/D, dizziness, chest pain, SOB, or LOC. He has a history of CAD with stents, lung ca, prostate ca, NIDDM, HTN, and COPD. He had a cystoscopy on 2/24/2020 with Dr. Soto and developed urological symptoms about a week after. He was treated with cipro from epididymitis, but did not finish this because it upset his stomach. He continued to have symptoms and decided to come to the ED today for treatment. While in the ED he went into afib RVR which he's never been in previously, but remained asymptomatic denying SOB, chest pain, and palpitations. He is a patient of Dr. Interiano, but has seen Dr. NEVILLE Pimentel in the past and had a negative stress test in 2019.     Overview/Hospital Course:  03/11:  Patient was seen and examined bedside.  He converted back to sinus rhythm and currently heart rate 60-80.  Denies any new symptoms except some discomfort in scrotum.  No acute events overnight as per nursing staff.  Off Cardizem drip now.  Waiting for Urology evaluation    Interval History: Patient was seen and examined bedside.  He converted back to sinus rhythm and currently heart rate 60-80.  Denies any new symptoms except some discomfort in scrotum.  No acute events overnight as per nursing staff.  Off Cardizem drip now.  Waiting for Urology evaluation      Review of Systems   Constitutional: Negative for chills, diaphoresis and fatigue.   HENT: Negative for congestion, ear pain, sore  throat and trouble swallowing.    Eyes: Negative for pain, discharge and visual disturbance.   Respiratory: Negative for cough, chest tightness, shortness of breath and wheezing.    Cardiovascular: Negative for chest pain, palpitations and leg swelling.   Gastrointestinal: Positive for abdominal pain. Negative for abdominal distention, blood in stool, constipation, diarrhea, nausea and vomiting.   Endocrine: Negative for polydipsia, polyphagia and polyuria.   Genitourinary: Positive for dysuria, flank pain, frequency and scrotal swelling. Negative for urgency.   Musculoskeletal: Negative for back pain, joint swelling, neck pain and neck stiffness.   Skin: Negative for rash and wound.   Allergic/Immunologic: Negative for immunocompromised state.   Neurological: Negative for dizziness, syncope, speech difficulty, weakness, light-headedness, numbness and headaches.   Hematological: Negative for adenopathy.   Psychiatric/Behavioral: Negative for confusion and suicidal ideas. The patient is not nervous/anxious.    All other systems reviewed and are negative.    Objective:     Vital Signs (Most Recent):  Temp: 98.9 °F (37.2 °C) (03/11/20 1135)  Pulse: 72 (03/11/20 1135)  Resp: (!) 24 (03/11/20 1135)  BP: (!) 98/56 (03/11/20 1135)  SpO2: 96 % (03/11/20 1135) Vital Signs (24h Range):  Temp:  [98.5 °F (36.9 °C)-101.6 °F (38.7 °C)] 98.9 °F (37.2 °C)  Pulse:  [] 72  Resp:  [18-28] 24  SpO2:  [94 %-100 %] 96 %  BP: ()/(51-80) 98/56     Weight: 80.5 kg (177 lb 7.5 oz)  Body mass index is 25.46 kg/m².    Intake/Output Summary (Last 24 hours) at 3/11/2020 1522  Last data filed at 3/11/2020 0500  Gross per 24 hour   Intake 2200 ml   Output 200 ml   Net 2000 ml      Physical Exam   Constitutional: He is oriented to person, place, and time. He appears well-developed and well-nourished.   HENT:   Head: Normocephalic and atraumatic.   Eyes: Pupils are equal, round, and reactive to light. Conjunctivae and EOM are normal.    Neck: Normal range of motion. Neck supple.   Cardiovascular: Intact distal pulses.   regular rate and rhythm   Pulmonary/Chest: Effort normal and breath sounds normal.   Abdominal: Soft. Bowel sounds are normal. There is no tenderness.   Genitourinary:   Genitourinary Comments: Scrotum mildly edematous with erythema    Musculoskeletal: Normal range of motion.   No CVA tenderness   Neurological: He is alert and oriented to person, place, and time.   Skin: Skin is warm and dry. Capillary refill takes less than 2 seconds.   Psychiatric: He has a normal mood and affect. His behavior is normal. Judgment and thought content normal.   Nursing note and vitals reviewed.      Significant Labs: All pertinent labs within the past 24 hours have been reviewed.    Significant Imaging: I have reviewed all pertinent imaging results/findings within the past 24 hours.      Assessment/Plan:      * New onset a-fib  Converted to sinus rhythm, off Cardizem drip now   Consult Dr. NEVILLE Pimentel  Continue weight based Lovenox and Toprol-XL.  If needed sotalol can be considered    Epididymoorchitis  It appears that patient is suffering with this issue for a long time and was recently on antibiotics  Continue levaquin and Zosyn - renally dose  Consult urology-pending evaluation  Continue IV hydration    Acute renal failure superimposed on stage 3 chronic kidney disease  Improving with IV hydration  Hold nephrotoxic meds - ARB/NSAID  Renally dose all meds   Repeat level in AM       Dehydration  Continue with maint. Fluids  Repeat labs in AM       Elevated liver function tests  Dehydration vs. Other?   Repeat CMP in AM      Diabetes mellitus type II, uncontrolled  accuchecks ACHS with low dose ISS.        VTE Risk Mitigation (From admission, onward)         Ordered     enoxaparin injection 80 mg  Every 12 hours (non-standard times)      03/10/20 2117     Place sequential compression device  Until discontinued      03/10/20 1924     IP VTE HIGH  RISK PATIENT  Once      03/10/20 1924                      Norma Rowe MD  Department of Hospital Medicine   [facility]

## 2020-03-12 PROBLEM — E86.0 DEHYDRATION: Status: RESOLVED | Noted: 2020-03-10 | Resolved: 2020-03-12

## 2020-03-12 LAB
ANION GAP SERPL CALC-SCNC: 8 MMOL/L (ref 8–16)
BACTERIA UR CULT: ABNORMAL
BACTERIA UR CULT: ABNORMAL
BASOPHILS # BLD AUTO: 0.04 K/UL (ref 0–0.2)
BASOPHILS NFR BLD: 0.5 % (ref 0–1.9)
BNP SERPL-MCNC: 299 PG/ML (ref 0–99)
BUN SERPL-MCNC: 24 MG/DL (ref 8–23)
CALCIUM SERPL-MCNC: 7.6 MG/DL (ref 8.7–10.5)
CHLORIDE SERPL-SCNC: 106 MMOL/L (ref 95–110)
CK MB SERPL-MCNC: 1.9 NG/ML (ref 0.1–6.5)
CK MB SERPL-MCNC: 2.2 NG/ML (ref 0.1–6.5)
CK SERPL-CCNC: 4824 U/L (ref 20–200)
CK SERPL-CCNC: 4834 U/L (ref 20–200)
CO2 SERPL-SCNC: 23 MMOL/L (ref 23–29)
CREAT SERPL-MCNC: 1.7 MG/DL (ref 0.5–1.4)
DIFFERENTIAL METHOD: ABNORMAL
EOSINOPHIL # BLD AUTO: 0.1 K/UL (ref 0–0.5)
EOSINOPHIL NFR BLD: 1.5 % (ref 0–8)
ERYTHROCYTE [DISTWIDTH] IN BLOOD BY AUTOMATED COUNT: 14.8 % (ref 11.5–14.5)
EST. GFR  (AFRICAN AMERICAN): 44 ML/MIN/1.73 M^2
EST. GFR  (NON AFRICAN AMERICAN): 38.1 ML/MIN/1.73 M^2
GLUCOSE SERPL-MCNC: 160 MG/DL (ref 70–110)
GLUCOSE SERPL-MCNC: 169 MG/DL (ref 70–110)
GLUCOSE SERPL-MCNC: 222 MG/DL (ref 70–110)
GLUCOSE SERPL-MCNC: 222 MG/DL (ref 70–110)
HCT VFR BLD AUTO: 31 % (ref 40–54)
HGB BLD-MCNC: 10.2 G/DL (ref 14–18)
IMM GRANULOCYTES # BLD AUTO: 0.05 K/UL (ref 0–0.04)
IMM GRANULOCYTES NFR BLD AUTO: 0.6 % (ref 0–0.5)
LYMPHOCYTES # BLD AUTO: 1.2 K/UL (ref 1–4.8)
LYMPHOCYTES NFR BLD: 14.7 % (ref 18–48)
MAGNESIUM SERPL-MCNC: 1.6 MG/DL (ref 1.6–2.6)
MCH RBC QN AUTO: 30.5 PG (ref 27–31)
MCHC RBC AUTO-ENTMCNC: 32.9 G/DL (ref 32–36)
MCV RBC AUTO: 93 FL (ref 82–98)
MONOCYTES # BLD AUTO: 0.9 K/UL (ref 0.3–1)
MONOCYTES NFR BLD: 10.9 % (ref 4–15)
NEUTROPHILS # BLD AUTO: 5.7 K/UL (ref 1.8–7.7)
NEUTROPHILS NFR BLD: 71.8 % (ref 38–73)
NRBC BLD-RTO: 0 /100 WBC
PLATELET # BLD AUTO: 186 K/UL (ref 150–350)
PMV BLD AUTO: 10.1 FL (ref 9.2–12.9)
POTASSIUM SERPL-SCNC: 3.6 MMOL/L (ref 3.5–5.1)
RBC # BLD AUTO: 3.34 M/UL (ref 4.6–6.2)
SODIUM SERPL-SCNC: 137 MMOL/L (ref 136–145)
TROPONIN I SERPL DL<=0.01 NG/ML-MCNC: 0.09 NG/ML
TROPONIN I SERPL DL<=0.01 NG/ML-MCNC: 0.12 NG/ML
WBC # BLD AUTO: 7.97 K/UL (ref 3.9–12.7)

## 2020-03-12 PROCEDURE — 25000003 PHARM REV CODE 250: Performed by: NURSE PRACTITIONER

## 2020-03-12 PROCEDURE — 83735 ASSAY OF MAGNESIUM: CPT

## 2020-03-12 PROCEDURE — 84484 ASSAY OF TROPONIN QUANT: CPT | Mod: 91

## 2020-03-12 PROCEDURE — 82962 GLUCOSE BLOOD TEST: CPT

## 2020-03-12 PROCEDURE — 25000003 PHARM REV CODE 250: Performed by: INTERNAL MEDICINE

## 2020-03-12 PROCEDURE — 82553 CREATINE MB FRACTION: CPT | Mod: 91

## 2020-03-12 PROCEDURE — 36415 COLL VENOUS BLD VENIPUNCTURE: CPT

## 2020-03-12 PROCEDURE — 97162 PT EVAL MOD COMPLEX 30 MIN: CPT

## 2020-03-12 PROCEDURE — 94761 N-INVAS EAR/PLS OXIMETRY MLT: CPT

## 2020-03-12 PROCEDURE — 63600175 PHARM REV CODE 636 W HCPCS: Performed by: HOSPITALIST

## 2020-03-12 PROCEDURE — 85025 COMPLETE CBC W/AUTO DIFF WBC: CPT

## 2020-03-12 PROCEDURE — 63600175 PHARM REV CODE 636 W HCPCS: Performed by: NURSE PRACTITIONER

## 2020-03-12 PROCEDURE — G0378 HOSPITAL OBSERVATION PER HR: HCPCS

## 2020-03-12 PROCEDURE — 25000003 PHARM REV CODE 250: Performed by: UROLOGY

## 2020-03-12 PROCEDURE — 63600175 PHARM REV CODE 636 W HCPCS: Performed by: INTERNAL MEDICINE

## 2020-03-12 PROCEDURE — 97116 GAIT TRAINING THERAPY: CPT

## 2020-03-12 PROCEDURE — 82550 ASSAY OF CK (CPK): CPT | Mod: 91

## 2020-03-12 PROCEDURE — 99900035 HC TECH TIME PER 15 MIN (STAT)

## 2020-03-12 PROCEDURE — 80048 BASIC METABOLIC PNL TOTAL CA: CPT

## 2020-03-12 PROCEDURE — 83880 ASSAY OF NATRIURETIC PEPTIDE: CPT

## 2020-03-12 RX ORDER — MAGNESIUM SULFATE 1 G/100ML
1 INJECTION INTRAVENOUS ONCE
Status: COMPLETED | OUTPATIENT
Start: 2020-03-12 | End: 2020-03-12

## 2020-03-12 RX ORDER — ACETAMINOPHEN 10 MG/ML
1000 INJECTION, SOLUTION INTRAVENOUS ONCE
Status: COMPLETED | OUTPATIENT
Start: 2020-03-12 | End: 2020-03-12

## 2020-03-12 RX ORDER — METOPROLOL SUCCINATE 50 MG/1
50 TABLET, EXTENDED RELEASE ORAL 2 TIMES DAILY
Qty: 60 TABLET | Refills: 0 | Status: SHIPPED | OUTPATIENT
Start: 2020-03-12 | End: 2020-07-18 | Stop reason: CLARIF

## 2020-03-12 RX ORDER — HYDROCODONE BITARTRATE AND ACETAMINOPHEN 5; 325 MG/1; MG/1
1 TABLET ORAL
Qty: 20 TABLET | Refills: 0 | Status: SHIPPED | OUTPATIENT
Start: 2020-03-12 | End: 2020-03-17

## 2020-03-12 RX ORDER — TAMSULOSIN HYDROCHLORIDE 0.4 MG/1
0.4 CAPSULE ORAL NIGHTLY
Status: DISCONTINUED | OUTPATIENT
Start: 2020-03-12 | End: 2020-03-15 | Stop reason: HOSPADM

## 2020-03-12 RX ORDER — METFORMIN HYDROCHLORIDE 500 MG/1
1000 TABLET, EXTENDED RELEASE ORAL 2 TIMES DAILY
Start: 2020-03-12 | End: 2021-05-04

## 2020-03-12 RX ORDER — METOPROLOL SUCCINATE 50 MG/1
50 TABLET, EXTENDED RELEASE ORAL DAILY
Status: DISCONTINUED | OUTPATIENT
Start: 2020-03-13 | End: 2020-03-13

## 2020-03-12 RX ORDER — LEVOFLOXACIN 500 MG/1
500 TABLET, FILM COATED ORAL DAILY
Qty: 14 TABLET | Refills: 0 | Status: SHIPPED | OUTPATIENT
Start: 2020-03-12 | End: 2020-03-13 | Stop reason: HOSPADM

## 2020-03-12 RX ORDER — ACETAMINOPHEN 10 MG/ML
1000 INJECTION, SOLUTION INTRAVENOUS ONCE
Status: DISCONTINUED | OUTPATIENT
Start: 2020-03-12 | End: 2020-03-12

## 2020-03-12 RX ORDER — IRBESARTAN 300 MG/1
300 TABLET ORAL DAILY
Status: ON HOLD
Start: 2020-03-12 | End: 2022-09-06 | Stop reason: SDUPTHER

## 2020-03-12 RX ADMIN — ASPIRIN 81 MG 81 MG: 81 TABLET ORAL at 09:03

## 2020-03-12 RX ADMIN — HYDROCODONE BITARTRATE AND ACETAMINOPHEN 1 TABLET: 5; 325 TABLET ORAL at 08:03

## 2020-03-12 RX ADMIN — ACETAMINOPHEN 650 MG: 325 TABLET ORAL at 03:03

## 2020-03-12 RX ADMIN — INSULIN ASPART 1 UNITS: 100 INJECTION, SOLUTION INTRAVENOUS; SUBCUTANEOUS at 09:03

## 2020-03-12 RX ADMIN — HYDROCODONE BITARTRATE AND ACETAMINOPHEN 1 TABLET: 5; 325 TABLET ORAL at 02:03

## 2020-03-12 RX ADMIN — PIPERACILLIN SODIUM AND TAZOBACTAM SODIUM 3.38 G: 3; .375 INJECTION, POWDER, LYOPHILIZED, FOR SOLUTION INTRAVENOUS at 03:03

## 2020-03-12 RX ADMIN — SODIUM CHLORIDE 1000 ML: 0.9 INJECTION, SOLUTION INTRAVENOUS at 03:03

## 2020-03-12 RX ADMIN — BRIMONIDINE TARTRATE 1 DROP: 1.5 SOLUTION OPHTHALMIC at 08:03

## 2020-03-12 RX ADMIN — LATANOPROST 1 DROP: 50 SOLUTION OPHTHALMIC at 09:03

## 2020-03-12 RX ADMIN — PIPERACILLIN SODIUM AND TAZOBACTAM SODIUM 3.38 G: 3; .375 INJECTION, POWDER, LYOPHILIZED, FOR SOLUTION INTRAVENOUS at 09:03

## 2020-03-12 RX ADMIN — BRIMONIDINE TARTRATE 1 DROP: 1.5 SOLUTION OPHTHALMIC at 09:03

## 2020-03-12 RX ADMIN — SODIUM CHLORIDE: 0.9 INJECTION, SOLUTION INTRAVENOUS at 05:03

## 2020-03-12 RX ADMIN — DORZOLAMIDE HYDROCHLORIDE 1 DROP: 20 SOLUTION/ DROPS OPHTHALMIC at 08:03

## 2020-03-12 RX ADMIN — BRIMONIDINE TARTRATE 1 DROP: 1.5 SOLUTION OPHTHALMIC at 03:03

## 2020-03-12 RX ADMIN — SODIUM CHLORIDE: 0.9 INJECTION, SOLUTION INTRAVENOUS at 09:03

## 2020-03-12 RX ADMIN — PREGABALIN 100 MG: 50 CAPSULE ORAL at 09:03

## 2020-03-12 RX ADMIN — ENOXAPARIN SODIUM 80 MG: 80 INJECTION, SOLUTION INTRAVENOUS; SUBCUTANEOUS at 08:03

## 2020-03-12 RX ADMIN — ENOXAPARIN SODIUM 80 MG: 80 INJECTION, SOLUTION INTRAVENOUS; SUBCUTANEOUS at 09:03

## 2020-03-12 RX ADMIN — DORZOLAMIDE HYDROCHLORIDE 1 DROP: 20 SOLUTION/ DROPS OPHTHALMIC at 03:03

## 2020-03-12 RX ADMIN — ACETAMINOPHEN 1000 MG: 10 INJECTION, SOLUTION INTRAVENOUS at 04:03

## 2020-03-12 RX ADMIN — PREGABALIN 100 MG: 50 CAPSULE ORAL at 08:03

## 2020-03-12 RX ADMIN — ATORVASTATIN CALCIUM 10 MG: 10 TABLET, FILM COATED ORAL at 09:03

## 2020-03-12 RX ADMIN — METOPROLOL SUCCINATE 100 MG: 50 TABLET, FILM COATED, EXTENDED RELEASE ORAL at 09:03

## 2020-03-12 RX ADMIN — DORZOLAMIDE HYDROCHLORIDE 1 DROP: 20 SOLUTION/ DROPS OPHTHALMIC at 09:03

## 2020-03-12 RX ADMIN — TAMSULOSIN HYDROCHLORIDE 0.4 MG: 0.4 CAPSULE ORAL at 08:03

## 2020-03-12 RX ADMIN — MAGNESIUM SULFATE IN DEXTROSE 1 G: 10 INJECTION, SOLUTION INTRAVENOUS at 05:03

## 2020-03-12 NOTE — PROGRESS NOTES
[facility] Medicine  Progress Note    DOS: 03/12/2020    Patient Name: Tyler Todd  MRN: [id number]  Patient Class: OP- Observation   Admission Date: 3/10/2020  Length of Stay: 0 days  Attending Physician: Norma Rowe MD  Primary Care Provider: Andres Diane MD        Subjective:     Principal Problem:New onset a-fib        HPI:  Mr. Todd presents today with complaints of swelling of his scrotum. It is moderate. It is associated with malaise, fever, chills, hematuria, and recent urological procedure. He denies N/V/D, dizziness, chest pain, SOB, or LOC. He has a history of CAD with stents, lung ca, prostate ca, NIDDM, HTN, and COPD. He had a cystoscopy on 2/24/2020 with Dr. Soto and developed urological symptoms about a week after. He was treated with cipro from epididymitis, but did not finish this because it upset his stomach. He continued to have symptoms and decided to come to the ED today for treatment. While in the ED he went into afib RVR which he's never been in previously, but remained asymptomatic denying SOB, chest pain, and palpitations. He is a patient of Dr. Interiano, but has seen Dr. NEVILLE Pimentel in the past and had a negative stress test in 2019.     Overview/Hospital Course:  03/11:  Patient was seen and examined bedside.  He converted back to sinus rhythm and currently heart rate 60-80.  Denies any new symptoms except some discomfort in scrotum.  No acute events overnight as per nursing staff.  Off Cardizem drip now.  Waiting for Urology evaluation    03/12:  Patient was seen and examined at bedside.  Still has significant discomfort in scrotal area as well as perianal area.  Still spiking high-grade temperature.  Remains in sinus rhythm with rate controlled on current regimen of beta-blocker.  Remains on weight based Lovenox.  Urology recommended to continue same management and wait till fever resolves.  No new recommendations from Cardiology.     Interval History:  Patient was seen and examined at bedside.  Still has significant discomfort in scrotal area as well as perianal area.  Still spiking high-grade temperature.  Remains in sinus rhythm with rate controlled on current regimen of beta-blocker.  Remains on weight based Lovenox.  Urology recommended to continue same management and wait till fever resolves.  No new recommendations from Cardiology.    Review of Systems   Constitutional: Positive for chills and fatigue. Negative for diaphoresis.   HENT: Negative for congestion, ear pain, sore throat and trouble swallowing.    Eyes: Negative for pain, discharge and visual disturbance.   Respiratory: Negative for cough, chest tightness, shortness of breath and wheezing.    Cardiovascular: Negative for chest pain, palpitations and leg swelling.   Gastrointestinal: Positive for abdominal pain. Negative for abdominal distention, blood in stool, constipation, diarrhea, nausea and vomiting.   Endocrine: Negative for polydipsia, polyphagia and polyuria.   Genitourinary: Positive for dysuria, flank pain, frequency and scrotal swelling. Negative for urgency.   Musculoskeletal: Negative for back pain, joint swelling, neck pain and neck stiffness.   Skin: Negative for rash and wound.   Allergic/Immunologic: Negative for immunocompromised state.   Neurological: Negative for dizziness, syncope, speech difficulty, weakness, light-headedness, numbness and headaches.   Hematological: Negative for adenopathy.   Psychiatric/Behavioral: Negative for confusion and suicidal ideas. The patient is not nervous/anxious.    All other systems reviewed and are negative.    Objective:     Vital Signs (Most Recent):  Temp: (!) 103 °F (39.4 °C) (03/12/20 1530)  Pulse: 89 (03/12/20 1102)  Resp: (!) 25 (03/12/20 1102)  BP: 138/64 (03/12/20 1100)  SpO2: (!) 94 % (03/12/20 1102) Vital Signs (24h Range):  Temp:  [98 °F (36.7 °C)-103 °F (39.4 °C)] 103 °F (39.4 °C)  Pulse:  [80-89] 89  Resp:  [16-39] 25  SpO2:   [94 %-99 %] 94 %  BP: (108-139)/(58-64) 138/64     Weight: 80.5 kg (177 lb 7.5 oz)  Body mass index is 25.46 kg/m².    Intake/Output Summary (Last 24 hours) at 3/12/2020 1614  Last data filed at 3/12/2020 1200  Gross per 24 hour   Intake 1530 ml   Output 1050 ml   Net 480 ml      Physical Exam   Constitutional: He is oriented to person, place, and time. He appears well-developed and well-nourished. He appears distressed.   HENT:   Head: Normocephalic and atraumatic.   Eyes: Pupils are equal, round, and reactive to light. Conjunctivae and EOM are normal.   Neck: Normal range of motion. Neck supple.   Cardiovascular: Intact distal pulses.   regular rate and rhythm   Pulmonary/Chest: Effort normal and breath sounds normal.   Abdominal: Soft. Bowel sounds are normal. There is no tenderness.   Genitourinary:   Genitourinary Comments: Scrotum mildly edematous with erythema    Musculoskeletal: Normal range of motion.   No CVA tenderness   Neurological: He is alert and oriented to person, place, and time.   Skin: Skin is warm and dry. Capillary refill takes less than 2 seconds.   Psychiatric: He has a normal mood and affect. His behavior is normal. Judgment and thought content normal.   Nursing note and vitals reviewed.      Significant Labs: All pertinent labs within the past 24 hours have been reviewed.    Significant Imaging: I have reviewed all pertinent imaging results/findings within the past 24 hours.      Assessment/Plan:      * New onset a-fib  Converted to sinus rhythm, off Cardizem drip now   Consult Dr. NEVILLE Pimentel  Continue weight based Lovenox and Toprol-XL 50 BID while inpatient  If needed sotalol can be considered    Epididymoorchitis  It appears that patient is suffering with this issue for a long time and was recently on antibiotics  Urine cultures positive for E coli  Continue levaquin and Zosyn while inpatient - renally dose  Consult urology-recommended to continue same management and keep him in the  hospital until fever subsides  Continue IV hydration.  P.r.n. Tylenol  Repeat imaging can be considered if fever continues  Daily CPK  CRP every 48 hrs    Acute renal failure superimposed on stage 3 chronic kidney disease  Improving with IV hydration  Hold nephrotoxic meds - ARB/NSAID  Renally dose all meds   Daily labs       Diabetes mellitus type II, uncontrolled  accuchecks ACHS with low dose ISS      Elevated liver function tests  Dehydration vs. Other?   Repeat CMP in AM.      Adenocarcinoma of prostate  Outpatient follow-up      VTE Risk Mitigation (From admission, onward)         Ordered     enoxaparin injection 80 mg  Every 12 hours (non-standard times)      03/10/20 2117     Place sequential compression device  Until discontinued      03/10/20 1924     IP VTE HIGH RISK PATIENT  Once      03/10/20 1924                      Norma Rowe MD  Department of Hospital Medicine   [facility]

## 2020-03-12 NOTE — ASSESSMENT & PLAN NOTE
Improving with IV hydration  Hold nephrotoxic meds - ARB/NSAID  Renally dose all meds   Daily labs

## 2020-03-12 NOTE — NURSING
Ice pack provided to pt. Refuses at this time. Placed at bedside and encouraged to use. Verbalizes understanding.

## 2020-03-12 NOTE — PLAN OF CARE
03/12/20 0800   MADRIGAL Message   Medicare Outpatient and Observation Notification regarding financial responsibility Given to patient/caregiver;Explained to patient/caregiver;Signed/date by patient/caregiver   Date MADRIGAL was signed 03/11/20   Time MADRIGAL was signed 1355     Form was reviewed with pt yesterday by SOPHIA Lewis.

## 2020-03-12 NOTE — ASSESSMENT & PLAN NOTE
Converted to sinus rhythm, off Cardizem drip now   Consult Dr. NEVILLE Pimentel  Continue weight based Lovenox and Toprol-XL 50 BID while inpatient  If needed sotalol can be considered

## 2020-03-12 NOTE — CARE UPDATE
03/11/20 2200   Patient Assessment/Suction   Level of Consciousness (AVPU) alert   Respiratory Effort Unlabored   Expansion/Accessory Muscles/Retractions no use of accessory muscles   All Lung Fields Breath Sounds clear   Rhythm/Pattern, Respiratory no shortness of breath reported   Cough Frequency no cough   PRE-TX-O2   O2 Device (Oxygen Therapy) room air   SpO2 97 %   Pulse Oximetry Type Continuous   $ Pulse Oximetry - Multiple Charge Pulse Oximetry - Multiple   Pulse 83   Resp (!) 39   Positioning   Head of Bed (HOB) HOB elevated   Respiratory Evaluation   $ Care Plan Tech Time 15 min

## 2020-03-12 NOTE — PT/OT/SLP EVAL
Physical Therapy Evaluation    Patient Name:  Tyler Todd   MRN:  [id number]    Recommendations:     Discharge Recommendations:  home with home health, home health PT(home health RN and PT)   Discharge Equipment Recommendations: walker, rolling   Barriers to discharge: pt has limited ambulation due to pain, wife at home.     Assessment:     Tyler Todd is a 79 y.o. male admitted with a medical diagnosis of New onset a-fib.  He presents with the following impairments/functional limitations:  weakness, impaired functional mobilty, gait instability, impaired endurance, impaired balance, impaired self care skills, edema, pain . Pt agreeable to PT. Pt reports continued moderate pain. Pt reports insecurity about taking blood thinner; PT encouraged to talk with MD and follow up with MD. Pt is not safe with cane and PT recommends RW, MD aware. Pt also report falling at home and laying on floor for about 3 hours until a friend could come by and assist up. Encouraged pt to call EMS and ask for an invalid assist if he falls again. Pt reports understanding. Good /64 HR 93 supine, 134/65 sitting, and after ambulation 139/66 HR 91, no dizziness,     Rehab Prognosis: Good; patient would benefit from acute skilled PT services to address these deficits and reach maximum level of function.    Recent Surgery: * No surgery found *      Plan:     During this hospitalization, patient to be seen 6 x/week to address the identified rehab impairments via gait training, therapeutic activities, therapeutic exercises and progress toward the following goals:    · Plan of Care Expires:  04/12/20    Subjective     Chief Complaint: groin pain  Patient/Family Comments/goals: to get better  Pain/Comfort:  · Pain Rating 1: (pain in groin but did not give number )  · Location 1: groin  · Pain Addressed 1: Reposition    Patients cultural, spiritual, Voodoo conflicts given the current situation:      Living Environment:  Pt lives with wife,  2-3 steps to enter home, drives.   Prior to admission, patients level of function was modified independent with cane.  Equipment used at home: cane, straight.  DME owned (not currently used): none.  Upon discharge, patient will have assistance from wife.    Objective:     Communicated with rn prior to session.  Patient found supine with bed alarm, blood pressure cuff, pulse ox (continuous), telemetry  upon PT entry to room.    General Precautions: Standard, fall   Orthopedic Precautions:N/A   Braces: N/A     Exams:  · Cognitive Exam:  Patient is oriented to Person, Place, Time and Situation  · Gross Motor Coordination:  WFL  · RUE Strength: WNL  · LUE Strength: WNL  · RLE ROM: wfl but limited by pain   · RLE Strength: wfl but limited by pain   · LLE ROM: wfl but limited by pain   · LLE Strength: wfl but limited by pain     Functional Mobility:  · Bed Mobility:     · Rolling Left:  modified independence  · Scooting: modified independence  · Supine to Sit: modified independence  · Sit to Supine: modified independence  · Transfers:     · Sit to Stand:  contact guard assistance with rolling walker  · Gait: Pt able to ambulate with RW cga 100 ft with instructions re: use for walker, walker management. Pt had one episode of lob with RW but he let go of walker when he got to the bed and his foot tripped on walker, min A needed to recover and pt now understands to keep RW with him at all times. Attempted to amb with cane but poor balance noted.       Therapeutic Activities and Exercises:   education, fall prevention, dc planning and poc.     AM-PAC 6 CLICK MOBILITY  Total Score:20     Patient left supine with all lines intact, call button in reach, bed alarm on, rn notified and warm blanket.    GOALS:   Multidisciplinary Problems     Physical Therapy Goals        Problem: Physical Therapy Goal    Goal Priority Disciplines Outcome Goal Variances Interventions   Physical Therapy Goal     PT, PT/OT      Description:  Goals to  be met by: discharge     Patient will increase functional independence with mobility by performin. Sit to stand transfer with Supervision  2. Bed to chair transfer with Supervision using Rolling Walker  3. Gait  x 150 feet with Supervision using Rolling Walker.                       History:     Past Medical History:   Diagnosis Date    Cancer 2012    lung cancer chemo and radiation    COPD (chronic obstructive pulmonary disease)     Coronary artery disease ,     s/p 3 stents;  Dr. Interiano    Diabetes mellitus, type 2     Hyperlipidemia     Hypertension     Prostate CA        Past Surgical History:   Procedure Laterality Date    ANKLE SURGERY Right 1970s    CYSTOSCOPY W/ RETROGRADES Bilateral 2020    Procedure: CYSTOSCOPY, WITH RETROGRADE PYELOGRAM;  Surgeon: Nuha Soto MD;  Location: [facility] OR;  Service: Urology;  Laterality: Bilateral;    EPIDURAL STEROID INJECTION INTO CERVICAL SPINE N/A 2019    Procedure: Injection-steroid-epidural-cervical C7-T1;  Surgeon: Marcelino Monroe MD;  Location: [facility] OR;  Service: Pain Management;  Laterality: N/A;    INJECTION OF ANESTHETIC AGENT AROUND MEDIAL BRANCH NERVES INNERVATING LUMBAR FACET JOINT Bilateral 2018    Procedure: MEDIAL BRANCH, LUMBAR L3,4,5;  Surgeon: Marcelino Monroe MD;  Location: [facility] OR;  Service: Pain Management;  Laterality: Bilateral;  L3, 4, 5    RADIOFREQUENCY ABLATION OF LUMBAR MEDIAL BRANCH NERVE AT SINGLE LEVEL Bilateral 2018    Procedure: RADIOFREQUENCY ABLATION, NERVE, MEDIAL BRANCH, LUMBAR, 1 LEVEL;  Surgeon: Marcelino Monroe MD;  Location: [facility];  Service: Pain Management;  Laterality: Bilateral;  L3, 4, 5  lumbar  [employer] pain management generator  SN [id number]  80 degrees for 75 seconds x2    TRANSRECTAL ULTRASOUND OF PROSTATE WITH INSERTION OF GOLD FIDUCIAL MARKER N/A 2020    Procedure: ULTRASOUND, PROSTATE, RECTAL APPROACH, WITH GOLD FIDUCIAL MARKER INSERTION;  Surgeon: Nuha oSto MD;   Location: [facility];  Service: Urology;  Laterality: N/A;       Time Tracking:     PT Received On: 03/12/20  PT Start Time: 1305     PT Stop Time: 1328  PT Total Time (min): 23 min     Billable Minutes: Evaluation 8 and Gait Training 15      Tawnya Clancy, PT  03/12/2020

## 2020-03-12 NOTE — ASSESSMENT & PLAN NOTE
It appears that patient is suffering with this issue for a long time and was recently on antibiotics  Urine cultures positive for E coli  Continue levaquin and Zosyn while inpatient - renally dose  Consult urology-recommended to continue same management and keep him in the hospital until fever subsides  Continue IV hydration.  P.r.n. Tylenol  Repeat imaging can be considered if fever continues  Daily CPK  CRP every 48 hrs

## 2020-03-12 NOTE — SUBJECTIVE & OBJECTIVE
Interval History: Patient was seen and examined at bedside.  Still has significant discomfort in scrotal area as well as perianal area.  Still spiking high-grade temperature.  Remains in sinus rhythm with rate controlled on current regimen of beta-blocker.  Remains on weight based Lovenox.  Urology recommended to continue same management and wait till fever resolves.  No new recommendations from Cardiology.    Review of Systems   Constitutional: Positive for chills and fatigue. Negative for diaphoresis.   HENT: Negative for congestion, ear pain, sore throat and trouble swallowing.    Eyes: Negative for pain, discharge and visual disturbance.   Respiratory: Negative for cough, chest tightness, shortness of breath and wheezing.    Cardiovascular: Negative for chest pain, palpitations and leg swelling.   Gastrointestinal: Positive for abdominal pain. Negative for abdominal distention, blood in stool, constipation, diarrhea, nausea and vomiting.   Endocrine: Negative for polydipsia, polyphagia and polyuria.   Genitourinary: Positive for dysuria, flank pain, frequency and scrotal swelling. Negative for urgency.   Musculoskeletal: Negative for back pain, joint swelling, neck pain and neck stiffness.   Skin: Negative for rash and wound.   Allergic/Immunologic: Negative for immunocompromised state.   Neurological: Negative for dizziness, syncope, speech difficulty, weakness, light-headedness, numbness and headaches.   Hematological: Negative for adenopathy.   Psychiatric/Behavioral: Negative for confusion and suicidal ideas. The patient is not nervous/anxious.    All other systems reviewed and are negative.    Objective:     Vital Signs (Most Recent):  Temp: (!) 103 °F (39.4 °C) (03/12/20 1530)  Pulse: 89 (03/12/20 1102)  Resp: (!) 25 (03/12/20 1102)  BP: 138/64 (03/12/20 1100)  SpO2: (!) 94 % (03/12/20 1102) Vital Signs (24h Range):  Temp:  [98 °F (36.7 °C)-103 °F (39.4 °C)] 103 °F (39.4 °C)  Pulse:  [80-89] 89  Resp:   [16-39] 25  SpO2:  [94 %-99 %] 94 %  BP: (108-139)/(58-64) 138/64     Weight: 80.5 kg (177 lb 7.5 oz)  Body mass index is 25.46 kg/m².    Intake/Output Summary (Last 24 hours) at 3/12/2020 1614  Last data filed at 3/12/2020 1200  Gross per 24 hour   Intake 1530 ml   Output 1050 ml   Net 480 ml      Physical Exam   Constitutional: He is oriented to person, place, and time. He appears well-developed and well-nourished. He appears distressed.   HENT:   Head: Normocephalic and atraumatic.   Eyes: Pupils are equal, round, and reactive to light. Conjunctivae and EOM are normal.   Neck: Normal range of motion. Neck supple.   Cardiovascular: Intact distal pulses.   regular rate and rhythm   Pulmonary/Chest: Effort normal and breath sounds normal.   Abdominal: Soft. Bowel sounds are normal. There is no tenderness.   Genitourinary:   Genitourinary Comments: Scrotum mildly edematous with erythema    Musculoskeletal: Normal range of motion.   No CVA tenderness   Neurological: He is alert and oriented to person, place, and time.   Skin: Skin is warm and dry. Capillary refill takes less than 2 seconds.   Psychiatric: He has a normal mood and affect. His behavior is normal. Judgment and thought content normal.   Nursing note and vitals reviewed.      Significant Labs: All pertinent labs within the past 24 hours have been reviewed.    Significant Imaging: I have reviewed all pertinent imaging results/findings within the past 24 hours.

## 2020-03-13 PROBLEM — N45.3 ORCHITIS AND EPIDIDYMITIS: Status: ACTIVE | Noted: 2020-03-13

## 2020-03-13 LAB
ALBUMIN SERPL BCP-MCNC: 2.8 G/DL (ref 3.5–5.2)
ALP SERPL-CCNC: 80 U/L (ref 55–135)
ALT SERPL W/O P-5'-P-CCNC: 51 U/L (ref 10–44)
ANION GAP SERPL CALC-SCNC: 9 MMOL/L (ref 8–16)
AST SERPL-CCNC: 158 U/L (ref 10–40)
BASOPHILS # BLD AUTO: 0.04 K/UL (ref 0–0.2)
BASOPHILS NFR BLD: 0.4 % (ref 0–1.9)
BILIRUB SERPL-MCNC: 1.1 MG/DL (ref 0.1–1)
BUN SERPL-MCNC: 18 MG/DL (ref 8–23)
CALCIUM SERPL-MCNC: 7.8 MG/DL (ref 8.7–10.5)
CHLORIDE SERPL-SCNC: 108 MMOL/L (ref 95–110)
CK SERPL-CCNC: 3179 U/L (ref 20–200)
CO2 SERPL-SCNC: 22 MMOL/L (ref 23–29)
CREAT SERPL-MCNC: 1.6 MG/DL (ref 0.5–1.4)
CRP SERPL-MCNC: 19.66 MG/DL (ref 0–0.75)
DIFFERENTIAL METHOD: ABNORMAL
EOSINOPHIL # BLD AUTO: 0.1 K/UL (ref 0–0.5)
EOSINOPHIL NFR BLD: 0.6 % (ref 0–8)
ERYTHROCYTE [DISTWIDTH] IN BLOOD BY AUTOMATED COUNT: 14.8 % (ref 11.5–14.5)
EST. GFR  (AFRICAN AMERICAN): 47.3 ML/MIN/1.73 M^2
EST. GFR  (NON AFRICAN AMERICAN): 40.9 ML/MIN/1.73 M^2
FINAL PATHOLOGIC DIAGNOSIS: NORMAL
GLUCOSE SERPL-MCNC: 151 MG/DL (ref 70–110)
GLUCOSE SERPL-MCNC: 162 MG/DL (ref 70–110)
GLUCOSE SERPL-MCNC: 164 MG/DL (ref 70–110)
GLUCOSE SERPL-MCNC: 166 MG/DL (ref 70–110)
GLUCOSE SERPL-MCNC: 194 MG/DL (ref 70–110)
HCT VFR BLD AUTO: 33.8 % (ref 40–54)
HGB BLD-MCNC: 10.8 G/DL (ref 14–18)
IMM GRANULOCYTES # BLD AUTO: 0.09 K/UL (ref 0–0.04)
IMM GRANULOCYTES NFR BLD AUTO: 0.9 % (ref 0–0.5)
INFLUENZA A, MOLECULAR: NEGATIVE
INFLUENZA B, MOLECULAR: NEGATIVE
LACTATE SERPL-SCNC: 0.9 MMOL/L (ref 0.5–1.9)
LACTATE SERPL-SCNC: 2.9 MMOL/L (ref 0.5–1.9)
LYMPHOCYTES # BLD AUTO: 1.1 K/UL (ref 1–4.8)
LYMPHOCYTES NFR BLD: 11.6 % (ref 18–48)
MAGNESIUM SERPL-MCNC: 1.7 MG/DL (ref 1.6–2.6)
MCH RBC QN AUTO: 30.4 PG (ref 27–31)
MCHC RBC AUTO-ENTMCNC: 32 G/DL (ref 32–36)
MCV RBC AUTO: 95 FL (ref 82–98)
MONOCYTES # BLD AUTO: 0.3 K/UL (ref 0.3–1)
MONOCYTES NFR BLD: 2.6 % (ref 4–15)
NEUTROPHILS # BLD AUTO: 8 K/UL (ref 1.8–7.7)
NEUTROPHILS NFR BLD: 83.9 % (ref 38–73)
NRBC BLD-RTO: 0 /100 WBC
PHOSPHATE SERPL-MCNC: 2.8 MG/DL (ref 2.7–4.5)
PLATELET # BLD AUTO: 202 K/UL (ref 150–350)
PMV BLD AUTO: 10.3 FL (ref 9.2–12.9)
POTASSIUM SERPL-SCNC: 4 MMOL/L (ref 3.5–5.1)
PROT SERPL-MCNC: 6.6 G/DL (ref 6–8.4)
RBC # BLD AUTO: 3.55 M/UL (ref 4.6–6.2)
SODIUM SERPL-SCNC: 139 MMOL/L (ref 136–145)
SPECIMEN SOURCE: NORMAL
WBC # BLD AUTO: 9.51 K/UL (ref 3.9–12.7)

## 2020-03-13 PROCEDURE — 63600175 PHARM REV CODE 636 W HCPCS: Performed by: NURSE PRACTITIONER

## 2020-03-13 PROCEDURE — 25000003 PHARM REV CODE 250: Performed by: HOSPITALIST

## 2020-03-13 PROCEDURE — 85025 COMPLETE CBC W/AUTO DIFF WBC: CPT

## 2020-03-13 PROCEDURE — 21400001 HC TELEMETRY ROOM

## 2020-03-13 PROCEDURE — 80053 COMPREHEN METABOLIC PANEL: CPT

## 2020-03-13 PROCEDURE — 94761 N-INVAS EAR/PLS OXIMETRY MLT: CPT

## 2020-03-13 PROCEDURE — 83605 ASSAY OF LACTIC ACID: CPT

## 2020-03-13 PROCEDURE — 87040 BLOOD CULTURE FOR BACTERIA: CPT

## 2020-03-13 PROCEDURE — 84100 ASSAY OF PHOSPHORUS: CPT

## 2020-03-13 PROCEDURE — 25000003 PHARM REV CODE 250: Performed by: NURSE PRACTITIONER

## 2020-03-13 PROCEDURE — 63600175 PHARM REV CODE 636 W HCPCS: Performed by: HOSPITALIST

## 2020-03-13 PROCEDURE — 87502 INFLUENZA DNA AMP PROBE: CPT

## 2020-03-13 PROCEDURE — 63600175 PHARM REV CODE 636 W HCPCS: Performed by: INTERNAL MEDICINE

## 2020-03-13 PROCEDURE — 83735 ASSAY OF MAGNESIUM: CPT

## 2020-03-13 PROCEDURE — 27000221 HC OXYGEN, UP TO 24 HOURS

## 2020-03-13 PROCEDURE — 36415 COLL VENOUS BLD VENIPUNCTURE: CPT

## 2020-03-13 PROCEDURE — 83605 ASSAY OF LACTIC ACID: CPT | Mod: 91

## 2020-03-13 PROCEDURE — 21000000 HC CCU ICU ROOM CHARGE

## 2020-03-13 PROCEDURE — 99900035 HC TECH TIME PER 15 MIN (STAT)

## 2020-03-13 PROCEDURE — 86140 C-REACTIVE PROTEIN: CPT

## 2020-03-13 PROCEDURE — 82550 ASSAY OF CK (CPK): CPT

## 2020-03-13 RX ORDER — MORPHINE SULFATE 2 MG/ML
1 INJECTION, SOLUTION INTRAMUSCULAR; INTRAVENOUS EVERY 4 HOURS PRN
Status: DISCONTINUED | OUTPATIENT
Start: 2020-03-13 | End: 2020-03-15 | Stop reason: HOSPADM

## 2020-03-13 RX ORDER — METOPROLOL SUCCINATE 50 MG/1
50 TABLET, EXTENDED RELEASE ORAL 2 TIMES DAILY
Status: DISCONTINUED | OUTPATIENT
Start: 2020-03-13 | End: 2020-03-15 | Stop reason: HOSPADM

## 2020-03-13 RX ADMIN — ENOXAPARIN SODIUM 80 MG: 80 INJECTION, SOLUTION INTRAVENOUS; SUBCUTANEOUS at 09:03

## 2020-03-13 RX ADMIN — PIPERACILLIN SODIUM AND TAZOBACTAM SODIUM 3.38 G: 3; .375 INJECTION, POWDER, LYOPHILIZED, FOR SOLUTION INTRAVENOUS at 09:03

## 2020-03-13 RX ADMIN — DORZOLAMIDE HYDROCHLORIDE 1 DROP: 20 SOLUTION/ DROPS OPHTHALMIC at 08:03

## 2020-03-13 RX ADMIN — BRIMONIDINE TARTRATE 1 DROP: 1.5 SOLUTION OPHTHALMIC at 08:03

## 2020-03-13 RX ADMIN — ACETAMINOPHEN 650 MG: 325 TABLET ORAL at 02:03

## 2020-03-13 RX ADMIN — DORZOLAMIDE HYDROCHLORIDE 1 DROP: 20 SOLUTION/ DROPS OPHTHALMIC at 03:03

## 2020-03-13 RX ADMIN — BRIMONIDINE TARTRATE 1 DROP: 1.5 SOLUTION OPHTHALMIC at 03:03

## 2020-03-13 RX ADMIN — BRIMONIDINE TARTRATE 1 DROP: 1.5 SOLUTION OPHTHALMIC at 09:03

## 2020-03-13 RX ADMIN — LATANOPROST 1 DROP: 50 SOLUTION OPHTHALMIC at 08:03

## 2020-03-13 RX ADMIN — METOPROLOL SUCCINATE 50 MG: 50 TABLET, FILM COATED, EXTENDED RELEASE ORAL at 08:03

## 2020-03-13 RX ADMIN — PREGABALIN 100 MG: 50 CAPSULE ORAL at 08:03

## 2020-03-13 RX ADMIN — METOPROLOL SUCCINATE 50 MG: 50 TABLET, EXTENDED RELEASE ORAL at 08:03

## 2020-03-13 RX ADMIN — ENOXAPARIN SODIUM 80 MG: 80 INJECTION, SOLUTION INTRAVENOUS; SUBCUTANEOUS at 08:03

## 2020-03-13 RX ADMIN — ACETAMINOPHEN 650 MG: 325 TABLET ORAL at 04:03

## 2020-03-13 RX ADMIN — MORPHINE SULFATE 1 MG: 2 INJECTION, SOLUTION INTRAMUSCULAR; INTRAVENOUS at 11:03

## 2020-03-13 RX ADMIN — PIPERACILLIN SODIUM AND TAZOBACTAM SODIUM 3.38 G: 3; .375 INJECTION, POWDER, LYOPHILIZED, FOR SOLUTION INTRAVENOUS at 11:03

## 2020-03-13 RX ADMIN — PIPERACILLIN SODIUM AND TAZOBACTAM SODIUM 3.38 G: 3; .375 INJECTION, POWDER, LYOPHILIZED, FOR SOLUTION INTRAVENOUS at 05:03

## 2020-03-13 RX ADMIN — SODIUM CHLORIDE: 0.9 INJECTION, SOLUTION INTRAVENOUS at 07:03

## 2020-03-13 RX ADMIN — PIPERACILLIN SODIUM AND TAZOBACTAM SODIUM 3.38 G: 3; .375 INJECTION, POWDER, LYOPHILIZED, FOR SOLUTION INTRAVENOUS at 12:03

## 2020-03-13 RX ADMIN — ASPIRIN 81 MG 81 MG: 81 TABLET ORAL at 09:03

## 2020-03-13 NOTE — ASSESSMENT & PLAN NOTE
Converted to sinus rhythm, off Cardizem drip now   Consult Dr. NEVILLE Pimentel  Continue weight based Lovenox and Toprol-XL 50 BID while inpatient.  Planning to discharge home on 2.5 mg Eliquis considering his prior history of GI bleeds.  If tolerates that and Dr. Interiano can consider 5 mg b.i.d.  If needed sotalol can be considered

## 2020-03-13 NOTE — ASSESSMENT & PLAN NOTE
It appears that patient is suffering with this issue for a long time and was recently on and off antibiotics  Urine cultures positive for E coli  Continue Zosyn while inpatient - renally dosed  Consult urology-recommended to continue same management and keep him in the hospital until fever subsides and recommended Infectious Disease consult  Still spikes high-grade fever and at that time he experiences severe chills  Continue IV hydration.  P.r.n. Tylenol  Repeat imaging can be considered if fever continues  Daily CPK  CRP every 48 hrs  Consult Dr. Quintanilla

## 2020-03-13 NOTE — PROGRESS NOTES
Pt walked to the bathroom with no assistance. Pt walked to bathroom with steady gait unassisted. At this time the pt is not requiring a walker.

## 2020-03-13 NOTE — PROGRESS NOTES
[facility] Medicine  Progress Note    DOS: 03/13/2020    Patient Name: Tyler Todd  MRN: [id number]  Patient Class: IP- Inpatient   Admission Date: 3/10/2020  Length of Stay: 0 days  Attending Physician: Norma Rowe MD  Primary Care Provider: Andres Diane MD        Subjective:     Principal Problem:New onset a-fib        HPI:  Mr. Todd presents today with complaints of swelling of his scrotum. It is moderate. It is associated with malaise, fever, chills, hematuria, and recent urological procedure. He denies N/V/D, dizziness, chest pain, SOB, or LOC. He has a history of CAD with stents, lung ca, prostate ca, NIDDM, HTN, and COPD. He had a cystoscopy on 2/24/2020 with Dr. Soto and developed urological symptoms about a week after. He was treated with cipro from epididymitis, but did not finish this because it upset his stomach. He continued to have symptoms and decided to come to the ED today for treatment. While in the ED he went into afib RVR which he's never been in previously, but remained asymptomatic denying SOB, chest pain, and palpitations. He is a patient of Dr. Interiano, but has seen Dr. NEVILLE Pimentel in the past and had a negative stress test in 2019.     Overview/Hospital Course:  03/11:  Patient was seen and examined bedside.  He converted back to sinus rhythm and currently heart rate 60-80.  Denies any new symptoms except some discomfort in scrotum.  No acute events overnight as per nursing staff.  Off Cardizem drip now.  Waiting for Urology evaluation    03/12:  Patient was seen and examined at bedside.  Still has significant discomfort in scrotal area as well as perianal area.  Still spiking high-grade temperature.  Remains in sinus rhythm with rate controlled on current regimen of beta-blocker.  Remains on weight based Lovenox.  Urology recommended to continue same management and wait till fever resolves.  No new recommendations from Cardiology.     03/13:  Patient was  seen and examined bedside.  Still spikes high-grade temperature.  Repeat blood cultures were sent.  Remains on Zosyn and weight based Lovenox.  Remains in sinus rhythm.  Reports significant deconditioning and weakness as well as scrotal pain otherwise denies any new symptoms.  No other acute events overnight as per nursing staff.  Breathing comfortably on room air.     Interval History:  Patient was seen and examined bedside.  Still spikes high-grade temperature.  Repeat blood cultures were sent.  Remains on Zosyn and weight based Lovenox.  Remains in sinus rhythm.  Reports significant deconditioning and weakness as well as scrotal pain otherwise denies any new symptoms.  No other acute events overnight as per nursing staff.  Breathing comfortably on room air.       Review of Systems   Constitutional: Positive for chills, fatigue and fever. Negative for diaphoresis.   HENT: Negative for congestion, ear pain, sore throat and trouble swallowing.    Eyes: Negative for pain, discharge and visual disturbance.   Respiratory: Negative for cough, chest tightness, shortness of breath and wheezing.    Cardiovascular: Negative for chest pain, palpitations and leg swelling.   Gastrointestinal: Positive for abdominal pain. Negative for abdominal distention, blood in stool, constipation, diarrhea, nausea and vomiting.   Endocrine: Negative for polydipsia, polyphagia and polyuria.   Genitourinary: Positive for flank pain, frequency and scrotal swelling. Negative for urgency.   Musculoskeletal: Negative for back pain, joint swelling, neck pain and neck stiffness.   Skin: Negative for rash and wound.   Allergic/Immunologic: Negative for immunocompromised state.   Neurological: Negative for dizziness, syncope, speech difficulty, weakness, light-headedness, numbness and headaches.   Hematological: Negative for adenopathy.   Psychiatric/Behavioral: Negative for confusion and suicidal ideas. The patient is not nervous/anxious.    All  other systems reviewed and are negative.    Objective:     Vital Signs (Most Recent):  Temp: 98.1 °F (36.7 °C) (03/13/20 1215)  Pulse: 88 (03/13/20 1215)  Resp: (!) 67 (03/13/20 1215)  BP: 133/70 (03/13/20 1215)  SpO2: 100 % (03/13/20 1215) Vital Signs (24h Range):  Temp:  [98.1 °F (36.7 °C)-102.7 °F (39.3 °C)] 98.1 °F (36.7 °C)  Pulse:  [] 88  Resp:  [17-94] 67  SpO2:  [76 %-100 %] 100 %  BP: (125-150)/(58-70) 133/70     Weight: 80.5 kg (177 lb 7.5 oz)  Body mass index is 25.46 kg/m².    Intake/Output Summary (Last 24 hours) at 3/13/2020 1557  Last data filed at 3/13/2020 0900  Gross per 24 hour   Intake 2025 ml   Output 400 ml   Net 1625 ml      Physical Exam   Constitutional: He is oriented to person, place, and time. He appears well-developed and well-nourished. He appears distressed.   HENT:   Head: Normocephalic and atraumatic.   Eyes: Pupils are equal, round, and reactive to light. Conjunctivae and EOM are normal.   Neck: Normal range of motion. Neck supple.   Cardiovascular: Intact distal pulses.   regular rate and rhythm   Pulmonary/Chest: Effort normal and breath sounds normal.   Abdominal: Soft. Bowel sounds are normal. There is no tenderness.   Genitourinary:   Genitourinary Comments: Scrotum mildly edematous with erythema-much better compared to 2 days ago   Musculoskeletal: Normal range of motion.   No CVA tenderness   Neurological: He is alert and oriented to person, place, and time.   Skin: Skin is warm and dry. Capillary refill takes less than 2 seconds.   Psychiatric: He has a normal mood and affect. His behavior is normal. Judgment and thought content normal.   Nursing note and vitals reviewed.      Significant Labs: All pertinent labs within the past 24 hours have been reviewed.    Significant Imaging: I have reviewed all pertinent imaging results/findings within the past 24 hours.      Assessment/Plan:      * New onset a-fib  Converted to sinus rhythm, off Cardizem drip now   Consult   NEVILLE Pimentel  Continue weight based Lovenox and Toprol-XL 50 BID while inpatient.  Planning to discharge home on 2.5 mg Eliquis considering his prior history of GI bleeds.  If tolerates that and Dr. Interiano can consider 5 mg b.i.d.  If needed sotalol can be considered    Epididymoorchitis  It appears that patient is suffering with this issue for a long time and was recently on and off antibiotics  Urine cultures positive for E coli  Continue Zosyn while inpatient - renally dosed  Consult urology-recommended to continue same management and keep him in the hospital until fever subsides and recommended Infectious Disease consult  Still spikes high-grade fever and at that time he experiences severe chills  Continue IV hydration.  P.r.n. Tylenol  Repeat imaging can be considered if fever continues  Daily CPK  CRP every 48 hrs  Consult Dr. Quintanilla    Acute renal failure superimposed on stage 3 chronic kidney disease  Improving with IV hydration  Hold nephrotoxic meds - ARB/NSAID  Renally dose all meds   Daily labs       Diabetes mellitus type II, uncontrolled  accuchecks ACHS with low dose ISS      Elevated liver function tests  Dehydration vs. Other?   Repeat CMP in AM.      Adenocarcinoma of prostate  Outpatient follow-up      VTE Risk Mitigation (From admission, onward)         Ordered     enoxaparin injection 80 mg  Every 12 hours (non-standard times)      03/10/20 2117     Place sequential compression device  Until discontinued      03/10/20 1924     IP VTE HIGH RISK PATIENT  Once      03/10/20 1924                      Norma Rowe MD  Department of Hospital Medicine   [facility]

## 2020-03-13 NOTE — PT/OT/SLP PROGRESS
"Physical Therapy      Patient Name:  Tyler Todd   MRN:  [id number]    Patient not seen today secondary to Patient fatigue(patient stated, "I don't feel like walking. I just got back from the bathroom."). Will follow-up 03/14/20.    Charu Bernal PTA    "

## 2020-03-13 NOTE — SUBJECTIVE & OBJECTIVE
Interval History:  Patient was seen and examined bedside.  Still spikes high-grade temperature.  Repeat blood cultures were sent.  Remains on Zosyn and weight based Lovenox.  Remains in sinus rhythm.  Reports significant deconditioning and weakness as well as scrotal pain otherwise denies any new symptoms.  No other acute events overnight as per nursing staff.  Breathing comfortably on room air.       Review of Systems   Constitutional: Positive for chills, fatigue and fever. Negative for diaphoresis.   HENT: Negative for congestion, ear pain, sore throat and trouble swallowing.    Eyes: Negative for pain, discharge and visual disturbance.   Respiratory: Negative for cough, chest tightness, shortness of breath and wheezing.    Cardiovascular: Negative for chest pain, palpitations and leg swelling.   Gastrointestinal: Positive for abdominal pain. Negative for abdominal distention, blood in stool, constipation, diarrhea, nausea and vomiting.   Endocrine: Negative for polydipsia, polyphagia and polyuria.   Genitourinary: Positive for flank pain, frequency and scrotal swelling. Negative for urgency.   Musculoskeletal: Negative for back pain, joint swelling, neck pain and neck stiffness.   Skin: Negative for rash and wound.   Allergic/Immunologic: Negative for immunocompromised state.   Neurological: Negative for dizziness, syncope, speech difficulty, weakness, light-headedness, numbness and headaches.   Hematological: Negative for adenopathy.   Psychiatric/Behavioral: Negative for confusion and suicidal ideas. The patient is not nervous/anxious.    All other systems reviewed and are negative.    Objective:     Vital Signs (Most Recent):  Temp: 98.1 °F (36.7 °C) (03/13/20 1215)  Pulse: 88 (03/13/20 1215)  Resp: (!) 67 (03/13/20 1215)  BP: 133/70 (03/13/20 1215)  SpO2: 100 % (03/13/20 1215) Vital Signs (24h Range):  Temp:  [98.1 °F (36.7 °C)-102.7 °F (39.3 °C)] 98.1 °F (36.7 °C)  Pulse:  [] 88  Resp:  [17-94]  67  SpO2:  [76 %-100 %] 100 %  BP: (125-150)/(58-70) 133/70     Weight: 80.5 kg (177 lb 7.5 oz)  Body mass index is 25.46 kg/m².    Intake/Output Summary (Last 24 hours) at 3/13/2020 1557  Last data filed at 3/13/2020 0900  Gross per 24 hour   Intake 2025 ml   Output 400 ml   Net 1625 ml      Physical Exam   Constitutional: He is oriented to person, place, and time. He appears well-developed and well-nourished. He appears distressed.   HENT:   Head: Normocephalic and atraumatic.   Eyes: Pupils are equal, round, and reactive to light. Conjunctivae and EOM are normal.   Neck: Normal range of motion. Neck supple.   Cardiovascular: Intact distal pulses.   regular rate and rhythm   Pulmonary/Chest: Effort normal and breath sounds normal.   Abdominal: Soft. Bowel sounds are normal. There is no tenderness.   Genitourinary:   Genitourinary Comments: Scrotum mildly edematous with erythema-much better compared to 2 days ago   Musculoskeletal: Normal range of motion.   No CVA tenderness   Neurological: He is alert and oriented to person, place, and time.   Skin: Skin is warm and dry. Capillary refill takes less than 2 seconds.   Psychiatric: He has a normal mood and affect. His behavior is normal. Judgment and thought content normal.   Nursing note and vitals reviewed.      Significant Labs: All pertinent labs within the past 24 hours have been reviewed.    Significant Imaging: I have reviewed all pertinent imaging results/findings within the past 24 hours.

## 2020-03-13 NOTE — PLAN OF CARE
03/13/20 0955   Post-Acute Status   Post-Acute Authorization Home Health/Hospice   Home Health/Hospice Status Referrals Sent   Patient choice form signed by patient/caregiver List from [employer]   Discharge Plan   Discharge Plan A Home Health   Discharge Plan B Home Health   Referral to be sent to [facility] per patient request. Rosanna from DME approved pulling from [employer]. Walker delivered to patient's room.

## 2020-03-13 NOTE — PROGRESS NOTES
Chart review    Pt's urine culture is growing e.coli sensitive to zosyn. He says his L testicular pain is improving with ice and scrotal elevation. Had a fever to 103 today then came down to 101.2 and now 98.2.  Wbc normal     Plan:   · Will have nurse check pvr and chart in nurses note  · If he spikes fever again check blood cultures  · If he continues to spike fever this weekend, reconsult urology ( covering) for re-evaluation. May need ct abd pelvis with contrast if kd function allows to r/o kidney or prostate abscess and consider ID consult  · Start flomax 0.4mg nightly     Vitals:    03/12/20 1620   BP:    Pulse: 101   Resp: (!) 30   Temp: (!) 101.2 °F (38.4 °C)         Recent Labs   Lab 03/10/20  1538 03/11/20  0437 03/12/20  0323   WBC 10.79 9.58 7.97   Hemoglobin 11.8 L 11.2 L 10.2 L   Hematocrit 35.8 L 34.3 L 31.0 L   Platelets 202 187 186   ]  Recent Labs   Lab 02/19/20  1032 03/10/20  1538 03/11/20  0437 03/12/20  0323   Sodium 140 136 137 137   Potassium 4.2 3.9 3.8 3.6   Chloride 105 103 104 106   CO2 26 24 21 L 23   BUN, Bld 18 31 H 28 H 24 H   Creatinine 1.5 H 2.0 H 1.7 H 1.7 H   Glucose 168 H 159 H 248 H 169 H   Calcium 9.2 8.3 L 7.9 L 7.6 L   Magnesium  --  1.6 1.8 1.6   Alkaline Phosphatase 88 74 66  --    Total Protein 7.1 6.9 5.9 L  --    Albumin 3.9 3.1 L 2.6 L  --    Total Bilirubin 0.6 1.1 H 0.8  --    AST 13 236 H 198 H  --    ALT 9 L 54 H 51 H  --    ]    Lab Results   Component Value Date    HGBA1C 6.8 (H) 08/04/2019

## 2020-03-14 LAB
ALBUMIN SERPL BCP-MCNC: 2.3 G/DL (ref 3.5–5.2)
ALP SERPL-CCNC: 73 U/L (ref 55–135)
ALT SERPL W/O P-5'-P-CCNC: 74 U/L (ref 10–44)
ANION GAP SERPL CALC-SCNC: 6 MMOL/L (ref 8–16)
AST SERPL-CCNC: 142 U/L (ref 10–40)
BASOPHILS # BLD AUTO: 0.04 K/UL (ref 0–0.2)
BASOPHILS NFR BLD: 0.4 % (ref 0–1.9)
BILIRUB SERPL-MCNC: 0.8 MG/DL (ref 0.1–1)
BUN SERPL-MCNC: 19 MG/DL (ref 8–23)
CALCIUM SERPL-MCNC: 7.7 MG/DL (ref 8.7–10.5)
CHLORIDE SERPL-SCNC: 109 MMOL/L (ref 95–110)
CK SERPL-CCNC: 1349 U/L (ref 20–200)
CO2 SERPL-SCNC: 24 MMOL/L (ref 23–29)
CREAT SERPL-MCNC: 1.4 MG/DL (ref 0.5–1.4)
DIFFERENTIAL METHOD: ABNORMAL
EOSINOPHIL # BLD AUTO: 0.1 K/UL (ref 0–0.5)
EOSINOPHIL NFR BLD: 1.3 % (ref 0–8)
ERYTHROCYTE [DISTWIDTH] IN BLOOD BY AUTOMATED COUNT: 14.8 % (ref 11.5–14.5)
EST. GFR  (AFRICAN AMERICAN): 55.6 ML/MIN/1.73 M^2
EST. GFR  (NON AFRICAN AMERICAN): 48.1 ML/MIN/1.73 M^2
GLUCOSE SERPL-MCNC: 140 MG/DL (ref 70–110)
GLUCOSE SERPL-MCNC: 175 MG/DL (ref 70–110)
GLUCOSE SERPL-MCNC: 203 MG/DL (ref 70–110)
HCT VFR BLD AUTO: 29.7 % (ref 40–54)
HGB BLD-MCNC: 9.7 G/DL (ref 14–18)
IMM GRANULOCYTES # BLD AUTO: 0.1 K/UL (ref 0–0.04)
IMM GRANULOCYTES NFR BLD AUTO: 1 % (ref 0–0.5)
LYMPHOCYTES # BLD AUTO: 1.4 K/UL (ref 1–4.8)
LYMPHOCYTES NFR BLD: 14.2 % (ref 18–48)
MAGNESIUM SERPL-MCNC: 1.6 MG/DL (ref 1.6–2.6)
MCH RBC QN AUTO: 30.5 PG (ref 27–31)
MCHC RBC AUTO-ENTMCNC: 32.7 G/DL (ref 32–36)
MCV RBC AUTO: 93 FL (ref 82–98)
MONOCYTES # BLD AUTO: 0.8 K/UL (ref 0.3–1)
MONOCYTES NFR BLD: 8.8 % (ref 4–15)
NEUTROPHILS # BLD AUTO: 7.1 K/UL (ref 1.8–7.7)
NEUTROPHILS NFR BLD: 74.3 % (ref 38–73)
NRBC BLD-RTO: 0 /100 WBC
PHOSPHATE SERPL-MCNC: 2.3 MG/DL (ref 2.7–4.5)
PLATELET # BLD AUTO: 203 K/UL (ref 150–350)
PMV BLD AUTO: 10.4 FL (ref 9.2–12.9)
POTASSIUM SERPL-SCNC: 3.7 MMOL/L (ref 3.5–5.1)
PROT SERPL-MCNC: 5.8 G/DL (ref 6–8.4)
RBC # BLD AUTO: 3.18 M/UL (ref 4.6–6.2)
SODIUM SERPL-SCNC: 139 MMOL/L (ref 136–145)
WBC # BLD AUTO: 9.55 K/UL (ref 3.9–12.7)

## 2020-03-14 PROCEDURE — 63600175 PHARM REV CODE 636 W HCPCS: Performed by: INTERNAL MEDICINE

## 2020-03-14 PROCEDURE — 94761 N-INVAS EAR/PLS OXIMETRY MLT: CPT

## 2020-03-14 PROCEDURE — 97116 GAIT TRAINING THERAPY: CPT

## 2020-03-14 PROCEDURE — 83735 ASSAY OF MAGNESIUM: CPT

## 2020-03-14 PROCEDURE — 82550 ASSAY OF CK (CPK): CPT

## 2020-03-14 PROCEDURE — 27000221 HC OXYGEN, UP TO 24 HOURS

## 2020-03-14 PROCEDURE — 63600175 PHARM REV CODE 636 W HCPCS: Performed by: HOSPITALIST

## 2020-03-14 PROCEDURE — 63600175 PHARM REV CODE 636 W HCPCS: Performed by: NURSE PRACTITIONER

## 2020-03-14 PROCEDURE — 25000003 PHARM REV CODE 250: Performed by: HOSPITALIST

## 2020-03-14 PROCEDURE — 82962 GLUCOSE BLOOD TEST: CPT

## 2020-03-14 PROCEDURE — 84100 ASSAY OF PHOSPHORUS: CPT

## 2020-03-14 PROCEDURE — 21400001 HC TELEMETRY ROOM

## 2020-03-14 PROCEDURE — 97530 THERAPEUTIC ACTIVITIES: CPT

## 2020-03-14 PROCEDURE — 85025 COMPLETE CBC W/AUTO DIFF WBC: CPT

## 2020-03-14 PROCEDURE — 80053 COMPREHEN METABOLIC PANEL: CPT

## 2020-03-14 PROCEDURE — 25000003 PHARM REV CODE 250: Performed by: NURSE PRACTITIONER

## 2020-03-14 PROCEDURE — 36415 COLL VENOUS BLD VENIPUNCTURE: CPT

## 2020-03-14 RX ORDER — GUAIFENESIN 600 MG/1
600 TABLET, EXTENDED RELEASE ORAL 2 TIMES DAILY
Status: DISCONTINUED | OUTPATIENT
Start: 2020-03-14 | End: 2020-03-15 | Stop reason: HOSPADM

## 2020-03-14 RX ADMIN — BRIMONIDINE TARTRATE 1 DROP: 1.5 SOLUTION OPHTHALMIC at 08:03

## 2020-03-14 RX ADMIN — BRIMONIDINE TARTRATE 1 DROP: 1.5 SOLUTION OPHTHALMIC at 03:03

## 2020-03-14 RX ADMIN — PREGABALIN 100 MG: 50 CAPSULE ORAL at 08:03

## 2020-03-14 RX ADMIN — ENOXAPARIN SODIUM 80 MG: 80 INJECTION, SOLUTION INTRAVENOUS; SUBCUTANEOUS at 10:03

## 2020-03-14 RX ADMIN — METOPROLOL SUCCINATE 50 MG: 50 TABLET, EXTENDED RELEASE ORAL at 10:03

## 2020-03-14 RX ADMIN — ENOXAPARIN SODIUM 80 MG: 80 INJECTION, SOLUTION INTRAVENOUS; SUBCUTANEOUS at 11:03

## 2020-03-14 RX ADMIN — GUAIFENESIN 600 MG: 600 TABLET, EXTENDED RELEASE ORAL at 08:03

## 2020-03-14 RX ADMIN — SODIUM CHLORIDE: 0.9 INJECTION, SOLUTION INTRAVENOUS at 08:03

## 2020-03-14 RX ADMIN — LATANOPROST 1 DROP: 50 SOLUTION OPHTHALMIC at 10:03

## 2020-03-14 RX ADMIN — BRIMONIDINE TARTRATE 1 DROP: 1.5 SOLUTION OPHTHALMIC at 10:03

## 2020-03-14 RX ADMIN — PIPERACILLIN SODIUM AND TAZOBACTAM SODIUM 3.38 G: 3; .375 INJECTION, POWDER, LYOPHILIZED, FOR SOLUTION INTRAVENOUS at 03:03

## 2020-03-14 RX ADMIN — PREGABALIN 100 MG: 50 CAPSULE ORAL at 10:03

## 2020-03-14 RX ADMIN — ASPIRIN 81 MG 81 MG: 81 TABLET ORAL at 10:03

## 2020-03-14 RX ADMIN — PIPERACILLIN SODIUM AND TAZOBACTAM SODIUM 3.38 G: 3; .375 INJECTION, POWDER, LYOPHILIZED, FOR SOLUTION INTRAVENOUS at 08:03

## 2020-03-14 RX ADMIN — GUAIFENESIN 600 MG: 600 TABLET, EXTENDED RELEASE ORAL at 03:03

## 2020-03-14 RX ADMIN — HYDROCODONE BITARTRATE AND ACETAMINOPHEN 1 TABLET: 5; 325 TABLET ORAL at 07:03

## 2020-03-14 RX ADMIN — METOPROLOL SUCCINATE 50 MG: 50 TABLET, EXTENDED RELEASE ORAL at 08:03

## 2020-03-14 RX ADMIN — DORZOLAMIDE HYDROCHLORIDE 1 DROP: 20 SOLUTION/ DROPS OPHTHALMIC at 03:03

## 2020-03-14 RX ADMIN — DORZOLAMIDE HYDROCHLORIDE 1 DROP: 20 SOLUTION/ DROPS OPHTHALMIC at 10:03

## 2020-03-14 NOTE — ASSESSMENT & PLAN NOTE
Significantly improved in last 24 hrs with IV hydration  Hold nephrotoxic meds - ARB/NSAID  Renally dose all meds   Daily labs

## 2020-03-14 NOTE — CONSULTS
Consult Note  Infectious Disease    Reason for Consult:      HPI: Tyler Todd is a  79 y.o. male  COPD, CAD, 3 stents placed, HTN, D LP, lung cancer status post chemotherapy and radiation, prostate cancer, DM 2,    Status post cystoscopy on 02/24/2020 by Dr. Soto.  He was discharged on an antibiotic which patient does not know what it was.  Later he developed pain on the left scrotum and was seen at urologist office.  Was diagnosed with SVT edema or colitis and was placed on ciprofloxacin.  He was also sent to the ED for evaluation.  Please note patient did not complete Cipro because it caused him GI upset.   While in ED he was found to be in AFib with RVR and was treated for such in cardiology.    Patient has been receiving Zosyn.  Scrotal pain, erythema and swelling are markedly improved but still present.  T-max is improving. The highest was 103 than 102.7 than afebrile.  Urine culture are growing E coli which is resistant to ciprofloxacin (?this is why patient failed outpatient therapy?)    Patient also has some cough with dark phlegm which he blames it on his heavy smoking history.  Patient has not been smoking while in hospital and he is hoping to kick the happened.    Antibiotics (From admission, onward)    Start     Stop Route Frequency Ordered    03/11/20 0000  piperacillin-tazobactam 3.375 g in dextrose 5 % 50 mL IVPB (ready to mix system)      -- IV Every 8 hours (non-standard times) 03/10/20 2321        Antifungals (From admission, onward)    None        Antivirals (From admission, onward)    None          .  Review of patient's allergies indicates:   Allergen Reactions    Doxycycline Diarrhea     Severe diarrhea    Sulfa (sulfonamide antibiotics) Rash     Other reaction(s): Itching    Sulfasalazine Rash     Rash and itching mild     Past Medical History:   Diagnosis Date    Cancer 2012    lung cancer chemo and radiation    COPD (chronic obstructive pulmonary disease)     Coronary artery  disease 2002, 2004    s/p 3 stents;  Dr. Interiano    Diabetes mellitus, type 2     Hyperlipidemia     Hypertension     Prostate CA      Past Surgical History:   Procedure Laterality Date    ANKLE SURGERY Right 1970s    CYSTOSCOPY W/ RETROGRADES Bilateral 2/24/2020    Procedure: CYSTOSCOPY, WITH RETROGRADE PYELOGRAM;  Surgeon: Nuha Soto MD;  Location: [facility];  Service: Urology;  Laterality: Bilateral;    EPIDURAL STEROID INJECTION INTO CERVICAL SPINE N/A 2/19/2019    Procedure: Injection-steroid-epidural-cervical C7-T1;  Surgeon: Marcelino Monroe MD;  Location: [facility];  Service: Pain Management;  Laterality: N/A;    INJECTION OF ANESTHETIC AGENT AROUND MEDIAL BRANCH NERVES INNERVATING LUMBAR FACET JOINT Bilateral 6/4/2018    Procedure: MEDIAL BRANCH, LUMBAR L3,4,5;  Surgeon: Marcelino Monroe MD;  Location: [facility];  Service: Pain Management;  Laterality: Bilateral;  L3, 4, 5    RADIOFREQUENCY ABLATION OF LUMBAR MEDIAL BRANCH NERVE AT SINGLE LEVEL Bilateral 7/16/2018    Procedure: RADIOFREQUENCY ABLATION, NERVE, MEDIAL BRANCH, LUMBAR, 1 LEVEL;  Surgeon: Marcelino Monroe MD;  Location: [facility];  Service: Pain Management;  Laterality: Bilateral;  L3, 4, 5  lumbar  Qompium pain management generator  SN [id number]  80 degrees for 75 seconds x2    TRANSRECTAL ULTRASOUND OF PROSTATE WITH INSERTION OF GOLD FIDUCIAL MARKER N/A 2/24/2020    Procedure: ULTRASOUND, PROSTATE, RECTAL APPROACH, WITH GOLD FIDUCIAL MARKER INSERTION;  Surgeon: Nuha Stoo MD;  Location: [facility];  Service: Urology;  Laterality: N/A;     Social History     Socioeconomic History    Marital status:      Spouse name: Not on file    Number of children: Not on file    Years of education: Not on file    Highest education level: Not on file   Occupational History    Not on file   Social Needs    Financial resource strain: Not on file    Food insecurity:     Worry: Not on file     Inability: Not on file    Transportation needs:      Medical: Not on file     Non-medical: Not on file   Tobacco Use    Smoking status: Current Every Day Smoker     Packs/day: 1.00     Years: 57.00     Pack years: 57.00     Types: Cigarettes    Smokeless tobacco: Never Used   Substance and Sexual Activity    Alcohol use: No    Drug use: No    Sexual activity: Never   Lifestyle    Physical activity:     Days per week: Not on file     Minutes per session: Not on file    Stress: Not on file   Relationships    Social connections:     Talks on phone: Not on file     Gets together: Not on file     Attends Judaism service: Not on file     Active member of club or organization: Not on file     Attends meetings of clubs or organizations: Not on file     Relationship status: Not on file   Other Topics Concern    Not on file   Social History Narrative    Not on file     Family History   Problem Relation Age of Onset    Diabetes Mother     Peripheral vascular disease Mother     Heart attack Mother     Diabetes Father     Heart attack Father     Emphysema Father     Diabetes Sister        Pertinent medications noted:     Review of Systems:   No chills, fever, sweats, weight loss  No change in vision, loss of vision or diplopia  No sinus congestion, purulent nasal discharge, post nasal drip or facial pain  No pain in mouth or throat. No problems with teeth, gums.  No chest pain, palpitations, syncope  No cough, sputum production, shortness of breath, dyspnea on exertion, pleurisy, hemoptysis  No nausea, vomiting, diarrhea, constipation, blood in stool, or focal abd pain,  No dysphagia, odynophagia  No dysuria, hematuria, strangury, retention, incontinence, nocturia, prostatism,   No vaginal/penile discharge, genital ulcers, risk for STD  No swelling of joints, redness of joints, injuries, or new focal pain  No unusual headaches, dizziness, vertigo, numbness, paresthesias, neuropathy, falls  No anxiety, depression, substance abuse, sleep disturbance  No diabetes,  thyroid, hypogonadal conditions  No bleeding, lymphadenopathy, anemia, malignancy, unusual bruising  No new rashes, lesions, or wounds  No TB exposure  No recent/prior steroids no  Outdoor activities:  No  Travel:  No  Implants:   Antibiotic History:  As above    EXAM & DIAGNOSTICS REVIEWED:   Vitals:     Temp:  [97.9 °F (36.6 °C)-100.1 °F (37.8 °C)]   Temp: 98.9 °F (37.2 °C) (03/14/20 1610)  Pulse: 82 (03/14/20 1610)  Resp: 19 (03/14/20 1610)  BP: (!) 172/83 (03/14/20 1610)  SpO2: (!) 83 % (03/14/20 1120)    Intake/Output Summary (Last 24 hours) at 3/14/2020 1816  Last data filed at 3/14/2020 1600  Gross per 24 hour   Intake 1695 ml   Output 1050 ml   Net 645 ml       General:  In NAD. Alert and attentive, cooperative, comfortable  Eyes:  Anicteric, PERRL, EOMI  ENT:  No ulcers, exudates, thrush, nares patent, multiple missing teeth.  Neck:  supple, no masses or adenopathy appreciated  Lungs: Clear, no consolidation, rales, wheezes, rub  Heart:  RRR, no gallop/murmur/rub noted  Abd:  Soft, NT, ND, normal BS, no masses or organomegaly appreciated.  :  Normal penis.  Tender left lateral post testicle/tender epididimitis.  No associated cellulitis.  Musc:  Joints without effusion, swelling, erythema, synovitis, muscle wasting.   Skin:  No rashes. No palmar or plantar lesions. No subungual petechiae  Wound:   Neuro: Alert, attentive, speech fluent, face symmetric, moves all extremities, no focal weakness. Ambulatory  Psych:  Calm, cooperative  Lymphatic:     No cervical, supraclavicular, axillary, or inguinal nodes  Extrem: No edema, erythema, phlebitis, cellulitis, warm and well perfused  VAD:       Isolation:      Lines/Tubes/Drains:    General Labs reviewed:  Recent Labs   Lab 03/12/20  0323 03/13/20  0239 03/14/20  0309   WBC 7.97 9.51 9.55   HGB 10.2* 10.8* 9.7*   HCT 31.0* 33.8* 29.7*    202 203       Recent Labs   Lab 03/11/20  0437 03/12/20  0323 03/13/20  0239 03/14/20  0309    137 139 139   K  3.8 3.6 4.0 3.7    106 108 109   CO2 21* 23 22* 24   BUN 28* 24* 18 19   CREATININE 1.7* 1.7* 1.6* 1.4   CALCIUM 7.9* 7.6* 7.8* 7.7*   PROT 5.9*  --  6.6 5.8*   BILITOT 0.8  --  1.1* 0.8   ALKPHOS 66  --  80 73   ALT 51*  --  51* 74*   *  --  158* 142*           Micro:  Microbiology Results (last 7 days)     Procedure Component Value Units Date/Time    Blood culture x two cultures. Draw prior to antibiotics. [[id number]] Collected:  03/10/20 1539    Order Status:  Completed Specimen:  Blood from Peripheral, Antecubital, Right Updated:  03/14/20 1632     Blood Culture, Routine No Growth to date      No Growth to date      No Growth to date      No Growth to date      No Growth to date    Narrative:       Aerobic and anaerobic    Blood culture [[id number]] Collected:  03/13/20 0239    Order Status:  Completed Specimen:  Blood from Antecubital, Right Updated:  03/14/20 0432     Blood Culture, Routine No Growth to date      No Growth to date    Blood culture [[id number]] Collected:  03/13/20 0238    Order Status:  Completed Specimen:  Blood Updated:  03/14/20 0432     Blood Culture, Routine No Growth to date      No Growth to date    Blood culture x two cultures. Draw prior to antibiotics. [[id number]] Collected:  03/10/20 1540    Order Status:  Completed Specimen:  Blood from Peripheral, Antecubital, Left Updated:  03/13/20 1832     Blood Culture, Routine No Growth to date      No Growth to date      No Growth to date      No Growth to date    Narrative:       Aerobic and anaerobic    Culture, Respiratory with Gram Stain [[id number]] Collected:  03/13/20 1103    Order Status:  Sent Specimen:  Respiratory from Sputum Updated:  03/13/20 1104    Blood culture [[id number]]     Order Status:  Canceled Specimen:  Blood     Urine culture [[id number]]  (Abnormal)  (Susceptibility) Collected:  03/10/20 1330    Order Status:  Completed Specimen:  Urine Updated:  03/12/20 0743     Urine Culture, Routine ESCHERICHIA  COLI  >100,000 cfu/ml      Narrative:       Preferred Collection Type->Urine, Clean Catch  Specimen Source->Urine            Component 4d ago   Urine Culture, Routine Abnormal    ESCHERICHIA COLI   >100,000 cfu/ml     Resulting Agency [facility]   Susceptibility      Escherichia coli     CULTURE, URINE     Amox/K Clav'ate <=8/4 mcg/mL Sensitive     Amp/Sulbactam 16/8 mcg/mL Intermediate     Ampicillin >16 mcg/mL Resistant     Cefazolin <=8 mcg/mL Sensitive     Cefepime <=4 mcg/mL Sensitive     Ceftriaxone <=8 mcg/mL Sensitive     Ciprofloxacin >4 mcg/mL Resistant     Ertapenem <=2 mcg/mL Sensitive     Gentamicin <=4 mcg/mL Sensitive     Levofloxacin >4 mcg/mL Resistant     Meropenem <=1 mcg/mL Sensitive     Nitrofurantoin <=32 mcg/mL Sensitive     Piperacillin/Tazo <=16 mcg/mL Sensitive     Tetracycline <=4 mcg/mL Sensitive     Tobramycin <=4 mcg/mL Sensitive     Trimeth/Sulfa <=2/38 mcg/mL Sensitive            Linear View                Imaging Reviewed:   CXRFissural fluid and findings of mild interstitial edema, potentially due to volume overload.   US   Hyperemia to the left testicle and left epididymis consistent with epididymo-orchitis.  Moderate left hydrocele and small right hydrocele.  Stable appearance of right paratesticular cystic structure with differential considerations remaining as discussed on the reference examination.    Cardiology:    IMPRESSION & PLAN     Epididymo-orchitis after recent cystoscopy  He had a few loose stools.  Monitor.  Check stool studies.    Ho COPD, CAD, 3 stents placed, HTN, DLP, lung cancer status post chemotherapy and radiation, prostate cancer, DM 2,      Recommendations:  Augmentin at home, if able to tolerate  Augmentin is my 1st choice, If not able to tolerate Augmentin then can try Keflex.  probiotic  Duration 3-4 weeks

## 2020-03-14 NOTE — PLAN OF CARE
Problem: Physical Therapy Goal  Goal: Physical Therapy Goal  Description  Goals to be met by: discharge     Patient will increase functional independence with mobility by performin. Sit to stand transfer with Supervision  2. Bed to chair transfer with Supervision using Rolling Walker  3. Gait  x 150 feet with Supervision using Rolling Walker.      Outcome: Ongoing, Progressing

## 2020-03-14 NOTE — SUBJECTIVE & OBJECTIVE
Interval History: Patient was seen and examined at bedside.  Still spikes low-grade temperature.  Scrotal erythema and swelling has improved.  Still becomes very dyspneic upon minimal exertion.  Coughing dark sputum.  No other acute events overnight as per nursing staff.  Remained in sinus rhythm.       Review of Systems   Constitutional: Positive for chills and fatigue. Negative for diaphoresis.   HENT: Negative for congestion, ear pain, sore throat and trouble swallowing.    Eyes: Negative for pain, discharge and visual disturbance.   Respiratory: Positive for cough and shortness of breath. Negative for chest tightness and wheezing.    Cardiovascular: Negative for chest pain, palpitations and leg swelling.   Gastrointestinal: Positive for abdominal pain. Negative for abdominal distention, blood in stool, constipation, diarrhea, nausea and vomiting.   Endocrine: Negative for polydipsia, polyphagia and polyuria.   Genitourinary: Positive for flank pain, frequency and scrotal swelling. Negative for urgency.   Musculoskeletal: Negative for back pain, joint swelling, neck pain and neck stiffness.   Skin: Negative for rash and wound.   Allergic/Immunologic: Negative for immunocompromised state.   Neurological: Negative for dizziness, syncope, speech difficulty, weakness, light-headedness, numbness and headaches.   Hematological: Negative for adenopathy.   Psychiatric/Behavioral: Negative for confusion and suicidal ideas. The patient is not nervous/anxious.    All other systems reviewed and are negative.    Objective:     Vital Signs (Most Recent):  Temp: 99.7 °F (37.6 °C) (03/14/20 1120)  Pulse: (!) 196 (03/14/20 1120)  Resp: 20 (03/14/20 1120)  BP: (!) 161/74 (03/14/20 1120)  SpO2: (!) 83 % (03/14/20 1120) Vital Signs (24h Range):  Temp:  [97.9 °F (36.6 °C)-100.1 °F (37.8 °C)] 99.7 °F (37.6 °C)  Pulse:  [] 196  Resp:  [15-93] 20  SpO2:  [83 %-99 %] 83 %  BP: (135-199)/(61-88) 161/74     Weight: 80.5 kg (177 lb  7.5 oz)  Body mass index is 25.46 kg/m².    Intake/Output Summary (Last 24 hours) at 3/14/2020 1349  Last data filed at 3/14/2020 0900  Gross per 24 hour   Intake 1455 ml   Output 450 ml   Net 1005 ml      Physical Exam   Constitutional: He is oriented to person, place, and time. He appears well-developed and well-nourished. He appears distressed.   HENT:   Head: Normocephalic and atraumatic.   Eyes: Pupils are equal, round, and reactive to light. Conjunctivae and EOM are normal.   Neck: Normal range of motion. Neck supple.   Cardiovascular: Intact distal pulses.   regular rate and rhythm   Pulmonary/Chest: Effort normal and breath sounds normal.   Mild crackles in bilateral bases   Abdominal: Soft. Bowel sounds are normal. There is no tenderness.   Genitourinary:   Genitourinary Comments: Scrotum mildly edematous with erythema-better   Musculoskeletal: Normal range of motion.   No CVA tenderness   Neurological: He is alert and oriented to person, place, and time.   Skin: Skin is warm and dry. Capillary refill takes less than 2 seconds.   Psychiatric: He has a normal mood and affect. His behavior is normal. Judgment and thought content normal.   Nursing note and vitals reviewed.      Significant Labs: All pertinent labs within the past 24 hours have been reviewed.    Significant Imaging: I have reviewed all pertinent imaging results/findings within the past 24 hours.

## 2020-03-14 NOTE — ASSESSMENT & PLAN NOTE
Urine cultures positive for E coli  Continue Zosyn while inpatient - renally dosed  Consult urology-recommended to continue same management and keep him in the hospital until fever subsides and recommended Infectious Disease consult  Still spikes low grade fever and severe chills  Continue IV hydration-slowed down to 75 cc/hour  P.r.n. Tylenol  Repeat ultrasound of scrotum did not show any worsening or abscess  Daily CPK  CRP every 48 hrs  Consult Dr. Quintanilla

## 2020-03-14 NOTE — PLAN OF CARE
Pt remains free from injury. Last temp 98.6. Pt receiving iv fluids and iv abx as ordered. No c/o pain. Still has scrotal swelling. Educated on plan of care and safety. Sr on tele.

## 2020-03-14 NOTE — PLAN OF CARE
This note also relates to the following rows which could not be included:  SpO2 - Cannot attach notes to unvalidated device data  Pulse - Cannot attach notes to unvalidated device data       03/14/20 1008   PRE-TX-O2   O2 Device (Oxygen Therapy) nasal cannula   $ Is the patient on Low Flow Oxygen? Yes   Flow (L/min) 2   Pulse Oximetry Type Continuous   $ Pulse Oximetry - Multiple Charge Pulse Oximetry - Multiple   Resp 15

## 2020-03-14 NOTE — CARE UPDATE
03/13/20 2100   Patient Assessment/Suction   Level of Consciousness (AVPU) alert   Respiratory Effort Unlabored   Expansion/Accessory Muscles/Retractions no use of accessory muscles   All Lung Fields Breath Sounds clear;equal bilaterally   Rhythm/Pattern, Respiratory no shortness of breath reported   Cough Frequency no cough   PRE-TX-O2   O2 Device (Oxygen Therapy) room air   SpO2 99 %   Pulse Oximetry Type Continuous   $ Pulse Oximetry - Multiple Charge Pulse Oximetry - Multiple   Pulse 89   Resp (!) 93   Positioning   Body Position positioned/repositioned independently   Head of Bed (HOB) HOB at 20-30 degrees   Respiratory Evaluation   $ Care Plan Tech Time 15 min

## 2020-03-14 NOTE — NURSING
PT REQUESTED TO TAKE SHOWER BUT REFUSES ANYONE TO STAY IN ROOM  TO HELP. PT IS ADAMENT ABOUT NOT WANTING HELP. INSTRUCTED TO CALL WHEN FINISHED OR IF ANY HELP IS NEEDED PLEASE PRESS [employer]

## 2020-03-14 NOTE — PROGRESS NOTES
[facility] Medicine  Progress Note    DOS: 03/14/2020    Patient Name: Tyler Todd  MRN: [id number]  Patient Class: IP- Inpatient   Admission Date: 3/10/2020  Length of Stay: 1 days  Attending Physician: Norma Rowe MD  Primary Care Provider: Andres Diane MD        Subjective:     Principal Problem:New onset a-fib        HPI:  Mr. Todd presents today with complaints of swelling of his scrotum. It is moderate. It is associated with malaise, fever, chills, hematuria, and recent urological procedure. He denies N/V/D, dizziness, chest pain, SOB, or LOC. He has a history of CAD with stents, lung ca, prostate ca, NIDDM, HTN, and COPD. He had a cystoscopy on 2/24/2020 with Dr. Soto and developed urological symptoms about a week after. He was treated with cipro from epididymitis, but did not finish this because it upset his stomach. He continued to have symptoms and decided to come to the ED today for treatment. While in the ED he went into afib RVR which he's never been in previously, but remained asymptomatic denying SOB, chest pain, and palpitations. He is a patient of Dr. Interiano, but has seen Dr. NEVILLE Pimentel in the past and had a negative stress test in 2019.     Overview/Hospital Course:  03/11:  Patient was seen and examined bedside.  He converted back to sinus rhythm and currently heart rate 60-80.  Denies any new symptoms except some discomfort in scrotum.  No acute events overnight as per nursing staff.  Off Cardizem drip now.  Waiting for Urology evaluation    03/12:  Patient was seen and examined at bedside.  Still has significant discomfort in scrotal area as well as perianal area.  Still spiking high-grade temperature.  Remains in sinus rhythm with rate controlled on current regimen of beta-blocker.  Remains on weight based Lovenox.  Urology recommended to continue same management and wait till fever resolves.  No new recommendations from Cardiology.     03/13:  Patient was  seen and examined bedside.  Still spikes high-grade temperature.  Repeat blood cultures were sent.  Remains on Zosyn and weight based Lovenox.  Remains in sinus rhythm.  Reports significant deconditioning and weakness as well as scrotal pain otherwise denies any new symptoms.  No other acute events overnight as per nursing staff.  Breathing comfortably on room air.     03/14:  Patient was seen and examined at bedside.  Still spikes low-grade temperature.  Scrotal erythema and swelling has improved.  Still becomes very dyspneic upon minimal exertion.  Coughing dark sputum.  No other acute events overnight as per nursing staff.  Remained in sinus rhythm.     Interval History: Patient was seen and examined at bedside.  Still spikes low-grade temperature.  Scrotal erythema and swelling has improved.  Still becomes very dyspneic upon minimal exertion.  Coughing dark sputum.  No other acute events overnight as per nursing staff.  Remained in sinus rhythm.       Review of Systems   Constitutional: Positive for chills and fatigue. Negative for diaphoresis.   HENT: Negative for congestion, ear pain, sore throat and trouble swallowing.    Eyes: Negative for pain, discharge and visual disturbance.   Respiratory: Positive for cough and shortness of breath. Negative for chest tightness and wheezing.    Cardiovascular: Negative for chest pain, palpitations and leg swelling.   Gastrointestinal: Positive for abdominal pain. Negative for abdominal distention, blood in stool, constipation, diarrhea, nausea and vomiting.   Endocrine: Negative for polydipsia, polyphagia and polyuria.   Genitourinary: Positive for flank pain, frequency and scrotal swelling. Negative for urgency.   Musculoskeletal: Negative for back pain, joint swelling, neck pain and neck stiffness.   Skin: Negative for rash and wound.   Allergic/Immunologic: Negative for immunocompromised state.   Neurological: Negative for dizziness, syncope, speech difficulty,  weakness, light-headedness, numbness and headaches.   Hematological: Negative for adenopathy.   Psychiatric/Behavioral: Negative for confusion and suicidal ideas. The patient is not nervous/anxious.    All other systems reviewed and are negative.    Objective:     Vital Signs (Most Recent):  Temp: 99.7 °F (37.6 °C) (03/14/20 1120)  Pulse: (!) 196 (03/14/20 1120)  Resp: 20 (03/14/20 1120)  BP: (!) 161/74 (03/14/20 1120)  SpO2: (!) 83 % (03/14/20 1120) Vital Signs (24h Range):  Temp:  [97.9 °F (36.6 °C)-100.1 °F (37.8 °C)] 99.7 °F (37.6 °C)  Pulse:  [] 196  Resp:  [15-93] 20  SpO2:  [83 %-99 %] 83 %  BP: (135-199)/(61-88) 161/74     Weight: 80.5 kg (177 lb 7.5 oz)  Body mass index is 25.46 kg/m².    Intake/Output Summary (Last 24 hours) at 3/14/2020 1349  Last data filed at 3/14/2020 0900  Gross per 24 hour   Intake 1455 ml   Output 450 ml   Net 1005 ml      Physical Exam   Constitutional: He is oriented to person, place, and time. He appears well-developed and well-nourished. He appears distressed.   HENT:   Head: Normocephalic and atraumatic.   Eyes: Pupils are equal, round, and reactive to light. Conjunctivae and EOM are normal.   Neck: Normal range of motion. Neck supple.   Cardiovascular: Intact distal pulses.   regular rate and rhythm   Pulmonary/Chest: Effort normal and breath sounds normal.   Mild crackles in bilateral bases   Abdominal: Soft. Bowel sounds are normal. There is no tenderness.   Genitourinary:   Genitourinary Comments: Scrotum mildly edematous with erythema-better   Musculoskeletal: Normal range of motion.   No CVA tenderness   Neurological: He is alert and oriented to person, place, and time.   Skin: Skin is warm and dry. Capillary refill takes less than 2 seconds.   Psychiatric: He has a normal mood and affect. His behavior is normal. Judgment and thought content normal.   Nursing note and vitals reviewed.      Significant Labs: All pertinent labs within the past 24 hours have been  reviewed.    Significant Imaging: I have reviewed all pertinent imaging results/findings within the past 24 hours.      Assessment/Plan:      * New onset a-fib  Converted to sinus rhythm, off Cardizem drip for more than 48 hr  Consult Dr. NEVILLE Pimentel  Continue weight based Lovenox and Toprol-XL 50 BID while inpatient.  Planning to discharge home on 2.5 mg Eliquis considering his prior history of GI bleeds.  If tolerates then Dr. Interiano can consider 5 mg b.i.d.  If needed sotalol can be considered    Epididymoorchitis  Urine cultures positive for E coli  Continue Zosyn while inpatient - renally dosed  Consult urology-recommended to continue same management and keep him in the hospital until fever subsides and recommended Infectious Disease consult  Still spikes low grade fever and severe chills  Continue IV hydration-slowed down to 75 cc/hour  P.r.n. Tylenol  Repeat ultrasound of scrotum did not show any worsening or abscess  Daily CPK  CRP every 48 hrs  Consult Dr. Quintanilla    Acute renal failure superimposed on stage 3 chronic kidney disease  Significantly improved in last 24 hrs with IV hydration  Hold nephrotoxic meds - ARB/NSAID  Renally dose all meds   Daily labs       Diabetes mellitus type II, uncontrolled  accuchecks ACHS with low dose ISS.      Elevated liver function tests  Dehydration vs. Other?   Repeat CMP in AM      Adenocarcinoma of prostate  Outpatient follow-up      VTE Risk Mitigation (From admission, onward)         Ordered     enoxaparin injection 80 mg  Every 12 hours (non-standard times)      03/10/20 2117     Place sequential compression device  Until discontinued      03/10/20 1924     IP VTE HIGH RISK PATIENT  Once      03/10/20 1924                      Norma Rowe MD  Department of Hospital Medicine   [facility]

## 2020-03-14 NOTE — ASSESSMENT & PLAN NOTE
Converted to sinus rhythm, off Cardizem drip for more than 48 hr  Consult Dr. NEVILLE Pimentel  Continue weight based Lovenox and Toprol-XL 50 BID while inpatient.  Planning to discharge home on 2.5 mg Eliquis considering his prior history of GI bleeds.  If tolerates then Dr. Interiano can consider 5 mg b.i.d.  If needed sotalol can be considered

## 2020-03-14 NOTE — PT/OT/SLP PROGRESS
Physical Therapy Treatment    Patient Name:  Tyler Todd   MRN:  [id number]    Recommendations:     Discharge Recommendations:  home with home health, home health PT(home health RN and PT)   Discharge Equipment Recommendations: walker, rolling   Barriers to discharge: None    Assessment:     Tyler Todd is a 79 y.o. male admitted with a medical diagnosis of New onset a-fib.  He presents with the following impairments/functional limitations:  weakness, impaired self care skills, impaired endurance, impaired functional mobilty, gait instability, decreased lower extremity function, impaired cardiopulmonary response to activity.  Patient agreeable to gait training and assisting in/out of bathroom.    Rehab Prognosis: Good; patient would benefit from acute skilled PT services to address these deficits and reach maximum level of function.    Recent Surgery: * No surgery found *      Plan:     During this hospitalization, patient to be seen 6 x/week to address the identified rehab impairments via gait training, therapeutic activities, therapeutic exercises and progress toward the following goals:    · Plan of Care Expires:  04/12/20    Subjective     Chief Complaint: generalized weakness  Patient/Family Comments/goals: improve overall strength/mobility  Pain/Comfort:  · Pain Rating 1: 0/10      Objective:     Communicated with nurse prior to session.  Patient found supine with oxygen, peripheral IV, telemetry upon PT entry to room.     General Precautions: Standard, fall   Orthopedic Precautions:N/A   Braces: N/A     Functional Mobility:  · Bed Mobility:     · Supine to Sit: stand by assistance  · Sit to Supine: stand by assistance  · Transfers:     · Sit to Stand:  contact guard assistance x 2 with rolling walker  · Gait: 200 ft w/ RW and CGA x 2/2 liters of oxy      AM-PAC 6 CLICK MOBILITY  Turning over in bed (including adjusting bedclothes, sheets and blankets)?: 3  Sitting down on and standing up from a chair with  arms (e.g., wheelchair, bedside commode, etc.): 3  Moving from lying on back to sitting on the side of the bed?: 3  Moving to and from a bed to a chair (including a wheelchair)?: 3  Need to walk in hospital room?: 3  Climbing 3-5 steps with a railing?: 1  Basic Mobility Total Score: 16       Therapeutic Activities and Exercises:   assisted patient in/out of bathroom    Patient left supine with all lines intact and call button in reach..    GOALS:   Multidisciplinary Problems     Physical Therapy Goals        Problem: Physical Therapy Goal    Goal Priority Disciplines Outcome Goal Variances Interventions   Physical Therapy Goal     PT, PT/OT Ongoing, Progressing     Description:  Goals to be met by: discharge     Patient will increase functional independence with mobility by performin. Sit to stand transfer with Supervision  2. Bed to chair transfer with Supervision using Rolling Walker  3. Gait  x 150 feet with Supervision using Rolling Walker.                       Time Tracking:     PT Received On: 20  PT Start Time: 1310     PT Stop Time: 1335  PT Total Time (min): 25 min     Billable Minutes: Gait Training 15 and Therapeutic Activity 10    Treatment Type: Treatment  PT/PTA: PT     PTA Visit Number: 0     Rodriguez Hobson, PT  2020

## 2020-03-15 VITALS
HEIGHT: 70 IN | OXYGEN SATURATION: 99 % | BODY MASS INDEX: 25.41 KG/M2 | RESPIRATION RATE: 15 BRPM | WEIGHT: 177.5 LBS | SYSTOLIC BLOOD PRESSURE: 172 MMHG | HEART RATE: 74 BPM | DIASTOLIC BLOOD PRESSURE: 78 MMHG | TEMPERATURE: 99 F

## 2020-03-15 LAB
ALBUMIN SERPL BCP-MCNC: 2.4 G/DL (ref 3.5–5.2)
ALP SERPL-CCNC: 81 U/L (ref 55–135)
ALT SERPL W/O P-5'-P-CCNC: 68 U/L (ref 10–44)
ANION GAP SERPL CALC-SCNC: 6 MMOL/L (ref 8–16)
AST SERPL-CCNC: 93 U/L (ref 10–40)
BACTERIA BLD CULT: NORMAL
BACTERIA BLD CULT: NORMAL
BASOPHILS # BLD AUTO: 0.03 K/UL (ref 0–0.2)
BASOPHILS NFR BLD: 0.4 % (ref 0–1.9)
BILIRUB SERPL-MCNC: 0.8 MG/DL (ref 0.1–1)
BUN SERPL-MCNC: 15 MG/DL (ref 8–23)
CALCIUM SERPL-MCNC: 7.9 MG/DL (ref 8.7–10.5)
CHLORIDE SERPL-SCNC: 107 MMOL/L (ref 95–110)
CK SERPL-CCNC: 614 U/L (ref 20–200)
CO2 SERPL-SCNC: 26 MMOL/L (ref 23–29)
CREAT SERPL-MCNC: 1.1 MG/DL (ref 0.5–1.4)
CRP SERPL-MCNC: 15.45 MG/DL (ref 0–0.75)
DIFFERENTIAL METHOD: ABNORMAL
EOSINOPHIL # BLD AUTO: 0.2 K/UL (ref 0–0.5)
EOSINOPHIL NFR BLD: 2.9 % (ref 0–8)
ERYTHROCYTE [DISTWIDTH] IN BLOOD BY AUTOMATED COUNT: 14.8 % (ref 11.5–14.5)
EST. GFR  (AFRICAN AMERICAN): >60 ML/MIN/1.73 M^2
EST. GFR  (NON AFRICAN AMERICAN): >60 ML/MIN/1.73 M^2
GLUCOSE SERPL-MCNC: 131 MG/DL (ref 70–110)
GLUCOSE SERPL-MCNC: 151 MG/DL (ref 70–110)
GLUCOSE SERPL-MCNC: 170 MG/DL (ref 70–110)
HCT VFR BLD AUTO: 28.7 % (ref 40–54)
HGB BLD-MCNC: 9.4 G/DL (ref 14–18)
IMM GRANULOCYTES # BLD AUTO: 0.06 K/UL (ref 0–0.04)
IMM GRANULOCYTES NFR BLD AUTO: 0.8 % (ref 0–0.5)
LYMPHOCYTES # BLD AUTO: 1.5 K/UL (ref 1–4.8)
LYMPHOCYTES NFR BLD: 19 % (ref 18–48)
MAGNESIUM SERPL-MCNC: 1.6 MG/DL (ref 1.6–2.6)
MCH RBC QN AUTO: 30.1 PG (ref 27–31)
MCHC RBC AUTO-ENTMCNC: 32.8 G/DL (ref 32–36)
MCV RBC AUTO: 92 FL (ref 82–98)
MONOCYTES # BLD AUTO: 0.9 K/UL (ref 0.3–1)
MONOCYTES NFR BLD: 10.7 % (ref 4–15)
NEUTROPHILS # BLD AUTO: 5.3 K/UL (ref 1.8–7.7)
NEUTROPHILS NFR BLD: 66.2 % (ref 38–73)
NRBC BLD-RTO: 0 /100 WBC
PHOSPHATE SERPL-MCNC: 2.4 MG/DL (ref 2.7–4.5)
PLATELET # BLD AUTO: 219 K/UL (ref 150–350)
PMV BLD AUTO: 10.3 FL (ref 9.2–12.9)
POTASSIUM SERPL-SCNC: 3.8 MMOL/L (ref 3.5–5.1)
PROT SERPL-MCNC: 5.9 G/DL (ref 6–8.4)
RBC # BLD AUTO: 3.12 M/UL (ref 4.6–6.2)
SODIUM SERPL-SCNC: 139 MMOL/L (ref 136–145)
WBC # BLD AUTO: 7.94 K/UL (ref 3.9–12.7)
WBC #/AREA STL HPF: NORMAL /[HPF]

## 2020-03-15 PROCEDURE — 27000221 HC OXYGEN, UP TO 24 HOURS

## 2020-03-15 PROCEDURE — 83735 ASSAY OF MAGNESIUM: CPT

## 2020-03-15 PROCEDURE — 87209 SMEAR COMPLEX STAIN: CPT

## 2020-03-15 PROCEDURE — 87046 STOOL CULTR AEROBIC BACT EA: CPT

## 2020-03-15 PROCEDURE — 87015 SPECIMEN INFECT AGNT CONCNTJ: CPT

## 2020-03-15 PROCEDURE — 25000003 PHARM REV CODE 250: Performed by: HOSPITALIST

## 2020-03-15 PROCEDURE — 94761 N-INVAS EAR/PLS OXIMETRY MLT: CPT

## 2020-03-15 PROCEDURE — 89055 LEUKOCYTE ASSESSMENT FECAL: CPT

## 2020-03-15 PROCEDURE — 80053 COMPREHEN METABOLIC PANEL: CPT

## 2020-03-15 PROCEDURE — 63600175 PHARM REV CODE 636 W HCPCS: Performed by: HOSPITALIST

## 2020-03-15 PROCEDURE — 82962 GLUCOSE BLOOD TEST: CPT

## 2020-03-15 PROCEDURE — 84100 ASSAY OF PHOSPHORUS: CPT

## 2020-03-15 PROCEDURE — 87045 FECES CULTURE AEROBIC BACT: CPT

## 2020-03-15 PROCEDURE — 86140 C-REACTIVE PROTEIN: CPT

## 2020-03-15 PROCEDURE — 36415 COLL VENOUS BLD VENIPUNCTURE: CPT

## 2020-03-15 PROCEDURE — 82550 ASSAY OF CK (CPK): CPT

## 2020-03-15 PROCEDURE — 85025 COMPLETE CBC W/AUTO DIFF WBC: CPT

## 2020-03-15 RX ORDER — CEPHALEXIN 500 MG/1
500 CAPSULE ORAL EVERY 8 HOURS
Qty: 69 CAPSULE | Refills: 0 | Status: SHIPPED | OUTPATIENT
Start: 2020-03-15 | End: 2020-04-08 | Stop reason: SDUPTHER

## 2020-03-15 RX ADMIN — LATANOPROST 1 DROP: 50 SOLUTION OPHTHALMIC at 08:03

## 2020-03-15 RX ADMIN — GUAIFENESIN 600 MG: 600 TABLET, EXTENDED RELEASE ORAL at 08:03

## 2020-03-15 RX ADMIN — DORZOLAMIDE HYDROCHLORIDE 1 DROP: 20 SOLUTION/ DROPS OPHTHALMIC at 08:03

## 2020-03-15 RX ADMIN — ASPIRIN 81 MG 81 MG: 81 TABLET ORAL at 08:03

## 2020-03-15 RX ADMIN — PIPERACILLIN SODIUM AND TAZOBACTAM SODIUM 3.38 G: 3; .375 INJECTION, POWDER, LYOPHILIZED, FOR SOLUTION INTRAVENOUS at 08:03

## 2020-03-15 RX ADMIN — PREGABALIN 100 MG: 50 CAPSULE ORAL at 08:03

## 2020-03-15 RX ADMIN — PIPERACILLIN SODIUM AND TAZOBACTAM SODIUM 3.38 G: 3; .375 INJECTION, POWDER, LYOPHILIZED, FOR SOLUTION INTRAVENOUS at 12:03

## 2020-03-15 RX ADMIN — BRIMONIDINE TARTRATE 1 DROP: 1.5 SOLUTION OPHTHALMIC at 08:03

## 2020-03-15 RX ADMIN — ENOXAPARIN SODIUM 80 MG: 80 INJECTION, SOLUTION INTRAVENOUS; SUBCUTANEOUS at 09:03

## 2020-03-15 RX ADMIN — METOPROLOL SUCCINATE 50 MG: 50 TABLET, EXTENDED RELEASE ORAL at 09:03

## 2020-03-15 NOTE — PLAN OF CARE
This note also relates to the following rows which could not be included:  SpO2 - Cannot attach notes to unvalidated device data  Pulse - Cannot attach notes to unvalidated device data       03/15/20 0808   PRE-TX-O2   O2 Device (Oxygen Therapy) nasal cannula   $ Is the patient on Low Flow Oxygen? Yes   Flow (L/min) 1   Pulse Oximetry Type Continuous   $ Pulse Oximetry - Multiple Charge Pulse Oximetry - Multiple   Resp 15

## 2020-03-15 NOTE — NURSING
Spoke with juan jose in [facility] pharmacy in [place]. She fully verbalizes to not give levaquin rx. As per Dr. Rowe orders.

## 2020-03-15 NOTE — PLAN OF CARE
Pt remains free from injury. Trying to get stool sample, pt has not had bm yet. Monitroing I&Os. IV fluid hydration. Elevating scrotum. Pt educated on plan of care and safety.

## 2020-03-15 NOTE — DISCHARGE SUMMARY
[facility]  Discharge Summary    DOS: 03/15/2020      Patient Name: Tyler Todd  MRN: [id number]  Admission Date: 3/10/2020  Hospital Length of Stay: 2 days  Discharge Date and Time:  03/15/2020 2:21 PM  Attending Physician: Norma Rowe MD   Discharging Provider: Norma Rowe MD  Primary Care Provider: Andres Diane MD      HPI:   Mr. Todd presents today with complaints of swelling of his scrotum. It is moderate. It is associated with malaise, fever, chills, hematuria, and recent urological procedure. He denies N/V/D, dizziness, chest pain, SOB, or LOC. He has a history of CAD with stents, lung ca, prostate ca, NIDDM, HTN, and COPD. He had a cystoscopy on 2/24/2020 with Dr. Soto and developed urological symptoms about a week after. He was treated with cipro from epididymitis, but did not finish this because it upset his stomach. He continued to have symptoms and decided to come to the ED today for treatment. While in the ED he went into afib RVR which he's never been in previously, but remained asymptomatic denying SOB, chest pain, and palpitations. He is a patient of Dr. Interiano, but has seen Dr. NEVILLE Pimentel in the past and had a negative stress test in 2019.     * No surgery found *      Hospital Course:   03/11:  Patient was seen and examined bedside.  He converted back to sinus rhythm and currently heart rate 60-80.  Denies any new symptoms except some discomfort in scrotum.  No acute events overnight as per nursing staff.  Off Cardizem drip now.  Waiting for Urology evaluation    03/12:  Patient was seen and examined at bedside.  Still has significant discomfort in scrotal area as well as perianal area.  Still spiking high-grade temperature.  Remains in sinus rhythm with rate controlled on current regimen of beta-blocker.  Remains on weight based Lovenox.  Urology recommended to continue same management and wait till fever resolves.  No new recommendations from  Cardiology.     03/13:  Patient was seen and examined bedside.  Still spikes high-grade temperature.  Repeat blood cultures were sent.  Remains on Zosyn and weight based Lovenox.  Remains in sinus rhythm.  Reports significant deconditioning and weakness as well as scrotal pain otherwise denies any new symptoms.  No other acute events overnight as per nursing staff.  Breathing comfortably on room air.     03/14:  Patient was seen and examined at bedside.  Still spikes low-grade temperature.  Scrotal erythema and swelling has improved.  Still becomes very dyspneic upon minimal exertion.  Coughing dark sputum.  No other acute events overnight as per nursing staff.  Remained in sinus rhythm.     03/15:  Patient was seen and examined at bedside.  Denies any new symptoms except his usual aches and pains and heated arguments with his wife.  No acute events overnight as per nursing staff.  Remained in sinus rhythm.  Breathing comfortably on room air.  Walking in the room and took shower by himself.  Scrotal swelling and erythema is significantly improved in last 48 hr.  No fevers for almost 48 hrs.  Was cleared for discharge by Infectious Disease.       In summary,   79-year-old gentleman with multiple chronic problems presented to emergency room with atrial fibrillation with RVR, was found to have moderate to severe left orchitis with hydrocele.  It was very difficult to treat this patient due to his behavior and constant argument upon every intervention and many times refusal to follow nursing instructions.  He was evaluated by Cardiology, Urology and Infectious Disease.  He quickly converted back to sinus rhythm and was placed on beta-blocker and weight based Lovenox while inpatient.  It took a while for his fever to subside on IV antibiotics.  Finally improved remarkably in last 24-48 hours and was discharged home in hemodynamically stable condition with a new prescription of Keflex for total 28 days.  He was advised to  see his regular urologist, regular cardiologist(who can further up titrate his Eliquis to 5 mg b.i.d. if no bleeding occurs and patient will need 30 day monitor).  We arranged home health as well as walker upon discharge.  Patient has multiple chronic aches and pains, heavy smoking dependence, osteoarthritis and back pains.  Very difficult compliance.     Review of systems:  Mild discomfort in left scrotal area, chronic arthralgia otherwise comprehensive review of system negative    Physical Exam   Constitutional: He is oriented to person, place, and time. He appears well-developed and well-nourished. He appears distressed.   HENT:   Head: Normocephalic and atraumatic.   Eyes: Pupils are equal, round, and reactive to light. Conjunctivae and EOM are normal.   Neck: Normal range of motion. Neck supple.   Cardiovascular: Intact distal pulses.   regular rate and rhythm   Pulmonary/Chest: Effort normal and breath sounds normal.   Mild crackles in bilateral bases   Abdominal: Soft. Bowel sounds are normal. There is no tenderness.   Genitourinary : Scrotum mildly edematous with erythema-significantly better and erythema is completely resolved  Musculoskeletal: Normal range of motion.   No CVA tenderness   Neurological: He is alert and oriented to person, place, and time.   Skin: Skin is warm and dry. Capillary refill takes less than 2 seconds.   Psychiatric: He has a normal mood and affect. His behavior is normal. Judgment and thought content normal.     Nursing note and vitals reviewed.     Consults:   Consults (From admission, onward)        Status Ordering Provider     Inpatient consult to Cardiology  Once     Provider:  Chavo Pimentel MD    Acknowledged MINDI SPANN     Inpatient consult to Infectious Diseases  Once     Provider:  Chelly Quintanilla MD    Completed MINDI SPANN     Inpatient consult to   Once     Provider:  (Not yet assigned)    Completed MINDI SPANN     Inpatient  "consult to Urology  Once     Provider:  Khadijah Cesar MD    Completed ARI THOMPSON          No new Assessment & Plan notes have been filed under this hospital service since the last note was generated.  Service: Hospital Medicine    Final Active Diagnoses:    Diagnosis Date Noted POA    PRINCIPAL PROBLEM:  New onset a-fib [I48.91] 03/10/2020 Yes    Epididymoorchitis [N45.3] 03/10/2020 Yes    Acute renal failure superimposed on stage 3 chronic kidney disease [N17.9, N18.3] 03/10/2020 Yes    Diabetes mellitus type II, uncontrolled [E11.65] 11/19/2012 Yes    Elevated liver function tests [R94.5] 03/10/2020 Yes    Adenocarcinoma of prostate [C61] 02/17/2020 Yes    Orchitis and epididymitis [N45.3] 03/13/2020 Yes      Problems Resolved During this Admission:    Diagnosis Date Noted Date Resolved POA    Dehydration [E86.0] 03/10/2020 03/12/2020 Yes       Discharged Condition: good    Disposition: Home-Health Care [facility]    Follow Up:  Follow-up Information     Nuha Soto MD In 1 week.    Specialty:  Urology  Contact information:  [address] [place] [address] [address]  [address]  [place] [address]  [phone]             Jayson Interiano MD In 2 weeks.    Specialty:  Cardiology  Contact information:  [address] [facility] [facility] [address] [facility] [address]  [place] [address]  [phone]                 Patient Instructions:      WALKER FOR HOME USE     Order Specific Question Answer Comments   Type of Walker: Adult (5'4"-6'6")    With wheels? Yes    Height: 5' 10" (1.778 m)    Weight: 80.5 kg (177 lb 7.5 oz)    Length of need (1-99 months): 3    Does patient have medical equipment at home? cane, straight    Please check all that apply: Patient's condition impairs ambulation.      Ambulatory referral/consult to Home Health   Standing Status: Future   Referral Priority: Routine Referral Type: Home Health Care   Referral Reason: Specialty Services Required   Requested Specialty: Home Health Services   Number of Visits Requested: 1     Ambulatory " referral/consult to Home Health   Standing Status: Future   Referral Priority: Routine Referral Type: Home Health Care   Referral Reason: Specialty Services Required   Requested Specialty: Home Health Services   Number of Visits Requested: 1     Ambulatory referral/consult to Home Health   Standing Status: Future   Referral Priority: Routine Referral Type: Home Health Care   Referral Reason: Specialty Services Required   Requested Specialty: Home Health Services   Number of Visits Requested: 1     Diet Cardiac     Diet diabetic     Notify your health care provider if you experience any of the following:  temperature >100.4     Notify your health care provider if you experience any of the following:  severe uncontrolled pain     Activity as tolerated       Significant Diagnostic Studies: Labs:   BMP:   Recent Labs   Lab 03/14/20  0309 03/15/20  0335   * 131*    139   K 3.7 3.8    107   CO2 24 26   BUN 19 15   CREATININE 1.4 1.1   CALCIUM 7.7* 7.9*   MG 1.6 1.6       Pending Diagnostic Studies:     Procedure Component Value Units Date/Time    Stool Exam-Ova,Cysts,Parasites [[id number]] Collected:  03/15/20 0535    Order Status:  Sent Lab Status:  In process Updated:  03/15/20 0546    Specimen:  Stool          Medications:  Reconciled Home Medications:      Medication List      START taking these medications    apixaban 2.5 mg Tab  Commonly known as:  ELIQUIS  Take 1 tablet (2.5 mg total) by mouth 2 (two) times daily.     cephALEXin 500 MG capsule  Commonly known as:  KEFLEX  Take 1 capsule (500 mg total) by mouth every 8 (eight) hours. for 23 days        CHANGE how you take these medications    metoprolol succinate 50 MG 24 hr tablet  Commonly known as:  TOPROL-XL  Take 1 tablet (50 mg total) by mouth 2 (two) times daily.  What changed:  See the new instructions.        CONTINUE taking these medications    alogliptin 12.5 mg Tab  Commonly known as:  NESINA  Take by mouth.     amLODIPine 5 MG  tablet  Commonly known as:  NORVASC  Take 5 mg by mouth once daily.     aspirin 81 mg Tab  Take 1 tablet by mouth once daily. Every day     atorvastatin 10 MG tablet  Commonly known as:  LIPITOR  Take 10 mg by mouth once daily.     brimonidine 0.1% 0.1 % Drop  Commonly known as:  ALPHAGAN P  Place 1 drop into both eyes 3 (three) times daily.     * carboxymethylcellulose 0.5 % Dpet  Commonly known as:  REFRESH PLUS  1 drop 3 (three) times daily as needed.     * THERATEARS 1 % Dpge  Generic drug:  carboxymethylcellulose sodium  TheraTears 1 % gel in a dropperette     diabetic supplies, miscellan. Misc  No Sig Provided     dorzolamide 2 % ophthalmic solution  Commonly known as:  TRUSOPT  1 drop 3 (three) times daily.     HYDROcodone-acetaminophen 5-325 mg per tablet  Commonly known as:  NORCO  Take 1 tablet by mouth every 4 to 6 hours as needed for Pain.     irbesartan 300 MG tablet  Commonly known as:  AVAPRO  Take 1 tablet (300 mg total) by mouth once daily.     latanoprost 0.005 % ophthalmic solution  Apply 1 drop to eye.     metFORMIN 500 MG XR 24hr tablet  Commonly known as:  GLUCOPHAGE-XR  Take 2 tablets (1,000 mg total) by mouth 2 (two) times daily.     NITROSTAT 0.4 MG SL tablet  Generic drug:  nitroGLYCERIN  As directed     phenazopyridine 100 MG tablet  Commonly known as:  PYRIDIUM  Take 2 tablets (200 mg total) by mouth 3 (three) times daily with meals.     pregabalin 100 MG capsule  Commonly known as:  LYRICA  Take 100 mg by mouth 2 (two) times daily.     vit C-E-zinc ox-rola-lut-zeax 250 mg-200 unit -12.5 mg-1 mg Cap  Take by mouth.     VITAMIN C 100 MG tablet  Generic drug:  ascorbic acid (vitamin C)  Vitamin C         * This list has 2 medication(s) that are the same as other medications prescribed for you. Read the directions carefully, and ask your doctor or other care provider to review them with you.            STOP taking these medications    cefUROXime 500 MG tablet  Commonly known as:  CEFTIN      ibuprofen 800 MG tablet  Commonly known as:  ADVIL,MOTRIN            Indwelling Lines/Drains at time of discharge:   Lines/Drains/Airways     None                 Time spent on the discharge of patient: 29 minutes  Patient was seen and examined on the date of discharge and determined to be suitable for discharge.         Norma Rowe MD  Department of Hospital Medicine  formerly [facility]

## 2020-03-17 LAB
STOOL CULTURE: NORMAL

## 2020-03-18 LAB
BACTERIA BLD CULT: NORMAL
BACTERIA BLD CULT: NORMAL

## 2020-03-19 LAB — O+P STL TRI STN: NORMAL

## 2020-03-24 ENCOUNTER — TELEPHONE (OUTPATIENT)
Dept: UROLOGY | Facility: CLINIC | Age: 80
End: 2020-03-24

## 2020-03-24 NOTE — TELEPHONE ENCOUNTER
----- Message from Daphne Ruelas sent at 3/24/2020 12:58 PM CDT -----  Contact: Home Health Nurse  Type: Needs Medical Advice    Who Called:  [facility]  Symptoms (please be specific): Hospital Discharge, infected left testicle swollen, has prostate cancer  How long has patient had these symptoms:  2 weeks  Pharmacy name and phone #:    [facility] - [place], [place] - [address]  [address]  [place] [address]  Phone: [phone] Fax: [phone]  Best Call Back Number: [phone]   Additional Information: Per home health nurse urgently needs appointment-please advise-thank you

## 2020-03-24 NOTE — TELEPHONE ENCOUNTER
Returned call and spoke to home health nurse. She states the patient wants to see the MD about his swollen left testicles. He state getting the gold seeds placed caused it. Informed the MD is not able to see non-emergent patient because of the COVID-19, and we can set him up to be seen on virtual visit. She states he has a granddaughter who she will discuss the virtual visit with. I am signing patient up for portal code. She will call back when we can get it accessed. She will inform the patient, she verbally understood.

## 2020-03-27 ENCOUNTER — TELEPHONE (OUTPATIENT)
Dept: UROLOGY | Facility: CLINIC | Age: 80
End: 2020-03-27

## 2020-03-27 NOTE — TELEPHONE ENCOUNTER
Spoke with patient, he wants to see the MD to discuss his after hospital stay and swollen testicles. Informed because of covid-19 concern we are doing virtual visit with the MD. Patient states he does not know how and does not want to learn how to do it. Informed will give message to MD to advise, patient verbally understood.

## 2020-03-27 NOTE — TELEPHONE ENCOUNTER
Returned call to patient to inform we are only doing virtual visit, no answer, message left with call back number.

## 2020-03-27 NOTE — TELEPHONE ENCOUNTER
----- Message from Hellen Pelayo sent at 3/27/2020  2:44 PM CDT -----  Contact: self  Type:  Patient Returning Call    Who Called:  self  Who Left Message for Patient:  Sujata  Does the patient know what this is regarding?:  yes  Best Call Back Number:  [phone] (home)   Additional Information:  Patient states it is regarding a virtual appt. Patient is unable to do a virtual visit. Please call patient. Thanks!

## 2020-03-27 NOTE — TELEPHONE ENCOUNTER
----- Message from Yanick Dominguez sent at 3/27/2020  1:16 PM CDT -----  Contact: pt  Type: Needs Medical Advice    Who Called:  pt    Best Call Back Number: [phone]  Additional Information: pt would like to come in to be seen by Dr. Soto. Pt does not want to do a virtual visit. Pt states he needs to come in for a hospital follow up. Pt was seen at [facility]. Please call to advise.

## 2020-03-30 ENCOUNTER — TELEPHONE (OUTPATIENT)
Dept: UROLOGY | Facility: CLINIC | Age: 80
End: 2020-03-30

## 2020-03-30 NOTE — TELEPHONE ENCOUNTER
Call placed to inform patient the MD is ready to speak with him, no answer, message left with call back number.

## 2020-04-08 ENCOUNTER — TELEPHONE (OUTPATIENT)
Dept: UROLOGY | Facility: CLINIC | Age: 80
End: 2020-04-08

## 2020-04-08 DIAGNOSIS — N50.819 TESTICLE PAIN: Primary | ICD-10-CM

## 2020-04-08 DIAGNOSIS — N50.82 SCROTAL PAIN: Primary | ICD-10-CM

## 2020-04-08 RX ORDER — APIXABAN 2.5 MG/1
TABLET, FILM COATED ORAL
Qty: 60 TABLET | Refills: 0 | Status: SHIPPED | OUTPATIENT
Start: 2020-04-08 | End: 2020-05-08

## 2020-04-08 RX ORDER — CEPHALEXIN 500 MG/1
500 CAPSULE ORAL EVERY 8 HOURS
Qty: 63 CAPSULE | Refills: 0 | Status: SHIPPED | OUTPATIENT
Start: 2020-04-08 | End: 2020-04-29

## 2020-04-08 NOTE — TELEPHONE ENCOUNTER
----- Message from Ana Ricks sent at 4/8/2020  1:40 PM CDT -----  Contact: Patient  Type: Needs Medical Advice    Who Called:  Patient    Best Call Back Number: [phone]    Additional Information:   Patient called to get info on Dr. He is supposed to be seeing- says number office gave him was not correct

## 2020-04-08 NOTE — TELEPHONE ENCOUNTER
Spoke with patient, he states the scrotal pain is better, but feels he needs to be on the antibiotic longer, also inquired about another scrotal US. Message given to MD, gave order for another round of antibiotics and order for US in to be done at [facility], patient verbally understood.

## 2020-04-08 NOTE — TELEPHONE ENCOUNTER
Returned call and spoke with patient, he has wrong number for [facility], correct number given, patient verbally understood.

## 2020-04-09 ENCOUNTER — TELEPHONE (OUTPATIENT)
Dept: UROLOGY | Facility: CLINIC | Age: 80
End: 2020-04-09

## 2020-04-09 ENCOUNTER — HOSPITAL ENCOUNTER (OUTPATIENT)
Dept: RADIOLOGY | Facility: HOSPITAL | Age: 80
Discharge: HOME | End: 2020-04-09
Attending: UROLOGY
Payer: MEDICARE

## 2020-04-09 DIAGNOSIS — N50.819 TESTICLE PAIN: ICD-10-CM

## 2020-04-09 PROCEDURE — 76870 US EXAM SCROTUM: CPT | Mod: TC,PO

## 2020-04-09 RX ORDER — CIPROFLOXACIN 500 MG/1
500 TABLET ORAL 2 TIMES DAILY
Qty: 60 TABLET | Refills: 0 | Status: SHIPPED | OUTPATIENT
Start: 2020-04-09 | End: 2020-07-13

## 2020-04-09 NOTE — TELEPHONE ENCOUNTER
----- Message from Nehal Boyd sent at 4/9/2020 12:28 PM CDT -----  Contact: Pt  Type: Returning Call    Who Called: Pt  Who Left the patient a message: terry  Does the pt  know what this is regarding: unsure  Best Call back number: [phone]  Additional information: Pt requesting a call back.

## 2020-04-09 NOTE — TELEPHONE ENCOUNTER
Spoke with patient, scrotal US results given and informed of antibiotic, patient states he has picked up the med and will start taking it, patient verbally understood.

## 2020-04-09 NOTE — TELEPHONE ENCOUNTER
----- Message from Nuha Soto MD sent at 4/9/2020 11:46 AM CDT -----  Scrotal ultrasound - left epididymo-orchitis as was suspected    Rec:  Cipro 500 mg p.o. b.i.d. for 4 weeks            Needs follow-up appointment when it  next becomes available

## 2020-04-09 NOTE — TELEPHONE ENCOUNTER
Call placed to inform patient, the MD reviewed his scrotal US and gave order to change antibiotic to cipro, no answer, message left with call back number.

## 2020-04-13 DIAGNOSIS — C34.90 MALIGNANT NEOPLASM OF BRONCHUS AND LUNG: Primary | ICD-10-CM

## 2020-04-13 DIAGNOSIS — C34.82 CANCER OF OVERLAPPING SITES OF LEFT LUNG: ICD-10-CM

## 2020-04-27 ENCOUNTER — TELEPHONE (OUTPATIENT)
Dept: UROLOGY | Facility: CLINIC | Age: 80
End: 2020-04-27

## 2020-04-27 NOTE — TELEPHONE ENCOUNTER
Returned call and spoke with patient, he states his testicle is still swollen and he feels the antibiotic is not working, offered virtual visit, patient states he is not able to do the computerized things, appt made for virtual audio, patient verbally understood.

## 2020-04-27 NOTE — TELEPHONE ENCOUNTER
----- Message from Princess ROBIN Jalloh sent at 4/27/2020  3:15 PM CDT -----  Contact: pt  Type: Needs Medical Advice  Who Called:  Patient  Symptoms (please be specific): antibody not working-swellen in the testicle area  How long has patient had these symptoms:  2 months  Pharmacy name and phone #:    [facility] - [place], [place] - [address]  [address]  [place] [address]  Phone: [phone] Fax: [phone]  Best Call Back Number: #[phone] (home)   Additional Information: Requesting a call in regards to getting something for the swollen. Patient would also like to be seen if possible.

## 2020-04-28 ENCOUNTER — LAB VISIT (OUTPATIENT)
Dept: LAB | Facility: HOSPITAL | Age: 80
End: 2020-04-28
Attending: INTERNAL MEDICINE
Payer: MEDICARE

## 2020-04-28 ENCOUNTER — OFFICE VISIT (OUTPATIENT)
Dept: UROLOGY | Facility: CLINIC | Age: 80
End: 2020-04-28
Payer: MEDICARE

## 2020-04-28 DIAGNOSIS — C61 PROSTATE CANCER: ICD-10-CM

## 2020-04-28 DIAGNOSIS — C34.90 CARCINOMA OF LUNG, UNSPECIFIED LATERALITY: Chronic | ICD-10-CM

## 2020-04-28 DIAGNOSIS — N45.1 LEFT EPIDIDYMITIS: Primary | ICD-10-CM

## 2020-04-28 LAB
ALBUMIN SERPL BCP-MCNC: 3.6 G/DL (ref 3.5–5.2)
ALP SERPL-CCNC: 73 U/L (ref 55–135)
ALT SERPL W/O P-5'-P-CCNC: 9 U/L (ref 10–44)
ANION GAP SERPL CALC-SCNC: 7 MMOL/L (ref 8–16)
AST SERPL-CCNC: 15 U/L (ref 10–40)
BASOPHILS # BLD AUTO: 0.04 K/UL (ref 0–0.2)
BASOPHILS NFR BLD: 0.5 % (ref 0–1.9)
BILIRUB SERPL-MCNC: 0.4 MG/DL (ref 0.1–1)
BUN SERPL-MCNC: 16 MG/DL (ref 8–23)
CALCIUM SERPL-MCNC: 8.6 MG/DL (ref 8.7–10.5)
CHLORIDE SERPL-SCNC: 107 MMOL/L (ref 95–110)
CO2 SERPL-SCNC: 25 MMOL/L (ref 23–29)
CREAT SERPL-MCNC: 1.4 MG/DL (ref 0.5–1.4)
DIFFERENTIAL METHOD: ABNORMAL
EOSINOPHIL # BLD AUTO: 0.4 K/UL (ref 0–0.5)
EOSINOPHIL NFR BLD: 4.6 % (ref 0–8)
ERYTHROCYTE [DISTWIDTH] IN BLOOD BY AUTOMATED COUNT: 15.2 % (ref 11.5–14.5)
EST. GFR  (AFRICAN AMERICAN): 55.6 ML/MIN/1.73 M^2
EST. GFR  (NON AFRICAN AMERICAN): 48.1 ML/MIN/1.73 M^2
GLUCOSE SERPL-MCNC: 146 MG/DL (ref 70–110)
HCT VFR BLD AUTO: 35.1 % (ref 40–54)
HGB BLD-MCNC: 11.4 G/DL (ref 14–18)
IMM GRANULOCYTES # BLD AUTO: 0.02 K/UL (ref 0–0.04)
IMM GRANULOCYTES NFR BLD AUTO: 0.2 % (ref 0–0.5)
LYMPHOCYTES # BLD AUTO: 1.5 K/UL (ref 1–4.8)
LYMPHOCYTES NFR BLD: 17.9 % (ref 18–48)
MCH RBC QN AUTO: 30.2 PG (ref 27–31)
MCHC RBC AUTO-ENTMCNC: 32.5 G/DL (ref 32–36)
MCV RBC AUTO: 93 FL (ref 82–98)
MONOCYTES # BLD AUTO: 0.7 K/UL (ref 0.3–1)
MONOCYTES NFR BLD: 8.8 % (ref 4–15)
NEUTROPHILS # BLD AUTO: 5.7 K/UL (ref 1.8–7.7)
NEUTROPHILS NFR BLD: 68 % (ref 38–73)
NRBC BLD-RTO: 0 /100 WBC
PLATELET # BLD AUTO: 228 K/UL (ref 150–350)
PMV BLD AUTO: 8.9 FL (ref 9.2–12.9)
POTASSIUM SERPL-SCNC: 4 MMOL/L (ref 3.5–5.1)
PROT SERPL-MCNC: 6.9 G/DL (ref 6–8.4)
RBC # BLD AUTO: 3.78 M/UL (ref 4.6–6.2)
SODIUM SERPL-SCNC: 139 MMOL/L (ref 136–145)
WBC # BLD AUTO: 8.4 K/UL (ref 3.9–12.7)

## 2020-04-28 PROCEDURE — 80053 COMPREHEN METABOLIC PANEL: CPT

## 2020-04-28 PROCEDURE — 99441 PR PHYSICIAN TELEPHONE EVALUATION 5-10 MIN: ICD-10-PCS | Mod: 95,,, | Performed by: UROLOGY

## 2020-04-28 PROCEDURE — 99441 PR PHYSICIAN TELEPHONE EVALUATION 5-10 MIN: CPT | Mod: 95,,, | Performed by: UROLOGY

## 2020-04-28 PROCEDURE — 36415 COLL VENOUS BLD VENIPUNCTURE: CPT

## 2020-04-28 PROCEDURE — 85025 COMPLETE CBC W/AUTO DIFF WBC: CPT

## 2020-04-28 NOTE — PROGRESS NOTES
OFFICE NOTE  [unfilled]  [id number]  4/28/2020       CHIEF COMPLAINT:   left epididymo-orchitis, adenocarcinoma prostate     HISTORY OF PRESENT ILLNESS:   this 79-year-old male underwent communication via telephone today since he cannot be seen in the office due to the Coronavirus situation and he could not communicate via video.  He has been treated for epididymal orchitis that recently he took Cipro that he has completed.  He has noticed some improvement but has not completely resolved.  Also has a history of adenocarcinoma prostate for which he underwent transrectal ultrasound prostate gold markers on 02/24/2020 in preparation for radiation therapy.  He has been seeing  his radiation oncologist but has yet to start radiation therapy.  He has a follow-up appointment with him to discuss the treatment plan.     PSA  - 8.52 on 12/23/2019       FINAL IMPRESSION:   adenocarcinoma prostate, left epididymo-orchitis     RECOMMENDATIONS:   trial on Ceftin 500 mg p.o. b.i.d. for management of his epididymo-orchitis.  Recheck in 3-4 weeks.  He will continue to follow-up with radiation therapy

## 2020-04-28 NOTE — TELEPHONE ENCOUNTER
Returned call and spoke with patient, he states the pharmacy had no record of his RX antibiotic, informed the antibiotic transmission failed, Ceftin called in to pharmacy, patient verbally understood.

## 2020-04-29 RX ORDER — CEFUROXIME AXETIL 500 MG/1
500 TABLET ORAL 2 TIMES DAILY
Qty: 56 TABLET | Refills: 0 | Status: SHIPPED | OUTPATIENT
Start: 2020-04-29 | End: 2020-05-27

## 2020-05-01 ENCOUNTER — HOSPITAL ENCOUNTER (OUTPATIENT)
Dept: RADIOLOGY | Facility: HOSPITAL | Age: 80
Discharge: HOME | End: 2020-05-01
Attending: INTERNAL MEDICINE
Payer: MEDICARE

## 2020-05-01 ENCOUNTER — TELEPHONE (OUTPATIENT)
Dept: UROLOGY | Facility: CLINIC | Age: 80
End: 2020-05-01

## 2020-05-01 VITALS — HEIGHT: 70 IN | BODY MASS INDEX: 24.91 KG/M2 | WEIGHT: 174 LBS

## 2020-05-01 DIAGNOSIS — C34.82 CANCER OF OVERLAPPING SITES OF LEFT LUNG: ICD-10-CM

## 2020-05-01 DIAGNOSIS — C34.90 MALIGNANT NEOPLASM OF BRONCHUS AND LUNG: ICD-10-CM

## 2020-05-01 LAB — GLUCOSE SERPL-MCNC: 138 MG/DL (ref 70–110)

## 2020-05-01 PROCEDURE — 78815 PET IMAGE W/CT SKULL-THIGH: CPT | Mod: TC,PO,PS

## 2020-05-01 PROCEDURE — A9552 F18 FDG: HCPCS | Mod: PO

## 2020-05-05 ENCOUNTER — LAB VISIT (OUTPATIENT)
Dept: LAB | Facility: HOSPITAL | Age: 80
End: 2020-05-05
Attending: UROLOGY
Payer: MEDICARE

## 2020-05-05 ENCOUNTER — OFFICE VISIT (OUTPATIENT)
Dept: RADIATION ONCOLOGY | Facility: CLINIC | Age: 80
End: 2020-05-05
Payer: MEDICARE

## 2020-05-05 ENCOUNTER — OFFICE VISIT (OUTPATIENT)
Dept: UROLOGY | Facility: CLINIC | Age: 80
End: 2020-05-05
Payer: MEDICARE

## 2020-05-05 VITALS
SYSTOLIC BLOOD PRESSURE: 115 MMHG | WEIGHT: 171.5 LBS | HEIGHT: 70 IN | BODY MASS INDEX: 24.55 KG/M2 | HEART RATE: 91 BPM | DIASTOLIC BLOOD PRESSURE: 67 MMHG | TEMPERATURE: 98 F

## 2020-05-05 VITALS
DIASTOLIC BLOOD PRESSURE: 74 MMHG | RESPIRATION RATE: 18 BRPM | TEMPERATURE: 98 F | WEIGHT: 174 LBS | BODY MASS INDEX: 24.97 KG/M2 | HEART RATE: 91 BPM | SYSTOLIC BLOOD PRESSURE: 120 MMHG

## 2020-05-05 DIAGNOSIS — C61 PROSTATE CANCER: Primary | ICD-10-CM

## 2020-05-05 DIAGNOSIS — C61 ADENOCARCINOMA OF PROSTATE: Primary | ICD-10-CM

## 2020-05-05 DIAGNOSIS — C61 PROSTATE CANCER: ICD-10-CM

## 2020-05-05 DIAGNOSIS — R31.29 MICROSCOPIC HEMATURIA: ICD-10-CM

## 2020-05-05 LAB
BILIRUB SERPL-MCNC: ABNORMAL MG/DL
BLOOD URINE, POC: ABNORMAL
COLOR, POC UA: YELLOW
COMPLEXED PSA SERPL-MCNC: 4.9 NG/ML (ref 0–4)
GLUCOSE UR QL STRIP: ABNORMAL
KETONES UR QL STRIP: ABNORMAL
LEUKOCYTE ESTERASE URINE, POC: ABNORMAL
NITRITE, POC UA: NEGATIVE
PH, POC UA: 5
PROTEIN, POC: 300
SPECIFIC GRAVITY, POC UA: 1.03
UROBILINOGEN, POC UA: 0.2

## 2020-05-05 PROCEDURE — 99999 PR PBB SHADOW E&M-EST. PATIENT-LVL III: ICD-10-PCS | Mod: PBBFAC,,, | Performed by: UROLOGY

## 2020-05-05 PROCEDURE — 36415 COLL VENOUS BLD VENIPUNCTURE: CPT

## 2020-05-05 PROCEDURE — 99214 PR OFFICE/OUTPT VISIT, EST, LEVL IV, 30-39 MIN: ICD-10-PCS | Mod: S$GLB,,, | Performed by: RADIOLOGY

## 2020-05-05 PROCEDURE — 87086 URINE CULTURE/COLONY COUNT: CPT

## 2020-05-05 PROCEDURE — 81002 URINALYSIS NONAUTO W/O SCOPE: CPT | Mod: PBBFAC,PN | Performed by: UROLOGY

## 2020-05-05 PROCEDURE — 88112 PR  CYTOPATH, CELL ENHANCE TECH: ICD-10-PCS | Mod: 26,,, | Performed by: PATHOLOGY

## 2020-05-05 PROCEDURE — 99214 OFFICE O/P EST MOD 30 MIN: CPT | Mod: 25,S$PBB,, | Performed by: UROLOGY

## 2020-05-05 PROCEDURE — 99214 PR OFFICE/OUTPT VISIT, EST, LEVL IV, 30-39 MIN: ICD-10-PCS | Mod: 25,S$PBB,, | Performed by: UROLOGY

## 2020-05-05 PROCEDURE — 99213 OFFICE O/P EST LOW 20 MIN: CPT | Mod: PBBFAC,PN | Performed by: UROLOGY

## 2020-05-05 PROCEDURE — 87186 SC STD MICRODIL/AGAR DIL: CPT

## 2020-05-05 PROCEDURE — 88112 CYTOPATH CELL ENHANCE TECH: CPT | Mod: 26,,, | Performed by: PATHOLOGY

## 2020-05-05 PROCEDURE — 88112 CYTOPATH CELL ENHANCE TECH: CPT | Performed by: PATHOLOGY

## 2020-05-05 PROCEDURE — 99214 OFFICE O/P EST MOD 30 MIN: CPT | Mod: S$GLB,,, | Performed by: RADIOLOGY

## 2020-05-05 PROCEDURE — 99999 PR PBB SHADOW E&M-EST. PATIENT-LVL III: CPT | Mod: PBBFAC,,, | Performed by: UROLOGY

## 2020-05-05 PROCEDURE — 87077 CULTURE AEROBIC IDENTIFY: CPT

## 2020-05-05 PROCEDURE — 87088 URINE BACTERIA CULTURE: CPT

## 2020-05-05 PROCEDURE — 81000 URINALYSIS NONAUTO W/SCOPE: CPT | Mod: PBBFAC,PN | Performed by: UROLOGY

## 2020-05-05 PROCEDURE — 84153 ASSAY OF PSA TOTAL: CPT
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
## 2020-05-05 NOTE — PROGRESS NOTES
OFFICE NOTE  [unfilled]  [id number]  5/5/2020       CHIEF COMPLAINT:   adenocarcinoma prostate, left epididymo-orchitis     HISTORY OF PRESENT ILLNESS:   this 79-year-old male returns recheck.  He has a history of adenocarcinoma prostate for which he underwent transrectal ultrasound gold marker placements on 02/24/2020 in preparation to undergo radiation therapy.  He has been seeing Dr. Escobar but the treatment has been delayed in view of the Coronavirus situation.  His last PSA was 8.52 on 12/23/2019.  He also has had left epididymo-orchitis and he completed Cipro recently  and he states he is feeling better.  He never did take Ceftin since he was concerned about the side effects.    Physical exam:  Abdomen - soft benign nontender no masses no hernias no organomegaly                             External genitalia - normal phallus with adequate meatus testes descended and feel normal no scrotal mass                             Rectal -  firm smooth prostate no nodules but there is increased prominence of the left lobe       PSA  - 8.52 on 12/23/2019     UA - moderate blood with pH 5.0 and small amount of WBCs and negative for nitrites      FINAL IMPRESSION:   adenocarcinoma prostate, microscopic hematuria     RECOMMENDATIONS:  PSA, urine for culture and cytology.  He will subsequently need to follow-up with Dr. Escobar concerning radiation therapy pending the PSA level.  If there is a significant rise in his PSA we may need to proceed with hormonal therapy and discussion was had today with Dr. Escobar the concerning this.

## 2020-05-07 ENCOUNTER — TELEPHONE (OUTPATIENT)
Dept: UROLOGY | Facility: CLINIC | Age: 80
End: 2020-05-07

## 2020-05-07 LAB — FINAL PATHOLOGIC DIAGNOSIS: ABNORMAL

## 2020-05-07 NOTE — TELEPHONE ENCOUNTER
Returned call and spoke with patient. He wants to know the urine culture results. Informed it is still in process and we will contact him once resulted, patient verbally understood.

## 2020-05-07 NOTE — TELEPHONE ENCOUNTER
----- Message from Kristen Ang sent at 5/7/2020 11:06 AM CDT -----  Type: Patient Call Back    Who called: Self     What is the request in detail:patient called to get the rest of his results . Please call     Can the clinic reply by [facility]? No     Would the patient rather a call back or a response via [facility]?  Call     Best call back number: [phone]

## 2020-05-08 ENCOUNTER — EXTERNAL HOME HEALTH (OUTPATIENT)
Dept: HOME HEALTH SERVICES | Facility: HOSPITAL | Age: 80
End: 2020-05-08

## 2020-05-08 LAB — BACTERIA UR CULT: ABNORMAL

## 2020-05-08 RX ORDER — CEFUROXIME AXETIL 500 MG/1
500 TABLET ORAL 2 TIMES DAILY
Qty: 30 TABLET | Refills: 0 | Status: SHIPPED | OUTPATIENT
Start: 2020-05-08 | End: 2020-07-18 | Stop reason: CLARIF

## 2020-05-11 ENCOUNTER — TELEPHONE (OUTPATIENT)
Dept: UROLOGY | Facility: CLINIC | Age: 80
End: 2020-05-11

## 2020-05-11 NOTE — TELEPHONE ENCOUNTER
I spoke with the pt and advised him of results and recommendations. Understanding was verbalized.

## 2020-05-11 NOTE — TELEPHONE ENCOUNTER
----- Message from Leonidas Barrientos sent at 5/11/2020 10:14 AM CDT -----  Contact: same  Type:  Test Results    Who Called:  patient  Name of Test (Lab/Mammo/Etc):  Urine  Date of Test:  5/5/2020  Ordering Provider:  Charles  Where the test was performed:  Office  Best Call Back Number:  [phone]  Additional Information:  n/a

## 2020-05-19 RX ORDER — APIXABAN 2.5 MG/1
TABLET, FILM COATED ORAL
Qty: 60 TABLET | Refills: 0 | OUTPATIENT
Start: 2020-05-19

## 2020-05-28 ENCOUNTER — NURSE TRIAGE (OUTPATIENT)
Dept: ADMINISTRATIVE | Facility: CLINIC | Age: 80
End: 2020-05-28

## 2020-05-28 ENCOUNTER — HOSPITAL ENCOUNTER (EMERGENCY)
Facility: HOSPITAL | Age: 80
Discharge: HOME | End: 2020-05-28
Attending: EMERGENCY MEDICINE
Payer: MEDICARE

## 2020-05-28 VITALS
SYSTOLIC BLOOD PRESSURE: 167 MMHG | TEMPERATURE: 98 F | WEIGHT: 171 LBS | DIASTOLIC BLOOD PRESSURE: 79 MMHG | RESPIRATION RATE: 20 BRPM | HEART RATE: 64 BPM | BODY MASS INDEX: 24.54 KG/M2 | OXYGEN SATURATION: 95 %

## 2020-05-28 DIAGNOSIS — R06.02 SHORTNESS OF BREATH: ICD-10-CM

## 2020-05-28 DIAGNOSIS — R06.02 SOB (SHORTNESS OF BREATH): Primary | ICD-10-CM

## 2020-05-28 LAB
ALBUMIN SERPL BCP-MCNC: 3.3 G/DL (ref 3.5–5.2)
ALP SERPL-CCNC: 86 U/L (ref 55–135)
ALT SERPL W/O P-5'-P-CCNC: 8 U/L (ref 10–44)
ANION GAP SERPL CALC-SCNC: 6 MMOL/L (ref 8–16)
APTT BLDCRRT: 31.6 SEC (ref 21–32)
AST SERPL-CCNC: 13 U/L (ref 10–40)
BASOPHILS # BLD AUTO: 0.03 K/UL (ref 0–0.2)
BASOPHILS NFR BLD: 0.5 % (ref 0–1.9)
BILIRUB SERPL-MCNC: 0.3 MG/DL (ref 0.1–1)
BNP SERPL-MCNC: 175 PG/ML (ref 0–99)
BUN SERPL-MCNC: 14 MG/DL (ref 8–23)
CALCIUM SERPL-MCNC: 8.1 MG/DL (ref 8.7–10.5)
CHLORIDE SERPL-SCNC: 107 MMOL/L (ref 95–110)
CO2 SERPL-SCNC: 27 MMOL/L (ref 23–29)
CREAT SERPL-MCNC: 1.4 MG/DL (ref 0.5–1.4)
DIFFERENTIAL METHOD: ABNORMAL
EOSINOPHIL # BLD AUTO: 0.2 K/UL (ref 0–0.5)
EOSINOPHIL NFR BLD: 3.8 % (ref 0–8)
ERYTHROCYTE [DISTWIDTH] IN BLOOD BY AUTOMATED COUNT: 15.7 % (ref 11.5–14.5)
EST. GFR  (AFRICAN AMERICAN): 56 ML/MIN/1.73 M^2
EST. GFR  (NON AFRICAN AMERICAN): 48 ML/MIN/1.73 M^2
GLUCOSE SERPL-MCNC: 200 MG/DL (ref 70–110)
HCT VFR BLD AUTO: 33.9 % (ref 40–54)
HGB BLD-MCNC: 10.6 G/DL (ref 14–18)
IMM GRANULOCYTES # BLD AUTO: 0.02 K/UL (ref 0–0.04)
IMM GRANULOCYTES NFR BLD AUTO: 0.4 % (ref 0–0.5)
INR PPP: 1 (ref 0.8–1.2)
LYMPHOCYTES # BLD AUTO: 1.4 K/UL (ref 1–4.8)
LYMPHOCYTES NFR BLD: 24.2 % (ref 18–48)
MCH RBC QN AUTO: 29.4 PG (ref 27–31)
MCHC RBC AUTO-ENTMCNC: 31.3 G/DL (ref 32–36)
MCV RBC AUTO: 94 FL (ref 82–98)
MONOCYTES # BLD AUTO: 0.7 K/UL (ref 0.3–1)
MONOCYTES NFR BLD: 11.6 % (ref 4–15)
NEUTROPHILS # BLD AUTO: 3.3 K/UL (ref 1.8–7.7)
NEUTROPHILS NFR BLD: 59.5 % (ref 38–73)
NRBC BLD-RTO: 0 /100 WBC
PLATELET # BLD AUTO: 216 K/UL (ref 150–350)
PMV BLD AUTO: 10 FL (ref 9.2–12.9)
POTASSIUM SERPL-SCNC: 4 MMOL/L (ref 3.5–5.1)
PROT SERPL-MCNC: 6.6 G/DL (ref 6–8.4)
PROTHROMBIN TIME: 11 SEC (ref 9–12.5)
RBC # BLD AUTO: 3.6 M/UL (ref 4.6–6.2)
SODIUM SERPL-SCNC: 140 MMOL/L (ref 136–145)
TROPONIN I SERPL DL<=0.01 NG/ML-MCNC: 0.01 NG/ML (ref 0–0.03)
WBC # BLD AUTO: 5.59 K/UL (ref 3.9–12.7)

## 2020-05-28 PROCEDURE — 93005 ELECTROCARDIOGRAM TRACING: CPT

## 2020-05-28 PROCEDURE — 85025 COMPLETE CBC W/AUTO DIFF WBC: CPT

## 2020-05-28 PROCEDURE — 94761 N-INVAS EAR/PLS OXIMETRY MLT: CPT

## 2020-05-28 PROCEDURE — 94640 AIRWAY INHALATION TREATMENT: CPT

## 2020-05-28 PROCEDURE — 84484 ASSAY OF TROPONIN QUANT: CPT

## 2020-05-28 PROCEDURE — 85610 PROTHROMBIN TIME: CPT

## 2020-05-28 PROCEDURE — 80053 COMPREHEN METABOLIC PANEL: CPT

## 2020-05-28 PROCEDURE — 36415 COLL VENOUS BLD VENIPUNCTURE: CPT

## 2020-05-28 PROCEDURE — 99285 EMERGENCY DEPT VISIT HI MDM: CPT | Mod: 25

## 2020-05-28 PROCEDURE — 85730 THROMBOPLASTIN TIME PARTIAL: CPT

## 2020-05-28 PROCEDURE — 25000242 PHARM REV CODE 250 ALT 637 W/ HCPCS: Performed by: EMERGENCY MEDICINE

## 2020-05-28 PROCEDURE — 83880 ASSAY OF NATRIURETIC PEPTIDE: CPT

## 2020-05-28 RX ORDER — ALBUTEROL SULFATE 2.5 MG/.5ML
2.5 SOLUTION RESPIRATORY (INHALATION)
Status: COMPLETED | OUTPATIENT
Start: 2020-05-28 | End: 2020-05-28

## 2020-05-28 RX ORDER — ALBUTEROL SULFATE 90 UG/1
1-2 AEROSOL, METERED RESPIRATORY (INHALATION) EVERY 6 HOURS PRN
Qty: 8 G | Refills: 1 | Status: SHIPPED | OUTPATIENT
Start: 2020-05-28 | End: 2020-06-04 | Stop reason: SDUPTHER

## 2020-05-28 RX ORDER — FUROSEMIDE 20 MG/1
20 TABLET ORAL DAILY
Qty: 5 TABLET | Refills: 0 | Status: SHIPPED | OUTPATIENT
Start: 2020-05-28 | End: 2020-07-18 | Stop reason: CLARIF

## 2020-05-28 RX ADMIN — ALBUTEROL SULFATE 2.5 MG: 2.5 SOLUTION RESPIRATORY (INHALATION) at 01:05

## 2020-05-28 NOTE — ED PROVIDER NOTES
Encounter Date: 5/28/2020       History     Chief Complaint   Patient presents with    Shortness of Breath     intermittent SOB; states SOB occurs everytime he takes Eliquis; lasts for approx 15 minutes, and then subsides     Patient is a 79-year-old male with a past medical history of COPD hypertension hyperlipidemia type 2 diabetes coronary artery disease status post 3 stents lung cancer in remission prostate cancer who presents the emergency room for evaluation of intermittent shortness of breath that he states occurs 15 min after he takes his Eliquis twice a day.  Patient just quit smoking 2 months ago.  He also has occasional wheezing.  He states he feels like he has to cough at times and is unable to cough phlegm up.  He denies any lower extremity edema history of DVT or PE fevers chest pain abdominal pain nausea vomiting diarrhea black or bloody stools.  He does not want to be on Eliquis secondary to the cost of medicine and in the past he was on anticoagulant and this was stopped because he had an ulcer.  He denies any black or bloody stools.        Review of patient's allergies indicates:   Allergen Reactions    Doxycycline Diarrhea     Severe diarrhea    Sulfa (sulfonamide antibiotics) Rash     Other reaction(s): Itching    Sulfasalazine Rash     Rash and itching mild     Past Medical History:   Diagnosis Date    Cancer 2012    lung cancer chemo and radiation    COPD (chronic obstructive pulmonary disease)     Coronary artery disease 2002, 2004    s/p 3 stents;  Dr. Interiano    Diabetes mellitus, type 2     Hyperlipidemia     Hypertension     Prostate CA      Past Surgical History:   Procedure Laterality Date    ANKLE SURGERY Right 1970s    CYSTOSCOPY W/ RETROGRADES Bilateral 2/24/2020    Procedure: CYSTOSCOPY, WITH RETROGRADE PYELOGRAM;  Surgeon: Nuha Soto MD;  [facility];  Service: Urology;  Laterality: Bilateral;    EPIDURAL STEROID INJECTION INTO CERVICAL SPINE N/A 2/19/2019     Procedure: Injection-steroid-epidural-cervical C7-T1;  Surgeon: Marcelino Monroe MD;  Location: [facility] OR;  Service: Pain Management;  Laterality: N/A;    INJECTION OF ANESTHETIC AGENT AROUND MEDIAL BRANCH NERVES INNERVATING LUMBAR FACET JOINT Bilateral 2018    Procedure: MEDIAL BRANCH, LUMBAR L3,4,5;  Surgeon: Marcelino Monroe MD;  Location: [facility] OR;  Service: Pain Management;  Laterality: Bilateral;  L3, 4, 5    RADIOFREQUENCY ABLATION OF LUMBAR MEDIAL BRANCH NERVE AT SINGLE LEVEL Bilateral 2018    Procedure: RADIOFREQUENCY ABLATION, NERVE, MEDIAL BRANCH, LUMBAR, 1 LEVEL;  Surgeon: Marcelino Monroe MD;  Location: [facility] OR;  Service: Pain Management;  Laterality: Bilateral;  L3, 4, 5  lumbar  [employer] pain management generator  SN [id number]  80 degrees for 75 seconds x2    TRANSRECTAL ULTRASOUND OF PROSTATE WITH INSERTION OF GOLD FIDUCIAL MARKER N/A 2020    Procedure: ULTRASOUND, PROSTATE, RECTAL APPROACH, WITH GOLD FIDUCIAL MARKER INSERTION;  Surgeon: Nuha Soto MD;  Location: [facility] OR;  Service: Urology;  Laterality: N/A;     Family History   Problem Relation Age of Onset    Diabetes Mother     Peripheral vascular disease Mother     Heart attack Mother     Diabetes Father     Heart attack Father     Emphysema Father     Diabetes Sister      Social History     Tobacco Use    Smoking status: Former Smoker     Packs/day: 1.00     Years: 57.00     Pack years: 57.00     Types: Cigarettes     Last attempt to quit: 3/14/2020     Years since quittin.2    Smokeless tobacco: Never Used   Substance Use Topics    Alcohol use: No    Drug use: No     Review of Systems   Constitutional: Negative for diaphoresis, fatigue and fever.   HENT: Negative for ear pain, rhinorrhea and sore throat.    Eyes: Negative for pain and visual disturbance.   Respiratory: Positive for cough and shortness of breath. Negative for chest tightness, wheezing and stridor.    Cardiovascular: Negative for chest pain.    Gastrointestinal: Negative for abdominal pain, diarrhea, nausea and vomiting.   Genitourinary: Negative for difficulty urinating and dysuria.   Musculoskeletal: Negative for arthralgias.   Skin: Negative for rash.   Neurological: Negative for weakness, numbness and headaches.   Psychiatric/Behavioral: Negative for agitation and confusion.   All other systems reviewed and are negative.      Physical Exam     Initial Vitals [05/28/20 1151]   BP Pulse Resp Temp SpO2   (!) 197/84 66 18 98.2 °F (36.8 °C) 98 %      MAP       --         Physical Exam    Nursing note and vitals reviewed.  Constitutional: He appears well-developed and well-nourished. No distress.   HENT:   Head: Normocephalic and atraumatic.   Mouth/Throat: Oropharynx is clear and moist.   Eyes: Conjunctivae and EOM are normal. Pupils are equal, round, and reactive to light.   Neck: Neck supple. No thyromegaly present.   Cardiovascular: Normal rate, regular rhythm, normal heart sounds and intact distal pulses. Exam reveals no gallop and no friction rub.    No murmur heard.  Pulmonary/Chest: He has rales (Bilateral bases).   Abdominal: Soft. Bowel sounds are normal. There is no tenderness.   Musculoskeletal: Normal range of motion. He exhibits no edema.   Neurological: He is alert and oriented to person, place, and time. He has normal strength. No sensory deficit. GCS score is 15. GCS eye subscore is 4. GCS verbal subscore is 5. GCS motor subscore is 6.   Skin: Skin is warm and dry.   Psychiatric: He has a normal mood and affect.         ED Course   Procedures  Labs Reviewed   CBC W/ AUTO DIFFERENTIAL   COMPREHENSIVE METABOLIC PANEL   TROPONIN I   B-TYPE NATRIURETIC PEPTIDE   APTT   PROTIME-INR     EKG Readings: (Independently Interpreted)   EKG independently interpreted by me as rate of 58 sinus bradycardia T-wave inversion lead 3, no ST segment elevation or depression.       Imaging Results          X-Ray Chest AP Portable (Final result)  Result time  05/28/20 12:19:56    Final result by Jose Pinedo Jr., MD (05/28/20 12:19:56)                 Impression:      No acute abnormality.      Electronically signed by: Jose Pinedo MD  Date:    05/28/2020  Time:    12:19             Narrative:    EXAMINATION:  XR CHEST AP PORTABLE    CLINICAL HISTORY:  shortness of breath;    TECHNIQUE:  Single frontal view of the chest was performed.    COMPARISON:  Chest of March 14, 2020    FINDINGS:  The lungs are clear, with normal appearance of pulmonary vasculature and no pleural effusion or pneumothorax.    The cardiac silhouette is normal in size. The hilar and mediastinal contours are unremarkable.    Bones are intact.                                                   ED Course as of May 28 1914   Thu May 28, 2020   1413 Chest x-ray appears to be normal.  BNP is slightly elevated.  Patient does have some subtle rales at the bases and I will start him on Lasix.  He can also use albuterol.  Stable for discharge at this time.  Follow up with primary care.  Continue his Eliquis    [JS]      ED Course User Index  [JS] Marcelino Walton MD                Clinical Impression:       ICD-10-CM ICD-9-CM   1. SOB (shortness of breath) R06.02 786.05   2. Shortness of breath R06.02 786.05                                Marcelino Walton MD  05/28/20 1914

## 2020-05-28 NOTE — DISCHARGE INSTRUCTIONS
Your exam was concerning for mild heart failure.  You need to take medications as prescribed including the diuretics.  Follow up with her regular doctor in the next few days.  Return to the ER for worsening shortness of breath.

## 2020-05-28 NOTE — TELEPHONE ENCOUNTER
Pt c/o SOB since being admitted to hospital. Pt states he thinks SOB may be related to taking Plavix. Pt advised per protocol to ED now and pt verbalizes understanding.     Reason for Disposition   MODERATE difficulty breathing (e.g., speaks in phrases, SOB even at rest, pulse 100-120) of new onset or worse than normal    Additional Information   Negative: Breathing stopped and hasn't returned   Negative: Choking on something   Negative: SEVERE difficulty breathing (e.g., struggling for each breath, speaks in single words, pulse > 120)   Negative: Bluish (or gray) lips or face   Negative: Difficult to awaken or acting confused (e.g., disoriented, slurred speech)   Negative: Passed out (i.e., fainted, collapsed and was not responding)   Negative: Wheezing started suddenly after medicine, an allergic food, or bee sting   Negative: Stridor   Negative: Slow, shallow and weak breathing   Negative: Sounds like a life-threatening emergency to the triager    Protocols used: BREATHING DIFFICULTY-A-OH

## 2020-05-28 NOTE — PROVIDER PROGRESS NOTES - EMERGENCY DEPT.
Emergency Department TeleTRIAGE Encounter Note      CHIEF COMPLAINT    Chief Complaint   Patient presents with    Shortness of Breath     intermittent SOB; states SOB occurs everytime he takes Eliquis; lasts for approx 15 minutes, and then subsides       VITAL SIGNS   Initial Vitals [05/28/20 1151]   BP Pulse Resp Temp SpO2   (!) 197/84 66 18 98.2 °F (36.8 °C) 98 %      MAP       --            ALLERGIES    Review of patient's allergies indicates:   Allergen Reactions    Doxycycline Diarrhea     Severe diarrhea    Sulfa (sulfonamide antibiotics) Rash     Other reaction(s): Itching    Sulfasalazine Rash     Rash and itching mild       PROVIDER TRIAGE NOTE  Patient with past medical history COPD, CAD, diabetes, hyperlipidemia, hypertension, prostate and lung cancer presents to the ED for evaluation of shortness of breath.  Patient states he quit smoking 2 months ago and has an intermittent productive cough since then.  He reports shortness of breath over the past few weeks after taking Eliquis.  He states that shortness of breath last for approximately 15 min and then it subsides.  He read this morning that that is a side effect of the medication and became concerned so he came to the ED. He denies chest pain, shortness of breath, abdominal pain, nausea, vomiting, neck pain, arm pain at this time.       ORDERS  Labs Reviewed - No data to display    ED Orders (720h ago, onward)    Start Ordered     Status Ordering Provider    05/28/20 1459 05/28/20 1200  Troponin I #2  Once Timed      Ordered BRIDGER PARK.    05/28/20 1201 05/28/20 1200  APTT  STAT      Ordered BRIDGER PARK G.    05/28/20 1201 05/28/20 1200  Protime-INR  STAT      Ordered BRIDGER PARK G.    05/28/20 1159 05/28/20 1200  Vital signs  Every 15 min      Ordered BRIDGER PARK G.    05/28/20 1159 05/28/20 1200  Cardiac Monitoring - Adult  Continuous     Comments:  Notify Physician If:    Ordered BRIDGER PARK    05/28/20 1159 05/28/20 1200  Pulse  Oximetry Continuous  Continuous      Ordered BRIDGER PARK.    05/28/20 1159 05/28/20 1200  Diet NPO  Diet effective now      Ordered BRIDGER PARK.    05/28/20 1159 05/28/20 1200  Saline lock IV  Once      Ordered BRIDGER PARK.    05/28/20 1159 05/28/20 1200  EKG 12-lead  Once     Comments:  Do not perform if previously done during this visit/ triage    Ordered BRIDGER PARK.    05/28/20 1159 05/28/20 1200  CBC auto differential  STAT      Ordered BRIDGER PARK.    05/28/20 1159 05/28/20 1200  Comprehensive metabolic panel  STAT      Ordered BRIDGER PARK.    05/28/20 1159 05/28/20 1200  Troponin I #1  STAT      Ordered BRIDGER PARK.    05/28/20 1159 05/28/20 1200  B-Type natriuretic peptide (BNP)  STAT      Ordered BRIDGER PARK.    05/28/20 1159 05/28/20 1200  X-Ray Chest AP Portable  1 time imaging      Ordered BRIDGER PARKDanyelle            Virtual Visit Note: The provider triage portion of this emergency department evaluation and documentation was performed via [employer], a HIPAA-compliant telemedicine application, in concert with a tele-presenter in the room. A face to face patient evaluation with one of my colleagues will occur once the patient is placed in an emergency department room.      DISCLAIMER: This note was prepared with [employer] voice recognition transcription software. Garbled syntax, mangled pronouns, and other bizarre constructions may be attributed to that software system.

## 2020-06-04 ENCOUNTER — TELEPHONE (OUTPATIENT)
Dept: PULMONOLOGY | Facility: CLINIC | Age: 80
End: 2020-06-04

## 2020-06-04 ENCOUNTER — OFFICE VISIT (OUTPATIENT)
Dept: PULMONOLOGY | Facility: CLINIC | Age: 80
End: 2020-06-04
Payer: MEDICARE

## 2020-06-04 ENCOUNTER — LAB VISIT (OUTPATIENT)
Dept: PRIMARY CARE CLINIC | Facility: CLINIC | Age: 80
End: 2020-06-04
Payer: MEDICARE

## 2020-06-04 VITALS
SYSTOLIC BLOOD PRESSURE: 145 MMHG | OXYGEN SATURATION: 98 % | DIASTOLIC BLOOD PRESSURE: 70 MMHG | HEART RATE: 63 BPM | BODY MASS INDEX: 25.92 KG/M2 | WEIGHT: 180.69 LBS

## 2020-06-04 VITALS — TEMPERATURE: 98 F

## 2020-06-04 DIAGNOSIS — F17.201 TOBACCO DEPENDENCE IN REMISSION: ICD-10-CM

## 2020-06-04 DIAGNOSIS — J44.9 CHRONIC OBSTRUCTIVE PULMONARY DISEASE, UNSPECIFIED COPD TYPE: Primary | Chronic | ICD-10-CM

## 2020-06-04 DIAGNOSIS — J44.9 CHRONIC OBSTRUCTIVE PULMONARY DISEASE, UNSPECIFIED COPD TYPE: Primary | ICD-10-CM

## 2020-06-04 DIAGNOSIS — J44.1 COPD EXACERBATION: ICD-10-CM

## 2020-06-04 DIAGNOSIS — C34.90 CARCINOMA OF LUNG, UNSPECIFIED LATERALITY: Chronic | ICD-10-CM

## 2020-06-04 DIAGNOSIS — Z20.822 SUSPECTED COVID-19 VIRUS INFECTION: Primary | ICD-10-CM

## 2020-06-04 PROCEDURE — 99999 PR PBB SHADOW E&M-EST. PATIENT-LVL III: CPT | Mod: PBBFAC,,, | Performed by: INTERNAL MEDICINE

## 2020-06-04 PROCEDURE — U0003 INFECTIOUS AGENT DETECTION BY NUCLEIC ACID (DNA OR RNA); SEVERE ACUTE RESPIRATORY SYNDROME CORONAVIRUS 2 (SARS-COV-2) (CORONAVIRUS DISEASE [COVID-19]), AMPLIFIED PROBE TECHNIQUE, MAKING USE OF HIGH THROUGHPUT TECHNOLOGIES AS DESCRIBED BY CMS-2020-01-R: HCPCS

## 2020-06-04 PROCEDURE — 99205 OFFICE O/P NEW HI 60 MIN: CPT | Mod: S$PBB,,, | Performed by: INTERNAL MEDICINE

## 2020-06-04 PROCEDURE — 99213 OFFICE O/P EST LOW 20 MIN: CPT | Mod: PBBFAC,PO | Performed by: INTERNAL MEDICINE

## 2020-06-04 PROCEDURE — 99999 PR PBB SHADOW E&M-EST. PATIENT-LVL III: ICD-10-PCS | Mod: PBBFAC,,, | Performed by: INTERNAL MEDICINE

## 2020-06-04 PROCEDURE — 99205 PR OFFICE/OUTPT VISIT, NEW, LEVL V, 60-74 MIN: ICD-10-PCS | Mod: S$PBB,,, | Performed by: INTERNAL MEDICINE

## 2020-06-04 RX ORDER — ALBUTEROL SULFATE 90 UG/1
1-2 AEROSOL, METERED RESPIRATORY (INHALATION) EVERY 6 HOURS PRN
Qty: 18 G | Refills: 11 | Status: SHIPPED | OUTPATIENT
Start: 2020-06-04 | End: 2022-06-08

## 2020-06-04 RX ORDER — AZITHROMYCIN 250 MG/1
TABLET, FILM COATED ORAL
Qty: 6 TABLET | Refills: 0 | Status: SHIPPED | OUTPATIENT
Start: 2020-06-04 | End: 2020-06-09

## 2020-06-04 RX ORDER — PREDNISONE 20 MG/1
40 TABLET ORAL DAILY
Qty: 10 TABLET | Refills: 0 | Status: SHIPPED | OUTPATIENT
Start: 2020-06-04 | End: 2020-06-09

## 2020-06-04 RX ORDER — CIPROFLOXACIN 500 MG/1
TABLET ORAL
COMMUNITY
Start: 2020-04-09 | End: 2020-07-13

## 2020-06-04 RX ORDER — ALBUTEROL SULFATE 90 UG/1
1-2 AEROSOL, METERED RESPIRATORY (INHALATION) EVERY 6 HOURS PRN
Qty: 18 G | Refills: 11 | Status: SHIPPED | OUTPATIENT
Start: 2020-06-04 | End: 2020-06-04

## 2020-06-04 RX ORDER — ALBUTEROL SULFATE 108 UG/1
AEROSOL, METERED RESPIRATORY (INHALATION)
COMMUNITY
Start: 2020-05-28 | End: 2020-06-04

## 2020-06-04 RX ORDER — APIXABAN 2.5 MG/1
2.5 TABLET, FILM COATED ORAL 2 TIMES DAILY
Status: ON HOLD | COMMUNITY
Start: 2020-05-18 | End: 2021-07-24 | Stop reason: SDUPTHER

## 2020-06-04 NOTE — PROGRESS NOTES
6/4/2020    Tyler Haven  New Patient Consult    Chief Complaint   Patient presents with    COPD     2012 lung cancer       HPI: Pt is a 80 yo male with COPD, CAD s/p stents x3 (2002)- follows w/ cardiologist , DM2, HTN, HLD, prostate ca and lung ca  Pt reports went to ED a week ago and was told he had heart failure so he is here to follow up that. Reports he was given 5 days of medication including a blood thinner, a diuretic, and an inhaler. He was SOB intermittently which happened about 3-4x w/ cough and rattling in the chest. Pt is a past smoker quit about 90 days now, 1 PPD for about 60 yrs. Pt treated for lung cancer on the left side in 2012- reports go radiation and chemo. Reports he saw Dr. Hargrove in remote past.   SOB is better now, but still has cough w/ phlegm which is brown and increased compared to baseline. Phlegm is thick and hard to expectorate. Uses albuterol inhaler a few times a week- just got it from ER. Not on any other inhalers. Denies frequent infections/bronchitis. Pt lives at home w/ wife. 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  Sister in law knows someone at [employer] who has covid. Some other family members and pt have lost their sense of taste/smell recently.    The chief compliant  problem is new to me    PFSH:  Past Medical History:   Diagnosis Date    Cancer 2012    lung cancer chemo and radiation    COPD (chronic obstructive pulmonary disease)     Coronary artery disease 2002, 2004    s/p 3 stents;  Dr. Interiano    Diabetes mellitus, type 2     Hyperlipidemia     Hypertension     Prostate CA          Past Surgical History:   Procedure Laterality Date    ANKLE SURGERY Right 1970s    CYSTOSCOPY W/ RETROGRADES Bilateral 2/24/2020    Procedure: CYSTOSCOPY, WITH RETROGRADE PYELOGRAM;  Surgeon: Nuha Soto MD;  Location: [facility];  Service: Urology;  Laterality: Bilateral;    EPIDURAL STEROID INJECTION INTO CERVICAL SPINE N/A  2019    Procedure: Injection-steroid-epidural-cervical C7-T1;  Surgeon: Marcelino Monroe MD;  [facility];  Service: Pain Management;  Laterality: N/A;    INJECTION OF ANESTHETIC AGENT AROUND MEDIAL BRANCH NERVES INNERVATING LUMBAR FACET JOINT Bilateral 2018    Procedure: MEDIAL BRANCH, LUMBAR L3,4,5;  Surgeon: Marcelino Monroe MD;  [facility];  Service: Pain Management;  Laterality: Bilateral;  L3, 4, 5    RADIOFREQUENCY ABLATION OF LUMBAR MEDIAL BRANCH NERVE AT SINGLE LEVEL Bilateral 2018    Procedure: RADIOFREQUENCY ABLATION, NERVE, MEDIAL BRANCH, LUMBAR, 1 LEVEL;  Surgeon: Marcelino Monroe MD;  [facility];  Service: Pain Management;  Laterality: Bilateral;  L3, 4, 5  lumbar  [employer] pain management generator  SN [id number]  80 degrees for 75 seconds x2    TRANSRECTAL ULTRASOUND OF PROSTATE WITH INSERTION OF GOLD FIDUCIAL MARKER N/A 2020    Procedure: ULTRASOUND, PROSTATE, RECTAL APPROACH, WITH GOLD FIDUCIAL MARKER INSERTION;  Surgeon: Nuha Soto MD;  Location: [facility] OR;  Service: Urology;  Laterality: N/A;     Social History     Tobacco Use    Smoking status: Former Smoker     Packs/day: 1.00     Years: 57.00     Pack years: 57.00     Types: Cigarettes     Last attempt to quit: 3/14/2020     Years since quittin.2    Smokeless tobacco: Never Used   Substance Use Topics    Alcohol use: No    Drug use: No     Family History   Problem Relation Age of Onset    Diabetes Mother     Peripheral vascular disease Mother     Heart attack Mother     Diabetes Father     Heart attack Father     Emphysema Father     Diabetes Sister      Review of patient's allergies indicates:   Allergen Reactions    Doxycycline Diarrhea     Severe diarrhea    Sulfa (sulfonamide antibiotics) Rash     Other reaction(s): Itching    Sulfasalazine Rash     Rash and itching mild       Performance Status:The patient's activity level is functions out of house.  Limited walking due to feet pain.  Sometimes rides stationary bike- 5 min then has to stop catch breath.    Review of Systems:  a review of eleven systems covering constitutional, Eye, HEENT, Psych, Respiratory, Cardiac, GI, , Musculoskeletal, Endocrine, Dermatologic was negative except for pertinent findings as listed ABOVE and below:  Chest pain- L side, intermittent- woke up at night, better w/ belching, better w/ nitro  GERD, belching   Shoulder and neck pain  Lost sense of taste    Exam:Comprehensive exam done. BP (!) 145/70 (BP Location: Left arm, Patient Position: Sitting)   Pulse 63   Wt 81.9 kg (180 lb 10.7 oz)   SpO2 98% Comment: on room air at rest  BMI 25.92 kg/m²  Exam included Vitals as listed, and patient's appearance and affect and alertness and mood, oral exam for yeast and hygiene and pharynx lesions and Mallapatti (M) score, neck with inspection for jvd and masses and thyroid abnormalities and lymph nodes (supraclavicular and infraclavicular nodes and axillary also examined and noted if abn), chest exam included symmetry and effort and fremitus and percussion and auscultation, cardiac exam included rhythm and gallops and murmur and rubs and jvd and edema, abdominal exam for mass and hepatosplenomegaly and tenderness and hernias and bowel sounds, Musculoskeletal exam with muscle tone and posture and mobility/gait and  strength, and skin for rashes and cyanosis and pallor and turgor, extremity for clubbing.  Findings were normal except for pertinent findings listed below:  M2  Thin, elderly  No acute distress  Lungs clear   No edema    Radiographs (ct chest and cxr) reviewed: view by direct vision   PET CT 5/1/20- aortic calcification emphysematous changes, pleural base nodule on left    TTE 3/11/20-   · The estimated PA systolic pressure is 37 mmHg.  · Concentric left ventricular remodeling.  · Normal left ventricular systolic function. The estimated ejection fraction is 55%.  · Indeterminate left ventricular diastolic  function.  · Normal right ventricular systolic function.  · Mild tricuspid regurgitation.  · Normal central venous pressure (3 mmHg).  · Mild pulmonic regurgitation.    Labs reviewed    Results for SHERI HAJI (MRN [id number]) as of 6/4/2020 09:16   Ref. Range 5/28/2020 12:56   WBC Latest Ref Range: 3.90 - 12.70 K/uL 5.59   RBC Latest Ref Range: 4.60 - 6.20 M/uL 3.60 (L)   Hemoglobin Latest Ref Range: 14.0 - 18.0 g/dL 10.6 (L)   Hematocrit Latest Ref Range: 40.0 - 54.0 % 33.9 (L)   MCV Latest Ref Range: 82 - 98 fL 94   MCH Latest Ref Range: 27.0 - 31.0 pg 29.4   MCHC Latest Ref Range: 32.0 - 36.0 g/dL 31.3 (L)   RDW Latest Ref Range: 11.5 - 14.5 % 15.7 (H)   Platelets Latest Ref Range: 150 - 350 K/uL 216   Results for SHERI HAJI (MRN [id number]) as of 6/4/2020 09:16   Ref. Range 5/28/2020 12:56   Sodium Latest Ref Range: 136 - 145 mmol/L 140   Potassium Latest Ref Range: 3.5 - 5.1 mmol/L 4.0   Chloride Latest Ref Range: 95 - 110 mmol/L 107   CO2 Latest Ref Range: 23 - 29 mmol/L 27   Anion Gap Latest Ref Range: 8 - 16 mmol/L 6 (L)   BUN, Bld Latest Ref Range: 8 - 23 mg/dL 14   Creatinine Latest Ref Range: 0.5 - 1.4 mg/dL 1.4   eGFR if non African American Latest Ref Range: >60 mL/min/1.73 m^2 48 (A)   eGFR if African American Latest Ref Range: >60 mL/min/1.73 m^2 56 (A)   Glucose Latest Ref Range: 70 - 110 mg/dL 200 (H)   Calcium Latest Ref Range: 8.7 - 10.5 mg/dL 8.1 (L)        PFT will be done and results to be reviewed      Plan:  Clinical impression is resonably certain and repeated evaluation prn +/- follow up will be needed as below. COPD exacerbation, need to r/o COVID infection. Needs to f/u with cardiologist for CHF/chest pain.    Sheri was seen today for copd.    Diagnoses and all orders for this visit:    Chronic obstructive pulmonary disease, unspecified COPD type  -     Complete PFT with bronchodilator; Future  -     umeclidinium-vilanteroL (ANORO ELLIPTA) 62.5-25 mcg/actuation DsDv; Inhale 1 puff  into the lungs once daily. Controller    Carcinoma of lung, unspecified laterality    COPD exacerbation  -     COVID-19 Routine Screening  -     predniSONE (DELTASONE) 20 MG tablet; Take 2 tablets (40 mg total) by mouth once daily. for 5 days  -     azithromycin (Z-JOSH) 250 MG tablet; Take 2 tablets by mouth on day 1; Take 1 tablet by mouth on days 2-5  -     albuterol (PROVENTIL/VENTOLIN HFA) 90 mcg/actuation inhaler; Inhale 1-2 puffs into the lungs every 6 (six) hours as needed for Wheezing. Rescue    Tobacco dependence in remission        Follow up in about 3 months (around 9/4/2020).    Discussed with patient above for education the following:      Patient Instructions   Can keep taking Guaifenesin 400mg- can take up to three times a day.  Continue albuterol as needed- refilled  Start taking anoro inhaler every day  Get pulmonary function tests  Take azithromycin and prednisone for bronchitis/COPD exacerbation  If you have chest pain that persists or feels more severe please go to the ER.  Go to COVID clinic and get tested  Avoid crowds and personal contact.  COVID 19 is a contagious cold virus- infected patients may carry disease without symptoms or develop cough with some fever and shortness of breath.  In 2 out of 10 people rapidly worsening breathing occurs, and worsening with death in just a few of those.  Go to the emergency department if you develop severe shortness of breath, fever or cough. If you have mild symptoms it is better to stay at home and avoid contact with others.  Continue to avoid smoking  Follow up with cardiologist.

## 2020-06-04 NOTE — PATIENT INSTRUCTIONS
Can keep taking Guaifenesin 400mg- can take up to three times a day.  Continue albuterol as needed- refilled  Start taking anoro inhaler every day  Get pulmonary function tests  Take azithromycin and prednisone for bronchitis/COPD exacerbation  If you have chest pain that persists or feels more severe please go to the ER.  Go to COVID clinic and get tested  Avoid crowds and personal contact.  COVID 19 is a contagious cold virus- infected patients may carry disease without symptoms or develop cough with some fever and shortness of breath.  In 2 out of 10 people rapidly worsening breathing occurs, and worsening with death in just a few of those.  Go to the emergency department if you develop severe shortness of breath, fever or cough. If you have mild symptoms it is better to stay at home and avoid contact with others.  Continue to avoid smoking  Follow up with cardiologist.

## 2020-06-05 LAB — SARS-COV-2 RNA RESP QL NAA+PROBE: NOT DETECTED

## 2020-06-08 ENCOUNTER — TELEPHONE (OUTPATIENT)
Dept: PULMONOLOGY | Facility: CLINIC | Age: 80
End: 2020-06-08

## 2020-06-08 NOTE — TELEPHONE ENCOUNTER
Spoke to pt. Pt informed his covid test was negative.----- Message from Alyssa Corbin sent at 6/8/2020 10:24 AM CDT -----    Type:  Test Results    Who Called: pt  Name of Test (Lab/Mammo/Etc):    covid  19  Date of Test:     friday  Best Call Back Number: [phone]  Additional Information:    Please call   Pt is  Anxiety to   Know  results

## 2020-06-10 ENCOUNTER — TELEPHONE (OUTPATIENT)
Dept: UROLOGY | Facility: CLINIC | Age: 80
End: 2020-06-10

## 2020-06-10 NOTE — TELEPHONE ENCOUNTER
----- Message from Shey Ryan sent at 6/10/2020  3:59 PM CDT -----  Contact: patient  Type: Needs Medical Advice  Who Called:  Patient  Best Call Back Number: [phone] (home)   The patient would like a call back he said he missed a call from this office

## 2020-06-10 NOTE — TELEPHONE ENCOUNTER
Returned call and spoke with patient, informed we had no reason to place a call this morning, he had been receiving call from his lung doctor. He will contact them in the am, patient verbally understood.

## 2020-06-18 ENCOUNTER — HOSPITAL ENCOUNTER (OUTPATIENT)
Dept: PULMONOLOGY | Facility: HOSPITAL | Age: 80
Discharge: HOME | End: 2020-06-18
Attending: INTERNAL MEDICINE
Payer: MEDICARE

## 2020-06-18 DIAGNOSIS — J44.9 CHRONIC OBSTRUCTIVE PULMONARY DISEASE, UNSPECIFIED COPD TYPE: Chronic | ICD-10-CM

## 2020-06-18 PROCEDURE — 94729 PR C02/MEMBANE DIFFUSE CAPACITY: ICD-10-PCS | Mod: 26,,, | Performed by: INTERNAL MEDICINE

## 2020-06-18 PROCEDURE — 94727 GAS DIL/WSHOT DETER LNG VOL: CPT | Mod: 26,,, | Performed by: INTERNAL MEDICINE

## 2020-06-18 PROCEDURE — 94727 PR PULM FUNCTION TEST BY GAS: ICD-10-PCS | Mod: 26,,, | Performed by: INTERNAL MEDICINE

## 2020-06-18 PROCEDURE — 94729 DIFFUSING CAPACITY: CPT

## 2020-06-18 PROCEDURE — 94060 EVALUATION OF WHEEZING: CPT | Mod: 26,,, | Performed by: INTERNAL MEDICINE

## 2020-06-18 PROCEDURE — 94060 PR EVAL OF BRONCHOSPASM: ICD-10-PCS | Mod: 26,,, | Performed by: INTERNAL MEDICINE

## 2020-06-18 PROCEDURE — 94729 DIFFUSING CAPACITY: CPT | Mod: 26,,, | Performed by: INTERNAL MEDICINE

## 2020-06-18 PROCEDURE — 94060 EVALUATION OF WHEEZING: CPT

## 2020-06-18 PROCEDURE — 94727 GAS DIL/WSHOT DETER LNG VOL: CPT

## 2020-06-19 LAB
BRPFT: ABNORMAL
DLCO ADJ PRE: 13.35 ML/(MIN*MMHG) (ref 18.94–32.8)
DLCO SINGLE BREATH LLN: 18.94
DLCO SINGLE BREATH PRE REF: 51.6 %
DLCO SINGLE BREATH REF: 25.87
DLCOC SBVA LLN: 2.49
DLCOC SBVA PRE REF: 81.2 %
DLCOC SBVA REF: 3.62
DLCOC SINGLE BREATH LLN: 18.94
DLCOC SINGLE BREATH PRE REF: 51.6 %
DLCOC SINGLE BREATH REF: 25.87
DLCOVA LLN: 2.49
DLCOVA PRE REF: 81.2 %
DLCOVA PRE: 2.94 ML/(MIN*MMHG*L) (ref 2.49–4.76)
DLCOVA REF: 3.62
DLVAADJ PRE: 2.94 ML/(MIN*MMHG*L) (ref 2.49–4.76)
ERVN2 LLN: -16449.03
ERVN2 PRE REF: 99.7 %
ERVN2 PRE: 0.97 L (ref -16449.03–16450.97)
ERVN2 REF: 0.97
FEF 25 75 CHG: -7.2 %
FEF 25 75 LLN: 0.58
FEF 25 75 POST REF: 34.1 %
FEF 25 75 PRE REF: 36.8 %
FEF 25 75 REF: 1.84
FET100 CHG: 39.7 %
FEV1 CHG: 3.9 %
FEV1 FVC CHG: -1.9 %
FEV1 FVC LLN: 62
FEV1 FVC POST REF: 77 %
FEV1 FVC PRE REF: 78.5 %
FEV1 FVC REF: 76
FEV1 LLN: 1.67
FEV1 POST REF: 74 %
FEV1 PRE REF: 71.2 %
FEV1 REF: 2.52
FRCN2 LLN: 2.78
FRCN2 PRE REF: 63.1 %
FRCN2 REF: 3.77
FVC CHG: 5.9 %
FVC LLN: 2.36
FVC POST REF: 95.5 %
FVC PRE REF: 90.1 %
FVC REF: 3.36
IVC PRE: 2.81 L (ref 2.36–4.36)
IVC SINGLE BREATH LLN: 2.36
IVC SINGLE BREATH PRE REF: 83.7 %
IVC SINGLE BREATH REF: 3.36
MVV LLN: 100
MVV PRE REF: 50.3 %
MVV REF: 117
PEF CHG: 3.5 %
PEF LLN: 5.08
PEF POST REF: 62 %
PEF PRE REF: 59.9 %
PEF REF: 7.74
POST FEF 25 75: 0.63 L/S (ref 0.58–3.11)
POST FET 100: 15.84 SEC
POST FEV1 FVC: 58.12 % (ref 61.51–89.5)
POST FEV1: 1.86 L (ref 1.67–3.37)
POST FVC: 3.21 L (ref 2.36–4.36)
POST PEF: 4.8 L/S (ref 5.08–10.4)
PRE DLCO: 13.35 ML/(MIN*MMHG) (ref 18.94–32.8)
PRE FEF 25 75: 0.68 L/S (ref 0.58–3.11)
PRE FET 100: 11.34 SEC
PRE FEV1 FVC: 59.24 % (ref 61.51–89.5)
PRE FEV1: 1.79 L (ref 1.67–3.37)
PRE FRC N2: 2.38 L
PRE FVC: 3.03 L (ref 2.36–4.36)
PRE MVV: 59 L/MIN (ref 99.65–134.83)
PRE PEF: 4.64 L/S (ref 5.08–10.4)
RVN2 LLN: 2.12
RVN2 PRE REF: 49.8 %
RVN2 PRE: 1.39 L (ref 2.12–3.47)
RVN2 REF: 2.8
RVN2TLCN2 LLN: 35.01
RVN2TLCN2 PRE REF: 67.6 %
RVN2TLCN2 PRE: 29.73 % (ref 35.01–52.97)
RVN2TLCN2 REF: 43.99
TLCN2 LLN: 5.99
TLCN2 PRE REF: 65.6 %
TLCN2 PRE: 4.68 L (ref 5.99–8.29)
TLCN2 REF: 7.14
VA PRE: 4.54 L (ref 6.99–6.99)
VA SINGLE BREATH LLN: 6.99
VA SINGLE BREATH PRE REF: 65 %
VA SINGLE BREATH REF: 6.99
VCMAXN2 LLN: 2.36
VCMAXN2 PRE REF: 97.9 %
VCMAXN2 PRE: 3.29 L (ref 2.36–4.36)
VCMAXN2 REF: 3.36

## 2020-06-22 ENCOUNTER — TELEPHONE (OUTPATIENT)
Dept: PULMONOLOGY | Facility: CLINIC | Age: 80
End: 2020-06-22

## 2020-06-22 NOTE — TELEPHONE ENCOUNTER
Reviewed results with patient, patient verbally understood.    ----- Message from Khadijah Dixon MD sent at 6/22/2020 12:08 PM CDT -----  PFT shows obstruction which is consistent with diagnosis of COPD and restriction which means low lung volumes. Continue with your current treatment plan and we can discuss more at your next visit.

## 2020-07-07 ENCOUNTER — TELEPHONE (OUTPATIENT)
Dept: RADIATION ONCOLOGY | Facility: CLINIC | Age: 80
End: 2020-07-07

## 2020-07-07 NOTE — TELEPHONE ENCOUNTER
Attempted to reach patient by phone to discuss return appointment for treatment planning.  LVM to call me.

## 2020-07-13 ENCOUNTER — TELEPHONE (OUTPATIENT)
Dept: UROLOGY | Facility: CLINIC | Age: 80
End: 2020-07-13

## 2020-07-13 ENCOUNTER — OFFICE VISIT (OUTPATIENT)
Dept: UROLOGY | Facility: CLINIC | Age: 80
End: 2020-07-13
Payer: MEDICARE

## 2020-07-13 VITALS
RESPIRATION RATE: 18 BRPM | HEART RATE: 88 BPM | SYSTOLIC BLOOD PRESSURE: 196 MMHG | DIASTOLIC BLOOD PRESSURE: 99 MMHG | BODY MASS INDEX: 26.1 KG/M2 | HEIGHT: 70 IN | WEIGHT: 182.31 LBS

## 2020-07-13 DIAGNOSIS — C61 PROSTATE CANCER: ICD-10-CM

## 2020-07-13 DIAGNOSIS — N41.1 CHRONIC PROSTATITIS: ICD-10-CM

## 2020-07-13 DIAGNOSIS — R36.1 HEMATOSPERMIA: Primary | ICD-10-CM

## 2020-07-13 PROCEDURE — 99999 PR PBB SHADOW E&M-EST. PATIENT-LVL V: CPT | Mod: PBBFAC,,, | Performed by: NURSE PRACTITIONER

## 2020-07-13 PROCEDURE — 99215 OFFICE O/P EST HI 40 MIN: CPT | Mod: PBBFAC,PN | Performed by: NURSE PRACTITIONER

## 2020-07-13 PROCEDURE — 99999 PR PBB SHADOW E&M-EST. PATIENT-LVL V: ICD-10-PCS | Mod: PBBFAC,,, | Performed by: NURSE PRACTITIONER

## 2020-07-13 PROCEDURE — 99214 PR OFFICE/OUTPT VISIT, EST, LEVL IV, 30-39 MIN: ICD-10-PCS | Mod: S$PBB,,, | Performed by: NURSE PRACTITIONER

## 2020-07-13 PROCEDURE — 99214 OFFICE O/P EST MOD 30 MIN: CPT | Mod: S$PBB,,, | Performed by: NURSE PRACTITIONER

## 2020-07-13 RX ORDER — CIPROFLOXACIN 500 MG/1
500 TABLET ORAL 2 TIMES DAILY
Qty: 60 TABLET | Refills: 0 | Status: SHIPPED | OUTPATIENT
Start: 2020-07-13 | End: 2020-08-12

## 2020-07-13 RX ORDER — NYSTATIN 100000 [USP'U]/ML
4 SUSPENSION ORAL 4 TIMES DAILY
Qty: 60 ML | Refills: 0 | Status: SHIPPED | OUTPATIENT
Start: 2020-07-13 | End: 2020-07-23

## 2020-07-13 NOTE — PROGRESS NOTES
[facility] Note  Staff: HERMAN Ayala    PCP: Roxi    Chief Complaint: Blood in Semen    Subjective:        HPI: Tyler Todd is a 79 y.o. male presents today needing emergent ov for sudden onset of blood within his semen.    Pt was last seen by Dr. Soto on 05/05/2020    He has a history of adenocarcinoma prostate for which he underwent transrectal ultrasound gold marker placements on 02/24/2020 in preparation to undergo radiation therapy.  He has been seeing Dr. Escobar but the treatment has been delayed in view of the Coronavirus situation.  He also has hx of recent left epididymo-orchitis and he completed Cipro recently  and he states he is feeling better.  He never did take Ceftin since he was concerned about the side effects.     Last PSA----4.9 on 05/05/2020  8.52 on 12/23/2019              8.1 on 3/19/2019    UA today-1.030, 5.5 pH and small amount of blood within the urine.       REVIEW OF SYSTEMS:  A comprehensive 10 system review was performed and is negative except as noted above in HPI    PMHx:  Past Medical History:   Diagnosis Date    Cancer 2012    lung cancer chemo and radiation    COPD (chronic obstructive pulmonary disease)     Coronary artery disease 2002, 2004    s/p 3 stents;  Dr. Interiano    Diabetes mellitus, type 2     Hyperlipidemia     Hypertension     Prostate CA      PSHx:  Past Surgical History:   Procedure Laterality Date    ANKLE SURGERY Right 1970s    CYSTOSCOPY W/ RETROGRADES Bilateral 2/24/2020    Procedure: CYSTOSCOPY, WITH RETROGRADE PYELOGRAM;  Surgeon: Nuha Soto MD;  Location: [facility] OR;  Service: Urology;  Laterality: Bilateral;    EPIDURAL STEROID INJECTION INTO CERVICAL SPINE N/A 2/19/2019    Procedure: Injection-steroid-epidural-cervical C7-T1;  Surgeon: Marcelino Monroe MD;  Location: [facility] OR;  Service: Pain Management;  Laterality: N/A;    INJECTION OF ANESTHETIC AGENT AROUND MEDIAL BRANCH NERVES INNERVATING LUMBAR FACET  JOINT Bilateral 6/4/2018    Procedure: MEDIAL BRANCH, LUMBAR L3,4,5;  Surgeon: Marcelino Monroe MD;  Location: [facility] OR;  Service: Pain Management;  Laterality: Bilateral;  L3, 4, 5    RADIOFREQUENCY ABLATION OF LUMBAR MEDIAL BRANCH NERVE AT SINGLE LEVEL Bilateral 7/16/2018    Procedure: RADIOFREQUENCY ABLATION, NERVE, MEDIAL BRANCH, LUMBAR, 1 LEVEL;  Surgeon: Marcelino Monroe MD;  Location: [facility] OR;  Service: Pain Management;  Laterality: Bilateral;  L3, 4, 5  lumbar  "[employer] pain management generator  SN [id number]  80 degrees for 75 seconds x2    TRANSRECTAL ULTRASOUND OF PROSTATE WITH INSERTION OF GOLD FIDUCIAL MARKER N/A 2/24/2020    Procedure: ULTRASOUND, PROSTATE, RECTAL APPROACH, WITH GOLD FIDUCIAL MARKER INSERTION;  Surgeon: Nuha Soto MD;  Location: [facility] OR;  Service: Urology;  Laterality: N/A;     Allergies:  Doxycycline, Sulfa (sulfonamide antibiotics), and Sulfasalazine    Medications: reviewed   Objective:     Vitals:    07/13/20 1132   BP: (!) 196/99   Pulse: 88   Resp: 18     General:WDWN in NAD  Eyes: PERRLA, normal conjunctiva  Respiratory: no increased work on breathing, clear to auscultation  Cardiovascular: regular rate and rhythm. No obvious extremity edema.  GI: palpation of masses. No tenderness. No hepatosplenomegaly to palpation.  Musculoskeletal: normal range of motion of bilateral upper extremities. Normal muscle strength and tone.  Skin: no obvious rashes or lesions. No tightening of skin noted.  Neurologic: CN grossly normal. Normal sensation.   Psychiatric: awake, alert and oriented x 3. Mood and affect normal. Cooperative.    Assessment:       1. Hematospermia    2. Chronic prostatitis    3. Prostate cancer          Plan:   Prostatitis vs. Symptoms of prostate ca origin:    Cipro 500 mg BID x 21 days prescribed to pt today.  Nystatin oral swish/swallow rx was prescribed to pt today.      [facility]: N/A    [facility]

## 2020-07-13 NOTE — TELEPHONE ENCOUNTER
----- Message from Renetta Dinh MA sent at 7/13/2020 10:31 AM CDT -----  Regarding: Call Back  Pt is requesting to be seen on today.  Reason: blood in semen  Next appt: 9/8  Call Back # [phone]

## 2020-07-18 ENCOUNTER — HOSPITAL ENCOUNTER (EMERGENCY)
Facility: HOSPITAL | Age: 80
Discharge: HOME | End: 2020-07-18
Attending: EMERGENCY MEDICINE
Payer: MEDICARE

## 2020-07-18 VITALS
DIASTOLIC BLOOD PRESSURE: 78 MMHG | SYSTOLIC BLOOD PRESSURE: 142 MMHG | RESPIRATION RATE: 18 BRPM | HEART RATE: 74 BPM | OXYGEN SATURATION: 99 % | HEIGHT: 70 IN | BODY MASS INDEX: 25.77 KG/M2 | WEIGHT: 180 LBS | TEMPERATURE: 99 F

## 2020-07-18 DIAGNOSIS — I10 HYPERTENSION, UNSPECIFIED TYPE: ICD-10-CM

## 2020-07-18 DIAGNOSIS — R55 NEAR SYNCOPE: Primary | ICD-10-CM

## 2020-07-18 LAB
ALBUMIN SERPL BCP-MCNC: 4.1 G/DL (ref 3.5–5.2)
ALP SERPL-CCNC: 77 U/L (ref 55–135)
ALT SERPL W/O P-5'-P-CCNC: 11 U/L (ref 10–44)
ANION GAP SERPL CALC-SCNC: 10 MMOL/L (ref 8–16)
AST SERPL-CCNC: 14 U/L (ref 10–40)
BASOPHILS # BLD AUTO: 0.03 K/UL (ref 0–0.2)
BASOPHILS NFR BLD: 0.5 % (ref 0–1.9)
BILIRUB SERPL-MCNC: 0.6 MG/DL (ref 0.1–1)
BNP SERPL-MCNC: 93 PG/ML (ref 0–99)
BUN SERPL-MCNC: 19 MG/DL (ref 8–23)
CALCIUM SERPL-MCNC: 9 MG/DL (ref 8.7–10.5)
CHLORIDE SERPL-SCNC: 109 MMOL/L (ref 95–110)
CO2 SERPL-SCNC: 23 MMOL/L (ref 23–29)
CREAT SERPL-MCNC: 1.6 MG/DL (ref 0.5–1.4)
DIFFERENTIAL METHOD: ABNORMAL
EOSINOPHIL # BLD AUTO: 0.1 K/UL (ref 0–0.5)
EOSINOPHIL NFR BLD: 2.2 % (ref 0–8)
ERYTHROCYTE [DISTWIDTH] IN BLOOD BY AUTOMATED COUNT: 15.4 % (ref 11.5–14.5)
EST. GFR  (AFRICAN AMERICAN): 47.3 ML/MIN/1.73 M^2
EST. GFR  (NON AFRICAN AMERICAN): 40.9 ML/MIN/1.73 M^2
GLUCOSE SERPL-MCNC: 149 MG/DL (ref 70–110)
HCT VFR BLD AUTO: 41.2 % (ref 40–54)
HGB BLD-MCNC: 13.4 G/DL (ref 14–18)
IMM GRANULOCYTES # BLD AUTO: 0.02 K/UL (ref 0–0.04)
IMM GRANULOCYTES NFR BLD AUTO: 0.3 % (ref 0–0.5)
INR PPP: 1.4
LYMPHOCYTES # BLD AUTO: 1.6 K/UL (ref 1–4.8)
LYMPHOCYTES NFR BLD: 27.5 % (ref 18–48)
MCH RBC QN AUTO: 29.6 PG (ref 27–31)
MCHC RBC AUTO-ENTMCNC: 32.5 G/DL (ref 32–36)
MCV RBC AUTO: 91 FL (ref 82–98)
MONOCYTES # BLD AUTO: 0.6 K/UL (ref 0.3–1)
MONOCYTES NFR BLD: 9.8 % (ref 4–15)
NEUTROPHILS # BLD AUTO: 3.5 K/UL (ref 1.8–7.7)
NEUTROPHILS NFR BLD: 59.7 % (ref 38–73)
NRBC BLD-RTO: 0 /100 WBC
PLATELET # BLD AUTO: 196 K/UL (ref 150–350)
PMV BLD AUTO: 10.3 FL (ref 9.2–12.9)
POTASSIUM SERPL-SCNC: 3.8 MMOL/L (ref 3.5–5.1)
PROT SERPL-MCNC: 7.6 G/DL (ref 6–8.4)
PROTHROMBIN TIME: 16.9 SEC (ref 10.6–14.8)
RBC # BLD AUTO: 4.52 M/UL (ref 4.6–6.2)
SARS-COV-2 RDRP RESP QL NAA+PROBE: NEGATIVE
SODIUM SERPL-SCNC: 142 MMOL/L (ref 136–145)
TROPONIN I SERPL DL<=0.01 NG/ML-MCNC: <0.03 NG/ML
WBC # BLD AUTO: 5.9 K/UL (ref 3.9–12.7)

## 2020-07-18 PROCEDURE — 25000003 PHARM REV CODE 250: Performed by: EMERGENCY MEDICINE

## 2020-07-18 PROCEDURE — 93005 ELECTROCARDIOGRAM TRACING: CPT | Performed by: INTERNAL MEDICINE

## 2020-07-18 PROCEDURE — U0002 COVID-19 LAB TEST NON-CDC: HCPCS

## 2020-07-18 PROCEDURE — 83880 ASSAY OF NATRIURETIC PEPTIDE: CPT

## 2020-07-18 PROCEDURE — 25500020 PHARM REV CODE 255: Performed by: EMERGENCY MEDICINE

## 2020-07-18 PROCEDURE — 99285 EMERGENCY DEPT VISIT HI MDM: CPT | Mod: 25

## 2020-07-18 PROCEDURE — 85025 COMPLETE CBC W/AUTO DIFF WBC: CPT

## 2020-07-18 PROCEDURE — 80053 COMPREHEN METABOLIC PANEL: CPT

## 2020-07-18 PROCEDURE — 85610 PROTHROMBIN TIME: CPT

## 2020-07-18 PROCEDURE — 84484 ASSAY OF TROPONIN QUANT: CPT

## 2020-07-18 RX ORDER — METOPROLOL SUCCINATE 50 MG/1
100 TABLET, EXTENDED RELEASE ORAL 2 TIMES DAILY
COMMUNITY
End: 2021-02-11

## 2020-07-18 RX ADMIN — IOHEXOL 100 ML: 350 INJECTION, SOLUTION INTRAVENOUS at 05:07

## 2020-07-18 RX ADMIN — SODIUM CHLORIDE 1000 ML: 0.9 INJECTION, SOLUTION INTRAVENOUS at 06:07

## 2020-07-18 NOTE — ED PROVIDER NOTES
Encounter Date: 7/18/2020       History     Chief Complaint   Patient presents with    Hypertension     TODAY     79-year-old male presents with near syncopal episode patient reports that he was sitting on his porch when he began to developed tunnel vision and he felt like he was going to pass out patient noted that his blood pressure was elevated and he took a metoprolol and nitroglycerin, patient also noted his heart rate was elevated patient has a history of COPD coronary artery disease lung cancer patient is on chemo and radiation patient also has hypertension and hyperlipidemia.  Patient reports that he is feeling better now.        Review of patient's allergies indicates:   Allergen Reactions    Doxycycline Diarrhea     Severe diarrhea    Sulfa (sulfonamide antibiotics) Rash     Other reaction(s): Itching    Sulfasalazine Rash     Rash and itching mild     Past Medical History:   Diagnosis Date    Cancer 2012    lung cancer chemo and radiation    COPD (chronic obstructive pulmonary disease)     Coronary artery disease 2002, 2004    s/p 3 stents;  Dr. Interiano    Diabetes mellitus, type 2     Hyperlipidemia     Hypertension     Prostate CA      Past Surgical History:   Procedure Laterality Date    ANKLE SURGERY Right 1970s    CYSTOSCOPY W/ RETROGRADES Bilateral 2/24/2020    Procedure: CYSTOSCOPY, WITH RETROGRADE PYELOGRAM;  Surgeon: Nuha Soto MD;  Location: [facility] OR;  Service: Urology;  Laterality: Bilateral;    EPIDURAL STEROID INJECTION INTO CERVICAL SPINE N/A 2/19/2019    Procedure: Injection-steroid-epidural-cervical C7-T1;  Surgeon: Marcelino Monroe MD;  Location: [facility] OR;  Service: Pain Management;  Laterality: N/A;    INJECTION OF ANESTHETIC AGENT AROUND MEDIAL BRANCH NERVES INNERVATING LUMBAR FACET JOINT Bilateral 6/4/2018    Procedure: MEDIAL BRANCH, LUMBAR L3,4,5;  Surgeon: Marcelino Monroe MD;  Location: [facility] OR;  Service: Pain Management;  Laterality: Bilateral;  L3, 4, 5     RADIOFREQUENCY ABLATION OF LUMBAR MEDIAL BRANCH NERVE AT SINGLE LEVEL Bilateral 2018    Procedure: RADIOFREQUENCY ABLATION, NERVE, MEDIAL BRANCH, LUMBAR, 1 LEVEL;  Surgeon: Marcelino Monroe MD;  Location: [facility] OR;  Service: Pain Management;  Laterality: Bilateral;  L3, 4, 5  lumbar  Roxi5by pain management generator  SN [id number]  80 degrees for 75 seconds x2    TRANSRECTAL ULTRASOUND OF PROSTATE WITH INSERTION OF GOLD FIDUCIAL MARKER N/A 2020    Procedure: ULTRASOUND, PROSTATE, RECTAL APPROACH, WITH GOLD FIDUCIAL MARKER INSERTION;  Surgeon: Nuha Soto MD;  Location: [facility] OR;  Service: Urology;  Laterality: N/A;     Family History   Problem Relation Age of Onset    Diabetes Mother     Peripheral vascular disease Mother     Heart attack Mother     Diabetes Father     Heart attack Father     Emphysema Father     Diabetes Sister      Social History     Tobacco Use    Smoking status: Former Smoker     Packs/day: 1.00     Years: 57.00     Pack years: 57.00     Types: Cigarettes     Quit date: 3/14/2020     Years since quittin.3    Smokeless tobacco: Never Used   Substance Use Topics    Alcohol use: No    Drug use: No     Review of Systems   Constitutional: Negative for fever.   HENT: Negative for congestion, rhinorrhea, sore throat and trouble swallowing.    Eyes: Negative for visual disturbance.   Respiratory: Positive for shortness of breath. Negative for cough, chest tightness and wheezing.    Cardiovascular: Negative for chest pain, palpitations and leg swelling.   Gastrointestinal: Negative for abdominal distention, abdominal pain, constipation, diarrhea, nausea and vomiting.   Genitourinary: Negative for difficulty urinating, dysuria, flank pain and frequency.   Musculoskeletal: Negative for arthralgias, back pain, joint swelling and neck pain.   Skin: Negative for color change and rash.   Neurological: Negative for dizziness, syncope, speech difficulty, weakness, numbness and  headaches.        Near syncope   All other systems reviewed and are negative.      Physical Exam     Initial Vitals [07/18/20 1340]   BP Pulse Resp Temp SpO2   136/73 (!) 122 20 98.9 °F (37.2 °C) 99 %      MAP       --         Physical Exam    Nursing note and vitals reviewed.  Constitutional: He appears well-developed and well-nourished. He is not diaphoretic. No distress.   HENT:   Head: Normocephalic and atraumatic.   Right Ear: External ear normal.   Left Ear: External ear normal.   Nose: Nose normal.   Mouth/Throat: Oropharynx is clear and moist. No oropharyngeal exudate.   Eyes: Conjunctivae and EOM are normal. Pupils are equal, round, and reactive to light. Right eye exhibits no discharge. Left eye exhibits no discharge. No scleral icterus.   Neck: Normal range of motion. Neck supple. No thyromegaly present. No tracheal deviation present. No JVD present.   Cardiovascular: Regular rhythm, normal heart sounds and intact distal pulses. Exam reveals no gallop and no friction rub.    No murmur heard.  Mild tachycardia   Pulmonary/Chest: Breath sounds normal. No stridor. No respiratory distress. He has no wheezes. He has no rhonchi. He has no rales. He exhibits no tenderness.   Abdominal: Soft. Bowel sounds are normal. He exhibits no distension and no mass. There is no abdominal tenderness. There is no rebound and no guarding.   Musculoskeletal: Normal range of motion. No tenderness or edema.   Lymphadenopathy:     He has no cervical adenopathy.   Neurological: He is alert and oriented to person, place, and time. He has normal strength and normal reflexes. He displays normal reflexes. No cranial nerve deficit or sensory deficit.   Skin: Skin is warm and dry. No rash noted. No erythema.         ED Course   Procedures  Labs Reviewed   CBC W/ AUTO DIFFERENTIAL - Abnormal; Notable for the following components:       Result Value    RBC 4.52 (*)     Hemoglobin 13.4 (*)     RDW 15.4 (*)     All other components within  normal limits   COMPREHENSIVE METABOLIC PANEL - Abnormal; Notable for the following components:    Glucose 149 (*)     Creatinine 1.6 (*)     eGFR if  47.3 (*)     eGFR if non  40.9 (*)     All other components within normal limits   PROTIME-INR - Abnormal; Notable for the following components:    PT 16.9 (*)     All other components within normal limits   B-TYPE NATRIURETIC PEPTIDE   TROPONIN I   SARS-COV-2 RNA AMPLIFICATION, QUAL        ECG Results          EKG 12-lead (Final result)  Result time 07/19/20 12:16:02    Final result by Interface, Lab In [facility] (07/19/20 12:16:02)                 Narrative:    Test Reason : R07.9,    Vent. Rate : 112 BPM     Atrial Rate : 112 BPM     P-R Int : 158 ms          QRS Dur : 074 ms      QT Int : 358 ms       P-R-T Axes : 070 016 068 degrees     QTc Int : 488 ms    Sinus tachycardia with Premature atrial complexes  Otherwise normal ECG  When compared with ECG of 28-MAY-2020 13:19,  Premature atrial complexes are now Present  Vent. rate has increased BY  54 BPM  Nonspecific T wave abnormality no longer evident in Inferior leads  Confirmed by Wai Pacheco MD ([id number]) on 7/19/2020 12:15:49 PM    Referred By: [email or web address]   SELF           Confirmed By:Wai Pacheco MD                            Imaging Results          CTA Chest Non-Coronary - PE Study (Final result)  Result time 07/18/20 17:59:20    Final result by Ramiro Clemens MD (07/18/20 17:59:20)                 Narrative:    CMS MANDATED QUALITY DATA - CT RADIATION - 436    All CT scans at this facility utilize dose modulation, iterative  reconstruction, and/or weight based dosing when appropriate to reduce  radiation dose to as low as reasonably achievable.        REASON: PE suspected, high pretest prob    TECHNIQUE: CT angiography of thorax with 100 mL Omnipaque 350.  Maximum intensity projection coronal reformations were created at a  separate workstation and stored in the patient's  permanent medical  record.    COMPARISON: CTA Chest Aug 3, 2020.    FINDINGS:    No pulmonary embolism identified.The heart is normal size. The aorta  is normal caliber. No mediastinal lymphadenopathy.    Right upper lung focal opacity with central cavitation. Bibasilar  subsegmental atelectasis. Mild emphysematous lung disease.  Visualized  abdominal viscera are unremarkable. No acute osseous abnormality.    IMPRESSION:    1.  No pulmonary embolism.  2.  Emphysematous lung disease.  3.  Right upper lung focal opacity with central cavitation. Follow  up CT in six months recommended.    Electronically Signed by Ramiro Clemens on 7/18/2020 6:15 PM                             CT Head Without Contrast (Final result)  Result time 07/18/20 17:16:14    Final result by Ramiro Clemens MD (07/18/20 17:16:14)                 Narrative:    CMS MANDATED QUALITY DATA - CT RADIATION - 436    All CT scans at this facility utilize dose modulation, iterative  reconstruction, and/or weight based dosing when appropriate to reduce  radiation dose to as low as reasonably achievable.        Reason: Altered mental status Numbness and weakness in  extremities,high blood pressure.blurred vision    TECHNIQUE: Head CT without IV contrast.    COMPARISON: CT head January 20, 2019.    FINDINGS:    Gray-white differentiation is maintained without hemorrhage, midline  shift, or mass effect.    The ventricles and cisterns are maintained.    Calvarium is intact. Visualized sinuses are clear.    IMPRESSION:    No acute intracranial abnormality.            Electronically Signed by Ramiro Clemens on 7/18/2020 5:23 PM                             X-Ray Chest AP Portable (Final result)  Result time 07/18/20 14:12:09    Final result by Ramiro Clemens MD (07/18/20 14:12:09)                 Narrative:    REASON: HTN htn    FINDINGS:    Portable chest radiograph with comparison chest x-ray May 28, 2020.  The cardiomediastinal silhouette is within normal  limits in size.The  pulmonary vascular structures are within normal limits. The lungs are  clear. No acute osseous abnormality.    IMPRESSION:    No acute cardiopulmonary process.    Electronically Signed by Ramiro Clemens on 7/18/2020 2:43 PM                               Medical Decision Making:   History:   Old Medical Records: I decided to obtain old medical records.  Initial Assessment:   79-year-old male presenting with near syncopal episode and an episode of shortness of breath differential diagnosis includes near syncope, dehydration, electrolyte abnormality, endocrine dysfunction, metastatic progression, infection              Attending Attestation:             Attending ED Notes:   Patient re-evaluated, he reports he continues to feel well patient has no complaints at this time he reports that he is hungry, patient's imaging shows no significant acute abnormality thus far, given history of lung cancer and the fact that he experienced sudden onset shortness of breath and elevation and blood pressure with near syncope patient will receive CT scan of chest to rule out PE.  Patient will continue to be observed and will re-evaluate after CTA of chest.      Patient's CTA of chest is within normal limits, patient does have cavitary lesion noted however patient reports he is aware of this and it was secondary to his lung cancer patient reports that his primary care doctor is monitoring this.  Patient reports he plans to follow-up with his cardiologist on Monday and patient is tolerating p.o. in the ER.  Patient offered admission to the hospital but he declined stating that he would rather go home and that he feels well.  Patient understands that he can return at any time should he change his mind about staying in the hospital he is alert and oriented x4 he appears to have capacity to make his own medical decisions.  Patient understands that by leaving there is a chance of death deterioration and disability.   Patient understands that he is to return immediately to the emergency department for any worsening or any further concerns otherwise he is to follow up with Cardiology as scheduled on Monday.                        Clinical Impression:       ICD-10-CM ICD-9-CM   1. Near syncope  R55 780.2   2. Hypertension, unspecified type  I10 401.9             ED Disposition Condition    Discharge Stable        ED Prescriptions     None        Follow-up Information     Follow up With Specialties Details Why Contact Info    Jayson Interiano MD Cardiology Schedule an appointment as soon as possible for a visit in 2 days  [address] [facility] [address]  [phone]      Andres Diane MD Internal Medicine   [address] [facility] [place] [address] [phone]                                       Shawn Strauss MD  07/24/20 3046

## 2020-07-20 ENCOUNTER — LAB VISIT (OUTPATIENT)
Dept: LAB | Facility: HOSPITAL | Age: 80
End: 2020-07-20
Attending: RADIOLOGY
Payer: MEDICARE

## 2020-07-20 ENCOUNTER — OFFICE VISIT (OUTPATIENT)
Dept: RADIATION ONCOLOGY | Facility: CLINIC | Age: 80
End: 2020-07-20
Payer: MEDICARE

## 2020-07-20 ENCOUNTER — TELEPHONE (OUTPATIENT)
Dept: RADIATION ONCOLOGY | Facility: CLINIC | Age: 80
End: 2020-07-20

## 2020-07-20 DIAGNOSIS — Z12.5 ENCOUNTER FOR SCREENING FOR MALIGNANT NEOPLASM OF PROSTATE: ICD-10-CM

## 2020-07-20 DIAGNOSIS — C61 ADENOCARCINOMA OF PROSTATE: ICD-10-CM

## 2020-07-20 DIAGNOSIS — C61 ADENOCARCINOMA OF PROSTATE: Primary | ICD-10-CM

## 2020-07-20 LAB — COMPLEXED PSA SERPL-MCNC: 5.6 NG/ML (ref 0–4)

## 2020-07-20 PROCEDURE — 84153 ASSAY OF PSA TOTAL: CPT

## 2020-07-20 PROCEDURE — 36415 COLL VENOUS BLD VENIPUNCTURE: CPT

## 2020-07-20 PROCEDURE — 99215 PR OFFICE/OUTPT VISIT, EST, LEVL V, 40-54 MIN: ICD-10-PCS | Mod: S$GLB,,, | Performed by: RADIOLOGY

## 2020-07-20 PROCEDURE — 99215 OFFICE O/P EST HI 40 MIN: CPT | Mod: S$GLB,,, | Performed by: RADIOLOGY

## 2020-07-20 NOTE — PROGRESS NOTES
Tyler Todd  [id number]  1940  7/20/2020  Nuha Soto Md  [address] [place] [address] [address]  [place] [address]    DIAGNOSIS: Cancer Staging  Adenocarcinoma of prostate  Staging form: Prostate, AJCC 8th Edition  - Clinical: Stage I (cT1c, cN0, cM0, PSA: 8.5, Grade Group: 1) - Signed by Rudy Escobar Jr., MD on 2/17/2020    REASON FOR VISIT: Routine scheduled follow-up.    HISTORY OF PRESENT ILLNESS:   79M being followed for persistently elevated PSA status post antibiotic treatment.  Dr. Soto took the patient for  transrectal ultrasound biopsy in September 2015 with 0 of 8 cores containing disease.  He continued to be maintained on an active surveillance protocol with PSAs with as below with repeat transrectal ultrasound and biopsy from April 2018 revealed:   - [place] 3 + 3 adenocarcinoma: RM 30%   - 1/8 cores+    He presented to [facility] for 2nd opinion, noting clear A[place] by Dr. Villeda. He underwent MRI of the prostate revealing low-grade prostate cancer at right peripheral mid and base without extracapsular extension.  There is broad capsular abutment however.  A slightly prominent left obturator lymph node 6 x 8 mm in size is appreciated.  Ultimately he was recommended to continue to undergo active surveillance.    Patient was recommended to undergo repeat biopsy by Dr. Sims @ [facility] but is refusing; he recommended EBRT standard fractionation should patient be incline to treatment. Staging CT abdomen pelvis demonstrates enlarged prostate gland with diffuse bladder wall thickening, no lymphadenopathy.  Bone scan was negative.    At last follow-up patient had gold fiducial placement and was diagnosed with left epididymitis with ultrasound findings placed on Ceftin which he did not take.  He presented with updated PET-CT scan with no definitive evidence of recurrent lung cancer.  FDG avidity was appreciated at left scrotum, left prostate, left external iliac lymph node.  I discussed this with Dr. Soto  and was suspicious given his previous low risk pathology that this lymph node was likely reactive due to left epididymitis; this was reinforced by decline in PSA to 4.8.    PSA  5/20 4.8  12/19 8.52  3/19 8.1  11/18 7.1  3/18 9.2  2/16 6.7  7/15 5.9  9/14 4.8    PMHx: [id number] mediastinal (lung) carcinoma s/p CRT to 66Gy (+carbo-taxol) ending 4/20/12 @ [facility] + 4c consolidative carbo-taxol with clear PET/CT 4/19 (per Dr. Mast).    INTERVAL HISTORY:   Patient recently reports an episode hematospermia and he was placed again on ciprofloxacin 500 mg b.i.d. times 21 days.  He has been taking this x1 week and is not ejection latest since and is unable to say of this has resolved.  He denies fever, chills, chest pain, shortness of breath, cough or hemoptysis.  He denies bone pain.  He denies pelvic pain or discomfort.  He does have some discomfort in the left testicle.  He has not yet followed up with Dr. Soto.    Review of Systems   Constitutional: Negative for appetite change, chills, fever and unexpected weight change.   HENT:   Negative for lump/mass, mouth sores, sore throat, trouble swallowing and voice change.    Eyes: Negative for eye problems and icterus.   Respiratory: Negative for chest tightness, cough, hemoptysis and shortness of breath.    Cardiovascular: Negative for chest pain and leg swelling.   Gastrointestinal: Positive for rectal pain. Negative for abdominal distention, abdominal pain, blood in stool, constipation, diarrhea, nausea and vomiting.   Genitourinary: Positive for frequency and pelvic pain. Negative for bladder incontinence, difficulty urinating, dysuria, hematuria and nocturia.    Musculoskeletal: Positive for neck stiffness. Negative for back pain, gait problem and neck pain.   Neurological: Negative for extremity weakness, gait problem, headaches, numbness and seizures.   Hematological: Negative for adenopathy.     Past Medical History:   Diagnosis Date    Cancer 2012    lung cancer  chemo and radiation    COPD (chronic obstructive pulmonary disease)     Coronary artery disease 2002, 2004    s/p 3 stents;  Dr. Interiano    Diabetes mellitus, type 2     Hyperlipidemia     Hypertension     Prostate CA      Past Surgical History:   Procedure Laterality Date    ANKLE SURGERY Right 1970s    CYSTOSCOPY W/ RETROGRADES Bilateral 2/24/2020    Procedure: CYSTOSCOPY, WITH RETROGRADE PYELOGRAM;  Surgeon: Nuha Soto MD;  Location: [facility];  Service: Urology;  Laterality: Bilateral;    EPIDURAL STEROID INJECTION INTO CERVICAL SPINE N/A 2/19/2019    Procedure: Injection-steroid-epidural-cervical C7-T1;  Surgeon: Marcelino Monroe MD;  Location: [facility];  Service: Pain Management;  Laterality: N/A;    INJECTION OF ANESTHETIC AGENT AROUND MEDIAL BRANCH NERVES INNERVATING LUMBAR FACET JOINT Bilateral 6/4/2018    Procedure: MEDIAL BRANCH, LUMBAR L3,4,5;  Surgeon: Marcelino Monroe MD;  Location: [facility];  Service: Pain Management;  Laterality: Bilateral;  L3, 4, 5    RADIOFREQUENCY ABLATION OF LUMBAR MEDIAL BRANCH NERVE AT SINGLE LEVEL Bilateral 7/16/2018    Procedure: RADIOFREQUENCY ABLATION, NERVE, MEDIAL BRANCH, LUMBAR, 1 LEVEL;  Surgeon: Marcelino Monroe MD;  Location: [facility];  Service: Pain Management;  Laterality: Bilateral;  L3, 4, 5  lumbar  [employer] pain management generator  SN [id number]  80 degrees for 75 seconds x2    TRANSRECTAL ULTRASOUND OF PROSTATE WITH INSERTION OF GOLD FIDUCIAL MARKER N/A 2/24/2020    Procedure: ULTRASOUND, PROSTATE, RECTAL APPROACH, WITH GOLD FIDUCIAL MARKER INSERTION;  Surgeon: Nuha Soto MD;  Location: [facility];  Service: Urology;  Laterality: N/A;     Social History     Socioeconomic History    Marital status:      Spouse name: Not on file    Number of children: Not on file    Years of education: Not on file    Highest education level: Not on file   Occupational History    Not on file   Social Needs    Financial resource strain: Not on file    Food  insecurity     Worry: Not on file     Inability: Not on file    Transportation needs     Medical: Not on file     Non-medical: Not on file   Tobacco Use    Smoking status: Former Smoker     Packs/day: 1.00     Years: 57.00     Pack years: 57.00     Types: Cigarettes     Quit date: 3/14/2020     Years since quittin.3    Smokeless tobacco: Never Used   Substance and Sexual Activity    Alcohol use: No    Drug use: No    Sexual activity: Never   Lifestyle    Physical activity     Days per week: Not on file     Minutes per session: Not on file    Stress: Not on file   Relationships    Social connections     Talks on phone: Not on file     Gets together: Not on file     Attends Sikh service: Not on file     Active member of club or organization: Not on file     Attends meetings of clubs or organizations: Not on file     Relationship status: Not on file   Other Topics Concern    Not on file   Social History Narrative    Not on file     Family History   Problem Relation Age of Onset    Diabetes Mother     Peripheral vascular disease Mother     Heart attack Mother     Diabetes Father     Heart attack Father     Emphysema Father     Diabetes Sister      Medication List with Changes/Refills   Current Medications    ALBUTEROL (PROVENTIL/VENTOLIN HFA) 90 MCG/ACTUATION INHALER    Inhale 1-2 puffs into the lungs every 6 (six) hours as needed for Wheezing. Rescue    ASPIRIN 81 MG TAB    Take 1 tablet by mouth once daily. Every day    ATORVASTATIN (LIPITOR) 10 MG TABLET    Take 10 mg by mouth once daily.    BRIMONIDINE 0.1% (ALPHAGAN P) 0.1 % DROP    Place 1 drop into both eyes 3 (three) times daily.    CARBOXYMETHYLCELLULOSE (REFRESH PLUS) 0.5 % DPET    1 drop 3 (three) times daily as needed.    CARBOXYMETHYLCELLULOSE SODIUM (THERATEARS) 1 % DPGE    TheraTears 1 % gel in a dropperette    CIPROFLOXACIN HCL (CIPRO) 500 MG TABLET    Take 1 tablet (500 mg total) by mouth 2 (two) times daily.    DIABETIC  SUPPLIES, MISCELLAN. MISC    No Sig Provided    DORZOLAMIDE (TRUSOPT) 2 % OPHTHALMIC SOLUTION    1 drop 3 (three) times daily.    ELIQUIS 2.5 MG TAB    Take 2.5 mg by mouth 2 (two) times daily.     IRBESARTAN (AVAPRO) 300 MG TABLET    Take 1 tablet (300 mg total) by mouth once daily.    LATANOPROST 0.005 % OPHTHALMIC SOLUTION    Apply 1 drop to eye every evening.     METFORMIN (GLUCOPHAGE-XR) 500 MG XR 24HR TABLET    Take 2 tablets (1,000 mg total) by mouth 2 (two) times daily.    METOPROLOL SUCCINATE (TOPROL-XL) 50 MG 24 HR TABLET    Take 75 mg by mouth 2 (two) times daily.    NITROGLYCERIN (NITROSTAT) 0.4 MG SL TABLET    Place 0.4 mg under the tongue every 5 (five) minutes as needed. As directed    NYSTATIN (MYCOSTATIN) 100,000 UNIT/ML SUSPENSION    Take 4 mLs (400,000 Units total) by mouth 4 (four) times daily. for 10 days    PREGABALIN (LYRICA) 100 MG CAPSULE    Take 100 mg by mouth 2 (two) times daily.     UMECLIDINIUM-VILANTEROL (ANORO ELLIPTA) 62.5-25 MCG/ACTUATION DSDV    Inhale 1 puff into the lungs once daily. Controller    VITC-E-ZN- EPE-OSWH-EQCELO 250 MG-200 UNIT -12.5 MG-1 MG CAP    Take 1 tablet by mouth once daily.      Review of patient's allergies indicates:   Allergen Reactions    Doxycycline Diarrhea     Severe diarrhea    Sulfa (sulfonamide antibiotics) Rash     Other reaction(s): Itching    Sulfasalazine Rash     Rash and itching mild       QUALITY OF LIFE: 80%- Normal Activity with Effort: Some Symptoms of Disease    There were no vitals filed for this visit.  There is no height or weight on file to calculate BMI.    PHYSICAL EXAM:   GENERAL: alert; in no apparent distress.   HEAD: normocephalic, atraumatic.  EYES: pupils are equal, round, reactive to light and accommodation. Sclera anicteric. Conjunctiva not injected.   NOSE/THROAT: no nasal erythema or rhinorrhea. Oropharynx pink, without erythema, ulcerations or thrush.   NECK: no cervical motion rigidity; supple with no  masses.  CHEST: Patient is speaking comfortably on room air with normal work of breathing without using accessory muscles of respiration.   MUSCULOSKELETAL: Normal range of motion.  NEUROLOGIC: cranial nerves II-XII intact bilaterally. Strength 5/5 in bilateral upper and lower extremities. Normal gait.  EXTREMITIES: no clubbing, cyanosis, edema.  SKIN: no erythema, rashes, ulcerations noted.     ANCILLARY DATA:   4/20 PET/CT  1. Clustered nodules measuring up to 5 mm in right upper lobe with very mild FDG uptake.  Although metastatic disease could give this appearance, infectious or inflammatory etiologies are conceivable.  CT thorax without IV contrast follow-up is recommended in 3 months to evaluate for stability.  2. Newly identified FDG avidity in left aspect of prostate correlating with reportedly known primary prostate malignancy.  3. Moderately FDG avid enlarged left external iliac lymph node suspicious for regional lymph node metastasis related to prostate carcinoma.  4. No other FDG avid metastatic disease.    ASSESSMENT: 79 y.o. male with very low risk prostate adenocarcinoma, [id number] GS 3+3 (4/18: single core, 30%), iPSA 8.52 with updated PET-CT scan demonstrating no progression of prior lung cancer with FDG avid activity at the left prostate, left external iliac lymph node, left scrotum in the setting of suspected epididymitis, poor compliance with antibiotics; PSA 4.8 @ 5/20.  PLAN:   Tyler Todd again presents to discuss treatment of his prostate cancer.  He continues to have left epididymitis with hematospermia and was again placed on antibiotics, ciprofloxacin 500 mg b.i.d. times 21 days.  I reviewed his PET-CT findings with him again today and my uncertainty regarding the left external iliac lymph node.  My recommendation is to continue with antibiotics and proceed with radiotherapy alone, fiducials are in place.  I will get an updated PSA the evaluate the left external iliac lymph node on  planning CT scan.  Judgment on whether to include the elective pelvis and or addition of ADT will be made based on PSA level and CT planning evaluation of left external iliac lymph node.  Patient expressed understanding and again was provided instructions for daily bowel and bladder preparation during radiotherapy treatments.  At the end of our discussion the patient would like to proceed with treatment.    I carefully explained the process of simulation and treatment delivery with weekly physician visits.     We discussed the risks and benefits of the above treatment and have gone over in detail the acute and late toxicities of radiation therapy to the prostate/SV. The patient expressed  understanding and has signed a consent form which is included in the patients chart.     The patient has our contact information and understands that they are free to contact us at any time with questions or concerns regarding radiation therapy.    DISPOSITION: RTC FOR CT SIM    I have personally seen and evaluated this patient. Greater than 50% of this time was spent discussing coordination of care and/or counseling.    COVID-19 precautions discussed. Cancer Center policy for COVID-19 testing described.  Patient will be required to wear a mask when in the Cancer Center.    All questions answered and contact information provided. Patient understands free to call us anytime with any questions or concerns regarding radiation therapy.    PHYSICIAN: Rudy Escobar Jr, MD

## 2020-07-21 NOTE — PATIENT INSTRUCTIONS
Prostate prep instructions written and verbal discussed with patient.  Patient verbalized understanding.

## 2020-08-12 ENCOUNTER — TREATMENT (OUTPATIENT)
Dept: RADIATION ONCOLOGY | Facility: CLINIC | Age: 80
End: 2020-08-12
Payer: MEDICARE

## 2020-08-12 DIAGNOSIS — C61 ADENOCARCINOMA OF PROSTATE: Primary | ICD-10-CM

## 2020-08-12 PROCEDURE — 77014 PR  CT GUIDANCE PLACEMENT RAD THERAPY FIELDS: ICD-10-PCS | Mod: S$GLB,,, | Performed by: RADIOLOGY

## 2020-08-12 PROCEDURE — 77014 PR  CT GUIDANCE PLACEMENT RAD THERAPY FIELDS: CPT | Mod: S$GLB,,, | Performed by: RADIOLOGY

## 2020-08-12 PROCEDURE — 77427 PR CHG RADIATION,MANGEMENT,5 TX'S: ICD-10-PCS | Mod: S$GLB,,, | Performed by: RADIOLOGY

## 2020-08-12 PROCEDURE — G6015 PR RADN TX DELIVERY,  INTENS MOD, 1+ FIELDS PER TX: ICD-10-PCS | Mod: S$GLB,,, | Performed by: RADIOLOGY

## 2020-08-12 PROCEDURE — 77427 RADIATION TX MANAGEMENT X5: CPT | Mod: S$GLB,,, | Performed by: RADIOLOGY

## 2020-08-12 PROCEDURE — G6015 RADIATION TX DELIVERY IMRT: HCPCS | Mod: S$GLB,,, | Performed by: RADIOLOGY

## 2020-08-12 RX ORDER — TAMSULOSIN HYDROCHLORIDE 0.4 MG/1
0.4 CAPSULE ORAL NIGHTLY
Qty: 90 CAPSULE | Refills: 1 | Status: SHIPPED | OUTPATIENT
Start: 2020-08-12 | End: 2020-09-08 | Stop reason: SDUPTHER

## 2020-09-03 ENCOUNTER — TREATMENT (OUTPATIENT)
Dept: RADIATION ONCOLOGY | Facility: CLINIC | Age: 80
End: 2020-09-03
Payer: MEDICARE

## 2020-09-03 PROCEDURE — 77014 PR  CT GUIDANCE PLACEMENT RAD THERAPY FIELDS: ICD-10-PCS | Mod: S$GLB,,, | Performed by: RADIOLOGY

## 2020-09-03 PROCEDURE — 77014 PR  CT GUIDANCE PLACEMENT RAD THERAPY FIELDS: CPT | Mod: S$GLB,,, | Performed by: RADIOLOGY

## 2020-09-03 PROCEDURE — G6015 RADIATION TX DELIVERY IMRT: HCPCS | Mod: S$GLB,,, | Performed by: RADIOLOGY

## 2020-09-03 PROCEDURE — G6015 PR RADN TX DELIVERY,  INTENS MOD, 1+ FIELDS PER TX: ICD-10-PCS | Mod: S$GLB,,, | Performed by: RADIOLOGY

## 2020-09-08 ENCOUNTER — TREATMENT (OUTPATIENT)
Dept: RADIATION ONCOLOGY | Facility: CLINIC | Age: 80
End: 2020-09-08
Payer: MEDICARE

## 2020-09-08 DIAGNOSIS — C61 ADENOCARCINOMA OF PROSTATE: ICD-10-CM

## 2020-09-08 PROCEDURE — 77014 PR  CT GUIDANCE PLACEMENT RAD THERAPY FIELDS: CPT | Mod: S$GLB,,, | Performed by: RADIOLOGY

## 2020-09-08 PROCEDURE — G6015 RADIATION TX DELIVERY IMRT: HCPCS | Mod: S$GLB,,, | Performed by: RADIOLOGY

## 2020-09-08 PROCEDURE — 77014 PR  CT GUIDANCE PLACEMENT RAD THERAPY FIELDS: ICD-10-PCS | Mod: S$GLB,,, | Performed by: RADIOLOGY

## 2020-09-08 PROCEDURE — G6015 PR RADN TX DELIVERY,  INTENS MOD, 1+ FIELDS PER TX: ICD-10-PCS | Mod: S$GLB,,, | Performed by: RADIOLOGY

## 2020-09-08 RX ORDER — TAMSULOSIN HYDROCHLORIDE 0.4 MG/1
0.4 CAPSULE ORAL NIGHTLY
Qty: 90 CAPSULE | Refills: 1 | Status: SHIPPED | OUTPATIENT
Start: 2020-09-08 | End: 2021-04-28 | Stop reason: SDUPTHER

## 2020-09-10 PROCEDURE — 77427 PR CHG RADIATION,MANGEMENT,5 TX'S: ICD-10-PCS | Mod: S$GLB,,, | Performed by: RADIOLOGY

## 2020-09-10 PROCEDURE — 77427 RADIATION TX MANAGEMENT X5: CPT | Mod: S$GLB,,, | Performed by: RADIOLOGY

## 2020-09-16 ENCOUNTER — TREATMENT (OUTPATIENT)
Dept: RADIATION ONCOLOGY | Facility: CLINIC | Age: 80
End: 2020-09-16
Payer: MEDICARE

## 2020-09-16 PROCEDURE — 77300 RADIATION THERAPY DOSE PLAN: CPT | Mod: S$GLB,,, | Performed by: RADIOLOGY

## 2020-09-16 PROCEDURE — 77014 PR  CT GUIDANCE PLACEMENT RAD THERAPY FIELDS: CPT | Mod: S$GLB,,, | Performed by: RADIOLOGY

## 2020-09-16 PROCEDURE — 77014 PR  CT GUIDANCE PLACEMENT RAD THERAPY FIELDS: ICD-10-PCS | Mod: S$GLB,,, | Performed by: RADIOLOGY

## 2020-09-16 PROCEDURE — G6015 PR RADN TX DELIVERY,  INTENS MOD, 1+ FIELDS PER TX: ICD-10-PCS | Mod: S$GLB,,, | Performed by: RADIOLOGY

## 2020-09-16 PROCEDURE — 77338 DESIGN MLC DEVICE FOR IMRT: CPT | Mod: 59,S$GLB,, | Performed by: RADIOLOGY

## 2020-09-16 PROCEDURE — G6015 RADIATION TX DELIVERY IMRT: HCPCS | Mod: S$GLB,,, | Performed by: RADIOLOGY

## 2020-09-16 PROCEDURE — 77300 PR RADIATION THERAPY,DOSIMETRY PLAN: ICD-10-PCS | Mod: S$GLB,,, | Performed by: RADIOLOGY

## 2020-09-16 PROCEDURE — 77338 PR  MLC IMRT DESIGN & CONSTRUCTION PER IMRT PLAN: ICD-10-PCS | Mod: 59,S$GLB,, | Performed by: RADIOLOGY

## 2020-09-24 ENCOUNTER — OFFICE VISIT (OUTPATIENT)
Dept: UROLOGY | Facility: CLINIC | Age: 80
End: 2020-09-24
Payer: MEDICARE

## 2020-09-24 VITALS
DIASTOLIC BLOOD PRESSURE: 81 MMHG | TEMPERATURE: 98 F | HEIGHT: 70 IN | BODY MASS INDEX: 25.75 KG/M2 | HEART RATE: 96 BPM | SYSTOLIC BLOOD PRESSURE: 137 MMHG | WEIGHT: 179.88 LBS | RESPIRATION RATE: 18 BRPM

## 2020-09-24 DIAGNOSIS — N50.89 SCROTAL SWELLING: Primary | ICD-10-CM

## 2020-09-24 DIAGNOSIS — N45.1 LEFT EPIDIDYMITIS: ICD-10-CM

## 2020-09-24 DIAGNOSIS — C61 PROSTATE CANCER: ICD-10-CM

## 2020-09-24 PROCEDURE — 99213 OFFICE O/P EST LOW 20 MIN: CPT | Mod: PBBFAC,PN | Performed by: UROLOGY

## 2020-09-24 PROCEDURE — 99214 PR OFFICE/OUTPT VISIT, EST, LEVL IV, 30-39 MIN: ICD-10-PCS | Mod: S$PBB,,, | Performed by: UROLOGY

## 2020-09-24 PROCEDURE — 99214 OFFICE O/P EST MOD 30 MIN: CPT | Mod: S$PBB,,, | Performed by: UROLOGY

## 2020-09-24 PROCEDURE — 99999 PR PBB SHADOW E&M-EST. PATIENT-LVL III: ICD-10-PCS | Mod: PBBFAC,,, | Performed by: UROLOGY

## 2020-09-24 PROCEDURE — 99999 PR PBB SHADOW E&M-EST. PATIENT-LVL III: CPT | Mod: PBBFAC,,, | Performed by: UROLOGY

## 2020-09-24 RX ORDER — CIPROFLOXACIN 500 MG/1
500 TABLET ORAL 2 TIMES DAILY
Qty: 30 TABLET | Refills: 0 | Status: SHIPPED | OUTPATIENT
Start: 2020-09-24 | End: 2021-02-01

## 2020-09-24 NOTE — PROGRESS NOTES
OFFICE NOTE  [unfilled]  [id number]  9/24/2020       CHIEF COMPLAINT:   left testicular swelling and pain, adenocarcinoma prostate     HISTORY OF PRESENT ILLNESS:   this 80-year-old male returns routine recheck.  He has been experiencing some left testicular pain and swelling over the past 4-5 days similar to a prior episode of epididymo-orchitis for which he was treated earlier this year.  He continues to be under radiation therapy by Dr. Escobar for treatment of his adenocarcinoma prostate which he is tolerating well.    Physical exam:  Abdomen - soft benign nontender no masses no hernias no organomegaly                             External genitalia - normal phallus with adequate meatus testes descended with some moderate swelling and tenderness on the left side involving predominantly the epididymis and to some degree the testicle                                    PSA  - 5.6 on 07/20/2020          FINAL IMPRESSION:   left epididymo-orchitis, adenocarcinoma prostate     RECOMMENDATIONS:  Scrotal ultrasound.  Trial on Cipro 500 mg p.o. b.i.d. .  He will continue on his radiation therapy under the management of Dr. Escobar with subsequent follow-up with us after he has completed his treatment.

## 2020-09-25 ENCOUNTER — HOSPITAL ENCOUNTER (OUTPATIENT)
Dept: RADIOLOGY | Facility: HOSPITAL | Age: 80
Discharge: HOME | End: 2020-09-25
Attending: UROLOGY
Payer: MEDICARE

## 2020-09-25 DIAGNOSIS — N50.89 SCROTAL SWELLING: ICD-10-CM

## 2020-09-25 PROCEDURE — 76870 US EXAM SCROTUM: CPT | Mod: 26,,, | Performed by: RADIOLOGY

## 2020-09-25 PROCEDURE — 76870 US EXAM SCROTUM: CPT | Mod: TC

## 2020-09-25 PROCEDURE — 76870 US SCROTUM AND TESTICLES: ICD-10-PCS | Mod: 26,,, | Performed by: RADIOLOGY

## 2020-09-30 DIAGNOSIS — C61 MALIGNANT NEOPLASM OF PROSTATE: Primary | ICD-10-CM

## 2020-10-01 ENCOUNTER — LAB VISIT (OUTPATIENT)
Dept: LAB | Facility: HOSPITAL | Age: 80
End: 2020-10-01
Attending: RADIOLOGY
Payer: MEDICARE

## 2020-10-01 DIAGNOSIS — C61 MALIGNANT NEOPLASM OF PROSTATE: ICD-10-CM

## 2020-10-01 LAB — COMPLEXED PSA SERPL-MCNC: 4.9 NG/ML (ref 0–4)

## 2020-10-01 PROCEDURE — 36415 COLL VENOUS BLD VENIPUNCTURE: CPT

## 2020-10-01 PROCEDURE — 84153 ASSAY OF PSA TOTAL: CPT

## 2020-10-08 ENCOUNTER — TREATMENT (OUTPATIENT)
Dept: RADIATION ONCOLOGY | Facility: CLINIC | Age: 80
End: 2020-10-08
Payer: MEDICARE

## 2020-10-08 PROCEDURE — G6015 RADIATION TX DELIVERY IMRT: HCPCS | Mod: S$GLB,,, | Performed by: RADIOLOGY

## 2020-10-08 PROCEDURE — 77014 PR  CT GUIDANCE PLACEMENT RAD THERAPY FIELDS: ICD-10-PCS | Mod: S$GLB,,, | Performed by: RADIOLOGY

## 2020-10-08 PROCEDURE — 77014 PR  CT GUIDANCE PLACEMENT RAD THERAPY FIELDS: CPT | Mod: S$GLB,,, | Performed by: RADIOLOGY

## 2020-10-08 PROCEDURE — G6015 PR RADN TX DELIVERY,  INTENS MOD, 1+ FIELDS PER TX: ICD-10-PCS | Mod: S$GLB,,, | Performed by: RADIOLOGY

## 2020-10-08 PROCEDURE — 77336 PR  RADN PHYSICS CONSULT CONTINUING: ICD-10-PCS | Mod: S$GLB,,, | Performed by: RADIOLOGY

## 2020-10-08 PROCEDURE — 77336 RADIATION PHYSICS CONSULT: CPT | Mod: S$GLB,,, | Performed by: RADIOLOGY

## 2020-10-28 ENCOUNTER — DOCUMENTATION ONLY (OUTPATIENT)
Dept: RADIATION ONCOLOGY | Facility: CLINIC | Age: 80
End: 2020-10-28

## 2020-10-28 NOTE — PROGRESS NOTES
DIAGNOSIS: very low risk prostate adenocarcinoma, [id number] GS 3+3 (4/18: single core, 30%), iPSA 8.52 with updated PET-CT scan demonstrating no progression of prior lung cancer with FDG avid activity at the left prostate, left external iliac lymph node, left scrotum in the setting of suspected epididymitis, poor compliance with antibiotics; PSA 4.9 @ 5/20.    TREATMENT  Patient completed definitive radiotherapy alone to 7920 cGy ending October 8, 2020.  Treatment was generally well tolerated with mild symptoms of cystitis, fatigue and recurrent epididymitis requiring antibiotic therapy.    Contacted patient today for 3 week checkup.  Today patient denies fever, chills, chest pain, shortness of breath, cough or hemoptysis.  He denies dysuria, hematuria, diarrhea or bright red blood per rectum.  Frequency and urgency of urination is improving.  No testicular discomfort today.  He has follow-up with Dr. Soto to discuss further antibiotic therapy if needed.  He reports fatigue is beginning to johnathon.    PSA  10/1 4.9  7/20 5.6    A/P  1.  Patient tolerated radiotherapy well.  PSA was slightly decreased at the end of treatment course consistent with low risk histology.  I have assured him that fatigue will continue subside and urinary function will correct to his baseline in the coming weeks.  I recommend continue with Q 3 month PSAs.  I believe PSA trend continues to argue that pelvic lymphadenopathy is reactive and related to recurrent epididymitis not prostate cancer.  2.  Follow-up with Dr. Soto  3.  Return to clinic in 6 months.  4.  COVID-19 precautions discussed.

## 2020-11-02 ENCOUNTER — TELEPHONE (OUTPATIENT)
Dept: RADIATION ONCOLOGY | Facility: CLINIC | Age: 80
End: 2020-11-02

## 2020-11-02 DIAGNOSIS — Z12.5 ENCOUNTER FOR SCREENING FOR MALIGNANT NEOPLASM OF PROSTATE: ICD-10-CM

## 2020-11-02 DIAGNOSIS — C61 MALIGNANT NEOPLASM OF PROSTATE: Primary | ICD-10-CM

## 2020-11-02 DIAGNOSIS — C61 ADENOCARCINOMA OF PROSTATE: ICD-10-CM

## 2020-11-06 ENCOUNTER — OFFICE VISIT (OUTPATIENT)
Dept: UROLOGY | Facility: CLINIC | Age: 80
End: 2020-11-06
Payer: MEDICARE

## 2020-11-06 VITALS
HEART RATE: 77 BPM | HEIGHT: 70 IN | BODY MASS INDEX: 25 KG/M2 | WEIGHT: 174.63 LBS | DIASTOLIC BLOOD PRESSURE: 92 MMHG | SYSTOLIC BLOOD PRESSURE: 185 MMHG

## 2020-11-06 DIAGNOSIS — C61 PROSTATE CANCER: Primary | ICD-10-CM

## 2020-11-06 PROCEDURE — 81000 URINALYSIS NONAUTO W/SCOPE: CPT | Mod: PBBFAC,PN | Performed by: UROLOGY

## 2020-11-06 PROCEDURE — 99213 OFFICE O/P EST LOW 20 MIN: CPT | Mod: PBBFAC,PN | Performed by: UROLOGY

## 2020-11-06 PROCEDURE — 99214 OFFICE O/P EST MOD 30 MIN: CPT | Mod: 25,S$PBB,, | Performed by: UROLOGY

## 2020-11-06 PROCEDURE — 99999 PR PBB SHADOW E&M-EST. PATIENT-LVL III: ICD-10-PCS | Mod: PBBFAC,,, | Performed by: UROLOGY

## 2020-11-06 PROCEDURE — 99999 PR PBB SHADOW E&M-EST. PATIENT-LVL III: CPT | Mod: PBBFAC,,, | Performed by: UROLOGY

## 2020-11-06 PROCEDURE — 99214 PR OFFICE/OUTPT VISIT, EST, LEVL IV, 30-39 MIN: ICD-10-PCS | Mod: 25,S$PBB,, | Performed by: UROLOGY

## 2020-11-06 NOTE — PROGRESS NOTES
OFFICE NOTE  [unfilled]  [id number]  11/6/2020       CHIEF COMPLAINT:   adenocarcinoma prostate     HISTORY OF PRESENT ILLNESS:   this 80-year-old male returns routine recheck.  Has history of adenocarcinoma prostate for which he underwent external beam radiation therapy that was completed approximately 3-4 weeks ago under the care of his radiation oncologist Dr. Escobar.  Is doing very well on today's visit denies any complaints.  The left scrotal swelling and pain he was experiencing at his last visit appears to have essentially resolved.    Physical exam:  Abdomen - soft benign nontender no masses no hernias no organomegaly                             External genitalia - normal phallus with adequate meatus testes descended and feel normal no scrotal mass no scrotal swelling or tenderness                             Rectal -  smooth slightly firm  flattened prostate no nodules normal sphincter tone         PSA  - 4.9 on 10/01/2020 that compares to 5.6 on 07/20/2020     UA - negative pH 7.0     FINAL IMPRESSION:  Adenocarcinoma prostate       RECOMMENDATIONS:   recheck 6 months with repeat PSA and will continue to follow-up with Dr. Escobar.

## 2020-11-16 ENCOUNTER — LAB VISIT (OUTPATIENT)
Dept: LAB | Facility: HOSPITAL | Age: 80
End: 2020-11-16
Attending: INTERNAL MEDICINE
Payer: MEDICARE

## 2020-11-16 DIAGNOSIS — C61 MALIGNANT NEOPLASM OF PROSTATE: ICD-10-CM

## 2020-11-16 DIAGNOSIS — C34.90 CARCINOMA OF LUNG, UNSPECIFIED LATERALITY: Chronic | ICD-10-CM

## 2020-11-16 DIAGNOSIS — C61 ADENOCARCINOMA OF PROSTATE: ICD-10-CM

## 2020-11-16 LAB
ALBUMIN SERPL BCP-MCNC: 3.9 G/DL (ref 3.5–5.2)
ALP SERPL-CCNC: 72 U/L (ref 55–135)
ALT SERPL W/O P-5'-P-CCNC: 9 U/L (ref 10–44)
ANION GAP SERPL CALC-SCNC: 8 MMOL/L (ref 8–16)
AST SERPL-CCNC: 14 U/L (ref 10–40)
BASOPHILS # BLD AUTO: 0.03 K/UL (ref 0–0.2)
BASOPHILS NFR BLD: 0.7 % (ref 0–1.9)
BILIRUB SERPL-MCNC: 0.6 MG/DL (ref 0.1–1)
BUN SERPL-MCNC: 16 MG/DL (ref 8–23)
CALCIUM SERPL-MCNC: 9.1 MG/DL (ref 8.7–10.5)
CHLORIDE SERPL-SCNC: 106 MMOL/L (ref 95–110)
CO2 SERPL-SCNC: 28 MMOL/L (ref 23–29)
COMPLEXED PSA SERPL-MCNC: 2.6 NG/ML (ref 0–4)
CREAT SERPL-MCNC: 1.4 MG/DL (ref 0.5–1.4)
DIFFERENTIAL METHOD: ABNORMAL
EOSINOPHIL # BLD AUTO: 0.1 K/UL (ref 0–0.5)
EOSINOPHIL NFR BLD: 2 % (ref 0–8)
ERYTHROCYTE [DISTWIDTH] IN BLOOD BY AUTOMATED COUNT: 15.8 % (ref 11.5–14.5)
EST. GFR  (AFRICAN AMERICAN): 55.2 ML/MIN/1.73 M^2
EST. GFR  (NON AFRICAN AMERICAN): 47.8 ML/MIN/1.73 M^2
GLUCOSE SERPL-MCNC: 121 MG/DL (ref 70–110)
HCT VFR BLD AUTO: 36.3 % (ref 40–54)
HGB BLD-MCNC: 12.3 G/DL (ref 14–18)
IMM GRANULOCYTES # BLD AUTO: 0.02 K/UL (ref 0–0.04)
IMM GRANULOCYTES NFR BLD AUTO: 0.4 % (ref 0–0.5)
LYMPHOCYTES # BLD AUTO: 0.6 K/UL (ref 1–4.8)
LYMPHOCYTES NFR BLD: 12.2 % (ref 18–48)
MCH RBC QN AUTO: 32 PG (ref 27–31)
MCHC RBC AUTO-ENTMCNC: 33.9 G/DL (ref 32–36)
MCV RBC AUTO: 95 FL (ref 82–98)
MONOCYTES # BLD AUTO: 0.5 K/UL (ref 0.3–1)
MONOCYTES NFR BLD: 11.3 % (ref 4–15)
NEUTROPHILS # BLD AUTO: 3.4 K/UL (ref 1.8–7.7)
NEUTROPHILS NFR BLD: 73.4 % (ref 38–73)
NRBC BLD-RTO: 0 /100 WBC
PLATELET # BLD AUTO: 151 K/UL (ref 150–350)
PMV BLD AUTO: 9.5 FL (ref 9.2–12.9)
POTASSIUM SERPL-SCNC: 3.7 MMOL/L (ref 3.5–5.1)
PROT SERPL-MCNC: 7.1 G/DL (ref 6–8.4)
RBC # BLD AUTO: 3.84 M/UL (ref 4.6–6.2)
SODIUM SERPL-SCNC: 142 MMOL/L (ref 136–145)
WBC # BLD AUTO: 4.6 K/UL (ref 3.9–12.7)

## 2020-11-16 PROCEDURE — 85025 COMPLETE CBC W/AUTO DIFF WBC: CPT

## 2020-11-16 PROCEDURE — 84153 ASSAY OF PSA TOTAL: CPT

## 2020-11-16 PROCEDURE — 80053 COMPREHEN METABOLIC PANEL: CPT

## 2020-11-16 PROCEDURE — 36415 COLL VENOUS BLD VENIPUNCTURE: CPT

## 2021-02-01 ENCOUNTER — TELEPHONE (OUTPATIENT)
Dept: UROLOGY | Facility: CLINIC | Age: 81
End: 2021-02-01

## 2021-02-01 ENCOUNTER — HOSPITAL ENCOUNTER (EMERGENCY)
Facility: HOSPITAL | Age: 81
Discharge: HOME | End: 2021-02-01
Attending: EMERGENCY MEDICINE
Payer: MEDICARE

## 2021-02-01 ENCOUNTER — OFFICE VISIT (OUTPATIENT)
Dept: UROLOGY | Facility: CLINIC | Age: 81
End: 2021-02-01
Payer: MEDICARE

## 2021-02-01 VITALS
RESPIRATION RATE: 18 BRPM | DIASTOLIC BLOOD PRESSURE: 99 MMHG | HEART RATE: 81 BPM | HEIGHT: 70 IN | WEIGHT: 176.38 LBS | BODY MASS INDEX: 25.25 KG/M2 | SYSTOLIC BLOOD PRESSURE: 193 MMHG

## 2021-02-01 VITALS
SYSTOLIC BLOOD PRESSURE: 177 MMHG | TEMPERATURE: 98 F | HEIGHT: 70 IN | BODY MASS INDEX: 25.05 KG/M2 | DIASTOLIC BLOOD PRESSURE: 86 MMHG | HEART RATE: 67 BPM | WEIGHT: 175 LBS | OXYGEN SATURATION: 99 % | RESPIRATION RATE: 18 BRPM

## 2021-02-01 DIAGNOSIS — C61 PROSTATE CANCER: ICD-10-CM

## 2021-02-01 DIAGNOSIS — N17.9 ACUTE RENAL FAILURE SUPERIMPOSED ON STAGE 3 CHRONIC KIDNEY DISEASE, UNSPECIFIED ACUTE RENAL FAILURE TYPE, UNSPECIFIED WHETHER STAGE 3A OR 3B CKD: ICD-10-CM

## 2021-02-01 DIAGNOSIS — N18.30 ACUTE RENAL FAILURE SUPERIMPOSED ON STAGE 3 CHRONIC KIDNEY DISEASE, UNSPECIFIED ACUTE RENAL FAILURE TYPE, UNSPECIFIED WHETHER STAGE 3A OR 3B CKD: ICD-10-CM

## 2021-02-01 DIAGNOSIS — E86.0 DEHYDRATION: Primary | ICD-10-CM

## 2021-02-01 DIAGNOSIS — R33.9 INCOMPLETE BLADDER EMPTYING: Primary | ICD-10-CM

## 2021-02-01 LAB
ALBUMIN SERPL BCP-MCNC: 3.7 G/DL (ref 3.5–5.2)
ALP SERPL-CCNC: 76 U/L (ref 55–135)
ALT SERPL W/O P-5'-P-CCNC: 10 U/L (ref 10–44)
ANION GAP SERPL CALC-SCNC: 8 MMOL/L (ref 8–16)
AST SERPL-CCNC: 14 U/L (ref 10–40)
BACTERIA #/AREA URNS HPF: NEGATIVE /HPF
BASOPHILS # BLD AUTO: 0.03 K/UL (ref 0–0.2)
BASOPHILS NFR BLD: 0.7 % (ref 0–1.9)
BILIRUB SERPL-MCNC: 0.8 MG/DL (ref 0.1–1)
BILIRUB UR QL STRIP: NEGATIVE
BUN SERPL-MCNC: 14 MG/DL (ref 8–23)
CALCIUM SERPL-MCNC: 8.5 MG/DL (ref 8.7–10.5)
CHLORIDE SERPL-SCNC: 105 MMOL/L (ref 95–110)
CLARITY UR: CLEAR
CO2 SERPL-SCNC: 26 MMOL/L (ref 23–29)
COLOR UR: YELLOW
CREAT SERPL-MCNC: 1.5 MG/DL (ref 0.5–1.4)
DIFFERENTIAL METHOD: ABNORMAL
EOSINOPHIL # BLD AUTO: 0.1 K/UL (ref 0–0.5)
EOSINOPHIL NFR BLD: 1.8 % (ref 0–8)
ERYTHROCYTE [DISTWIDTH] IN BLOOD BY AUTOMATED COUNT: 14.6 % (ref 11.5–14.5)
EST. GFR  (AFRICAN AMERICAN): 50.8 ML/MIN/1.73 M^2
EST. GFR  (NON AFRICAN AMERICAN): 44 ML/MIN/1.73 M^2
GLUCOSE SERPL-MCNC: 154 MG/DL (ref 70–110)
GLUCOSE SERPL-MCNC: 179 MG/DL (ref 70–110)
GLUCOSE UR QL STRIP: NEGATIVE
HCT VFR BLD AUTO: 37 % (ref 40–54)
HGB BLD-MCNC: 12.2 G/DL (ref 14–18)
HGB UR QL STRIP: ABNORMAL
HYALINE CASTS #/AREA URNS LPF: 3 /LPF
IMM GRANULOCYTES # BLD AUTO: 0.02 K/UL (ref 0–0.04)
IMM GRANULOCYTES NFR BLD AUTO: 0.5 % (ref 0–0.5)
KETONES UR QL STRIP: NEGATIVE
LEUKOCYTE ESTERASE UR QL STRIP: NEGATIVE
LYMPHOCYTES # BLD AUTO: 0.6 K/UL (ref 1–4.8)
LYMPHOCYTES NFR BLD: 12.9 % (ref 18–48)
MCH RBC QN AUTO: 31.4 PG (ref 27–31)
MCHC RBC AUTO-ENTMCNC: 33 G/DL (ref 32–36)
MCV RBC AUTO: 95 FL (ref 82–98)
MICROSCOPIC COMMENT: ABNORMAL
MONOCYTES # BLD AUTO: 0.6 K/UL (ref 0.3–1)
MONOCYTES NFR BLD: 13.1 % (ref 4–15)
NEUTROPHILS # BLD AUTO: 3.1 K/UL (ref 1.8–7.7)
NEUTROPHILS NFR BLD: 71 % (ref 38–73)
NITRITE UR QL STRIP: NEGATIVE
NRBC BLD-RTO: 0 /100 WBC
PH UR STRIP: 7 [PH] (ref 5–8)
PLATELET # BLD AUTO: 160 K/UL (ref 150–350)
PMV BLD AUTO: 10 FL (ref 9.2–12.9)
POC RESIDUAL URINE VOLUME: 0 ML (ref 0–100)
POTASSIUM SERPL-SCNC: 4.5 MMOL/L (ref 3.5–5.1)
PROT SERPL-MCNC: 6.8 G/DL (ref 6–8.4)
PROT UR QL STRIP: NEGATIVE
RBC # BLD AUTO: 3.88 M/UL (ref 4.6–6.2)
RBC #/AREA URNS HPF: 93 /HPF (ref 0–4)
SODIUM SERPL-SCNC: 139 MMOL/L (ref 136–145)
SP GR UR STRIP: 1.02 (ref 1–1.03)
SQUAMOUS #/AREA URNS HPF: 4 /HPF
URN SPEC COLLECT METH UR: ABNORMAL
UROBILINOGEN UR STRIP-ACNC: NEGATIVE EU/DL
WBC # BLD AUTO: 4.34 K/UL (ref 3.9–12.7)
WBC #/AREA URNS HPF: 4 /HPF (ref 0–5)

## 2021-02-01 PROCEDURE — 51798 US URINE CAPACITY MEASURE: CPT | Mod: PBBFAC,PN | Performed by: NURSE PRACTITIONER

## 2021-02-01 PROCEDURE — 96361 HYDRATE IV INFUSION ADD-ON: CPT

## 2021-02-01 PROCEDURE — 85025 COMPLETE CBC W/AUTO DIFF WBC: CPT

## 2021-02-01 PROCEDURE — 51701 INSERT BLADDER CATHETER: CPT | Mod: PBBFAC,PN | Performed by: NURSE PRACTITIONER

## 2021-02-01 PROCEDURE — 96360 HYDRATION IV INFUSION INIT: CPT

## 2021-02-01 PROCEDURE — 99214 PR OFFICE/OUTPT VISIT, EST, LEVL IV, 30-39 MIN: ICD-10-PCS | Mod: S$PBB,25,, | Performed by: NURSE PRACTITIONER

## 2021-02-01 PROCEDURE — 80053 COMPREHEN METABOLIC PANEL: CPT

## 2021-02-01 PROCEDURE — 87088 URINE BACTERIA CULTURE: CPT

## 2021-02-01 PROCEDURE — 99999 PR PBB SHADOW E&M-EST. PATIENT-LVL V: ICD-10-PCS | Mod: PBBFAC,,, | Performed by: NURSE PRACTITIONER

## 2021-02-01 PROCEDURE — 25000003 PHARM REV CODE 250: Performed by: EMERGENCY MEDICINE

## 2021-02-01 PROCEDURE — 87086 URINE CULTURE/COLONY COUNT: CPT

## 2021-02-01 PROCEDURE — 51701 INSERT BLADDER CATHETER: CPT | Mod: S$PBB,,, | Performed by: NURSE PRACTITIONER

## 2021-02-01 PROCEDURE — 99214 OFFICE O/P EST MOD 30 MIN: CPT | Mod: S$PBB,25,, | Performed by: NURSE PRACTITIONER

## 2021-02-01 PROCEDURE — 99999 PR PBB SHADOW E&M-EST. PATIENT-LVL V: CPT | Mod: PBBFAC,,, | Performed by: NURSE PRACTITIONER

## 2021-02-01 PROCEDURE — 99285 EMERGENCY DEPT VISIT HI MDM: CPT | Mod: 25

## 2021-02-01 PROCEDURE — 81001 URINALYSIS AUTO W/SCOPE: CPT

## 2021-02-01 PROCEDURE — 82962 GLUCOSE BLOOD TEST: CPT

## 2021-02-01 PROCEDURE — 99215 OFFICE O/P EST HI 40 MIN: CPT | Mod: PBBFAC,PN | Performed by: NURSE PRACTITIONER

## 2021-02-01 PROCEDURE — 51701 INSERT,NON-INDWELLING BLADDER CATHETER: ICD-10-PCS | Mod: S$PBB,,, | Performed by: NURSE PRACTITIONER

## 2021-02-01 RX ADMIN — SODIUM CHLORIDE 1000 ML: 9 INJECTION, SOLUTION INTRAVENOUS at 03:02

## 2021-02-01 RX ADMIN — SODIUM CHLORIDE 1000 ML: 0.9 INJECTION, SOLUTION INTRAVENOUS at 01:02

## 2021-02-02 ENCOUNTER — OFFICE VISIT (OUTPATIENT)
Dept: ENDOCRINOLOGY | Facility: CLINIC | Age: 81
End: 2021-02-02
Payer: MEDICARE

## 2021-02-02 ENCOUNTER — LAB VISIT (OUTPATIENT)
Dept: LAB | Facility: HOSPITAL | Age: 81
End: 2021-02-02
Attending: PHYSICIAN ASSISTANT
Payer: MEDICARE

## 2021-02-02 VITALS
BODY MASS INDEX: 25.39 KG/M2 | HEART RATE: 61 BPM | SYSTOLIC BLOOD PRESSURE: 126 MMHG | OXYGEN SATURATION: 98 % | HEIGHT: 70 IN | WEIGHT: 177.38 LBS | TEMPERATURE: 98 F | DIASTOLIC BLOOD PRESSURE: 70 MMHG

## 2021-02-02 DIAGNOSIS — E78.5 HYPERLIPIDEMIA, UNSPECIFIED HYPERLIPIDEMIA TYPE: Primary | ICD-10-CM

## 2021-02-02 DIAGNOSIS — E11.9 TYPE 2 DIABETES MELLITUS WITHOUT COMPLICATION, WITHOUT LONG-TERM CURRENT USE OF INSULIN: ICD-10-CM

## 2021-02-02 DIAGNOSIS — G62.9 NEUROPATHY: ICD-10-CM

## 2021-02-02 DIAGNOSIS — I25.10 CORONARY ARTERY DISEASE, ANGINA PRESENCE UNSPECIFIED, UNSPECIFIED VESSEL OR LESION TYPE, UNSPECIFIED WHETHER NATIVE OR TRANSPLANTED HEART: ICD-10-CM

## 2021-02-02 DIAGNOSIS — C61 ADENOCARCINOMA OF PROSTATE: ICD-10-CM

## 2021-02-02 DIAGNOSIS — E11.9 TYPE 2 DIABETES MELLITUS WITHOUT COMPLICATION, WITHOUT LONG-TERM CURRENT USE OF INSULIN: Primary | ICD-10-CM

## 2021-02-02 DIAGNOSIS — I10 ESSENTIAL HYPERTENSION: ICD-10-CM

## 2021-02-02 DIAGNOSIS — Z72.0 TOBACCO ABUSE: ICD-10-CM

## 2021-02-02 LAB
CHOLEST SERPL-MCNC: 198 MG/DL (ref 120–199)
CHOLEST/HDLC SERPL: 5.7 {RATIO} (ref 2–5)
ESTIMATED AVG GLUCOSE: 174 MG/DL (ref 68–131)
HBA1C MFR BLD: 7.7 % (ref 4–5.6)
HDLC SERPL-MCNC: 35 MG/DL (ref 40–75)
HDLC SERPL: 17.7 % (ref 20–50)
LDLC SERPL CALC-MCNC: 132.4 MG/DL (ref 63–159)
NONHDLC SERPL-MCNC: 163 MG/DL
TRIGL SERPL-MCNC: 153 MG/DL (ref 30–150)

## 2021-02-02 PROCEDURE — 84439 ASSAY OF FREE THYROXINE: CPT

## 2021-02-02 PROCEDURE — 80061 LIPID PANEL: CPT

## 2021-02-02 PROCEDURE — 99214 OFFICE O/P EST MOD 30 MIN: CPT | Mod: S$PBB,,, | Performed by: PHYSICIAN ASSISTANT

## 2021-02-02 PROCEDURE — 84443 ASSAY THYROID STIM HORMONE: CPT

## 2021-02-02 PROCEDURE — 99215 OFFICE O/P EST HI 40 MIN: CPT | Mod: PBBFAC,PO | Performed by: PHYSICIAN ASSISTANT

## 2021-02-02 PROCEDURE — 99999 PR PBB SHADOW E&M-EST. PATIENT-LVL V: CPT | Mod: PBBFAC,,, | Performed by: PHYSICIAN ASSISTANT

## 2021-02-02 PROCEDURE — 83036 HEMOGLOBIN GLYCOSYLATED A1C: CPT

## 2021-02-02 PROCEDURE — 99999 PR PBB SHADOW E&M-EST. PATIENT-LVL V: ICD-10-PCS | Mod: PBBFAC,,, | Performed by: PHYSICIAN ASSISTANT

## 2021-02-02 PROCEDURE — 36415 COLL VENOUS BLD VENIPUNCTURE: CPT | Mod: PO

## 2021-02-02 PROCEDURE — 99214 PR OFFICE/OUTPT VISIT, EST, LEVL IV, 30-39 MIN: ICD-10-PCS | Mod: S$PBB,,, | Performed by: PHYSICIAN ASSISTANT

## 2021-02-02 RX ORDER — ATORVASTATIN CALCIUM 10 MG/1
TABLET, FILM COATED ORAL
Qty: 90 TABLET | Refills: 3 | Status: SHIPPED | OUTPATIENT
Start: 2021-02-02 | End: 2021-02-03

## 2021-02-02 RX ORDER — REPAGLINIDE 1 MG/1
1 TABLET ORAL
Qty: 270 TABLET | Refills: 3 | Status: SHIPPED | OUTPATIENT
Start: 2021-02-02 | End: 2021-05-04

## 2021-02-02 RX ORDER — LINAGLIPTIN 5 MG/1
5 TABLET, FILM COATED ORAL DAILY
Qty: 90 TABLET | Refills: 3 | Status: SHIPPED | OUTPATIENT
Start: 2021-02-02 | End: 2021-05-04

## 2021-02-03 LAB
BACTERIA UR CULT: ABNORMAL
T4 FREE SERPL-MCNC: 1.11 NG/DL (ref 0.71–1.51)
TSH SERPL DL<=0.005 MIU/L-ACNC: 0.94 UIU/ML (ref 0.4–4)

## 2021-02-03 RX ORDER — ATORVASTATIN CALCIUM 20 MG/1
20 TABLET, FILM COATED ORAL DAILY
Qty: 90 TABLET | Refills: 3 | Status: SHIPPED | OUTPATIENT
Start: 2021-02-03 | End: 2021-08-16

## 2021-02-03 RX ORDER — FLUCONAZOLE 150 MG/1
150 TABLET ORAL DAILY
Qty: 3 TABLET | Refills: 0 | Status: SHIPPED | OUTPATIENT
Start: 2021-02-03 | End: 2021-02-06

## 2021-02-03 RX ORDER — AMOXICILLIN AND CLAVULANATE POTASSIUM 875; 125 MG/1; MG/1
1 TABLET, FILM COATED ORAL 2 TIMES DAILY
Qty: 28 TABLET | Refills: 0 | Status: SHIPPED | OUTPATIENT
Start: 2021-02-03 | End: 2021-02-17

## 2021-02-10 ENCOUNTER — TELEPHONE (OUTPATIENT)
Dept: ENDOCRINOLOGY | Facility: CLINIC | Age: 81
End: 2021-02-10

## 2021-02-11 ENCOUNTER — OFFICE VISIT (OUTPATIENT)
Dept: PODIATRY | Facility: CLINIC | Age: 81
End: 2021-02-11
Payer: MEDICARE

## 2021-02-11 VITALS
HEART RATE: 62 BPM | WEIGHT: 175 LBS | BODY MASS INDEX: 25.05 KG/M2 | OXYGEN SATURATION: 99 % | HEIGHT: 70 IN | RESPIRATION RATE: 18 BRPM

## 2021-02-11 DIAGNOSIS — E11.42 DIABETIC POLYNEUROPATHY ASSOCIATED WITH TYPE 2 DIABETES MELLITUS: Primary | ICD-10-CM

## 2021-02-11 PROCEDURE — 99203 PR OFFICE/OUTPT VISIT, NEW, LEVL III, 30-44 MIN: ICD-10-PCS | Mod: S$GLB,,, | Performed by: PODIATRIST

## 2021-02-11 PROCEDURE — 99203 OFFICE O/P NEW LOW 30 MIN: CPT | Mod: S$GLB,,, | Performed by: PODIATRIST

## 2021-02-11 RX ORDER — METOPROLOL SUCCINATE 100 MG/1
100 TABLET, EXTENDED RELEASE ORAL 2 TIMES DAILY
COMMUNITY
Start: 2021-02-07 | End: 2022-09-03 | Stop reason: SDUPTHER

## 2021-03-30 ENCOUNTER — TELEPHONE (OUTPATIENT)
Dept: UROLOGY | Facility: CLINIC | Age: 81
End: 2021-03-30

## 2021-04-28 ENCOUNTER — OFFICE VISIT (OUTPATIENT)
Dept: RADIATION ONCOLOGY | Facility: CLINIC | Age: 81
End: 2021-04-28
Payer: MEDICARE

## 2021-04-28 VITALS
HEART RATE: 66 BPM | SYSTOLIC BLOOD PRESSURE: 169 MMHG | WEIGHT: 180 LBS | TEMPERATURE: 99 F | OXYGEN SATURATION: 98 % | DIASTOLIC BLOOD PRESSURE: 81 MMHG | BODY MASS INDEX: 25.83 KG/M2 | RESPIRATION RATE: 18 BRPM

## 2021-04-28 DIAGNOSIS — C61 MALIGNANT NEOPLASM OF PROSTATE: Primary | ICD-10-CM

## 2021-04-28 DIAGNOSIS — C61 ADENOCARCINOMA OF PROSTATE: ICD-10-CM

## 2021-04-28 PROCEDURE — 99213 PR OFFICE/OUTPT VISIT, EST, LEVL III, 20-29 MIN: ICD-10-PCS | Mod: S$GLB,,, | Performed by: RADIOLOGY

## 2021-04-28 PROCEDURE — 99213 OFFICE O/P EST LOW 20 MIN: CPT | Mod: S$GLB,,, | Performed by: RADIOLOGY

## 2021-04-28 RX ORDER — TAMSULOSIN HYDROCHLORIDE 0.4 MG/1
0.4 CAPSULE ORAL NIGHTLY
Qty: 90 CAPSULE | Refills: 1 | Status: SHIPPED | OUTPATIENT
Start: 2021-04-28 | End: 2021-05-07 | Stop reason: SDUPTHER

## 2021-04-29 ENCOUNTER — LAB VISIT (OUTPATIENT)
Dept: LAB | Facility: HOSPITAL | Age: 81
End: 2021-04-29
Attending: UROLOGY
Payer: MEDICARE

## 2021-04-29 DIAGNOSIS — E78.5 HYPERLIPIDEMIA, UNSPECIFIED HYPERLIPIDEMIA TYPE: ICD-10-CM

## 2021-04-29 DIAGNOSIS — C61 PROSTATE CANCER: ICD-10-CM

## 2021-04-29 DIAGNOSIS — E11.9 TYPE 2 DIABETES MELLITUS WITHOUT COMPLICATION, WITHOUT LONG-TERM CURRENT USE OF INSULIN: ICD-10-CM

## 2021-04-29 LAB
ALBUMIN SERPL BCP-MCNC: 3.7 G/DL (ref 3.5–5.2)
ALP SERPL-CCNC: 85 U/L (ref 55–135)
ALT SERPL W/O P-5'-P-CCNC: 9 U/L (ref 10–44)
ANION GAP SERPL CALC-SCNC: 8 MMOL/L (ref 8–16)
AST SERPL-CCNC: 14 U/L (ref 10–40)
BILIRUB SERPL-MCNC: 0.6 MG/DL (ref 0.1–1)
BUN SERPL-MCNC: 15 MG/DL (ref 8–23)
CALCIUM SERPL-MCNC: 9.2 MG/DL (ref 8.7–10.5)
CHLORIDE SERPL-SCNC: 105 MMOL/L (ref 95–110)
CHOLEST SERPL-MCNC: 176 MG/DL (ref 120–199)
CHOLEST/HDLC SERPL: 5 {RATIO} (ref 2–5)
CO2 SERPL-SCNC: 28 MMOL/L (ref 23–29)
COMPLEXED PSA SERPL-MCNC: 2.3 NG/ML (ref 0–4)
CREAT SERPL-MCNC: 1.5 MG/DL (ref 0.5–1.4)
EST. GFR  (AFRICAN AMERICAN): 51 ML/MIN/1.73 M^2
EST. GFR  (NON AFRICAN AMERICAN): 44 ML/MIN/1.73 M^2
ESTIMATED AVG GLUCOSE: 160 MG/DL (ref 68–131)
GLUCOSE SERPL-MCNC: 162 MG/DL (ref 70–110)
HBA1C MFR BLD: 7.2 % (ref 4–5.6)
HDLC SERPL-MCNC: 35 MG/DL (ref 40–75)
HDLC SERPL: 19.9 % (ref 20–50)
LDLC SERPL CALC-MCNC: 113.6 MG/DL (ref 63–159)
NONHDLC SERPL-MCNC: 141 MG/DL
POTASSIUM SERPL-SCNC: 4 MMOL/L (ref 3.5–5.1)
PROT SERPL-MCNC: 7.2 G/DL (ref 6–8.4)
SODIUM SERPL-SCNC: 141 MMOL/L (ref 136–145)
TRIGL SERPL-MCNC: 137 MG/DL (ref 30–150)

## 2021-04-29 PROCEDURE — 36415 COLL VENOUS BLD VENIPUNCTURE: CPT | Performed by: UROLOGY

## 2021-04-29 PROCEDURE — 83036 HEMOGLOBIN GLYCOSYLATED A1C: CPT | Performed by: PHYSICIAN ASSISTANT

## 2021-04-29 PROCEDURE — 80061 LIPID PANEL: CPT | Performed by: PHYSICIAN ASSISTANT

## 2021-04-29 PROCEDURE — 80053 COMPREHEN METABOLIC PANEL: CPT | Performed by: PHYSICIAN ASSISTANT

## 2021-04-29 PROCEDURE — 84153 ASSAY OF PSA TOTAL: CPT | Performed by: UROLOGY

## 2021-05-03 ENCOUNTER — HOSPITAL ENCOUNTER (OUTPATIENT)
Dept: RADIOLOGY | Facility: HOSPITAL | Age: 81
Discharge: HOME | End: 2021-05-03
Attending: INTERNAL MEDICINE
Payer: MEDICARE

## 2021-05-03 VITALS — HEIGHT: 70 IN | BODY MASS INDEX: 25.05 KG/M2 | WEIGHT: 175 LBS

## 2021-05-03 LAB — GLUCOSE SERPL-MCNC: 147 MG/DL (ref 70–110)

## 2021-05-03 PROCEDURE — 78815 PET IMAGE W/CT SKULL-THIGH: CPT | Mod: TC,PO,PI

## 2021-05-03 PROCEDURE — 82962 GLUCOSE BLOOD TEST: CPT | Mod: PO

## 2021-05-04 ENCOUNTER — OFFICE VISIT (OUTPATIENT)
Dept: ENDOCRINOLOGY | Facility: CLINIC | Age: 81
End: 2021-05-04
Payer: MEDICARE

## 2021-05-04 VITALS
SYSTOLIC BLOOD PRESSURE: 150 MMHG | WEIGHT: 180.25 LBS | TEMPERATURE: 98 F | DIASTOLIC BLOOD PRESSURE: 80 MMHG | HEIGHT: 70 IN | HEART RATE: 70 BPM | BODY MASS INDEX: 25.8 KG/M2

## 2021-05-04 DIAGNOSIS — K14.6 GLOSSODYNIA: ICD-10-CM

## 2021-05-04 DIAGNOSIS — E55.9 HYPOVITAMINOSIS D: ICD-10-CM

## 2021-05-04 DIAGNOSIS — I10 ESSENTIAL HYPERTENSION: ICD-10-CM

## 2021-05-04 DIAGNOSIS — E11.9 TYPE 2 DIABETES MELLITUS WITHOUT COMPLICATION, WITHOUT LONG-TERM CURRENT USE OF INSULIN: Primary | ICD-10-CM

## 2021-05-04 DIAGNOSIS — Z72.0 TOBACCO ABUSE: ICD-10-CM

## 2021-05-04 DIAGNOSIS — E78.5 HYPERLIPIDEMIA, UNSPECIFIED HYPERLIPIDEMIA TYPE: ICD-10-CM

## 2021-05-04 DIAGNOSIS — I25.10 CORONARY ARTERY DISEASE, ANGINA PRESENCE UNSPECIFIED, UNSPECIFIED VESSEL OR LESION TYPE, UNSPECIFIED WHETHER NATIVE OR TRANSPLANTED HEART: ICD-10-CM

## 2021-05-04 PROCEDURE — 99214 OFFICE O/P EST MOD 30 MIN: CPT | Mod: S$PBB,,, | Performed by: PHYSICIAN ASSISTANT

## 2021-05-04 PROCEDURE — 99999 PR PBB SHADOW E&M-EST. PATIENT-LVL V: ICD-10-PCS | Mod: PBBFAC,,, | Performed by: PHYSICIAN ASSISTANT

## 2021-05-04 PROCEDURE — 99999 PR PBB SHADOW E&M-EST. PATIENT-LVL V: CPT | Mod: PBBFAC,,, | Performed by: PHYSICIAN ASSISTANT

## 2021-05-04 PROCEDURE — 99214 PR OFFICE/OUTPT VISIT, EST, LEVL IV, 30-39 MIN: ICD-10-PCS | Mod: S$PBB,,, | Performed by: PHYSICIAN ASSISTANT

## 2021-05-04 PROCEDURE — 99215 OFFICE O/P EST HI 40 MIN: CPT | Mod: PBBFAC,PO | Performed by: PHYSICIAN ASSISTANT

## 2021-05-04 RX ORDER — DEXAMETHASONE 0.5 MG/5ML
SOLUTION ORAL
Qty: 240 ML | Refills: 1 | Status: ON HOLD | OUTPATIENT
Start: 2021-05-04 | End: 2021-07-24 | Stop reason: HOSPADM

## 2021-05-04 RX ORDER — ALOGLIPTIN 12.5 MG/1
TABLET, FILM COATED ORAL
Qty: 90 TABLET | Refills: 3
Start: 2021-05-04 | End: 2021-07-20

## 2021-05-04 RX ORDER — REPAGLINIDE 0.5 MG/1
0.5 TABLET ORAL
Qty: 270 TABLET | Refills: 3 | Status: SHIPPED | OUTPATIENT
Start: 2021-05-04 | End: 2022-05-04

## 2021-05-07 ENCOUNTER — OFFICE VISIT (OUTPATIENT)
Dept: UROLOGY | Facility: CLINIC | Age: 81
End: 2021-05-07
Payer: MEDICARE

## 2021-05-07 VITALS
HEART RATE: 68 BPM | SYSTOLIC BLOOD PRESSURE: 176 MMHG | WEIGHT: 180.31 LBS | DIASTOLIC BLOOD PRESSURE: 82 MMHG | HEIGHT: 70 IN | BODY MASS INDEX: 25.81 KG/M2

## 2021-05-07 DIAGNOSIS — C61 ADENOCARCINOMA OF PROSTATE: Primary | ICD-10-CM

## 2021-05-07 DIAGNOSIS — R33.9 INCOMPLETE BLADDER EMPTYING: ICD-10-CM

## 2021-05-07 DIAGNOSIS — R39.9 LOWER URINARY TRACT SYMPTOMS (LUTS): ICD-10-CM

## 2021-05-07 DIAGNOSIS — N52.9 ERECTILE DYSFUNCTION, UNSPECIFIED ERECTILE DYSFUNCTION TYPE: ICD-10-CM

## 2021-05-07 PROCEDURE — 99215 OFFICE O/P EST HI 40 MIN: CPT | Mod: S$PBB,,, | Performed by: UROLOGY

## 2021-05-07 PROCEDURE — 99215 PR OFFICE/OUTPT VISIT, EST, LEVL V, 40-54 MIN: ICD-10-PCS | Mod: S$PBB,,, | Performed by: UROLOGY

## 2021-05-07 PROCEDURE — 99999 PR PBB SHADOW E&M-EST. PATIENT-LVL III: ICD-10-PCS | Mod: PBBFAC,,, | Performed by: UROLOGY

## 2021-05-07 PROCEDURE — 99213 OFFICE O/P EST LOW 20 MIN: CPT | Mod: PBBFAC,PN | Performed by: UROLOGY

## 2021-05-07 PROCEDURE — 99999 PR PBB SHADOW E&M-EST. PATIENT-LVL III: CPT | Mod: PBBFAC,,, | Performed by: UROLOGY

## 2021-05-07 RX ORDER — LANOLIN ALCOHOL/MO/W.PET/CERES
CREAM (GRAM) TOPICAL
COMMUNITY
Start: 2021-02-17 | End: 2022-06-08

## 2021-05-07 RX ORDER — PHENAZOPYRIDINE HYDROCHLORIDE 100 MG/1
100 TABLET, FILM COATED ORAL 3 TIMES DAILY PRN
COMMUNITY
Start: 2021-03-13 | End: 2021-07-20

## 2021-05-07 RX ORDER — ATORVASTATIN CALCIUM 10 MG/1
10 TABLET, FILM COATED ORAL DAILY
COMMUNITY
Start: 2021-04-13 | End: 2021-07-20

## 2021-05-07 RX ORDER — REPAGLINIDE 1 MG/1
1 TABLET ORAL
COMMUNITY
Start: 2021-02-02 | End: 2021-07-20

## 2021-05-07 RX ORDER — TAMSULOSIN HYDROCHLORIDE 0.4 MG/1
0.4 CAPSULE ORAL NIGHTLY
Qty: 90 CAPSULE | Refills: 3 | Status: SHIPPED | OUTPATIENT
Start: 2021-05-07 | End: 2022-06-08

## 2021-05-07 RX ORDER — DICLOFENAC SODIUM 10 MG/G
GEL TOPICAL
COMMUNITY
Start: 2021-02-17 | End: 2022-06-08

## 2021-05-26 DIAGNOSIS — C34.90 CARCINOMA OF LUNG, UNSPECIFIED LATERALITY: Primary | ICD-10-CM

## 2021-06-01 ENCOUNTER — HOSPITAL ENCOUNTER (OUTPATIENT)
Dept: PREADMISSION TESTING | Facility: HOSPITAL | Age: 81
Discharge: HOME | End: 2021-06-01
Attending: INTERNAL MEDICINE
Payer: MEDICARE

## 2021-06-01 DIAGNOSIS — Z01.818 PRE-OP TESTING: Primary | ICD-10-CM

## 2021-06-01 DIAGNOSIS — Z01.818 PRE-OP TESTING: ICD-10-CM

## 2021-06-01 LAB
BASOPHILS # BLD AUTO: 0.02 K/UL (ref 0–0.2)
BASOPHILS NFR BLD: 0.4 % (ref 0–1.9)
DIFFERENTIAL METHOD: ABNORMAL
EOSINOPHIL # BLD AUTO: 0.1 K/UL (ref 0–0.5)
EOSINOPHIL NFR BLD: 1.5 % (ref 0–8)
ERYTHROCYTE [DISTWIDTH] IN BLOOD BY AUTOMATED COUNT: 14.9 % (ref 11.5–14.5)
HCT VFR BLD AUTO: 38.3 % (ref 40–54)
HGB BLD-MCNC: 12.8 G/DL (ref 14–18)
IMM GRANULOCYTES # BLD AUTO: 0.02 K/UL (ref 0–0.04)
IMM GRANULOCYTES NFR BLD AUTO: 0.4 % (ref 0–0.5)
LYMPHOCYTES # BLD AUTO: 0.6 K/UL (ref 1–4.8)
LYMPHOCYTES NFR BLD: 13.1 % (ref 18–48)
MCH RBC QN AUTO: 31.8 PG (ref 27–31)
MCHC RBC AUTO-ENTMCNC: 33.4 G/DL (ref 32–36)
MCV RBC AUTO: 95 FL (ref 82–98)
MONOCYTES # BLD AUTO: 0.6 K/UL (ref 0.3–1)
MONOCYTES NFR BLD: 12.7 % (ref 4–15)
NEUTROPHILS # BLD AUTO: 3.3 K/UL (ref 1.8–7.7)
NEUTROPHILS NFR BLD: 71.9 % (ref 38–73)
NRBC BLD-RTO: 0 /100 WBC
PLATELET # BLD AUTO: 153 K/UL (ref 150–450)
PMV BLD AUTO: 10.4 FL (ref 9.2–12.9)
RBC # BLD AUTO: 4.03 M/UL (ref 4.6–6.2)
WBC # BLD AUTO: 4.57 K/UL (ref 3.9–12.7)

## 2021-06-01 PROCEDURE — 93010 ELECTROCARDIOGRAM REPORT: CPT | Mod: ,,, | Performed by: SPECIALIST

## 2021-06-01 PROCEDURE — 93005 ELECTROCARDIOGRAM TRACING: CPT | Performed by: SPECIALIST

## 2021-06-01 PROCEDURE — 85025 COMPLETE CBC W/AUTO DIFF WBC: CPT | Performed by: INTERNAL MEDICINE

## 2021-06-01 PROCEDURE — 93010 EKG 12-LEAD: ICD-10-PCS | Mod: ,,, | Performed by: SPECIALIST

## 2021-06-02 ENCOUNTER — ANESTHESIA (OUTPATIENT)
Dept: SURGERY | Facility: HOSPITAL | Age: 81
End: 2021-06-02
Payer: MEDICARE

## 2021-06-02 ENCOUNTER — HOSPITAL ENCOUNTER (OUTPATIENT)
Facility: HOSPITAL | Age: 81
Discharge: HOME | End: 2021-06-02
Attending: INTERNAL MEDICINE | Admitting: INTERNAL MEDICINE
Payer: MEDICARE

## 2021-06-02 ENCOUNTER — ANESTHESIA EVENT (OUTPATIENT)
Dept: SURGERY | Facility: HOSPITAL | Age: 81
End: 2021-06-02
Payer: MEDICARE

## 2021-06-02 VITALS
RESPIRATION RATE: 18 BRPM | OXYGEN SATURATION: 95 % | HEIGHT: 70 IN | BODY MASS INDEX: 25.62 KG/M2 | HEART RATE: 68 BPM | DIASTOLIC BLOOD PRESSURE: 85 MMHG | TEMPERATURE: 98 F | SYSTOLIC BLOOD PRESSURE: 187 MMHG | WEIGHT: 179 LBS

## 2021-06-02 DIAGNOSIS — C34.90 LUNG CANCER: ICD-10-CM

## 2021-06-02 DIAGNOSIS — R59.0 MEDIASTINAL LYMPHADENOPATHY: Primary | ICD-10-CM

## 2021-06-02 PROBLEM — R91.1 LUNG NODULE: Status: ACTIVE | Noted: 2021-06-02

## 2021-06-02 LAB — GLUCOSE SERPL-MCNC: 132 MG/DL (ref 70–110)

## 2021-06-02 PROCEDURE — 27000655: Performed by: ANESTHESIOLOGY

## 2021-06-02 PROCEDURE — 37000008 HC ANESTHESIA 1ST 15 MINUTES: Performed by: INTERNAL MEDICINE

## 2021-06-02 PROCEDURE — 37000009 HC ANESTHESIA EA ADD 15 MINS: Performed by: INTERNAL MEDICINE

## 2021-06-02 PROCEDURE — 31622 DX BRONCHOSCOPE/WASH: CPT | Performed by: INTERNAL MEDICINE

## 2021-06-02 PROCEDURE — 31652 BRONCH EBUS SAMPLNG 1/2 NODE: CPT | Performed by: INTERNAL MEDICINE

## 2021-06-02 PROCEDURE — 63600175 PHARM REV CODE 636 W HCPCS: Performed by: NURSE ANESTHETIST, CERTIFIED REGISTERED

## 2021-06-02 PROCEDURE — 27201107 HC STYLET, STANDARD: Performed by: ANESTHESIOLOGY

## 2021-06-02 PROCEDURE — 25000003 PHARM REV CODE 250: Performed by: NURSE ANESTHETIST, CERTIFIED REGISTERED

## 2021-06-02 PROCEDURE — 27202053 HC NEEDLE BIOPSY EUS: Performed by: INTERNAL MEDICINE

## 2021-06-02 PROCEDURE — 63600175 PHARM REV CODE 636 W HCPCS: Performed by: ANESTHESIOLOGY

## 2021-06-02 PROCEDURE — 88173 CYTOPATH EVAL FNA REPORT: CPT | Mod: TC

## 2021-06-02 PROCEDURE — 27000284 HC CANNULA NASAL: Performed by: ANESTHESIOLOGY

## 2021-06-02 PROCEDURE — 31652 BRONCH EBUS SAMPLNG 1/2 NODE: CPT | Mod: ,,, | Performed by: INTERNAL MEDICINE

## 2021-06-02 PROCEDURE — 31652 PR BRONCH W/ EBUS, SAMPLING 1 OR 2 NODES, INCL GUIDE: ICD-10-PCS | Mod: ,,, | Performed by: INTERNAL MEDICINE

## 2021-06-02 RX ORDER — ONDANSETRON 2 MG/ML
INJECTION INTRAMUSCULAR; INTRAVENOUS
Status: DISCONTINUED | OUTPATIENT
Start: 2021-06-02 | End: 2021-06-02

## 2021-06-02 RX ORDER — LIDOCAINE HCL/PF 100 MG/5ML
SYRINGE (ML) INTRAVENOUS
Status: DISCONTINUED | OUTPATIENT
Start: 2021-06-02 | End: 2021-06-02

## 2021-06-02 RX ORDER — PROPOFOL 10 MG/ML
VIAL (ML) INTRAVENOUS
Status: DISCONTINUED | OUTPATIENT
Start: 2021-06-02 | End: 2021-06-02

## 2021-06-02 RX ORDER — ONDANSETRON 2 MG/ML
INJECTION INTRAMUSCULAR; INTRAVENOUS
Status: COMPLETED | OUTPATIENT
Start: 2021-06-02 | End: 2021-06-02

## 2021-06-02 RX ORDER — ROCURONIUM BROMIDE 10 MG/ML
INJECTION, SOLUTION INTRAVENOUS
Status: DISCONTINUED | OUTPATIENT
Start: 2021-06-02 | End: 2021-06-02

## 2021-06-02 RX ORDER — SUCCINYLCHOLINE CHLORIDE 20 MG/ML
INJECTION INTRAMUSCULAR; INTRAVENOUS
Status: DISCONTINUED | OUTPATIENT
Start: 2021-06-02 | End: 2021-06-02

## 2021-06-02 RX ADMIN — PROPOFOL 100 MG: 10 INJECTION, EMULSION INTRAVENOUS at 10:06

## 2021-06-02 RX ADMIN — ROCURONIUM BROMIDE 5 MG: 10 INJECTION, SOLUTION INTRAVENOUS at 10:06

## 2021-06-02 RX ADMIN — PROPOFOL 100 MG: 10 INJECTION, EMULSION INTRAVENOUS at 11:06

## 2021-06-02 RX ADMIN — ONDANSETRON 4 MG: 2 INJECTION INTRAMUSCULAR; INTRAVENOUS at 10:06

## 2021-06-02 RX ADMIN — LIDOCAINE HYDROCHLORIDE 50 MG: 20 INJECTION, SOLUTION INTRAVENOUS at 10:06

## 2021-06-02 RX ADMIN — SODIUM CHLORIDE: 0.9 INJECTION, SOLUTION INTRAVENOUS at 10:06

## 2021-06-02 RX ADMIN — SUCCINYLCHOLINE CHLORIDE 160 MG: 20 INJECTION, SOLUTION INTRAMUSCULAR; INTRAVENOUS at 10:06

## 2021-06-02 RX ADMIN — ONDANSETRON 4 MG: 2 INJECTION INTRAMUSCULAR; INTRAVENOUS at 12:06

## 2021-06-02 RX ADMIN — PROPOFOL 150 MG: 10 INJECTION, EMULSION INTRAVENOUS at 10:06

## 2021-06-07 ENCOUNTER — TELEPHONE (OUTPATIENT)
Dept: PULMONOLOGY | Facility: CLINIC | Age: 81
End: 2021-06-07

## 2021-06-10 ENCOUNTER — TELEPHONE (OUTPATIENT)
Dept: VASCULAR SURGERY | Facility: CLINIC | Age: 81
End: 2021-06-10

## 2021-06-11 ENCOUNTER — TELEPHONE (OUTPATIENT)
Dept: PULMONOLOGY | Facility: HOSPITAL | Age: 81
End: 2021-06-11

## 2021-06-29 ENCOUNTER — OFFICE VISIT (OUTPATIENT)
Dept: VASCULAR SURGERY | Facility: CLINIC | Age: 81
End: 2021-06-29
Payer: MEDICARE

## 2021-06-29 VITALS — HEIGHT: 70 IN | BODY MASS INDEX: 25.69 KG/M2 | WEIGHT: 179.44 LBS

## 2021-06-29 DIAGNOSIS — R91.1 LUNG NODULE: Primary | ICD-10-CM

## 2021-06-29 DIAGNOSIS — R59.0 MEDIASTINAL LYMPHADENOPATHY: ICD-10-CM

## 2021-06-29 DIAGNOSIS — Z85.118 HISTORY OF LUNG CANCER: ICD-10-CM

## 2021-06-29 DIAGNOSIS — C61 ADENOCARCINOMA OF PROSTATE: ICD-10-CM

## 2021-06-29 PROCEDURE — 99205 PR OFFICE/OUTPT VISIT, NEW, LEVL V, 60-74 MIN: ICD-10-PCS | Mod: S$PBB,,, | Performed by: THORACIC SURGERY (CARDIOTHORACIC VASCULAR SURGERY)

## 2021-06-29 PROCEDURE — 99205 OFFICE O/P NEW HI 60 MIN: CPT | Mod: S$PBB,,, | Performed by: THORACIC SURGERY (CARDIOTHORACIC VASCULAR SURGERY)

## 2021-06-29 PROCEDURE — 99214 OFFICE O/P EST MOD 30 MIN: CPT | Mod: PBBFAC,PO | Performed by: THORACIC SURGERY (CARDIOTHORACIC VASCULAR SURGERY)

## 2021-06-29 PROCEDURE — 99999 PR PBB SHADOW E&M-EST. PATIENT-LVL IV: ICD-10-PCS | Mod: PBBFAC,,, | Performed by: THORACIC SURGERY (CARDIOTHORACIC VASCULAR SURGERY)

## 2021-06-29 PROCEDURE — 99999 PR PBB SHADOW E&M-EST. PATIENT-LVL IV: CPT | Mod: PBBFAC,,, | Performed by: THORACIC SURGERY (CARDIOTHORACIC VASCULAR SURGERY)

## 2021-06-29 RX ORDER — TRAMADOL HYDROCHLORIDE 50 MG/1
50 TABLET ORAL 2 TIMES DAILY PRN
COMMUNITY
Start: 2021-06-21 | End: 2021-07-20

## 2021-06-29 RX ORDER — CHOLECALCIFEROL (VITAMIN D3) 25 MCG
1000 TABLET ORAL DAILY
COMMUNITY
End: 2021-07-20

## 2021-07-06 PROBLEM — Z85.118 HISTORY OF LUNG CANCER: Status: ACTIVE | Noted: 2021-07-06

## 2021-07-07 ENCOUNTER — TELEPHONE (OUTPATIENT)
Dept: VASCULAR SURGERY | Facility: CLINIC | Age: 81
End: 2021-07-07

## 2021-07-13 DIAGNOSIS — R91.8 MASS OF UPPER LOBE OF RIGHT LUNG: Primary | ICD-10-CM

## 2021-07-20 PROBLEM — R91.8 MASS OF UPPER LOBE OF RIGHT LUNG: Status: ACTIVE | Noted: 2021-06-02

## 2021-07-27 ENCOUNTER — DOCUMENTATION ONLY (OUTPATIENT)
Dept: RADIATION ONCOLOGY | Facility: CLINIC | Age: 81
End: 2021-07-27

## 2021-08-02 ENCOUNTER — OFFICE VISIT (OUTPATIENT)
Dept: RADIATION ONCOLOGY | Facility: CLINIC | Age: 81
End: 2021-08-02
Payer: MEDICARE

## 2021-08-02 VITALS
TEMPERATURE: 98 F | HEART RATE: 68 BPM | DIASTOLIC BLOOD PRESSURE: 81 MMHG | OXYGEN SATURATION: 98 % | SYSTOLIC BLOOD PRESSURE: 144 MMHG | WEIGHT: 174.63 LBS | BODY MASS INDEX: 25.05 KG/M2

## 2021-08-02 DIAGNOSIS — C61 ADENOCARCINOMA OF PROSTATE: Primary | ICD-10-CM

## 2021-08-02 DIAGNOSIS — C34.91 SQUAMOUS CELL CARCINOMA LUNG, RIGHT: ICD-10-CM

## 2021-08-02 PROCEDURE — 99417 PR PROLONGED SVC, OUTPT, W/WO DIRECT PT CONTACT,  EA ADDTL 15 MIN: ICD-10-PCS | Mod: S$GLB,,, | Performed by: RADIOLOGY

## 2021-08-02 PROCEDURE — 99215 PR OFFICE/OUTPT VISIT, EST, LEVL V, 40-54 MIN: ICD-10-PCS | Mod: S$GLB,,, | Performed by: RADIOLOGY

## 2021-08-02 PROCEDURE — 99215 OFFICE O/P EST HI 40 MIN: CPT | Mod: S$GLB,,, | Performed by: RADIOLOGY

## 2021-08-02 PROCEDURE — 99417 PROLNG OP E/M EACH 15 MIN: CPT | Mod: S$GLB,,, | Performed by: RADIOLOGY

## 2021-08-06 ENCOUNTER — OFFICE VISIT (OUTPATIENT)
Dept: ENDOCRINOLOGY | Facility: CLINIC | Age: 81
End: 2021-08-06
Payer: MEDICARE

## 2021-08-06 VITALS
HEIGHT: 70 IN | WEIGHT: 174.94 LBS | OXYGEN SATURATION: 98 % | TEMPERATURE: 98 F | BODY MASS INDEX: 25.04 KG/M2 | HEART RATE: 81 BPM | SYSTOLIC BLOOD PRESSURE: 118 MMHG | DIASTOLIC BLOOD PRESSURE: 72 MMHG

## 2021-08-06 DIAGNOSIS — E11.9 TYPE 2 DIABETES MELLITUS WITHOUT COMPLICATION, WITHOUT LONG-TERM CURRENT USE OF INSULIN: Primary | ICD-10-CM

## 2021-08-06 DIAGNOSIS — E55.9 HYPOVITAMINOSIS D: ICD-10-CM

## 2021-08-06 DIAGNOSIS — I25.10 CORONARY ARTERY DISEASE, ANGINA PRESENCE UNSPECIFIED, UNSPECIFIED VESSEL OR LESION TYPE, UNSPECIFIED WHETHER NATIVE OR TRANSPLANTED HEART: ICD-10-CM

## 2021-08-06 DIAGNOSIS — K14.6 GLOSSODYNIA: ICD-10-CM

## 2021-08-06 DIAGNOSIS — E78.5 HYPERLIPIDEMIA, UNSPECIFIED HYPERLIPIDEMIA TYPE: ICD-10-CM

## 2021-08-06 DIAGNOSIS — Z72.0 TOBACCO ABUSE: ICD-10-CM

## 2021-08-06 DIAGNOSIS — I10 ESSENTIAL HYPERTENSION: ICD-10-CM

## 2021-08-06 PROCEDURE — 99999 PR PBB SHADOW E&M-EST. PATIENT-LVL V: CPT | Mod: PBBFAC,,, | Performed by: PHYSICIAN ASSISTANT

## 2021-08-06 PROCEDURE — 99215 OFFICE O/P EST HI 40 MIN: CPT | Mod: PBBFAC,PO | Performed by: PHYSICIAN ASSISTANT

## 2021-08-06 PROCEDURE — 99214 PR OFFICE/OUTPT VISIT, EST, LEVL IV, 30-39 MIN: ICD-10-PCS | Mod: S$PBB,,, | Performed by: PHYSICIAN ASSISTANT

## 2021-08-06 PROCEDURE — 99999 PR PBB SHADOW E&M-EST. PATIENT-LVL V: ICD-10-PCS | Mod: PBBFAC,,, | Performed by: PHYSICIAN ASSISTANT

## 2021-08-06 PROCEDURE — 99214 OFFICE O/P EST MOD 30 MIN: CPT | Mod: S$PBB,,, | Performed by: PHYSICIAN ASSISTANT

## 2021-08-06 RX ORDER — LINAGLIPTIN 5 MG/1
5 TABLET, FILM COATED ORAL DAILY
Qty: 90 TABLET | Refills: 3
Start: 2021-08-06 | End: 2022-06-08

## 2021-08-09 ENCOUNTER — LAB VISIT (OUTPATIENT)
Dept: LAB | Facility: HOSPITAL | Age: 81
End: 2021-08-09
Attending: PHYSICIAN ASSISTANT
Payer: MEDICARE

## 2021-08-09 DIAGNOSIS — E55.9 HYPOVITAMINOSIS D: ICD-10-CM

## 2021-08-09 DIAGNOSIS — E11.9 TYPE 2 DIABETES MELLITUS WITHOUT COMPLICATION, WITHOUT LONG-TERM CURRENT USE OF INSULIN: ICD-10-CM

## 2021-08-09 PROCEDURE — 80053 COMPREHEN METABOLIC PANEL: CPT | Performed by: PHYSICIAN ASSISTANT

## 2021-08-09 PROCEDURE — 82306 VITAMIN D 25 HYDROXY: CPT | Performed by: PHYSICIAN ASSISTANT

## 2021-08-09 PROCEDURE — 80061 LIPID PANEL: CPT | Performed by: PHYSICIAN ASSISTANT

## 2021-08-09 PROCEDURE — 83036 HEMOGLOBIN GLYCOSYLATED A1C: CPT | Performed by: PHYSICIAN ASSISTANT

## 2021-08-09 PROCEDURE — 36415 COLL VENOUS BLD VENIPUNCTURE: CPT | Mod: PO | Performed by: PHYSICIAN ASSISTANT

## 2021-08-10 ENCOUNTER — TELEPHONE (OUTPATIENT)
Dept: PHARMACY | Facility: AMBULARY SURGERY CENTER | Age: 81
End: 2021-08-10

## 2021-08-10 LAB
25(OH)D3+25(OH)D2 SERPL-MCNC: 28 NG/ML (ref 30–96)
ALBUMIN SERPL BCP-MCNC: 3.4 G/DL (ref 3.5–5.2)
ALP SERPL-CCNC: 85 U/L (ref 55–135)
ALT SERPL W/O P-5'-P-CCNC: 9 U/L (ref 10–44)
ANION GAP SERPL CALC-SCNC: 12 MMOL/L (ref 8–16)
AST SERPL-CCNC: 14 U/L (ref 10–40)
BILIRUB SERPL-MCNC: 0.7 MG/DL (ref 0.1–1)
BUN SERPL-MCNC: 12 MG/DL (ref 8–23)
CALCIUM SERPL-MCNC: 9.1 MG/DL (ref 8.7–10.5)
CHLORIDE SERPL-SCNC: 105 MMOL/L (ref 95–110)
CHOLEST SERPL-MCNC: 166 MG/DL (ref 120–199)
CHOLEST/HDLC SERPL: 5.5 {RATIO} (ref 2–5)
CO2 SERPL-SCNC: 23 MMOL/L (ref 23–29)
CREAT SERPL-MCNC: 1.4 MG/DL (ref 0.5–1.4)
EST. GFR  (AFRICAN AMERICAN): 55.2 ML/MIN/1.73 M^2
EST. GFR  (NON AFRICAN AMERICAN): 47.8 ML/MIN/1.73 M^2
ESTIMATED AVG GLUCOSE: 180 MG/DL (ref 68–131)
GLUCOSE SERPL-MCNC: 171 MG/DL (ref 70–110)
HBA1C MFR BLD: 7.9 % (ref 4–5.6)
HDLC SERPL-MCNC: 30 MG/DL (ref 40–75)
HDLC SERPL: 18.1 % (ref 20–50)
LDLC SERPL CALC-MCNC: 112.6 MG/DL (ref 63–159)
NONHDLC SERPL-MCNC: 136 MG/DL
POTASSIUM SERPL-SCNC: 4.2 MMOL/L (ref 3.5–5.1)
PROT SERPL-MCNC: 7.1 G/DL (ref 6–8.4)
SODIUM SERPL-SCNC: 140 MMOL/L (ref 136–145)
TRIGL SERPL-MCNC: 117 MG/DL (ref 30–150)

## 2021-08-16 DIAGNOSIS — E11.9 TYPE 2 DIABETES MELLITUS WITHOUT COMPLICATION, WITHOUT LONG-TERM CURRENT USE OF INSULIN: Primary | ICD-10-CM

## 2021-08-16 DIAGNOSIS — E78.5 HYPERLIPIDEMIA, UNSPECIFIED HYPERLIPIDEMIA TYPE: ICD-10-CM

## 2021-08-16 RX ORDER — ATORVASTATIN CALCIUM 40 MG/1
40 TABLET, FILM COATED ORAL DAILY
Qty: 90 TABLET | Refills: 3 | Status: SHIPPED | OUTPATIENT
Start: 2021-08-16 | End: 2022-06-08

## 2021-08-23 ENCOUNTER — LAB VISIT (OUTPATIENT)
Dept: LAB | Facility: HOSPITAL | Age: 81
End: 2021-08-23
Attending: INTERNAL MEDICINE
Payer: MEDICARE

## 2021-08-23 DIAGNOSIS — C34.11 MALIGNANT NEOPLASM OF UPPER LOBE OF RIGHT LUNG: Primary | ICD-10-CM

## 2021-08-23 LAB
ALBUMIN SERPL BCP-MCNC: 3.9 G/DL (ref 3.5–5.2)
ALP SERPL-CCNC: 78 U/L (ref 55–135)
ALT SERPL W/O P-5'-P-CCNC: 11 U/L (ref 10–44)
ANION GAP SERPL CALC-SCNC: 9 MMOL/L (ref 8–16)
AST SERPL-CCNC: 18 U/L (ref 10–40)
BASOPHILS # BLD AUTO: 0.02 K/UL (ref 0–0.2)
BASOPHILS NFR BLD: 0.4 % (ref 0–1.9)
BILIRUB SERPL-MCNC: 0.7 MG/DL (ref 0.1–1)
BUN SERPL-MCNC: 19 MG/DL (ref 8–23)
CALCIUM SERPL-MCNC: 9 MG/DL (ref 8.7–10.5)
CHLORIDE SERPL-SCNC: 106 MMOL/L (ref 95–110)
CO2 SERPL-SCNC: 27 MMOL/L (ref 23–29)
CREAT SERPL-MCNC: 1.5 MG/DL (ref 0.5–1.4)
DIFFERENTIAL METHOD: ABNORMAL
EOSINOPHIL # BLD AUTO: 0.1 K/UL (ref 0–0.5)
EOSINOPHIL NFR BLD: 2.4 % (ref 0–8)
ERYTHROCYTE [DISTWIDTH] IN BLOOD BY AUTOMATED COUNT: 14.8 % (ref 11.5–14.5)
EST. GFR  (AFRICAN AMERICAN): 50.8 ML/MIN/1.73 M^2
EST. GFR  (NON AFRICAN AMERICAN): 44 ML/MIN/1.73 M^2
GLUCOSE SERPL-MCNC: 160 MG/DL (ref 70–110)
HCT VFR BLD AUTO: 36.6 % (ref 40–54)
HGB BLD-MCNC: 12.4 G/DL (ref 14–18)
IMM GRANULOCYTES # BLD AUTO: 0.02 K/UL (ref 0–0.04)
IMM GRANULOCYTES NFR BLD AUTO: 0.4 % (ref 0–0.5)
LYMPHOCYTES # BLD AUTO: 0.7 K/UL (ref 1–4.8)
LYMPHOCYTES NFR BLD: 12.9 % (ref 18–48)
MCH RBC QN AUTO: 31.5 PG (ref 27–31)
MCHC RBC AUTO-ENTMCNC: 33.9 G/DL (ref 32–36)
MCV RBC AUTO: 93 FL (ref 82–98)
MONOCYTES # BLD AUTO: 0.7 K/UL (ref 0.3–1)
MONOCYTES NFR BLD: 12.8 % (ref 4–15)
NEUTROPHILS # BLD AUTO: 3.9 K/UL (ref 1.8–7.7)
NEUTROPHILS NFR BLD: 71.1 % (ref 38–73)
NRBC BLD-RTO: 0 /100 WBC
PLATELET # BLD AUTO: 133 K/UL (ref 150–450)
PLATELET BLD QL SMEAR: ABNORMAL
PMV BLD AUTO: 9.8 FL (ref 9.2–12.9)
POTASSIUM SERPL-SCNC: 3.9 MMOL/L (ref 3.5–5.1)
PROT SERPL-MCNC: 7.4 G/DL (ref 6–8.4)
RBC # BLD AUTO: 3.94 M/UL (ref 4.6–6.2)
SODIUM SERPL-SCNC: 142 MMOL/L (ref 136–145)
WBC # BLD AUTO: 5.41 K/UL (ref 3.9–12.7)

## 2021-08-23 PROCEDURE — 80053 COMPREHEN METABOLIC PANEL: CPT | Performed by: INTERNAL MEDICINE

## 2021-08-23 PROCEDURE — 36415 COLL VENOUS BLD VENIPUNCTURE: CPT | Performed by: INTERNAL MEDICINE

## 2021-08-23 PROCEDURE — 85025 COMPLETE CBC W/AUTO DIFF WBC: CPT | Performed by: INTERNAL MEDICINE

## 2021-09-13 ENCOUNTER — CLINICAL SUPPORT (OUTPATIENT)
Dept: RADIATION ONCOLOGY | Facility: CLINIC | Age: 81
End: 2021-09-13
Payer: MEDICARE

## 2021-09-13 DIAGNOSIS — C34.91 SQUAMOUS CELL CARCINOMA LUNG, RIGHT: Primary | ICD-10-CM

## 2021-09-14 ENCOUNTER — LAB VISIT (OUTPATIENT)
Dept: LAB | Facility: HOSPITAL | Age: 81
End: 2021-09-14
Attending: NURSE PRACTITIONER
Payer: MEDICARE

## 2021-09-14 DIAGNOSIS — C34.82 MALIGNANT NEOPLASM OF OVERLAPPING SITES OF LEFT BRONCHUS AND LUNG: ICD-10-CM

## 2021-09-14 LAB
BASOPHILS # BLD AUTO: 0.02 K/UL (ref 0–0.2)
BASOPHILS NFR BLD: 0.5 % (ref 0–1.9)
DIFFERENTIAL METHOD: ABNORMAL
EOSINOPHIL # BLD AUTO: 0.1 K/UL (ref 0–0.5)
EOSINOPHIL NFR BLD: 1.4 % (ref 0–8)
ERYTHROCYTE [DISTWIDTH] IN BLOOD BY AUTOMATED COUNT: 14.6 % (ref 11.5–14.5)
HCT VFR BLD AUTO: 35.4 % (ref 40–54)
HGB BLD-MCNC: 12.2 G/DL (ref 14–18)
IMM GRANULOCYTES # BLD AUTO: 0.02 K/UL (ref 0–0.04)
IMM GRANULOCYTES NFR BLD AUTO: 0.5 % (ref 0–0.5)
LYMPHOCYTES # BLD AUTO: 0.7 K/UL (ref 1–4.8)
LYMPHOCYTES NFR BLD: 16.2 % (ref 18–48)
MCH RBC QN AUTO: 31.3 PG (ref 27–31)
MCHC RBC AUTO-ENTMCNC: 34.5 G/DL (ref 32–36)
MCV RBC AUTO: 91 FL (ref 82–98)
MONOCYTES # BLD AUTO: 0.5 K/UL (ref 0.3–1)
MONOCYTES NFR BLD: 12.2 % (ref 4–15)
NEUTROPHILS # BLD AUTO: 2.9 K/UL (ref 1.8–7.7)
NEUTROPHILS NFR BLD: 69.2 % (ref 38–73)
NRBC BLD-RTO: 0 /100 WBC
PLATELET # BLD AUTO: 108 K/UL (ref 150–450)
PMV BLD AUTO: 9.7 FL (ref 9.2–12.9)
RBC # BLD AUTO: 3.9 M/UL (ref 4.6–6.2)
WBC # BLD AUTO: 4.25 K/UL (ref 3.9–12.7)

## 2021-09-14 PROCEDURE — 36415 COLL VENOUS BLD VENIPUNCTURE: CPT | Performed by: NURSE PRACTITIONER

## 2021-09-14 PROCEDURE — 85025 COMPLETE CBC W/AUTO DIFF WBC: CPT | Performed by: NURSE PRACTITIONER

## 2021-09-15 ENCOUNTER — TELEPHONE (OUTPATIENT)
Dept: RADIATION ONCOLOGY | Facility: CLINIC | Age: 81
End: 2021-09-15

## 2021-09-15 DIAGNOSIS — C34.90 MALIGNANT NEOPLASM OF UNSPECIFIED PART OF UNSPECIFIED BRONCHUS OR LUNG: ICD-10-CM

## 2021-10-26 ENCOUNTER — HOSPITAL ENCOUNTER (OUTPATIENT)
Dept: RADIOLOGY | Facility: HOSPITAL | Age: 81
Discharge: HOME | End: 2021-10-26
Attending: RADIOLOGY
Payer: MEDICARE

## 2021-10-26 DIAGNOSIS — C34.90 MALIGNANT NEOPLASM OF UNSPECIFIED PART OF UNSPECIFIED BRONCHUS OR LUNG: ICD-10-CM

## 2021-10-26 PROCEDURE — 71250 CT THORAX DX C-: CPT | Mod: TC,PO

## 2021-11-02 DIAGNOSIS — C34.91 SQUAMOUS CELL CARCINOMA LUNG, RIGHT: Primary | ICD-10-CM

## 2021-11-03 DIAGNOSIS — C34.11 MALIGNANT NEOPLASM OF UPPER LOBE OF RIGHT LUNG: Primary | ICD-10-CM

## 2021-11-08 ENCOUNTER — LAB VISIT (OUTPATIENT)
Dept: LAB | Facility: HOSPITAL | Age: 81
End: 2021-11-08
Attending: UROLOGY
Payer: MEDICARE

## 2021-11-08 DIAGNOSIS — C61 ADENOCARCINOMA OF PROSTATE: ICD-10-CM

## 2021-11-08 PROCEDURE — 84153 ASSAY OF PSA TOTAL: CPT | Performed by: UROLOGY

## 2021-11-08 PROCEDURE — 36415 COLL VENOUS BLD VENIPUNCTURE: CPT | Performed by: UROLOGY

## 2021-11-09 LAB
PROSTATE SPECIFIC ANTIGEN, TOTAL: 0.97 NG/ML (ref 0–4)
PSA FREE MFR SERPL: NORMAL %
PSA FREE SERPL-MCNC: <0.1 NG/ML (ref 0–1.5)

## 2021-11-10 ENCOUNTER — OFFICE VISIT (OUTPATIENT)
Dept: UROLOGY | Facility: CLINIC | Age: 81
End: 2021-11-10
Payer: MEDICARE

## 2021-11-10 VITALS
BODY MASS INDEX: 25.03 KG/M2 | HEIGHT: 70 IN | SYSTOLIC BLOOD PRESSURE: 165 MMHG | WEIGHT: 174.81 LBS | DIASTOLIC BLOOD PRESSURE: 84 MMHG | HEART RATE: 69 BPM

## 2021-11-10 DIAGNOSIS — R39.9 LOWER URINARY TRACT SYMPTOMS (LUTS): ICD-10-CM

## 2021-11-10 DIAGNOSIS — C61 PROSTATE CANCER: Primary | ICD-10-CM

## 2021-11-10 PROCEDURE — 99999 PR PBB SHADOW E&M-EST. PATIENT-LVL IV: ICD-10-PCS | Mod: PBBFAC,,, | Performed by: UROLOGY

## 2021-11-10 PROCEDURE — 99214 OFFICE O/P EST MOD 30 MIN: CPT | Mod: PBBFAC,PN | Performed by: UROLOGY

## 2021-11-10 PROCEDURE — 99999 PR PBB SHADOW E&M-EST. PATIENT-LVL IV: CPT | Mod: PBBFAC,,, | Performed by: UROLOGY

## 2021-11-10 PROCEDURE — 99214 OFFICE O/P EST MOD 30 MIN: CPT | Mod: S$PBB,,, | Performed by: UROLOGY

## 2021-11-10 PROCEDURE — 99214 PR OFFICE/OUTPT VISIT, EST, LEVL IV, 30-39 MIN: ICD-10-PCS | Mod: S$PBB,,, | Performed by: UROLOGY

## 2021-11-10 RX ORDER — REPAGLINIDE 1 MG/1
1 TABLET ORAL
Status: ON HOLD | COMMUNITY
Start: 2021-08-30 | End: 2022-09-06 | Stop reason: HOSPADM

## 2021-11-10 RX ORDER — ATORVASTATIN CALCIUM 10 MG/1
TABLET, FILM COATED ORAL
COMMUNITY
Start: 2021-08-30 | End: 2022-06-08

## 2021-11-10 RX ORDER — MEGESTROL ACETATE 125 MG/ML
SUSPENSION ORAL
COMMUNITY
Start: 2021-08-23 | End: 2022-06-08

## 2021-12-02 ENCOUNTER — LAB VISIT (OUTPATIENT)
Dept: LAB | Facility: HOSPITAL | Age: 81
End: 2021-12-02
Attending: PHYSICIAN ASSISTANT
Payer: MEDICARE

## 2021-12-02 DIAGNOSIS — E78.5 HYPERLIPIDEMIA, UNSPECIFIED HYPERLIPIDEMIA TYPE: ICD-10-CM

## 2021-12-02 DIAGNOSIS — E11.9 TYPE 2 DIABETES MELLITUS WITHOUT COMPLICATION, WITHOUT LONG-TERM CURRENT USE OF INSULIN: ICD-10-CM

## 2021-12-02 LAB
ALBUMIN SERPL BCP-MCNC: 3.6 G/DL (ref 3.5–5.2)
ALT SERPL W/O P-5'-P-CCNC: 9 U/L (ref 10–44)
ANION GAP SERPL CALC-SCNC: 12 MMOL/L (ref 8–16)
AST SERPL-CCNC: 12 U/L (ref 10–40)
BUN SERPL-MCNC: 16 MG/DL (ref 8–23)
CALCIUM SERPL-MCNC: 9.2 MG/DL (ref 8.7–10.5)
CHLORIDE SERPL-SCNC: 106 MMOL/L (ref 95–110)
CHOLEST SERPL-MCNC: 126 MG/DL (ref 120–199)
CHOLEST/HDLC SERPL: 4.1 {RATIO} (ref 2–5)
CO2 SERPL-SCNC: 24 MMOL/L (ref 23–29)
CREAT SERPL-MCNC: 1.4 MG/DL (ref 0.5–1.4)
EST. GFR  (AFRICAN AMERICAN): 54.9 ML/MIN/1.73 M^2
EST. GFR  (NON AFRICAN AMERICAN): 47.4 ML/MIN/1.73 M^2
ESTIMATED AVG GLUCOSE: 146 MG/DL (ref 68–131)
GLUCOSE SERPL-MCNC: 129 MG/DL (ref 70–110)
HBA1C MFR BLD: 6.7 % (ref 4–5.6)
HDLC SERPL-MCNC: 31 MG/DL (ref 40–75)
HDLC SERPL: 24.6 % (ref 20–50)
LDLC SERPL CALC-MCNC: 75.8 MG/DL (ref 63–159)
NONHDLC SERPL-MCNC: 95 MG/DL
PHOSPHATE SERPL-MCNC: 2.7 MG/DL (ref 2.7–4.5)
POTASSIUM SERPL-SCNC: 3.8 MMOL/L (ref 3.5–5.1)
SODIUM SERPL-SCNC: 142 MMOL/L (ref 136–145)
TRIGL SERPL-MCNC: 96 MG/DL (ref 30–150)

## 2021-12-02 PROCEDURE — 36415 COLL VENOUS BLD VENIPUNCTURE: CPT | Mod: PO | Performed by: PHYSICIAN ASSISTANT

## 2021-12-02 PROCEDURE — 80061 LIPID PANEL: CPT | Performed by: PHYSICIAN ASSISTANT

## 2021-12-02 PROCEDURE — 84460 ALANINE AMINO (ALT) (SGPT): CPT | Performed by: PHYSICIAN ASSISTANT

## 2021-12-02 PROCEDURE — 84450 TRANSFERASE (AST) (SGOT): CPT | Performed by: PHYSICIAN ASSISTANT

## 2021-12-02 PROCEDURE — 83036 HEMOGLOBIN GLYCOSYLATED A1C: CPT | Performed by: PHYSICIAN ASSISTANT

## 2021-12-02 PROCEDURE — 80069 RENAL FUNCTION PANEL: CPT | Performed by: PHYSICIAN ASSISTANT

## 2021-12-03 ENCOUNTER — HOSPITAL ENCOUNTER (OUTPATIENT)
Dept: RADIOLOGY | Facility: HOSPITAL | Age: 81
Discharge: HOME | End: 2021-12-03
Attending: INTERNAL MEDICINE
Payer: MEDICARE

## 2021-12-03 VITALS — WEIGHT: 172 LBS | BODY MASS INDEX: 24.62 KG/M2 | HEIGHT: 70 IN

## 2021-12-03 DIAGNOSIS — C34.11 MALIGNANT NEOPLASM OF UPPER LOBE OF RIGHT LUNG: ICD-10-CM

## 2021-12-03 LAB — GLUCOSE SERPL-MCNC: 163 MG/DL (ref 70–110)

## 2021-12-03 PROCEDURE — 82962 GLUCOSE BLOOD TEST: CPT | Mod: PO

## 2021-12-03 PROCEDURE — 78815 PET IMAGE W/CT SKULL-THIGH: CPT | Mod: TC,PO,PS

## 2022-06-08 ENCOUNTER — HOSPITAL ENCOUNTER (INPATIENT)
Facility: HOSPITAL | Age: 82
LOS: 5 days | Discharge: NURSING, REHAB OR LONG-TERM CARE | DRG: 066 | End: 2022-06-15
Attending: EMERGENCY MEDICINE | Admitting: HOSPITALIST
Payer: MEDICARE

## 2022-06-08 DIAGNOSIS — R41.82 AMS (ALTERED MENTAL STATUS): ICD-10-CM

## 2022-06-08 DIAGNOSIS — G93.40 ENCEPHALOPATHY: ICD-10-CM

## 2022-06-08 DIAGNOSIS — R07.9 CHEST PAIN: ICD-10-CM

## 2022-06-08 DIAGNOSIS — R41.82 ALTERED MENTAL STATUS, UNSPECIFIED: ICD-10-CM

## 2022-06-08 DIAGNOSIS — I63.9 ISCHEMIC EMBOLIC STROKE: ICD-10-CM

## 2022-06-08 DIAGNOSIS — I63.9 CVA (CEREBRAL VASCULAR ACCIDENT): ICD-10-CM

## 2022-06-08 DIAGNOSIS — I63.9 CEREBROVASCULAR ACCIDENT (CVA), UNSPECIFIED MECHANISM: Primary | ICD-10-CM

## 2022-06-08 DIAGNOSIS — R41.82 ALTERED MENTAL STATUS, UNSPECIFIED ALTERED MENTAL STATUS TYPE: ICD-10-CM

## 2022-06-08 LAB
ALBUMIN SERPL BCP-MCNC: 3.2 G/DL (ref 3.5–5.2)
ALP SERPL-CCNC: 109 U/L (ref 55–135)
ALT SERPL W/O P-5'-P-CCNC: 19 U/L (ref 10–44)
ANION GAP SERPL CALC-SCNC: 12 MMOL/L (ref 8–16)
AST SERPL-CCNC: 20 U/L (ref 10–40)
BASOPHILS # BLD AUTO: 0.01 K/UL (ref 0–0.2)
BASOPHILS NFR BLD: 0.2 % (ref 0–1.9)
BILIRUB SERPL-MCNC: 0.8 MG/DL (ref 0.1–1)
BILIRUB UR QL STRIP: NEGATIVE
BUN SERPL-MCNC: 27 MG/DL (ref 8–23)
CALCIUM SERPL-MCNC: 9.3 MG/DL (ref 8.7–10.5)
CHLORIDE SERPL-SCNC: 107 MMOL/L (ref 95–110)
CLARITY UR: CLEAR
CO2 SERPL-SCNC: 24 MMOL/L (ref 23–29)
COLOR UR: YELLOW
CREAT SERPL-MCNC: 1.7 MG/DL (ref 0.5–1.4)
DIFFERENTIAL METHOD: ABNORMAL
EOSINOPHIL # BLD AUTO: 0 K/UL (ref 0–0.5)
EOSINOPHIL NFR BLD: 0.6 % (ref 0–8)
ERYTHROCYTE [DISTWIDTH] IN BLOOD BY AUTOMATED COUNT: 14.6 % (ref 11.5–14.5)
EST. GFR  (AFRICAN AMERICAN): 43 ML/MIN/1.73 M^2
EST. GFR  (NON AFRICAN AMERICAN): 38 ML/MIN/1.73 M^2
GLUCOSE SERPL-MCNC: 85 MG/DL (ref 70–110)
GLUCOSE UR QL STRIP: NEGATIVE
HCT VFR BLD AUTO: 35 % (ref 40–54)
HGB BLD-MCNC: 11.2 G/DL (ref 14–18)
HGB UR QL STRIP: NEGATIVE
IMM GRANULOCYTES # BLD AUTO: 0.02 K/UL (ref 0–0.04)
IMM GRANULOCYTES NFR BLD AUTO: 0.3 % (ref 0–0.5)
KETONES UR QL STRIP: ABNORMAL
LACTATE SERPL-SCNC: 1.3 MMOL/L (ref 0.5–2.2)
LEUKOCYTE ESTERASE UR QL STRIP: NEGATIVE
LIPASE SERPL-CCNC: 14 U/L (ref 4–60)
LYMPHOCYTES # BLD AUTO: 0.6 K/UL (ref 1–4.8)
LYMPHOCYTES NFR BLD: 9.2 % (ref 18–48)
MCH RBC QN AUTO: 29.9 PG (ref 27–31)
MCHC RBC AUTO-ENTMCNC: 32 G/DL (ref 32–36)
MCV RBC AUTO: 93 FL (ref 82–98)
MONOCYTES # BLD AUTO: 0.6 K/UL (ref 0.3–1)
MONOCYTES NFR BLD: 8.9 % (ref 4–15)
NEUTROPHILS # BLD AUTO: 5.2 K/UL (ref 1.8–7.7)
NEUTROPHILS NFR BLD: 80.8 % (ref 38–73)
NITRITE UR QL STRIP: NEGATIVE
NRBC BLD-RTO: 0 /100 WBC
PH UR STRIP: 6 [PH] (ref 5–8)
PLATELET # BLD AUTO: 150 K/UL (ref 150–450)
PMV BLD AUTO: 11.2 FL (ref 9.2–12.9)
POCT GLUCOSE: 93 MG/DL (ref 70–110)
POTASSIUM SERPL-SCNC: 4.6 MMOL/L (ref 3.5–5.1)
PROT SERPL-MCNC: 8 G/DL (ref 6–8.4)
PROT UR QL STRIP: NEGATIVE
RBC # BLD AUTO: 3.75 M/UL (ref 4.6–6.2)
SARS-COV-2 RDRP RESP QL NAA+PROBE: NEGATIVE
SODIUM SERPL-SCNC: 143 MMOL/L (ref 136–145)
SP GR UR STRIP: 1.02 (ref 1–1.03)
URN SPEC COLLECT METH UR: ABNORMAL
UROBILINOGEN UR STRIP-ACNC: NEGATIVE EU/DL
WBC # BLD AUTO: 6.38 K/UL (ref 3.9–12.7)

## 2022-06-08 PROCEDURE — 93005 ELECTROCARDIOGRAM TRACING: CPT

## 2022-06-08 PROCEDURE — 80053 COMPREHEN METABOLIC PANEL: CPT | Performed by: EMERGENCY MEDICINE

## 2022-06-08 PROCEDURE — G0378 HOSPITAL OBSERVATION PER HR: HCPCS

## 2022-06-08 PROCEDURE — 81003 URINALYSIS AUTO W/O SCOPE: CPT | Performed by: EMERGENCY MEDICINE

## 2022-06-08 PROCEDURE — 83605 ASSAY OF LACTIC ACID: CPT | Performed by: EMERGENCY MEDICINE

## 2022-06-08 PROCEDURE — 86803 HEPATITIS C AB TEST: CPT | Performed by: EMERGENCY MEDICINE

## 2022-06-08 PROCEDURE — U0002 COVID-19 LAB TEST NON-CDC: HCPCS | Performed by: EMERGENCY MEDICINE

## 2022-06-08 PROCEDURE — 87389 HIV-1 AG W/HIV-1&-2 AB AG IA: CPT | Performed by: EMERGENCY MEDICINE

## 2022-06-08 PROCEDURE — 83690 ASSAY OF LIPASE: CPT | Performed by: EMERGENCY MEDICINE

## 2022-06-08 PROCEDURE — 93010 ELECTROCARDIOGRAM REPORT: CPT | Mod: ,,, | Performed by: INTERNAL MEDICINE

## 2022-06-08 PROCEDURE — 36415 COLL VENOUS BLD VENIPUNCTURE: CPT | Performed by: EMERGENCY MEDICINE

## 2022-06-08 PROCEDURE — 82962 GLUCOSE BLOOD TEST: CPT

## 2022-06-08 PROCEDURE — 99285 EMERGENCY DEPT VISIT HI MDM: CPT | Mod: 25

## 2022-06-08 PROCEDURE — 93010 EKG 12-LEAD: ICD-10-PCS | Mod: ,,, | Performed by: INTERNAL MEDICINE

## 2022-06-08 PROCEDURE — 25000003 PHARM REV CODE 250: Performed by: NURSE PRACTITIONER

## 2022-06-08 PROCEDURE — 85025 COMPLETE CBC W/AUTO DIFF WBC: CPT | Performed by: EMERGENCY MEDICINE

## 2022-06-08 RX ORDER — ONDANSETRON 2 MG/ML
4 INJECTION INTRAMUSCULAR; INTRAVENOUS EVERY 6 HOURS PRN
Status: DISCONTINUED | OUTPATIENT
Start: 2022-06-08 | End: 2022-06-15 | Stop reason: HOSPADM

## 2022-06-08 RX ORDER — AMOXICILLIN 250 MG
1 CAPSULE ORAL 2 TIMES DAILY
Status: DISCONTINUED | OUTPATIENT
Start: 2022-06-08 | End: 2022-06-15 | Stop reason: HOSPADM

## 2022-06-08 RX ORDER — ATORVASTATIN CALCIUM 20 MG/1
20 TABLET, FILM COATED ORAL DAILY
Status: ON HOLD | COMMUNITY
Start: 2022-04-06 | End: 2022-06-12 | Stop reason: SDUPTHER

## 2022-06-08 RX ORDER — LEVETIRACETAM 500 MG/1
500 TABLET ORAL 2 TIMES DAILY
COMMUNITY
Start: 2022-05-12

## 2022-06-08 RX ORDER — INSULIN ASPART 100 [IU]/ML
0-5 INJECTION, SOLUTION INTRAVENOUS; SUBCUTANEOUS
Status: DISCONTINUED | OUTPATIENT
Start: 2022-06-08 | End: 2022-06-15 | Stop reason: HOSPADM

## 2022-06-08 RX ORDER — TALC
6 POWDER (GRAM) TOPICAL NIGHTLY PRN
Status: DISCONTINUED | OUTPATIENT
Start: 2022-06-08 | End: 2022-06-15 | Stop reason: HOSPADM

## 2022-06-08 RX ORDER — GLUCAGON 1 MG
1 KIT INJECTION
Status: DISCONTINUED | OUTPATIENT
Start: 2022-06-08 | End: 2022-06-15 | Stop reason: HOSPADM

## 2022-06-08 RX ORDER — SODIUM,POTASSIUM PHOSPHATES 280-250MG
2 POWDER IN PACKET (EA) ORAL
Status: DISCONTINUED | OUTPATIENT
Start: 2022-06-08 | End: 2022-06-15 | Stop reason: HOSPADM

## 2022-06-08 RX ORDER — ACETAMINOPHEN 325 MG/1
650 TABLET ORAL EVERY 8 HOURS PRN
Status: DISCONTINUED | OUTPATIENT
Start: 2022-06-08 | End: 2022-06-15 | Stop reason: HOSPADM

## 2022-06-08 RX ORDER — MAG HYDROX/ALUMINUM HYD/SIMETH 200-200-20
30 SUSPENSION, ORAL (FINAL DOSE FORM) ORAL 4 TIMES DAILY PRN
Status: DISCONTINUED | OUTPATIENT
Start: 2022-06-08 | End: 2022-06-15 | Stop reason: HOSPADM

## 2022-06-08 RX ORDER — SIMETHICONE 80 MG
1 TABLET,CHEWABLE ORAL 4 TIMES DAILY PRN
Status: DISCONTINUED | OUTPATIENT
Start: 2022-06-08 | End: 2022-06-15 | Stop reason: HOSPADM

## 2022-06-08 RX ORDER — APIXABAN 5 MG/1
5 TABLET, FILM COATED ORAL 2 TIMES DAILY
Status: ON HOLD | COMMUNITY
Start: 2022-05-26 | End: 2022-06-14 | Stop reason: SDUPTHER

## 2022-06-08 RX ORDER — SODIUM CHLORIDE 9 MG/ML
INJECTION, SOLUTION INTRAVENOUS ONCE
Status: COMPLETED | OUTPATIENT
Start: 2022-06-08 | End: 2022-06-08

## 2022-06-08 RX ORDER — SODIUM CHLORIDE 0.9 % (FLUSH) 0.9 %
10 SYRINGE (ML) INJECTION EVERY 8 HOURS PRN
Status: DISCONTINUED | OUTPATIENT
Start: 2022-06-08 | End: 2022-06-15 | Stop reason: HOSPADM

## 2022-06-08 RX ORDER — IBUPROFEN 200 MG
24 TABLET ORAL
Status: DISCONTINUED | OUTPATIENT
Start: 2022-06-08 | End: 2022-06-15 | Stop reason: HOSPADM

## 2022-06-08 RX ORDER — ATORVASTATIN CALCIUM 40 MG/1
40 TABLET, FILM COATED ORAL DAILY
Status: DISCONTINUED | OUTPATIENT
Start: 2022-06-09 | End: 2022-06-09

## 2022-06-08 RX ORDER — NALOXONE HCL 0.4 MG/ML
0.02 VIAL (ML) INJECTION
Status: DISCONTINUED | OUTPATIENT
Start: 2022-06-08 | End: 2022-06-15 | Stop reason: HOSPADM

## 2022-06-08 RX ORDER — IBUPROFEN 200 MG
16 TABLET ORAL
Status: DISCONTINUED | OUTPATIENT
Start: 2022-06-08 | End: 2022-06-15 | Stop reason: HOSPADM

## 2022-06-08 RX ORDER — LOSARTAN POTASSIUM 100 MG/1
100 TABLET ORAL DAILY
Status: DISCONTINUED | OUTPATIENT
Start: 2022-06-09 | End: 2022-06-15 | Stop reason: HOSPADM

## 2022-06-08 RX ORDER — PROCHLORPERAZINE EDISYLATE 5 MG/ML
5 INJECTION INTRAMUSCULAR; INTRAVENOUS EVERY 6 HOURS PRN
Status: DISCONTINUED | OUTPATIENT
Start: 2022-06-08 | End: 2022-06-15 | Stop reason: HOSPADM

## 2022-06-08 RX ORDER — IPRATROPIUM BROMIDE AND ALBUTEROL SULFATE 2.5; .5 MG/3ML; MG/3ML
3 SOLUTION RESPIRATORY (INHALATION) EVERY 6 HOURS PRN
Status: DISCONTINUED | OUTPATIENT
Start: 2022-06-08 | End: 2022-06-15 | Stop reason: HOSPADM

## 2022-06-08 RX ADMIN — SODIUM CHLORIDE: 0.9 INJECTION, SOLUTION INTRAVENOUS at 09:06

## 2022-06-08 RX ADMIN — APIXABAN 2.5 MG: 2.5 TABLET, FILM COATED ORAL at 09:06

## 2022-06-08 NOTE — ED PROVIDER NOTES
Encounter Date: 6/8/2022    SCRIBE #1 NOTE: I, Abby Caren, am scribing for, and in the presence of, James Torres MD.       History     Chief Complaint   Patient presents with    Fatigue     Feeling weak x 4 days - worsening per home health      Time seen by provider: 4:54 PM on 06/08/2022    Tyler Todd is a 81 y.o. male who presents to the ED via EMS with fatigue, appetite change, bowel and bladder incontinence that began 4 days ago. Per wife this is unusual for him and he has a Hx of five strokes. Pt is unable to walk with a walker recently . Pt's wife state pt is confused and has worsened over the last few days. Pt has associated cough and is unable to eat or drinking anything without coughing. The patient denies fever, sore throat, SOB, chest pain, abdominal pain, or any other symptoms at this time. PMHx of COPD, HTN, HLD, DM, CAD, prostate CA, lung cancer, hiatal hernia, neck pain, and back pain. PSHx of epidural steroid injection into cervical spine, transrectal ultrasound of prostate with insertion of gold fiducial marker, cystoscopy with retrogrades, endobronchial ultrasounds, coronary angioplasty with stent, and mediastinoscopy.     The history is provided by the patient, the nursing home, the spouse and a relative.     Review of patient's allergies indicates:   Allergen Reactions    Doxycycline Diarrhea     Severe diarrhea    Sulfa (sulfonamide antibiotics) Rash     Other reaction(s): Itching    Sulfasalazine Rash     Rash and itching mild     Past Medical History:   Diagnosis Date    Back pain     Cancer 2012    lung cancer chemo and radiation    COPD (chronic obstructive pulmonary disease)     Coronary artery disease 2002, 2004    s/p 3 stents;  Dr. Interiano    Diabetes mellitus, type 2     Digestive disorder     Hiatal hernia     History of lung cancer 7/6/2021    Hyperlipidemia     Hypertension     Lung cancer     Neck pain     Prostate CA      Past Surgical History:   Procedure  Laterality Date    ANKLE SURGERY Right 1970s    CORONARY ANGIOPLASTY WITH STENT PLACEMENT      CYSTOSCOPY W/ RETROGRADES Bilateral 2/24/2020    Procedure: CYSTOSCOPY, WITH RETROGRADE PYELOGRAM;  Surgeon: Nuha Soto MD;  Location: [facility] OR;  Service: Urology;  Laterality: Bilateral;    ENDOBRONCHIAL ULTRASOUND N/A 6/2/2021    Procedure: ENDOBRONCHIAL ULTRASOUND (EBUS);  Surgeon: Rob Arrington MD;  Location: [facility];  Service: Pulmonary;  Laterality: N/A;    EPIDURAL STEROID INJECTION INTO CERVICAL SPINE N/A 2/19/2019    Procedure: Injection-steroid-epidural-cervical C7-T1;  Surgeon: Marcelino Monroe MD;  Location: [facility];  Service: Pain Management;  Laterality: N/A;    INJECTION OF ANESTHETIC AGENT AROUND MEDIAL BRANCH NERVES INNERVATING LUMBAR FACET JOINT Bilateral 6/4/2018    Procedure: MEDIAL BRANCH, LUMBAR L3,4,5;  Surgeon: Marcelino Monroe MD;  Location: [facility];  Service: Pain Management;  Laterality: Bilateral;  L3, 4, 5    MEDIASTINOSCOPY      RADIOFREQUENCY ABLATION OF LUMBAR MEDIAL BRANCH NERVE AT SINGLE LEVEL Bilateral 7/16/2018    Procedure: RADIOFREQUENCY ABLATION, NERVE, MEDIAL BRANCH, LUMBAR, 1 LEVEL;  Surgeon: Marcelino Monroe MD;  Location: [facility];  Service: Pain Management;  Laterality: Bilateral;  L3, 4, 5  lumbar  [employer] pain management generator  SN [id number]  80 degrees for 75 seconds x2    TRANSRECTAL ULTRASOUND OF PROSTATE WITH INSERTION OF GOLD FIDUCIAL MARKER N/A 2/24/2020    Procedure: ULTRASOUND, PROSTATE, RECTAL APPROACH, WITH GOLD FIDUCIAL MARKER INSERTION;  Surgeon: Nuha Soto MD;  Location: [facility];  Service: Urology;  Laterality: N/A;     Family History   Problem Relation Age of Onset    Diabetes Mother     Peripheral vascular disease Mother     Heart attack Mother     Diabetes Father     Heart attack Father     Emphysema Father     COPD Father     Diabetes Sister      Social History     Tobacco Use    Smoking status: Current Every Day Smoker     Packs/day:  1.00     Years: 57.00     Pack years: 57.00     Types: Cigarettes    Smokeless tobacco: Never Used   Substance Use Topics    Alcohol use: No    Drug use: No     Review of Systems   Constitutional: Positive for appetite change and fatigue. Negative for activity change and fever.   HENT: Negative for drooling, rhinorrhea, sore throat and trouble swallowing.    Eyes: Negative for pain and visual disturbance.   Respiratory: Positive for cough. Negative for shortness of breath and stridor.    Cardiovascular: Negative for chest pain and leg swelling.   Gastrointestinal: Negative for abdominal distention, abdominal pain, constipation and vomiting.        +bowel and bladder incontinence    Genitourinary: Negative for dysuria, hematuria and penile discharge.   Musculoskeletal: Negative for gait problem.   Skin: Negative for rash.   Neurological: Negative for seizures, facial asymmetry and headaches.   Psychiatric/Behavioral: Positive for confusion. Negative for hallucinations and suicidal ideas.       Physical Exam     Initial Vitals [06/08/22 1545]   BP Pulse Resp Temp SpO2   104/68 88 16 97.7 °F (36.5 °C) 100 %      MAP       --         Physical Exam    Nursing note and vitals reviewed.  Constitutional: He appears well-developed. No distress.   HENT:   Head: Normocephalic and atraumatic.   Nose: Nose normal.   Eyes: EOM are normal.   Neck: Neck supple. No tracheal deviation present. No JVD present.   Cardiovascular: Normal rate, regular rhythm, normal heart sounds and intact distal pulses. Exam reveals no gallop and no friction rub.    No murmur heard.  Pulmonary/Chest: Breath sounds normal. No respiratory distress. He has no wheezes. He has no rhonchi. He has no rales.   Abdominal: Abdomen is soft. Bowel sounds are normal. He exhibits no distension. There is no abdominal tenderness. There is no rebound and no guarding.   Musculoskeletal:         General: Normal range of motion.      Cervical back: Neck supple.      Neurological: He is alert. He has normal strength. No cranial nerve deficit or sensory deficit.   Oriented to person otherwise confused.    Skin: Skin is warm and dry. Capillary refill takes less than 2 seconds. No rash noted.   Psychiatric: He has a normal mood and affect.         ED Course   Critical Care    Date/Time: 6/17/2022 8:44 AM  Performed by: James Torres MD  Authorized by: Lg Sultana MD   Total critical care time (exclusive of procedural time) : 35 minutes  Critical care time was exclusive of separately billable procedures and treating other patients and teaching time.  Critical care was necessary to treat or prevent imminent or life-threatening deterioration of the following conditions: CNS failure or compromise.  Critical care was time spent personally by me on the following activities: evaluation of patient's response to treatment, obtaining history from patient or surrogate, pulse oximetry, ordering and review of laboratory studies, review of old charts, re-evaluation of patient's condition, ordering and review of radiographic studies, ordering and performing treatments and interventions and examination of patient.        Labs Reviewed   CBC W/ AUTO DIFFERENTIAL - Abnormal; Notable for the following components:       Result Value    RBC 3.75 (*)     Hemoglobin 11.2 (*)     Hematocrit 35.0 (*)     RDW 14.6 (*)     Lymph # 0.6 (*)     Gran % 80.8 (*)     Lymph % 9.2 (*)     All other components within normal limits    Narrative:     Collection has been rescheduled by [facility] at 06/08/2022 17:46 Reason:   Patient unavailable pt in ct and nurse mikel said she will start a line   COMPREHENSIVE METABOLIC PANEL - Abnormal; Notable for the following components:    BUN 27 (*)     Creatinine 1.7 (*)     Albumin 3.2 (*)     eGFR if  43 (*)     eGFR if non  38 (*)     All other components within normal limits    Narrative:     Collection has been rescheduled by [facility] at  06/08/2022 17:46 Reason:   Patient unavailable pt in ct and nurse mikel said she will start a line   URINALYSIS, REFLEX TO URINE CULTURE - Abnormal; Notable for the following components:    Ketones, UA 1+ (*)     All other components within normal limits    Narrative:     Specimen Source->Urine   LACTIC ACID, PLASMA    Narrative:     Collection has been rescheduled by [facility] at 06/08/2022 17:46 Reason:   Patient unavailable pt in ct and nurse mikel said she will start a line   LIPASE    Narrative:     Collection has been rescheduled by [facility] at 06/08/2022 17:46 Reason:   Patient unavailable pt in ct and nurse mikel said she will start a line   SARS-COV-2 RNA AMPLIFICATION, QUAL   POCT GLUCOSE   POCT GLUCOSE MONITORING CONTINUOUS     EKG Readings: (Independently Interpreted)   Initial Reading: No STEMI. Rhythm: Normal Sinus Rhythm. Heart Rate: 74. ST Segments: Normal ST Segments. T Waves: Normal. Axis: Normal.       Imaging Results           CT Head Without Contrast (Final result)  Result time 06/08/22 18:06:53    Final result by Jose Pinedo Jr., MD (06/08/22 18:06:53)                 Impression:      Moderate brain atrophy with deep white matter ischemic changes.  A 7 mm lacunar infarct is of near CSF density in the right thalamus suggesting this is old, however it is new since the prior CT of July 18, 2020.    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    No intracranial hemorrhage or hematoma is noted.    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      Electronically signed by: Jose Pinedo MD  Date:    06/08/2022  Time:    18:06             Narrative:    EXAMINATION:  CT HEAD WITHOUT CONTRAST    CLINICAL  HISTORY:  Mental status change, unknown cause;    TECHNIQUE:  Low dose axial images were obtained through the head.  Coronal and sagittal reformations were also performed. Contrast was not administered.    COMPARISON:  CT head of July 18, 2020    FINDINGS:  No cranial fracture is identified.  Scalp edema or hematoma is not noted.    The basal cisterns are enlarged but clear and symmetric.  There is no midline shift.  There is enlargement of the lateral ventricles cerebral sulci and sylvian fissures consistent with moderate brain atrophy.  Hypodensity in the central white matter is consistent with deep white matter ischemic changes.  There is a moderate size focus of encephalomalacia of the left occipital lobe consistent with a left posterior cerebral artery infarct.  This is new since the prior CT of July 18, 2020.  Hemorrhage or mass effect is not seen.  There is a 7 mm CSF density lacunar infarct of the right thalamus which is new from the prior CT of July 18, 2020.  Other focal areas of increased or decreased CT density consistent with tumor, edema, CVA or hemorrhage is not seen.  No intracranial hemorrhage or hematoma is noted.    There is a calcified lesion adjacent to the medulla on the left which has progressively calcified since the prior CT scan and may represent an aneurysm of the left vertebral artery.                               X-Ray Chest AP Portable (Final result)  Result time 06/08/22 17:28:26    Final result by Jose Pinedo Jr., MD (06/08/22 17:28:26)                 Impression:      No acute abnormality.      Electronically signed by: Jose Pinedo MD  Date:    06/08/2022  Time:    17:28             Narrative:    EXAMINATION:  XR CHEST AP PORTABLE    CLINICAL HISTORY:  Chest Pain;    TECHNIQUE:  Single frontal view of the chest was performed.    COMPARISON:  Chest of July 24, 2021.    FINDINGS:  The lungs are clear, with normal appearance of pulmonary vasculature and no pleural effusion  or pneumothorax.    The cardiac silhouette is normal in size. The hilar and mediastinal contours are unremarkable.    Bones are intact.                                 Medications   losartan tablet 100 mg (has no administration in time range)   sodium chloride 0.9% flush 10 mL (has no administration in time range)   albuterol-ipratropium 2.5 mg-0.5 mg/3 mL nebulizer solution 3 mL (has no administration in time range)   melatonin tablet 6 mg (has no administration in time range)   ondansetron injection 4 mg (has no administration in time range)   prochlorperazine injection Soln 5 mg (has no administration in time range)   senna-docusate 8.6-50 mg per tablet 1 tablet (1 tablet Oral Not Given 6/8/22 2100)   acetaminophen tablet 650 mg (has no administration in time range)   simethicone chewable tablet 80 mg (has no administration in time range)   aluminum-magnesium hydroxide-simethicone 200-200-20 mg/5 mL suspension 30 mL (has no administration in time range)   naloxone 0.4 mg/mL injection 0.02 mg (has no administration in time range)   potassium, sodium phosphates 280-160-250 mg packet 2 packet (has no administration in time range)   glucose chewable tablet 16 g (has no administration in time range)   glucose chewable tablet 24 g (has no administration in time range)   glucagon (human recombinant) injection 1 mg (has no administration in time range)   dextrose 10% bolus 125 mL (has no administration in time range)   dextrose 10% bolus 250 mL (has no administration in time range)   insulin aspart U-100 pen 0-5 Units (has no administration in time range)   atorvastatin tablet 20 mg (has no administration in time range)   levETIRAcetam tablet 500 mg (has no administration in time range)   apixaban tablet 2.5 mg (has no administration in time range)   0.9%  NaCl infusion ( Intravenous New Bag 6/8/22 0643)     Medical Decision Making:   History:   Old Medical Records: I decided to obtain old medical records.  Independently  Interpreted Test(s):   I have ordered and independently interpreted EKG Reading(s) - see prior notes  Clinical Tests:   Lab Tests: Ordered and Reviewed  Radiological Study: Ordered and Reviewed  Medical Tests: Reviewed and Ordered  ED Management:  Pt admitted for further management of encephalopathy and inability to care for self. CT findings discussed with family and hospitalist. UA pending at time of sign out.          Scribe Attestation:   Scribe #1: I performed the above scribed service and the documentation accurately describes the services I performed. I attest to the accuracy of the note.        ED Course as of 06/09/22 0715   Wed Jun 08, 2022   1752 X-Ray Chest AP Portable [BD]   1752 Impression:     No acute abnormality.        Electronically signed by: Jose Pinedo MD  Date:                                            06/08/2022  Time:                                           17:28 [BD]   1801 81 y.o. male with history of left lung cancer treated in 2012 with chemoradiation and current prostate cancer and SCC of RUL pw worsening confusion and generalized weakness/fatigue. AF. VSS. Alert but oriented only to self otherwise confused. Cachectic appearing. Neuro exam limited due to confusion but grossly nonfocal. Will initiate workup for encephalopathy in elderly pt with known cancer.   [BD]   1827 CT Head Without Contrast(!)  Impression:     Moderate brain atrophy with deep white matter ischemic changes.  A 7 mm lacunar infarct is of near CSF density in the right thalamus suggesting this is old, however it is new since the prior CT of July 18, 2020.     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     No intracranial hemorrhage or hematoma is noted.     A 6 mm calcified lesion to the left of the medulla at the foramen magnum may represent a  calcified left vertebral artery aneurysm     This report was flagged in Epic as abnormal.        Electronically signed by: Jose Pinedo MD  Date:                                            06/08/2022  Time:                                           18:06 [BD]   1900 X-Ray Chest AP Portable [BD]   1900 Impression:     No acute abnormality.        Electronically signed by: Jose Pinedo MD  Date:                                            06/08/2022  Time:                                           17:28 [BD]   1913 Signed over from Dr. Torres pending admission to hospital Medicine [EF]   1957 Confirmed with Hospital Medicine that patient is on the list to be admitted [EF]      ED Course User Index  [BD] James Torres MD  [EF] Rubén Carson MD            Attending Attestation:     Physician Attestation for Scribe:    I, Dr. James Torres, personally performed the services described in this documentation.   All medical record entries made by the scribe were at my direction and in my presence.   I have reviewed the chart and agree that the record is accurate and complete.   James Torres MD  7:13 AM 06/09/2022     DISCLAIMER: This note was prepared with [employer] voice recognition transcription software. Garbled syntax, mangled pronouns, and other bizarre constructions may be attributed to that software system.      Clinical Impression:   Final diagnoses:  [R41.82] AMS (altered mental status)  [I63.9] Cerebrovascular accident (CVA), unspecified mechanism (Primary)  [G93.40] Encephalopathy  [R41.82] Altered mental status, unspecified          ED Disposition Condition    Observation               James Torres MD  06/09/22 0715       James Torres MD  06/17/22 0844

## 2022-06-09 PROBLEM — N18.30 ANEMIA IN STAGE 3 CHRONIC KIDNEY DISEASE: Status: ACTIVE | Noted: 2022-06-09

## 2022-06-09 PROBLEM — R29.6 FALLS FREQUENTLY: Status: ACTIVE | Noted: 2022-06-09

## 2022-06-09 PROBLEM — D63.1 ANEMIA IN STAGE 3 CHRONIC KIDNEY DISEASE: Status: ACTIVE | Noted: 2022-06-09

## 2022-06-09 PROBLEM — I48.0 PAROXYSMAL ATRIAL FIBRILLATION: Status: ACTIVE | Noted: 2020-03-10

## 2022-06-09 LAB
ALBUMIN SERPL BCP-MCNC: 2.9 G/DL (ref 3.5–5.2)
ALP SERPL-CCNC: 94 U/L (ref 55–135)
ALT SERPL W/O P-5'-P-CCNC: 18 U/L (ref 10–44)
ANION GAP SERPL CALC-SCNC: 13 MMOL/L (ref 8–16)
AST SERPL-CCNC: 18 U/L (ref 10–40)
BASOPHILS # BLD AUTO: 0.02 K/UL (ref 0–0.2)
BASOPHILS NFR BLD: 0.4 % (ref 0–1.9)
BILIRUB SERPL-MCNC: 0.7 MG/DL (ref 0.1–1)
BUN SERPL-MCNC: 29 MG/DL (ref 8–23)
CALCIUM SERPL-MCNC: 8.9 MG/DL (ref 8.7–10.5)
CHLORIDE SERPL-SCNC: 108 MMOL/L (ref 95–110)
CO2 SERPL-SCNC: 23 MMOL/L (ref 23–29)
CREAT SERPL-MCNC: 1.6 MG/DL (ref 0.5–1.4)
DIFFERENTIAL METHOD: ABNORMAL
EOSINOPHIL # BLD AUTO: 0.1 K/UL (ref 0–0.5)
EOSINOPHIL NFR BLD: 2 % (ref 0–8)
ERYTHROCYTE [DISTWIDTH] IN BLOOD BY AUTOMATED COUNT: 14.6 % (ref 11.5–14.5)
EST. GFR  (AFRICAN AMERICAN): 47 ML/MIN/1.73 M^2
EST. GFR  (NON AFRICAN AMERICAN): 40 ML/MIN/1.73 M^2
GLUCOSE SERPL-MCNC: 64 MG/DL (ref 70–110)
HCT VFR BLD AUTO: 28.8 % (ref 40–54)
HCV AB SERPL QL IA: NEGATIVE
HGB BLD-MCNC: 9.6 G/DL (ref 14–18)
HIV 1+2 AB+HIV1 P24 AG SERPL QL IA: NEGATIVE
IMM GRANULOCYTES # BLD AUTO: 0.02 K/UL (ref 0–0.04)
IMM GRANULOCYTES NFR BLD AUTO: 0.4 % (ref 0–0.5)
LYMPHOCYTES # BLD AUTO: 0.8 K/UL (ref 1–4.8)
LYMPHOCYTES NFR BLD: 15.3 % (ref 18–48)
MCH RBC QN AUTO: 30.8 PG (ref 27–31)
MCHC RBC AUTO-ENTMCNC: 33.3 G/DL (ref 32–36)
MCV RBC AUTO: 92 FL (ref 82–98)
MONOCYTES # BLD AUTO: 0.6 K/UL (ref 0.3–1)
MONOCYTES NFR BLD: 12 % (ref 4–15)
NEUTROPHILS # BLD AUTO: 3.5 K/UL (ref 1.8–7.7)
NEUTROPHILS NFR BLD: 69.9 % (ref 38–73)
NRBC BLD-RTO: 0 /100 WBC
PLATELET # BLD AUTO: 155 K/UL (ref 150–450)
PMV BLD AUTO: 11.4 FL (ref 9.2–12.9)
POCT GLUCOSE: 132 MG/DL (ref 70–110)
POCT GLUCOSE: 168 MG/DL (ref 70–110)
POCT GLUCOSE: 78 MG/DL (ref 70–110)
POCT GLUCOSE: 80 MG/DL (ref 70–110)
POTASSIUM SERPL-SCNC: 4.1 MMOL/L (ref 3.5–5.1)
PROT SERPL-MCNC: 7.3 G/DL (ref 6–8.4)
RBC # BLD AUTO: 3.12 M/UL (ref 4.6–6.2)
SODIUM SERPL-SCNC: 144 MMOL/L (ref 136–145)
WBC # BLD AUTO: 4.98 K/UL (ref 3.9–12.7)

## 2022-06-09 PROCEDURE — 25000003 PHARM REV CODE 250: Performed by: NURSE PRACTITIONER

## 2022-06-09 PROCEDURE — 95819 EEG AWAKE AND ASLEEP: CPT | Mod: 26,,, | Performed by: PSYCHIATRY & NEUROLOGY

## 2022-06-09 PROCEDURE — G0378 HOSPITAL OBSERVATION PER HR: HCPCS

## 2022-06-09 PROCEDURE — 97161 PT EVAL LOW COMPLEX 20 MIN: CPT

## 2022-06-09 PROCEDURE — 80053 COMPREHEN METABOLIC PANEL: CPT | Performed by: NURSE PRACTITIONER

## 2022-06-09 PROCEDURE — 94761 N-INVAS EAR/PLS OXIMETRY MLT: CPT

## 2022-06-09 PROCEDURE — 92610 EVALUATE SWALLOWING FUNCTION: CPT

## 2022-06-09 PROCEDURE — 99406 BEHAV CHNG SMOKING 3-10 MIN: CPT

## 2022-06-09 PROCEDURE — 97116 GAIT TRAINING THERAPY: CPT

## 2022-06-09 PROCEDURE — 99900035 HC TECH TIME PER 15 MIN (STAT)

## 2022-06-09 PROCEDURE — 85025 COMPLETE CBC W/AUTO DIFF WBC: CPT | Performed by: NURSE PRACTITIONER

## 2022-06-09 PROCEDURE — 97166 OT EVAL MOD COMPLEX 45 MIN: CPT

## 2022-06-09 PROCEDURE — 95819 PR EEG,W/AWAKE & ASLEEP RECORD: ICD-10-PCS | Mod: 26,,, | Performed by: PSYCHIATRY & NEUROLOGY

## 2022-06-09 PROCEDURE — 97535 SELF CARE MNGMENT TRAINING: CPT

## 2022-06-09 PROCEDURE — 36415 COLL VENOUS BLD VENIPUNCTURE: CPT | Performed by: NURSE PRACTITIONER

## 2022-06-09 RX ORDER — ATORVASTATIN CALCIUM 20 MG/1
20 TABLET, FILM COATED ORAL DAILY
Status: DISCONTINUED | OUTPATIENT
Start: 2022-06-09 | End: 2022-06-10

## 2022-06-09 RX ORDER — LEVETIRACETAM 500 MG/1
500 TABLET ORAL 2 TIMES DAILY
Status: DISCONTINUED | OUTPATIENT
Start: 2022-06-09 | End: 2022-06-15 | Stop reason: HOSPADM

## 2022-06-09 RX ORDER — IBUPROFEN 200 MG
1 TABLET ORAL DAILY
Status: DISCONTINUED | OUTPATIENT
Start: 2022-06-10 | End: 2022-06-15 | Stop reason: HOSPADM

## 2022-06-09 RX ADMIN — LEVETIRACETAM 500 MG: 500 TABLET, FILM COATED ORAL at 09:06

## 2022-06-09 RX ADMIN — LOSARTAN POTASSIUM 100 MG: 100 TABLET, FILM COATED ORAL at 09:06

## 2022-06-09 RX ADMIN — APIXABAN 2.5 MG: 2.5 TABLET, FILM COATED ORAL at 09:06

## 2022-06-09 RX ADMIN — DOCUSATE SODIUM AND SENNOSIDES 1 TABLET: 8.6; 5 TABLET, FILM COATED ORAL at 09:06

## 2022-06-09 RX ADMIN — ATORVASTATIN CALCIUM 20 MG: 20 TABLET, FILM COATED ORAL at 09:06

## 2022-06-09 NOTE — SUBJECTIVE & OBJECTIVE
Interval History:  Was admitted last night with encephalopathy.  No new issues since that time.  SLP evaluated patient already and stated that patient should have dental soft diet.  Waiting on neurology to consult.    Review of Systems   Unable to perform ROS: Mental status change   Objective:     Vital Signs (Most Recent):  Temp: 97.3 °F (36.3 °C) (06/09/22 0046)  Pulse: 70 (06/09/22 0046)  Resp: 18 (06/09/22 0046)  BP: (!) 149/65 (06/09/22 0046)  SpO2: 98 % (06/09/22 0046)   Vital Signs (24h Range):  Temp:  [97.3 °F (36.3 °C)-98.3 °F (36.8 °C)] 97.3 °F (36.3 °C)  Pulse:  [70-88] 70  Resp:  [16-18] 18  SpO2:  [97 %-100 %] 98 %  BP: (104-149)/(59-68) 149/65     Weight: 68.8 kg (151 lb 10.8 oz)  Body mass index is 21.76 kg/m².    Physical Exam  Vitals reviewed.   Constitutional:       General: He is not in acute distress.     Appearance: He is not diaphoretic.   HENT:      Mouth/Throat:      Mouth: Mucous membranes are moist.   Eyes:      General: No scleral icterus.        Right eye: No discharge.         Left eye: No discharge.   Neck:      Vascular: No JVD.   Cardiovascular:      Rate and Rhythm: Normal rate and regular rhythm.   Pulmonary:      Effort: Pulmonary effort is normal.      Breath sounds: Normal breath sounds.   Abdominal:      General: Bowel sounds are normal. There is no distension.      Palpations: Abdomen is soft.      Tenderness: There is no abdominal tenderness.   Skin:     General: Skin is warm.      Findings: No rash.   Neurological:      Mental Status: He is alert. He is disoriented and confused.           Significant Labs: All pertinent labs within the past 24 hours have been reviewed.    Significant Imaging: I have reviewed all pertinent imaging results/findings within the past 24 hours.

## 2022-06-09 NOTE — PLAN OF CARE
Problem: Adult Inpatient Plan of Care  Goal: Plan of Care Review  Outcome: Ongoing, Progressing  Goal: Patient-Specific Goal (Individualized)  Outcome: Ongoing, Progressing  Goal: Readiness for Transition of Care  Outcome: Ongoing, Progressing     Problem: Diabetes Comorbidity  Goal: Blood Glucose Level Within Targeted Range  Outcome: Ongoing, Progressing      Pt is oriented to self, denies SOB, and chest pain. Wife at bedside.  Bed is low, wheels Locked and call light in reach.

## 2022-06-09 NOTE — HOSPITAL COURSE
Neurology was consulted for altered mental status/fatigue.  Pt seen and examined with Dr Raines. Patient has a history of multiple strokes.  He reports compliance with this medication.  Wife at bedside please he is having difficulty with speech.  Patient reports his wife is over reacting.  Encephalopathy workup pending.

## 2022-06-09 NOTE — PLAN OF CARE
06/09/22 1340   MADRIGAL Message   Medicare Outpatient and Observation Notification regarding financial responsibility Given to patient/caregiver;Explained to patient/caregiver;Signed/date by patient/caregiver   Date MADRIGAL was signed 06/09/22   Time MADRIGAL was signed 1340

## 2022-06-09 NOTE — ASSESSMENT & PLAN NOTE
Patient's FSGs are controlled on current medication regimen.  Last A1c reviewed-   Lab Results   Component Value Date    HGBA1C 6.7 (H) 12/02/2021     Most recent fingerstick glucose reviewed-   Recent Labs   Lab 06/08/22  1810   POCTGLUCOSE 93     Current correctional scale  Medium  Maintain anti-hyperglycemic dose as follows-   Antihyperglycemics (From admission, onward)            Start     Stop Route Frequency Ordered    06/08/22 2113  insulin aspart U-100 pen 0-5 Units         -- SubQ Before meals & nightly PRN 06/08/22 2015        Hold Oral hypoglycemics while patient is in the hospital.

## 2022-06-09 NOTE — SUBJECTIVE & OBJECTIVE
Past Medical History:   Diagnosis Date    Back pain     Cancer 2012    lung cancer chemo and radiation    COPD (chronic obstructive pulmonary disease)     Coronary artery disease 2002, 2004    s/p 3 stents;  Dr. Interiano    Diabetes mellitus, type 2     Digestive disorder     Hiatal hernia     History of lung cancer 7/6/2021    Hyperlipidemia     Hypertension     Lung cancer     Neck pain     Prostate CA        Past Surgical History:   Procedure Laterality Date    ANKLE SURGERY Right 1970s    CORONARY ANGIOPLASTY WITH STENT PLACEMENT      CYSTOSCOPY W/ RETROGRADES Bilateral 2/24/2020    Procedure: CYSTOSCOPY, WITH RETROGRADE PYELOGRAM;  Surgeon: Nuha Soto MD;  Location: [facility] OR;  Service: Urology;  Laterality: Bilateral;    ENDOBRONCHIAL ULTRASOUND N/A 6/2/2021    Procedure: ENDOBRONCHIAL ULTRASOUND (EBUS);  Surgeon: Rob Arrington MD;  Location: [facility];  Service: Pulmonary;  Laterality: N/A;    EPIDURAL STEROID INJECTION INTO CERVICAL SPINE N/A 2/19/2019    Procedure: Injection-steroid-epidural-cervical C7-T1;  Surgeon: Marcelino Monroe MD;  Location: [facility] OR;  Service: Pain Management;  Laterality: N/A;    INJECTION OF ANESTHETIC AGENT AROUND MEDIAL BRANCH NERVES INNERVATING LUMBAR FACET JOINT Bilateral 6/4/2018    Procedure: MEDIAL BRANCH, LUMBAR L3,4,5;  Surgeon: Marcelino Monroe MD;  Location: [facility] OR;  Service: Pain Management;  Laterality: Bilateral;  L3, 4, 5    MEDIASTINOSCOPY      RADIOFREQUENCY ABLATION OF LUMBAR MEDIAL BRANCH NERVE AT SINGLE LEVEL Bilateral 7/16/2018    Procedure: RADIOFREQUENCY ABLATION, NERVE, MEDIAL BRANCH, LUMBAR, 1 LEVEL;  Surgeon: Marcelino Monroe MD;  Location: [facility] OR;  Service: Pain Management;  Laterality: Bilateral;  L3, 4, 5  lumbar  [employer] pain management generator  SN [id number]  80 degrees for 75 seconds x2    TRANSRECTAL ULTRASOUND OF PROSTATE WITH INSERTION OF GOLD FIDUCIAL MARKER N/A 2/24/2020    Procedure: ULTRASOUND, PROSTATE, RECTAL APPROACH, WITH GOLD FIDUCIAL  MARKER INSERTION;  Surgeon: Nuha Soto MD;  Location: [facility];  Service: Urology;  Laterality: N/A;       Review of patient's allergies indicates:   Allergen Reactions    Doxycycline Diarrhea     Severe diarrhea    Sulfa (sulfonamide antibiotics) Rash     Other reaction(s): Itching    Sulfasalazine Rash     Rash and itching mild       Current Neurological Medications:  See below    Current Facility-Administered Medications on File Prior to Encounter   Medication    sodium chloride 0.9% injection     Current Outpatient Medications on File Prior to Encounter   Medication Sig    atorvastatin (LIPITOR) 20 MG tablet Take 20 mg by mouth once daily.    ELIQUIS 5 mg Tab Take 5 mg by mouth 2 (two) times daily.    irbesartan (AVAPRO) 300 MG tablet Take 1 tablet (300 mg total) by mouth once daily.    levETIRAcetam (KEPPRA) 500 MG Tab Take 500 mg by mouth 2 (two) times daily.    metoprolol succinate (TOPROL-XL) 100 MG 24 hr tablet Take 100 mg by mouth 2 (two) times daily.    pregabalin (LYRICA) 100 MG capsule Take 100 mg by mouth 2 (two) times daily.     repaglinide (PRANDIN) 1 MG tablet Take 1 mg by mouth 3 (three) times daily before meals.    acetaminophen (TYLENOL) 500 MG tablet Take 1,000 mg by mouth every 6 (six) hours as needed for Pain.    nitroGLYCERIN (NITROSTAT) 0.4 MG SL tablet Place 0.4 mg under the tongue every 5 (five) minutes as needed. As directed     Family History       Problem Relation (Age of Onset)    COPD Father    Diabetes Mother, Father, Sister    Emphysema Father    Heart attack Mother, Father    Peripheral vascular disease Mother          Tobacco Use    Smoking status: Current Every Day Smoker     Packs/day: 1.00     Years: 57.00     Pack years: 57.00     Types: Cigarettes    Smokeless tobacco: Never Used   Substance and Sexual Activity    Alcohol use: No    Drug use: No    Sexual activity: Never     Review of Systems   Constitutional:  Positive for appetite change (Decrease intake). Negative for  activity change (fall).   Eyes:  Positive for visual disturbance (decreased vision chronic).   Respiratory:  Negative for shortness of breath.    Cardiovascular:  Negative for chest pain.   Genitourinary:  Negative for difficulty urinating.   Musculoskeletal:  Negative for arthralgias.   Neurological:  Positive for weakness. Negative for dizziness, seizures and headaches.   Psychiatric/Behavioral:  Negative for confusion.    Objective:     Vital Signs (Most Recent):  Temp: 96.9 °F (36.1 °C) (06/09/22 1523)  Pulse: 70 (06/09/22 1523)  Resp: 18 (06/09/22 1523)  BP: 130/68 (06/09/22 1523)  SpO2: 98 % (06/09/22 1523) Vital Signs (24h Range):  Temp:  [96.5 °F (35.8 °C)-98.3 °F (36.8 °C)] 96.9 °F (36.1 °C)  Pulse:  [66-78] 70  Resp:  [17-18] 18  SpO2:  [96 %-100 %] 98 %  BP: (105-149)/(56-68) 130/68     Weight: 68.8 kg (151 lb 10.8 oz)  Body mass index is 21.76 kg/m².    Physical Exam  Vitals reviewed.   Constitutional:       General: He is not in acute distress.  HENT:      Head: Normocephalic and atraumatic.      Comments: Right-sided facial droop     Mouth/Throat:      Mouth: Mucous membranes are moist.   Eyes:      Extraocular Movements: Extraocular movements intact.      Pupils: Pupils are equal, round, and reactive to light.   Cardiovascular:      Rate and Rhythm: Normal rate.   Pulmonary:      Effort: Pulmonary effort is normal.   Musculoskeletal:         General: Normal range of motion.      Cervical back: Normal range of motion and neck supple.   Skin:     General: Skin is warm.   Neurological:      General: No focal deficit present.      Mental Status: He is alert and oriented to person, place, and time.      Coordination: Finger-Nose-Finger Test normal.   Psychiatric:         Mood and Affect: Mood normal.      Comments: Slow to respond       NEUROLOGICAL EXAMINATION:     MENTAL STATUS   Oriented to person, place, and time.   Attention: normal.   Level of consciousness: alert    CRANIAL NERVES     CN III, IV, VI    Pupils are equal, round, and reactive to light.    MOTOR EXAM   Muscle bulk: normal    GAIT AND COORDINATION      Coordination   Finger to nose coordination: normal    Significant Labs: Hemoglobin A1c: No results for input(s): HGBA1C in the last 720 hours.  BMP:   Recent Labs   Lab 06/08/22 1811 06/09/22  0430   GLU 85 64*    144   K 4.6 4.1    108   CO2 24 23   BUN 27* 29*   CREATININE 1.7* 1.6*   CALCIUM 9.3 8.9     CBC:   Recent Labs   Lab 06/08/22 1811 06/09/22  0430   WBC 6.38 4.98   HGB 11.2* 9.6*   HCT 35.0* 28.8*    155     CMP:   Recent Labs   Lab 06/08/22 1811 06/09/22  0430   GLU 85 64*    144   K 4.6 4.1    108   CO2 24 23   BUN 27* 29*   CREATININE 1.7* 1.6*   CALCIUM 9.3 8.9   PROT 8.0 7.3   ALBUMIN 3.2* 2.9*   BILITOT 0.8 0.7   ALKPHOS 109 94   AST 20 18   ALT 19 18   ANIONGAP 12 13   EGFRNONAA 38* 40*     Urine Culture: No results for input(s): LABURIN in the last 48 hours.    Significant Imaging: I have reviewed all pertinent imaging results/findings within the past 24 hours.

## 2022-06-09 NOTE — ASSESSMENT & PLAN NOTE
Creatine stable for now. BMP reviewed- noted Estimated Creatinine Clearance: 34.3 mL/min (A) (based on SCr of 1.7 mg/dL (H)). according to latest data. Monitor UOP and serial BMP and adjust therapy as needed. Renally dose meds.

## 2022-06-09 NOTE — PT/OT/SLP EVAL
"Physical Therapy Evaluation    Patient Name:  Tyler Todd   MRN:  [id number]    Recommendations:     Discharge Recommendations:  rehabilitation facility   Discharge Equipment Recommendations:  (TBD at next level of care)   Barriers to discharge: None    Assessment:     yTler Todd is a 81 y.o. male admitted with a medical diagnosis of Altered mental state.  He presents with the following impairments/functional limitations:  weakness, impaired endurance, impaired functional mobilty, gait instability, impaired balance, impaired cognition, decreased upper extremity function, decreased lower extremity function, decreased safety awareness. Patient is agreeable to participation with PT evaluation. He is oriented to person, that he is in the hospital, and president. He is disoriented to year (2016) and situation. He states he lives with his spouse in a [facility] with 3 DOMITILA and no rails. He states having a cane, RW, and BSC at home. When asked about recent falls he says "yeah that's what my wife says", but he does not recall recent falls. He requires SBA for supine to sit and CGA for sit to stand with RW. He ambulated 10' in room with RW, CGA-Pa, and slow gait speed before requesting to return to bed. He declines further ambulation or sitting up in chair. He returned to bed with bed alarm on, avasys in room, and RN notified.     Rehab Prognosis: Fair; patient would benefit from acute skilled PT services to address these deficits and reach maximum level of function.    Recent Surgery: * No surgery found *      Plan:     During this hospitalization, patient to be seen 6 x/week to address the identified rehab impairments via gait training, therapeutic activities, therapeutic exercises and progress toward the following goals:    · Plan of Care Expires:  07/09/22    Subjective     Chief Complaint: wants something to drink. PT informed patient that per his RN Zayda he is NPO. Patient frustrated   Patient/Family Comments/goals: get " "some juice   Pain/Comfort:  · Pain Rating 1: 0/10    Patients cultural, spiritual, Spiritism conflicts given the current situation:      Living Environment:  Patient lives with his spouse in a H with 3 DOMITILA and no rails  Prior to admission, patients level of function was ambulatory, but patient did not say if he uses his cane or RW for ambulation. Chart review indicates a significant history of recent falls, but patient states "yeah that's what my wife says", but he does not recall recent falls.  Equipment used at home: cane, straight, bedside commode, walker, rolling.  DME owned (not currently used): none.  Upon discharge, patient will have assistance from IRF?    Objective:     Communicated with CHAD Priest prior to session.  Patient found HOB elevated with bed alarm, peripheral IV (avasys)  upon PT entry to room.    General Precautions: Standard, fall   Orthopedic Precautions:N/A   Braces: N/A  Respiratory Status: Room air    Exams:  · Cognitive Exam:  Patient is oriented to Person and Place  · RLE Strength: 3+/5  · LLE Strength: 3+/5    Functional Mobility:  · Bed Mobility:     · Supine to Sit: stand by assistance  · Transfers:     · Sit to Stand:  contact guard assistance with rolling walker  · Gait: 10' in room with RW and CGA-Pa before requesting to return to bed    Therapeutic Activities and Exercises:   Patient was educated on the importance of OOB activity and functional mobility to negate negative effects of prolonged bed rest during hospitalization, safe transfers and ambulation, and D/C planning     AM-PAC 6 CLICK MOBILITY  Total Score:20     Patient left HOB elevated with all lines intact, call button in reach, bed alarm on, RN notified and avasys in room.    GOALS:   Multidisciplinary Problems     Physical Therapy Goals        Problem: Physical Therapy    Goal Priority Disciplines Outcome Goal Variances Interventions   Physical Therapy Goal     PT, PT/OT      Description: Goals to be met by: " 22    Patient will increase functional independence with mobility by performin. Supine to sit with Supervision  2. Sit to stand transfer with Supervision  3. Bed to chair transfer with Supervision using Rolling Walker  4. Gait  x 250 feet with Supervision using Rolling Walker.   5. Lower extremity exercise program x20 reps per handout, with supervision                   History:     Past Medical History:   Diagnosis Date    Back pain     Cancer     lung cancer chemo and radiation    COPD (chronic obstructive pulmonary disease)     Coronary artery disease ,     s/p 3 stents;  Dr. Interiano    Diabetes mellitus, type 2     Digestive disorder     Hiatal hernia     History of lung cancer 2021    Hyperlipidemia     Hypertension     Lung cancer     Neck pain     Prostate CA        Past Surgical History:   Procedure Laterality Date    ANKLE SURGERY Right 1970s    CORONARY ANGIOPLASTY WITH STENT PLACEMENT      CYSTOSCOPY W/ RETROGRADES Bilateral 2020    Procedure: CYSTOSCOPY, WITH RETROGRADE PYELOGRAM;  Surgeon: Nuha Soto MD;  [facility]  Service: Urology;  Laterality: Bilateral;    ENDOBRONCHIAL ULTRASOUND N/A 2021    Procedure: ENDOBRONCHIAL ULTRASOUND (EBUS);  Surgeon: Rob Arrington MD;  [facility];  Service: Pulmonary;  Laterality: N/A;    EPIDURAL STEROID INJECTION INTO CERVICAL SPINE N/A 2019    Procedure: Injection-steroid-epidural-cervical C7-T1;  Surgeon: Marcelino Monroe MD;  Location: [facility] OR;  Service: Pain Management;  Laterality: N/A;    INJECTION OF ANESTHETIC AGENT AROUND MEDIAL BRANCH NERVES INNERVATING LUMBAR FACET JOINT Bilateral 2018    Procedure: MEDIAL BRANCH, LUMBAR L3,4,5;  Surgeon: Marcelino Monroe MD;  Location: [facility] OR;  Service: Pain Management;  Laterality: Bilateral;  L3, 4, 5    MEDIASTINOSCOPY      RADIOFREQUENCY ABLATION OF LUMBAR MEDIAL BRANCH NERVE AT SINGLE LEVEL Bilateral 2018    Procedure: RADIOFREQUENCY  ABLATION, NERVE, MEDIAL BRANCH, LUMBAR, 1 LEVEL;  Surgeon: Marcelino Monroe MD;  Location: [facility] OR;  Service: Pain Management;  Laterality: Bilateral;  L3, 4, 5  lumbar  [employer] pain management generator  SN [id number]  80 degrees for 75 seconds x2    TRANSRECTAL ULTRASOUND OF PROSTATE WITH INSERTION OF GOLD FIDUCIAL MARKER N/A 2/24/2020    Procedure: ULTRASOUND, PROSTATE, RECTAL APPROACH, WITH GOLD FIDUCIAL MARKER INSERTION;  Surgeon: Nuha Soto MD;  Location: [facility] OR;  Service: Urology;  Laterality: N/A;       Time Tracking:     PT Received On: 06/09/22  PT Start Time: 1022     PT Stop Time: 1039  PT Total Time (min): 17 min     Billable Minutes: Evaluation 7 and Gait Training 10      06/09/2022

## 2022-06-09 NOTE — H&P
[facility] Medicine  History & Physical    Patient Name: Tyler Todd  MRN: [id number]  Patient Class: OP- Observation  Admission Date: 6/8/2022  Attending Physician: Tom Nayak MD   Primary Care Provider: Sánchez Mast DO         Patient information was obtained from patient, past medical records and ER records.     Subjective:     Principal Problem:Altered mental state    Chief Complaint:   Chief Complaint   Patient presents with    Fatigue     Feeling weak x 4 days - worsening per home health         HPI: Tyler Todd is a 81 y.o. male with a past medical history of COPD, HTN, HLD, DM type 2, CAD, prostate cancer and lung cancer presenting with fatigue, decreased appetite, confusion, bowel and bladder incontinence for the past 4 days.  Patient's wife reports he has been progressively becoming weaker for the past month and having frequent falls at home.  She states he has a history of 5 strokes and difficulty swallowing food without coughing.  She states his confusion and weakness started 4 days ago and has progressed.  ED workup revealed creatinine that is elevated from baseline.  Chest x-ray showed no acute abnormality.  CT Head showed an old 7 mm lacunar infarct, however it is new since the prior CT of July 2020. Patient was referred to Hospital Medicine and seen in the ED.  History limited due to altered mental status.  Patient is oriented to self only head and denies chest pain, shortness a breath, nausea, vomiting, diarrhea, fevers, and chills.  Patient's wife states she is unable to care for patient at home due to his increased weakness, confusion, and frequent falls.  Patient will be admitted to Hospital Medicine for further evaluation and management.      Past Medical History:   Diagnosis Date    Back pain     Cancer 2012    lung cancer chemo and radiation    COPD (chronic obstructive pulmonary disease)     Coronary artery disease 2002, 2004    s/p 3 stents;   Dr. Interiano    Diabetes mellitus, type 2     Digestive disorder     Hiatal hernia     History of lung cancer 7/6/2021    Hyperlipidemia     Hypertension     Lung cancer     Neck pain     Prostate CA        Past Surgical History:   Procedure Laterality Date    ANKLE SURGERY Right 1970s    CORONARY ANGIOPLASTY WITH STENT PLACEMENT      CYSTOSCOPY W/ RETROGRADES Bilateral 2/24/2020    Procedure: CYSTOSCOPY, WITH RETROGRADE PYELOGRAM;  Surgeon: Nuha Soto MD;  Location: [facility];  Service: Urology;  Laterality: Bilateral;    ENDOBRONCHIAL ULTRASOUND N/A 6/2/2021    Procedure: ENDOBRONCHIAL ULTRASOUND (EBUS);  Surgeon: Rob Arrington MD;  Location: [facility];  Service: Pulmonary;  Laterality: N/A;    EPIDURAL STEROID INJECTION INTO CERVICAL SPINE N/A 2/19/2019    Procedure: Injection-steroid-epidural-cervical C7-T1;  Surgeon: Marcelino Monroe MD;  Location: [facility];  Service: Pain Management;  Laterality: N/A;    INJECTION OF ANESTHETIC AGENT AROUND MEDIAL BRANCH NERVES INNERVATING LUMBAR FACET JOINT Bilateral 6/4/2018    Procedure: MEDIAL BRANCH, LUMBAR L3,4,5;  Surgeon: Marcelino Monroe MD;  Location: [facility];  Service: Pain Management;  Laterality: Bilateral;  L3, 4, 5    MEDIASTINOSCOPY      RADIOFREQUENCY ABLATION OF LUMBAR MEDIAL BRANCH NERVE AT SINGLE LEVEL Bilateral 7/16/2018    Procedure: RADIOFREQUENCY ABLATION, NERVE, MEDIAL BRANCH, LUMBAR, 1 LEVEL;  Surgeon: Marcelino Monroe MD;  Location: [facility];  Service: Pain Management;  Laterality: Bilateral;  L3, 4, 5  lumbar  MedaPhor pain management generator  SN [id number]  80 degrees for 75 seconds x2    TRANSRECTAL ULTRASOUND OF PROSTATE WITH INSERTION OF GOLD FIDUCIAL MARKER N/A 2/24/2020    Procedure: ULTRASOUND, PROSTATE, RECTAL APPROACH, WITH GOLD FIDUCIAL MARKER INSERTION;  Surgeon: Nuha Soto MD;  Location: [facility];  Service: Urology;  Laterality: N/A;       Review of patient's allergies indicates:   Allergen Reactions    Doxycycline  Diarrhea     Severe diarrhea    Sulfa (sulfonamide antibiotics) Rash     Other reaction(s): Itching    Sulfasalazine Rash     Rash and itching mild       Current Facility-Administered Medications on File Prior to Encounter   Medication    sodium chloride 0.9% injection     Current Outpatient Medications on File Prior to Encounter   Medication Sig    atorvastatin (LIPITOR) 20 MG tablet Take 20 mg by mouth once daily.    ELIQUIS 5 mg Tab Take 5 mg by mouth 2 (two) times daily.    irbesartan (AVAPRO) 300 MG tablet Take 1 tablet (300 mg total) by mouth once daily.    levETIRAcetam (KEPPRA) 500 MG Tab Take 500 mg by mouth 2 (two) times daily.    metoprolol succinate (TOPROL-XL) 100 MG 24 hr tablet Take 100 mg by mouth 2 (two) times daily.    pregabalin (LYRICA) 100 MG capsule Take 100 mg by mouth 2 (two) times daily.     repaglinide (PRANDIN) 1 MG tablet Take 1 mg by mouth 3 (three) times daily before meals.    acetaminophen (TYLENOL) 500 MG tablet Take 1,000 mg by mouth every 6 (six) hours as needed for Pain.    nitroGLYCERIN (NITROSTAT) 0.4 MG SL tablet Place 0.4 mg under the tongue every 5 (five) minutes as needed. As directed     Family History       Problem Relation (Age of Onset)    COPD Father    Diabetes Mother, Father, Sister    Emphysema Father    Heart attack Mother, Father    Peripheral vascular disease Mother          Tobacco Use    Smoking status: Current Every Day Smoker     Packs/day: 1.00     Years: 57.00     Pack years: 57.00     Types: Cigarettes    Smokeless tobacco: Never Used   Substance and Sexual Activity    Alcohol use: No    Drug use: No    Sexual activity: Never     Review of Systems   Unable to perform ROS: Mental status change   Constitutional:  Positive for appetite change and fatigue. Negative for activity change, chills and fever.   HENT:  Negative for congestion, sore throat and trouble swallowing.    Eyes:  Negative for photophobia and visual disturbance.   Respiratory:   Negative for cough, chest tightness and shortness of breath.    Cardiovascular:  Negative for chest pain, palpitations and leg swelling.   Gastrointestinal:  Negative for abdominal pain, diarrhea and nausea.   Genitourinary:  Negative for dysuria, flank pain and hematuria.   Musculoskeletal:  Negative for back pain.   Neurological:  Positive for weakness. Negative for dizziness and headaches.   Psychiatric/Behavioral:  Positive for confusion.    Objective:     Vital Signs (Most Recent):  Temp: 97.3 °F (36.3 °C) (06/09/22 0046)  Pulse: 70 (06/09/22 0046)  Resp: 18 (06/09/22 0046)  BP: (!) 149/65 (06/09/22 0046)  SpO2: 98 % (06/09/22 0046)   Vital Signs (24h Range):  Temp:  [97.3 °F (36.3 °C)-98.3 °F (36.8 °C)] 97.3 °F (36.3 °C)  Pulse:  [70-88] 70  Resp:  [16-18] 18  SpO2:  [97 %-100 %] 98 %  BP: (104-149)/(59-68) 149/65     Weight: 68.8 kg (151 lb 10.8 oz)  Body mass index is 21.76 kg/m².    Physical Exam  Vitals reviewed.   Constitutional:       Appearance: Normal appearance. He is normal weight.   HENT:      Head: Normocephalic.      Mouth/Throat:      Mouth: Mucous membranes are moist.      Pharynx: Oropharynx is clear.   Eyes:      Pupils: Pupils are equal, round, and reactive to light.   Cardiovascular:      Rate and Rhythm: Normal rate and regular rhythm.      Pulses: Normal pulses.   Pulmonary:      Effort: Pulmonary effort is normal.      Breath sounds: Normal breath sounds.   Abdominal:      General: Bowel sounds are normal.   Musculoskeletal:         General: Normal range of motion.      Cervical back: Normal range of motion.   Skin:     General: Skin is warm and dry.   Neurological:      Mental Status: He is alert. Mental status is at baseline. He is disoriented.      Comments: Oriented to self only   Psychiatric:         Mood and Affect: Mood normal.         Speech: Speech normal.         Behavior: Behavior is agitated.         CRANIAL NERVES     CN III, IV, VI   Pupils are equal, round, and reactive to  light.     Significant Labs: All pertinent labs within the past 24 hours have been reviewed.  CBC:   Recent Labs   Lab 06/08/22 1811   WBC 6.38   HGB 11.2*   HCT 35.0*        CMP:   Recent Labs   Lab 06/08/22 1811      K 4.6      CO2 24   GLU 85   BUN 27*   CREATININE 1.7*   CALCIUM 9.3   PROT 8.0   ALBUMIN 3.2*   BILITOT 0.8   ALKPHOS 109   AST 20   ALT 19   ANIONGAP 12   EGFRNONAA 38*       Significant Imaging: I have reviewed all pertinent imaging results/findings within the past 24 hours.  Imaging Results               CT Head Without Contrast (Final result)  Result time 06/08/22 18:06:53      Final result by Jose Pinedo Jr., MD (06/08/22 18:06:53)                   Impression:      Moderate brain atrophy with deep white matter ischemic changes.  A 7 mm lacunar infarct is of near CSF density in the right thalamus suggesting this is old, however it is new since the prior CT of July 18, 2020.    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    No intracranial hemorrhage or hematoma is noted.    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      Electronically signed by: Jose Pinedo MD  Date:    06/08/2022  Time:    18:06               Narrative:    EXAMINATION:  CT HEAD WITHOUT CONTRAST    CLINICAL HISTORY:  Mental status change, unknown cause;    TECHNIQUE:  Low dose axial images were obtained through the head.  Coronal and sagittal reformations were also performed. Contrast was not administered.    COMPARISON:  CT head of July 18, 2020    FINDINGS:  No cranial fracture is identified.  Scalp edema or hematoma is not noted.    The basal cisterns are enlarged but clear and symmetric.  There  is no midline shift.  There is enlargement of the lateral ventricles cerebral sulci and sylvian fissures consistent with moderate brain atrophy.  Hypodensity in the central white matter is consistent with deep white matter ischemic changes.  There is a moderate size focus of encephalomalacia of the left occipital lobe consistent with a left posterior cerebral artery infarct.  This is new since the prior CT of July 18, 2020.  Hemorrhage or mass effect is not seen.  There is a 7 mm CSF density lacunar infarct of the right thalamus which is new from the prior CT of July 18, 2020.  Other focal areas of increased or decreased CT density consistent with tumor, edema, CVA or hemorrhage is not seen.  No intracranial hemorrhage or hematoma is noted.    There is a calcified lesion adjacent to the medulla on the left which has progressively calcified since the prior CT scan and may represent an aneurysm of the left vertebral artery.                                       X-Ray Chest AP Portable (Final result)  Result time 06/08/22 17:28:26      Final result by Jose Pinedo Jr., MD (06/08/22 17:28:26)                   Impression:      No acute abnormality.      Electronically signed by: Jose Pinedo MD  Date:    06/08/2022  Time:    17:28               Narrative:    EXAMINATION:  XR CHEST AP PORTABLE    CLINICAL HISTORY:  Chest Pain;    TECHNIQUE:  Single frontal view of the chest was performed.    COMPARISON:  Chest of July 24, 2021.    FINDINGS:  The lungs are clear, with normal appearance of pulmonary vasculature and no pleural effusion or pneumothorax.    The cardiac silhouette is normal in size. The hilar and mediastinal contours are unremarkable.    Bones are intact.                                        Assessment/Plan:     * Altered mental state  Unspecified altered mental status    Neuro checks  Neuro consult  Avoid benzo's, opiods and other sedatives      Falls frequently  Consult OT  Consult  PT        Atrial fibrillation  Patient with Paroxysmal (<7 days) atrial fibrillation which is controlled currently with Calcium Channel Blocker. Patient is currently in sinus rhythm.[id number] Score: 4. Anticoagulation indicated. Anticoagulation done with Eliquis.        Hyperlipidemia   Patient is chronically on statin.will continue for now. Monitor clinically. Last LDL was   Lab Results   Component Value Date    LDLCALC 75.8 12/02/2021            CKD (chronic kidney disease), stage III  Creatine stable for now. BMP reviewed- noted Estimated Creatinine Clearance: 34.3 mL/min (A) (based on SCr of 1.7 mg/dL (H)). according to latest data. Monitor UOP and serial BMP and adjust therapy as needed. Renally dose meds.            Carcinoma of lung  Chronic, stable    Monitor for acute changes      Type 2 diabetes mellitus without complication  Patient's FSGs are controlled on current medication regimen.  Last A1c reviewed-   Lab Results   Component Value Date    HGBA1C 6.7 (H) 12/02/2021     Most recent fingerstick glucose reviewed-   Recent Labs   Lab 06/08/22  1810   POCTGLUCOSE 93     Current correctional scale  Medium  Maintain anti-hyperglycemic dose as follows-   Antihyperglycemics (From admission, onward)            Start     Stop Route Frequency Ordered    06/08/22 2113  insulin aspart U-100 pen 0-5 Units         -- SubQ Before meals & nightly PRN 06/08/22 2015        Hold Oral hypoglycemics while patient is in the hospital.          VTE Risk Mitigation (From admission, onward)         Ordered     apixaban tablet 2.5 mg  2 times daily         06/08/22 2015     IP VTE HIGH RISK PATIENT  Once         06/08/22 2015     Place sequential compression device  Until discontinued         06/08/22 2015     Reason for No Pharmacological VTE Prophylaxis  Once        Question:  Reasons:  Answer:  Already adequately anticoagulated on oral Anticoagulants    06/08/22 2015                   Khadijah Mccollum NP  Department of  Hospital Medicine [facility]

## 2022-06-09 NOTE — CONSULTS
[facility]  Neurology  Consult Note    Patient Name: Tyler Todd  MRN: [id number]  Admission Date: 6/8/2022  Hospital Length of Stay: 0 days  Code Status: Full Code   Attending Provider: Tom Nayak MD   Consulting Provider: Chaya Zapata DNP  Primary Care Physician: Sánchez Mast DO  Principal Problem:Altered mental state    Consults   Subjective:     Chief Complaint:  AMS     HPI:   Per Chart:     HPI: Tyler Todd is a 81 y.o. male with a past medical history of COPD, HTN, HLD, DM type 2, CAD, prostate cancer and lung cancer presenting with fatigue, decreased appetite, confusion, bowel and bladder incontinence for the past 4 days.  Patient's wife reports he has been progressively becoming weaker for the past month and having frequent falls at home.  She states he has a history of 5 strokes and difficulty swallowing food without coughing.  She states his confusion and weakness started 4 days ago and has progressed.  ED workup revealed creatinine that is elevated from baseline.  Chest x-ray showed no acute abnormality.  CT Head showed an old 7 mm lacunar infarct, however it is new since the prior CT of July 2020. Patient was referred to Hospital Medicine and seen in the ED.  History limited due to altered mental status.  Patient is oriented to self only head and denies chest pain, shortness a breath, nausea, vomiting, diarrhea, fevers, and chills.  Patient's wife states she is unable to care for patient at home due to his increased weakness, confusion, and frequent falls.  Patient will be admitted to Hospital Medicine for further evaluation and management.       Past Medical History:   Diagnosis Date    Back pain     Cancer 2012    lung cancer chemo and radiation    COPD (chronic obstructive pulmonary disease)     Coronary artery disease 2002, 2004    s/p 3 stents;  Dr. Interiano    Diabetes mellitus, type 2     Digestive disorder     Hiatal hernia     History of lung  cancer 7/6/2021    Hyperlipidemia     Hypertension     Lung cancer     Neck pain     Prostate CA        Past Surgical History:   Procedure Laterality Date    ANKLE SURGERY Right 1970s    CORONARY ANGIOPLASTY WITH STENT PLACEMENT      CYSTOSCOPY W/ RETROGRADES Bilateral 2/24/2020    Procedure: CYSTOSCOPY, WITH RETROGRADE PYELOGRAM;  Surgeon: Nuha Soto MD;  Location: [facility];  Service: Urology;  Laterality: Bilateral;    ENDOBRONCHIAL ULTRASOUND N/A 6/2/2021    Procedure: ENDOBRONCHIAL ULTRASOUND (EBUS);  Surgeon: Rob Arrington MD;  Location: [facility];  Service: Pulmonary;  Laterality: N/A;    EPIDURAL STEROID INJECTION INTO CERVICAL SPINE N/A 2/19/2019    Procedure: Injection-steroid-epidural-cervical C7-T1;  Surgeon: Marcelino Monroe MD;  [facility];  Service: Pain Management;  Laterality: N/A;    INJECTION OF ANESTHETIC AGENT AROUND MEDIAL BRANCH NERVES INNERVATING LUMBAR FACET JOINT Bilateral 6/4/2018    Procedure: MEDIAL BRANCH, LUMBAR L3,4,5;  Surgeon: Marcelino Monroe MD;  [facility];  Service: Pain Management;  Laterality: Bilateral;  L3, 4, 5    MEDIASTINOSCOPY      RADIOFREQUENCY ABLATION OF LUMBAR MEDIAL BRANCH NERVE AT SINGLE LEVEL Bilateral 7/16/2018    Procedure: RADIOFREQUENCY ABLATION, NERVE, MEDIAL BRANCH, LUMBAR, 1 LEVEL;  Surgeon: Marcelino Monroe MD;  [facility];  Service: Pain Management;  Laterality: Bilateral;  L3, 4, 5  lumbar  [employer] pain management generator  SN [id number]  80 degrees for 75 seconds x2    TRANSRECTAL ULTRASOUND OF PROSTATE WITH INSERTION OF GOLD FIDUCIAL MARKER N/A 2/24/2020    Procedure: ULTRASOUND, PROSTATE, RECTAL APPROACH, WITH GOLD FIDUCIAL MARKER INSERTION;  Surgeon: Nuha Soto MD;  Location: [facility];  Service: Urology;  Laterality: N/A;       Review of patient's allergies indicates:   Allergen Reactions    Doxycycline Diarrhea     Severe diarrhea    Sulfa (sulfonamide antibiotics) Rash     Other reaction(s): Itching     Sulfasalazine Rash     Rash and itching mild       Current Neurological Medications:  See below    Current Facility-Administered Medications on File Prior to Encounter   Medication    sodium chloride 0.9% injection     Current Outpatient Medications on File Prior to Encounter   Medication Sig    atorvastatin (LIPITOR) 20 MG tablet Take 20 mg by mouth once daily.    ELIQUIS 5 mg Tab Take 5 mg by mouth 2 (two) times daily.    irbesartan (AVAPRO) 300 MG tablet Take 1 tablet (300 mg total) by mouth once daily.    levETIRAcetam (KEPPRA) 500 MG Tab Take 500 mg by mouth 2 (two) times daily.    metoprolol succinate (TOPROL-XL) 100 MG 24 hr tablet Take 100 mg by mouth 2 (two) times daily.    pregabalin (LYRICA) 100 MG capsule Take 100 mg by mouth 2 (two) times daily.     repaglinide (PRANDIN) 1 MG tablet Take 1 mg by mouth 3 (three) times daily before meals.    acetaminophen (TYLENOL) 500 MG tablet Take 1,000 mg by mouth every 6 (six) hours as needed for Pain.    nitroGLYCERIN (NITROSTAT) 0.4 MG SL tablet Place 0.4 mg under the tongue every 5 (five) minutes as needed. As directed     Family History       Problem Relation (Age of Onset)    COPD Father    Diabetes Mother, Father, Sister    Emphysema Father    Heart attack Mother, Father    Peripheral vascular disease Mother          Tobacco Use    Smoking status: Current Every Day Smoker     Packs/day: 1.00     Years: 57.00     Pack years: 57.00     Types: Cigarettes    Smokeless tobacco: Never Used   Substance and Sexual Activity    Alcohol use: No    Drug use: No    Sexual activity: Never     Review of Systems   Constitutional:  Positive for appetite change (Decrease intake). Negative for activity change (fall).   Eyes:  Positive for visual disturbance (decreased vision chronic).   Respiratory:  Negative for shortness of breath.    Cardiovascular:  Negative for chest pain.   Genitourinary:  Negative for difficulty urinating.   Musculoskeletal:  Negative for  arthralgias.   Neurological:  Positive for weakness. Negative for dizziness, seizures and headaches.   Psychiatric/Behavioral:  Negative for confusion.    Objective:     Vital Signs (Most Recent):  Temp: 96.9 °F (36.1 °C) (06/09/22 1523)  Pulse: 70 (06/09/22 1523)  Resp: 18 (06/09/22 1523)  BP: 130/68 (06/09/22 1523)  SpO2: 98 % (06/09/22 1523) Vital Signs (24h Range):  Temp:  [96.5 °F (35.8 °C)-98.3 °F (36.8 °C)] 96.9 °F (36.1 °C)  Pulse:  [66-78] 70  Resp:  [17-18] 18  SpO2:  [96 %-100 %] 98 %  BP: (105-149)/(56-68) 130/68     Weight: 68.8 kg (151 lb 10.8 oz)  Body mass index is 21.76 kg/m².    Physical Exam  Vitals reviewed.   Constitutional:       General: He is not in acute distress.  HENT:      Head: Normocephalic and atraumatic.      Comments: Right-sided facial droop     Mouth/Throat:      Mouth: Mucous membranes are moist.   Eyes:      Extraocular Movements: Extraocular movements intact.      Pupils: Pupils are equal, round, and reactive to light.   Cardiovascular:      Rate and Rhythm: Normal rate.   Pulmonary:      Effort: Pulmonary effort is normal.   Musculoskeletal:         General: Normal range of motion.      Cervical back: Normal range of motion and neck supple.   Skin:     General: Skin is warm.   Neurological:      General: No focal deficit present.      Mental Status: He is alert and oriented to person, place, and time.      Coordination: Finger-Nose-Finger Test normal.   Psychiatric:         Mood and Affect: Mood normal.      Comments: Slow to respond       NEUROLOGICAL EXAMINATION:     MENTAL STATUS   Oriented to person, place, and time.   Attention: normal.   Level of consciousness: alert    CRANIAL NERVES     CN III, IV, VI   Pupils are equal, round, and reactive to light.    MOTOR EXAM   Muscle bulk: normal    GAIT AND COORDINATION      Coordination   Finger to nose coordination: normal    Significant Labs: Hemoglobin A1c: No results for input(s): HGBA1C in the last 720 hours.  BMP:   Recent  Labs   Lab 06/08/22  1811 06/09/22  0430   GLU 85 64*    144   K 4.6 4.1    108   CO2 24 23   BUN 27* 29*   CREATININE 1.7* 1.6*   CALCIUM 9.3 8.9     CBC:   Recent Labs   Lab 06/08/22  1811 06/09/22  0430   WBC 6.38 4.98   HGB 11.2* 9.6*   HCT 35.0* 28.8*    155     CMP:   Recent Labs   Lab 06/08/22  1811 06/09/22  0430   GLU 85 64*    144   K 4.6 4.1    108   CO2 24 23   BUN 27* 29*   CREATININE 1.7* 1.6*   CALCIUM 9.3 8.9   PROT 8.0 7.3   ALBUMIN 3.2* 2.9*   BILITOT 0.8 0.7   ALKPHOS 109 94   AST 20 18   ALT 19 18   ANIONGAP 12 13   EGFRNONAA 38* 40*     Urine Culture: No results for input(s): LABURIN in the last 48 hours.    Significant Imaging: I have reviewed all pertinent imaging results/findings within the past 24 hours.    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  Patient reports compliance with this Eliquis.  Wife does report he has trouble swallowing.  Will repeat MRI brain due to patient's risk factors for stroke.  EEG pending also to evaluate patient's for seizures encephalopathy.  Neurological labs also pending.  Will follow up.       Patient to follow up with [facility] at [phone] within 3 days from discharge.     Stroke education was provided including stroke risk factors modification and any acute neurological changes including weakness, confusion, visual changes to come straight to the ER.     All questions were answered.                                     VTE Risk Mitigation (From admission, onward)         Ordered     apixaban  tablet 2.5 mg  2 times daily         06/09/22 0133     IP VTE HIGH RISK PATIENT  Once         06/08/22 2015     Place sequential compression device  Until discontinued         06/08/22 2015     Reason for No Pharmacological VTE Prophylaxis  Once        Question:  Reasons:  Answer:  Already adequately anticoagulated on oral Anticoagulants    06/08/22 2015                Thank you for your consult. I will follow-up with patient. Please contact us if you have any additional questions.    Chaya Zapata DNP  Neurology  [facility]

## 2022-06-09 NOTE — PLAN OF CARE
Problem: SLP  Goal: SLP Goal  Description: 1. Pt will undergo MBSS to objectively assess swallow physiology and determine presence/severity of dysphagia.    2. The patient will demonstrate use of aspiration precautions/compensatory strategies during meals with SPV.     Outcome: Ongoing, Progressing    Clinical swallow evaluation completed. REC Dental soft textures (IDDSI 6) with thin liquids. No fresh/canned fruit x bananas. Consider MBSS for further evaluation in pt with recent CVA and wife's report of frequent coughing with PO. Will await orders

## 2022-06-09 NOTE — PT/OT/SLP EVAL
Occupational Therapy   Evaluation    Name: Tyler Todd  MRN: [id number]  Admitting Diagnosis:  Altered mental state  Recent Surgery: * No surgery found *      Recommendations:     Discharge Recommendations: rehabilitation facility  Discharge Equipment Recommendations:   (TBD)  Barriers to discharge:  None    Assessment:     Tyler Todd is a 81 y.o. male with a medical diagnosis of Altered mental state.  He presents with unsteady balance mostly when ambulating in RW requiring constant Min > Mod A and verbal cues to manage RW around corners. Patient seemed agitated at first, but become more pleasant throughout evaluation. Patient is able to speak, but only speaks when asked a question. Patient is oriented to self only. Patient was able to perform grooming tasks seated, but is not steady enough to perform tasks while standing at sink. Patient would benefit from continued therapy post acute care to maximize independence with ADLs and reduce caregiver burden.     Performance deficits affecting function: weakness, impaired endurance, impaired self care skills, impaired functional mobilty, gait instability, impaired balance, decreased lower extremity function, impaired cognition, decreased coordination, decreased safety awareness.      Rehab Prognosis: Good; patient would benefit from acute skilled OT services to address these deficits and reach maximum level of function.       Plan:     Patient to be seen 4 x/week to address the above listed problems via self-care/home management, therapeutic activities, therapeutic exercises  · Plan of Care Expires: 06/30/22  · Plan of Care Reviewed with: patient    Subjective     Chief Complaint: none  Patient/Family Comments/goals: none    Occupational Profile:  Living Environment: Patient lives with spouse in a [facility].   Previous level of function: Patient stated he required assistance with dressing and bathing; patient also stated he was ambulatory using AD.   Equipment Used at Home:   bedside commode, walker, rolling, cane, straight  Assistance upon Discharge: Patient will receive limited assistance from spouse.     Pain/Comfort:  · Pain Rating 1: 0/10  · Pain Rating Post-Intervention 1: 0/10    Patients cultural, spiritual, Taoism conflicts given the current situation:      Objective:     Communicated with: nurse Priest prior to session.  Patient found HOB elevated with bed alarm, peripheral IV (telesitter) upon OT entry to room.    General Precautions: Standard, fall   Orthopedic Precautions:N/A   Braces: N/A  Respiratory Status: Room air    Occupational Performance:    Bed Mobility:    · Patient completed Scooting/Bridging with stand by assistance  · Patient completed Supine to Sit with stand by assistance  · Patient completed Sit to Supine with stand by assistance    Functional Mobility/Transfers:  · Patient completed Sit <> Stand Transfer with minimum assistance  with  rolling walker   · Patient completed Toilet Transfer Stand Pivot technique with minimum assistance with  rolling walker    Activities of Daily Living:  · Grooming: stand by assistance with oral and facial hygiene while seated EOB. Patient required verbal cues with sequencing of tasks.   · Upper Body Dressing: minimum assistance   · Lower Body Dressing: stand by assistance to don/doff socks while seated EOB  · Toileting: moderate assistance     Cognitive/Visual Perceptual:  Cognitive/Psychosocial Skills:     -       Oriented to: person (patient only knew his name)   -       Follows Commands/attention:Follows two-step commands  -       Communication: clear/fluent  -       Safety awareness/insight to disability: impaired   -       Mood/Affect/Coping skills/emotional control: Cooperative  Visual/Perceptual:      -Impaired - patient has R side visual deficits from a past stroke. Patient had difficutly identifying objects on his R side.      Physical Exam:  Postural examination/scapula alignment:    -       Rounded shoulders  -        Forward head  Upper Extremity Range of Motion:     -       Right Upper Extremity: WFL  -       Left Upper Extremity: WFL  Upper Extremity Strength:    -       Right Upper Extremity: WFL  -       Left Upper Extremity: WFL   Strength:    -       Right Upper Extremity: WFL  -       Left Upper Extremity: WFL  Fine Motor Coordination:    -       Intact  Gross motor coordination:   WFL in BUE    AMPAC 6 Click ADL:  AMPA Total Score: 17    Treatment & Education:  OT ed pt on OT role & POC as well as discharge recommendations.  OT ed patient on safety with walker use for functional mobility with cues for hand placement & sequencing.     Education:    Patient left HOB elevated with all lines intact, call button in reach, bed alarm on and nurse notified    GOALS:   Multidisciplinary Problems     Occupational Therapy Goals        Problem: Occupational Therapy    Goal Priority Disciplines Outcome Interventions   Occupational Therapy Goal     OT, PT/OT Ongoing, Progressing    Description: Goals to be met by: 6/30/2022     Patient will increase functional independence with ADLs by performing:    LE Dressing with Modified [place].  Grooming while standing at sink with Modified [place].  Toileting from toilet with Modified [place] for hygiene and clothing management.   Supine to sit with Modified [place].  Toilet transfer to toilet with Modified [place].                     History:     Past Medical History:   Diagnosis Date    Back pain     Cancer 2012    lung cancer chemo and radiation    COPD (chronic obstructive pulmonary disease)     Coronary artery disease 2002, 2004    s/p 3 stents;  Dr. Interiano    Diabetes mellitus, type 2     Digestive disorder     Hiatal hernia     History of lung cancer 7/6/2021    Hyperlipidemia     Hypertension     Lung cancer     Neck pain     Prostate CA        Past Surgical History:   Procedure Laterality Date    ANKLE SURGERY Right 1970s    CORONARY  ANGIOPLASTY WITH STENT PLACEMENT      CYSTOSCOPY W/ RETROGRADES Bilateral 2/24/2020    Procedure: CYSTOSCOPY, WITH RETROGRADE PYELOGRAM;  Surgeon: Nuha Soto MD;  Location: [facility] OR;  Service: Urology;  Laterality: Bilateral;    ENDOBRONCHIAL ULTRASOUND N/A 6/2/2021    Procedure: ENDOBRONCHIAL ULTRASOUND (EBUS);  Surgeon: Rob Arrington MD;  [facility] END[facility];  Service: Pulmonary;  Laterality: N/A;    EPIDURAL STEROID INJECTION INTO CERVICAL SPINE N/A 2/19/2019    Procedure: Injection-steroid-epidural-cervical C7-T1;  Surgeon: Marcelino Monroe MD;  Location: [facility] OR;  Service: Pain Management;  Laterality: N/A;    INJECTION OF ANESTHETIC AGENT AROUND MEDIAL BRANCH NERVES INNERVATING LUMBAR FACET JOINT Bilateral 6/4/2018    Procedure: MEDIAL BRANCH, LUMBAR L3,4,5;  Surgeon: Marcelino Monroe MD;  Location: [facility] OR;  Service: Pain Management;  Laterality: Bilateral;  L3, 4, 5    MEDIASTINOSCOPY      RADIOFREQUENCY ABLATION OF LUMBAR MEDIAL BRANCH NERVE AT SINGLE LEVEL Bilateral 7/16/2018    Procedure: RADIOFREQUENCY ABLATION, NERVE, MEDIAL BRANCH, LUMBAR, 1 LEVEL;  Surgeon: Marcelino Monroe MD;  Location: [facility] OR;  Service: Pain Management;  Laterality: Bilateral;  L3, 4, 5  lumbar  [employer] pain management generator  SN [id number]  80 degrees for 75 seconds x2    TRANSRECTAL ULTRASOUND OF PROSTATE WITH INSERTION OF GOLD FIDUCIAL MARKER N/A 2/24/2020    Procedure: ULTRASOUND, PROSTATE, RECTAL APPROACH, WITH GOLD FIDUCIAL MARKER INSERTION;  Surgeon: Nuha Soto MD;  Location: [facility] OR;  Service: Urology;  Laterality: N/A;       Time Tracking:     OT Date of Treatment: 06/09/22  OT Start Time: 0916  OT Stop Time: 0947  OT Total Time (min): 31 min    Billable Minutes:Evaluation 8  Self Care/Home Management 23    6/9/2022

## 2022-06-09 NOTE — HPI
Per Chart:     HPI: Tyler Todd is a 81 y.o. male with a past medical history of COPD, HTN, HLD, DM type 2, CAD, prostate cancer and lung cancer presenting with fatigue, decreased appetite, confusion, bowel and bladder incontinence for the past 4 days.  Patient's wife reports he has been progressively becoming weaker for the past month and having frequent falls at home.  She states he has a history of 5 strokes and difficulty swallowing food without coughing.  She states his confusion and weakness started 4 days ago and has progressed.  ED workup revealed creatinine that is elevated from baseline.  Chest x-ray showed no acute abnormality.  CT Head showed an old 7 mm lacunar infarct, however it is new since the prior CT of July 2020. Patient was referred to Hospital Medicine and seen in the ED.  History limited due to altered mental status.  Patient is oriented to self only head and denies chest pain, shortness a breath, nausea, vomiting, diarrhea, fevers, and chills.  Patient's wife states she is unable to care for patient at home due to his increased weakness, confusion, and frequent falls.  Patient will be admitted to Hospital Medicine for further evaluation and management.

## 2022-06-09 NOTE — PT/OT/SLP EVAL
Speech Language Pathology Evaluation  Bedside Swallow    Patient Name:  Tyler Todd   MRN:  [id number]  Admitting Diagnosis: Altered mental state    Recommendations:                 General Recommendations:  Dysphagia therapy and Modified barium swallow study  Diet recommendations:  Dental Soft, Soft & Bite Sized Diet - IDDSI Level 6 (no fresh/canned fruits x bananas), Thin   Aspiration Precautions: 1 bite/sip at a time, Assistance with meals, Eliminate distractions, Feed only when awake/alert, HOB to 90 degrees, Small bites/sips and Strict aspiration precautions   General Precautions: Standard, aspiration, fall, vision impaired  Communication strategies:  frequent reorientation    History:     Past Medical History:   Diagnosis Date    Back pain     Cancer 2012    lung cancer chemo and radiation    COPD (chronic obstructive pulmonary disease)     Coronary artery disease 2002, 2004    s/p 3 stents;  Dr. Interiano    Diabetes mellitus, type 2     Digestive disorder     Hiatal hernia     History of lung cancer 7/6/2021    Hyperlipidemia     Hypertension     Lung cancer     Neck pain     Prostate CA        Past Surgical History:   Procedure Laterality Date    ANKLE SURGERY Right 1970s    CORONARY ANGIOPLASTY WITH STENT PLACEMENT      CYSTOSCOPY W/ RETROGRADES Bilateral 2/24/2020    Procedure: CYSTOSCOPY, WITH RETROGRADE PYELOGRAM;  Surgeon: Nuha Soto MD;  Location: [facility];  Service: Urology;  Laterality: Bilateral;    ENDOBRONCHIAL ULTRASOUND N/A 6/2/2021    Procedure: ENDOBRONCHIAL ULTRASOUND (EBUS);  Surgeon: Rob Arrington MD;  Location: [facility];  Service: Pulmonary;  Laterality: N/A;    EPIDURAL STEROID INJECTION INTO CERVICAL SPINE N/A 2/19/2019    Procedure: Injection-steroid-epidural-cervical C7-T1;  Surgeon: Marcelino Monroe MD;  Location: [facility] OR;  Service: Pain Management;  Laterality: N/A;    INJECTION OF ANESTHETIC AGENT AROUND MEDIAL BRANCH NERVES INNERVATING LUMBAR FACET JOINT  Bilateral 6/4/2018    Procedure: MEDIAL BRANCH, LUMBAR L3,4,5;  Surgeon: Marcelino Monreo MD;  Location: [facility] OR;  Service: Pain Management;  Laterality: Bilateral;  L3, 4, 5    MEDIASTINOSCOPY      RADIOFREQUENCY ABLATION OF LUMBAR MEDIAL BRANCH NERVE AT SINGLE LEVEL Bilateral 7/16/2018    Procedure: RADIOFREQUENCY ABLATION, NERVE, MEDIAL BRANCH, LUMBAR, 1 LEVEL;  Surgeon: Marcelino Monroe MD;  Location: [facility] OR;  Service: Pain Management;  Laterality: Bilateral;  L3, 4, 5  lumbar  [employer] pain management generator  SN [id number]  80 degrees for 75 seconds x2    TRANSRECTAL ULTRASOUND OF PROSTATE WITH INSERTION OF GOLD FIDUCIAL MARKER N/A 2/24/2020    Procedure: ULTRASOUND, PROSTATE, RECTAL APPROACH, WITH GOLD FIDUCIAL MARKER INSERTION;  Surgeon: Nuha Soto MD;  Location: [facility] OR;  Service: Urology;  Laterality: N/A;       Social History: Patient lives with wife.    Prior Intubation HX:  None this admit    Modified Barium Swallow: None in Epic    Imaging:  CT Head Without Contrast   Final Result   Abnormal      Moderate brain atrophy with deep white matter ischemic changes.  A 7 mm lacunar infarct is of near CSF density in the right thalamus suggesting this is old, however it is new since the prior CT of July 18, 2020.      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      No intracranial hemorrhage or hematoma is noted.      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         Electronically signed by: Jose Pinedo MD   Date:    06/08/2022   Time:    18:06      X-Ray Chest AP Portable   Final Result      No acute abnormality.         Electronically signed by: Jose Pinedo MD   Date:    06/08/2022  "  Time:    17:28      MRI Brain Without Contrast    (Results Pending)        Prior diet: Regular/thin.    Subjective     "I want something to eat."  "He's not choking any now." (Later in day when wife feeding pt lunch)      Pain/Comfort:  · Pain Rating 1: 0/10    Respiratory Status: Room air    Objective:   Pt seen for clinical swallow evaluation. He is awake and cooperative. Mildly agitated. Oriented o self and place only. Observed wife in room later in day. She reports pt with frequent coughing during meals (solids and liquids). Recent CVA 4/2022.     Oral Musculature Evaluation  · Oral Musculature: right weakness  · Dentition: present and adequate, scattered dentition (poor dental hygeine)  · Secretion Management: adequate  · Mucosal Quality: adequate  · Mandibular Strength and Mobility: WFL  · Oral Labial Strength and Mobility: impaired pursing, impaired retraction  · Lingual Strength and Mobility: impaired protrusion  · Velar Elevation: WFL  · Volitional Cough: adequate  · Volitional Swallow: able to palpate larygneal rise  · Voice Prior to PO Intake: clear    Bedside Swallow Eval:   Consistencies Assessed:  · Thin liquids --via tsp, cup and straw  · Nectar thick liquids --via tsp, cup and straw  · Puree --applesauce  · Mixed consistencies --diced peaches  · Solids --eliot cracker     Oral Phase:   · WFL    Pharyngeal Phase:   · coughing/choking x1 with water via straw  · cough x1 with diced peaches    Compensatory Strategies  · slow pace, small bites/sips    Treatment: Pt/family educated re: results/recs of evaluation, MBSS, aspiration precautions, SLP role and POC. Receptive to information provided.     Assessment:     Tyler Todd is a 81 y.o. male with an SLP diagnosis of Dysphagia.  He presents with s/s possible pharyngeal dysphagia. REC Dental soft textures (IDDSI 6) with thin liquids. No fresh/canned fruit x bananas. Consider MBSS for further evaluation in pt with recent CVA and wife's report of " frequent coughing with PO. Will await orders .    Goals:   Multidisciplinary Problems     SLP Goals        Problem: SLP    Goal Priority Disciplines Outcome   SLP Goal     SLP Ongoing, Progressing   Description: 1. Pt will undergo MBSS to objectively assess swallow physiology and determine presence/severity of dysphagia.    2. The patient will demonstrate use of aspiration precautions/compensatory strategies during meals with SPV.                      Plan:     · Patient to be seen:  5 x/week   · Plan of Care expires:  06/23/22  · Plan of Care reviewed with:  patient, spouse   · SLP Follow-Up:  Yes       Discharge recommendations:  rehabilitation facility   Barriers to Discharge:  Level of Skilled Assistance Needed , and Safety Awareness .    Time Tracking:     SLP Treatment Date:   06/09/22  Speech Start Time:  1100  Speech Stop Time:  1125     Speech Total Time (min):  25 min    Billable Minutes: Eval Swallow and Oral Function 25 and Total Time 25    06/09/2022

## 2022-06-09 NOTE — HPI
Tyler Todd is a 81 y.o. male with a past medical history of COPD, HTN, HLD, DM type 2, CAD, prostate cancer and lung cancer presenting with fatigue, decreased appetite, confusion, bowel and bladder incontinence for the past 4 days.  Patient's wife reports he has been progressively becoming weaker for the past month and having frequent falls at home.  She states he has a history of 5 strokes and difficulty swallowing food without coughing.  She states his confusion and weakness started 4 days ago and has progressed.  ED workup revealed creatinine that is elevated from baseline.  Chest x-ray showed no acute abnormality.  CT Head showed an old 7 mm lacunar infarct, however it is new since the prior CT of July 2020. Patient was referred to Hospital Medicine and seen in the ED.  History limited due to altered mental status.  Patient is oriented to self only head and denies chest pain, shortness a breath, nausea, vomiting, diarrhea, fevers, and chills.  Patient's wife states she is unable to care for patient at home due to his increased weakness, confusion, and frequent falls.  Patient will be admitted to Hospital Medicine for further evaluation and management.

## 2022-06-09 NOTE — PLAN OF CARE
Referrals sent to [facility], [facility], [facility], and [facility] rehab.      Pt so far accepted at [facility] per Rebeca ([phone]) and can admit tomorrow       06/09/22 3432   Post-Acute Status   Post-Acute Authorization Placement   Post-Acute Placement Status Referrals Sent

## 2022-06-09 NOTE — ASSESSMENT & PLAN NOTE
Patient is chronically on statin.will continue for now. Monitor clinically. Last LDL was   Lab Results   Component Value Date    LDLCALC 75.8 12/02/2021

## 2022-06-09 NOTE — ASSESSMENT & PLAN NOTE
Unspecified altered mental status    Neuro checks  Neuro consult  Avoid benzo's, opiods and other sedatives

## 2022-06-09 NOTE — NURSING
Message sent to Dr Nayak via secure chat requesting Nicotine patch per Resp. Nicotine patch order placed by Dr Nayak.

## 2022-06-09 NOTE — SUBJECTIVE & OBJECTIVE
Past Medical History:   Diagnosis Date    Back pain     Cancer 2012    lung cancer chemo and radiation    COPD (chronic obstructive pulmonary disease)     Coronary artery disease 2002, 2004    s/p 3 stents;  Dr. Interiano    Diabetes mellitus, type 2     Digestive disorder     Hiatal hernia     History of lung cancer 7/6/2021    Hyperlipidemia     Hypertension     Lung cancer     Neck pain     Prostate CA        Past Surgical History:   Procedure Laterality Date    ANKLE SURGERY Right 1970s    CORONARY ANGIOPLASTY WITH STENT PLACEMENT      CYSTOSCOPY W/ RETROGRADES Bilateral 2/24/2020    Procedure: CYSTOSCOPY, WITH RETROGRADE PYELOGRAM;  Surgeon: Nuha Soto MD;  Location: [facility] OR;  Service: Urology;  Laterality: Bilateral;    ENDOBRONCHIAL ULTRASOUND N/A 6/2/2021    Procedure: ENDOBRONCHIAL ULTRASOUND (EBUS);  Surgeon: Rob Arrington MD;  Location: [facility];  Service: Pulmonary;  Laterality: N/A;    EPIDURAL STEROID INJECTION INTO CERVICAL SPINE N/A 2/19/2019    Procedure: Injection-steroid-epidural-cervical C7-T1;  Surgeon: Marcelino Monroe MD;  Location: [facility] OR;  Service: Pain Management;  Laterality: N/A;    INJECTION OF ANESTHETIC AGENT AROUND MEDIAL BRANCH NERVES INNERVATING LUMBAR FACET JOINT Bilateral 6/4/2018    Procedure: MEDIAL BRANCH, LUMBAR L3,4,5;  Surgeon: Marcelino Monroe MD;  Location: [facility] OR;  Service: Pain Management;  Laterality: Bilateral;  L3, 4, 5    MEDIASTINOSCOPY      RADIOFREQUENCY ABLATION OF LUMBAR MEDIAL BRANCH NERVE AT SINGLE LEVEL Bilateral 7/16/2018    Procedure: RADIOFREQUENCY ABLATION, NERVE, MEDIAL BRANCH, LUMBAR, 1 LEVEL;  Surgeon: Marcelino Monroe MD;  Location: [facility] OR;  Service: Pain Management;  Laterality: Bilateral;  L3, 4, 5  lumbar  [employer] pain management generator  SN [id number]  80 degrees for 75 seconds x2    TRANSRECTAL ULTRASOUND OF PROSTATE WITH INSERTION OF GOLD FIDUCIAL MARKER N/A 2/24/2020    Procedure: ULTRASOUND, PROSTATE, RECTAL APPROACH, WITH GOLD FIDUCIAL  MARKER INSERTION;  Surgeon: Nuha Soto MD;  Location: [facility];  Service: Urology;  Laterality: N/A;       Review of patient's allergies indicates:   Allergen Reactions    Doxycycline Diarrhea     Severe diarrhea    Sulfa (sulfonamide antibiotics) Rash     Other reaction(s): Itching    Sulfasalazine Rash     Rash and itching mild       Current Facility-Administered Medications on File Prior to Encounter   Medication    sodium chloride 0.9% injection     Current Outpatient Medications on File Prior to Encounter   Medication Sig    atorvastatin (LIPITOR) 20 MG tablet Take 20 mg by mouth once daily.    ELIQUIS 5 mg Tab Take 5 mg by mouth 2 (two) times daily.    irbesartan (AVAPRO) 300 MG tablet Take 1 tablet (300 mg total) by mouth once daily.    levETIRAcetam (KEPPRA) 500 MG Tab Take 500 mg by mouth 2 (two) times daily.    metoprolol succinate (TOPROL-XL) 100 MG 24 hr tablet Take 100 mg by mouth 2 (two) times daily.    pregabalin (LYRICA) 100 MG capsule Take 100 mg by mouth 2 (two) times daily.     repaglinide (PRANDIN) 1 MG tablet Take 1 mg by mouth 3 (three) times daily before meals.    acetaminophen (TYLENOL) 500 MG tablet Take 1,000 mg by mouth every 6 (six) hours as needed for Pain.    nitroGLYCERIN (NITROSTAT) 0.4 MG SL tablet Place 0.4 mg under the tongue every 5 (five) minutes as needed. As directed     Family History       Problem Relation (Age of Onset)    COPD Father    Diabetes Mother, Father, Sister    Emphysema Father    Heart attack Mother, Father    Peripheral vascular disease Mother          Tobacco Use    Smoking status: Current Every Day Smoker     Packs/day: 1.00     Years: 57.00     Pack years: 57.00     Types: Cigarettes    Smokeless tobacco: Never Used   Substance and Sexual Activity    Alcohol use: No    Drug use: No    Sexual activity: Never     Review of Systems   Unable to perform ROS: Mental status change   Constitutional:  Positive for appetite change and fatigue. Negative for  activity change, chills and fever.   HENT:  Negative for congestion, sore throat and trouble swallowing.    Eyes:  Negative for photophobia and visual disturbance.   Respiratory:  Negative for cough, chest tightness and shortness of breath.    Cardiovascular:  Negative for chest pain, palpitations and leg swelling.   Gastrointestinal:  Negative for abdominal pain, diarrhea and nausea.   Genitourinary:  Negative for dysuria, flank pain and hematuria.   Musculoskeletal:  Negative for back pain.   Neurological:  Positive for weakness. Negative for dizziness and headaches.   Psychiatric/Behavioral:  Positive for confusion.    Objective:     Vital Signs (Most Recent):  Temp: 97.3 °F (36.3 °C) (06/09/22 0046)  Pulse: 70 (06/09/22 0046)  Resp: 18 (06/09/22 0046)  BP: (!) 149/65 (06/09/22 0046)  SpO2: 98 % (06/09/22 0046)   Vital Signs (24h Range):  Temp:  [97.3 °F (36.3 °C)-98.3 °F (36.8 °C)] 97.3 °F (36.3 °C)  Pulse:  [70-88] 70  Resp:  [16-18] 18  SpO2:  [97 %-100 %] 98 %  BP: (104-149)/(59-68) 149/65     Weight: 68.8 kg (151 lb 10.8 oz)  Body mass index is 21.76 kg/m².    Physical Exam  Vitals reviewed.   Constitutional:       Appearance: Normal appearance. He is normal weight.   HENT:      Head: Normocephalic.      Mouth/Throat:      Mouth: Mucous membranes are moist.      Pharynx: Oropharynx is clear.   Eyes:      Pupils: Pupils are equal, round, and reactive to light.   Cardiovascular:      Rate and Rhythm: Normal rate and regular rhythm.      Pulses: Normal pulses.   Pulmonary:      Effort: Pulmonary effort is normal.      Breath sounds: Normal breath sounds.   Abdominal:      General: Bowel sounds are normal.   Musculoskeletal:         General: Normal range of motion.      Cervical back: Normal range of motion.   Skin:     General: Skin is warm and dry.   Neurological:      Mental Status: He is alert. Mental status is at baseline. He is disoriented.      Comments: Oriented to self only   Psychiatric:         Mood  and Affect: Mood normal.         Speech: Speech normal.         Behavior: Behavior is agitated.         CRANIAL NERVES     CN III, IV, VI   Pupils are equal, round, and reactive to light.     Significant Labs: All pertinent labs within the past 24 hours have been reviewed.  CBC:   Recent Labs   Lab 06/08/22 1811   WBC 6.38   HGB 11.2*   HCT 35.0*        CMP:   Recent Labs   Lab 06/08/22 1811      K 4.6      CO2 24   GLU 85   BUN 27*   CREATININE 1.7*   CALCIUM 9.3   PROT 8.0   ALBUMIN 3.2*   BILITOT 0.8   ALKPHOS 109   AST 20   ALT 19   ANIONGAP 12   EGFRNONAA 38*       Significant Imaging: I have reviewed all pertinent imaging results/findings within the past 24 hours.  Imaging Results               CT Head Without Contrast (Final result)  Result time 06/08/22 18:06:53      Final result by Jose Pinedo Jr., MD (06/08/22 18:06:53)                   Impression:      Moderate brain atrophy with deep white matter ischemic changes.  A 7 mm lacunar infarct is of near CSF density in the right thalamus suggesting this is old, however it is new since the prior CT of July 18, 2020.    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    No intracranial hemorrhage or hematoma is noted.    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      Electronically signed by: Jose Pinedo MD  Date:    06/08/2022  Time:    18:06               Narrative:    EXAMINATION:  CT HEAD WITHOUT CONTRAST    CLINICAL HISTORY:  Mental status change, unknown cause;    TECHNIQUE:  Low dose axial images were obtained through the head.  Coronal and sagittal reformations were also performed. Contrast was not  administered.    COMPARISON:  CT head of July 18, 2020    FINDINGS:  No cranial fracture is identified.  Scalp edema or hematoma is not noted.    The basal cisterns are enlarged but clear and symmetric.  There is no midline shift.  There is enlargement of the lateral ventricles cerebral sulci and sylvian fissures consistent with moderate brain atrophy.  Hypodensity in the central white matter is consistent with deep white matter ischemic changes.  There is a moderate size focus of encephalomalacia of the left occipital lobe consistent with a left posterior cerebral artery infarct.  This is new since the prior CT of July 18, 2020.  Hemorrhage or mass effect is not seen.  There is a 7 mm CSF density lacunar infarct of the right thalamus which is new from the prior CT of July 18, 2020.  Other focal areas of increased or decreased CT density consistent with tumor, edema, CVA or hemorrhage is not seen.  No intracranial hemorrhage or hematoma is noted.    There is a calcified lesion adjacent to the medulla on the left which has progressively calcified since the prior CT scan and may represent an aneurysm of the left vertebral artery.                                       X-Ray Chest AP Portable (Final result)  Result time 06/08/22 17:28:26      Final result by Jose Pinedo Jr., MD (06/08/22 17:28:26)                   Impression:      No acute abnormality.      Electronically signed by: Jose Pinedo MD  Date:    06/08/2022  Time:    17:28               Narrative:    EXAMINATION:  XR CHEST AP PORTABLE    CLINICAL HISTORY:  Chest Pain;    TECHNIQUE:  Single frontal view of the chest was performed.    COMPARISON:  Chest of July 24, 2021.    FINDINGS:  The lungs are clear, with normal appearance of pulmonary vasculature and no pleural effusion or pneumothorax.    The cardiac silhouette is normal in size. The hilar and mediastinal contours are unremarkable.    Bones are intact.

## 2022-06-09 NOTE — ASSESSMENT & PLAN NOTE
Patient with Paroxysmal (<7 days) atrial fibrillation which is controlled currently with Calcium Channel Blocker. Patient is currently in sinus rhythm.[id number] Score: 4. Anticoagulation indicated. Anticoagulation done with Eliquis.

## 2022-06-09 NOTE — CARE UPDATE
06/09/22 1449   Tobacco Cessation Intervention   Do you use any type of tobacco product? Yes   Are you interested in quitting use of tobacco products? Thinking about quitting  (trying... down to 2 cigs/day)   Are you interested in Nicotine Replacement for symptom relief? Yes   $ Smoking Cessation Charges Smoking Cessation - Intermediate (CTTS)   Patient requesting a Nicotine patch.  RN notified of request.

## 2022-06-09 NOTE — PLAN OF CARE
Problem: Adult Inpatient Plan of Care  Goal: Plan of Care Review  Outcome: Ongoing, Progressing  Goal: Patient-Specific Goal (Individualized)  Outcome: Ongoing, Progressing  Goal: Absence of Hospital-Acquired Illness or Injury  Outcome: Ongoing, Progressing  Goal: Optimal Comfort and Wellbeing  Outcome: Ongoing, Progressing  Goal: Readiness for Transition of Care  Outcome: Ongoing, Progressing     Problem: Fluid and Electrolyte Imbalance (Acute Kidney Injury/Impairment)  Goal: Fluid and Electrolyte Balance  Outcome: Ongoing, Progressing    Updated patient on plan of care, call light in reach.

## 2022-06-09 NOTE — PLAN OF CARE
Problem: Occupational Therapy  Goal: Occupational Therapy Goal  Description: Goals to be met by: 6/30/2022     Patient will increase functional independence with ADLs by performing:    LE Dressing with Modified [place].  Grooming while standing at sink with Modified [place].  Toileting from toilet with Modified [place] for hygiene and clothing management.   Supine to sit with Modified [place].  Toilet transfer to toilet with Modified [place].    Outcome: Ongoing, Progressing

## 2022-06-09 NOTE — CARE UPDATE
06/09/22 0910   Patient Assessment/Suction   Level of Consciousness (AVPU) alert   Respiratory Effort Unlabored;Normal   All Lung Fields Breath Sounds diminished   Rhythm/Pattern, Respiratory unlabored   PRE-TX-O2   O2 Device (Oxygen Therapy) room air   SpO2 96 %   Pulse Oximetry Type Intermittent   $ Pulse Oximetry - Multiple Charge Pulse Oximetry - Multiple   Pulse 68   Resp 18   Aerosol Therapy   $ Aerosol Therapy Charges PRN treatment not required   Respiratory Treatment Status (SVN) PRN treatment not required

## 2022-06-09 NOTE — PLAN OF CARE
[facility]  Initial Discharge Assessment       Primary Care Provider: Sánchez Mast DO    Admission Diagnosis: Encephalopathy [G93.40]  Chest pain [R07.9]  Altered mental status, unspecified [R41.82]  Cerebrovascular accident (CVA), unspecified mechanism [I63.9]  AMS (altered mental status) [R41.82]    Admission Date: 6/8/2022  Expected Discharge Date: 6/10/2022    Discharge Barriers Identified: None    Payor: MEDICARE / Plan: MEDICARE PART A & B / Product Type: Government /     Extended Emergency Contact Information  Primary Emergency Contact: Batsheva Todd  Address: [address] [place] [address] [place]  Mobile Phone: [phone]  Relation: Spouse  Preferred language: English   needed? No    Discharge Plan A: Rehab  Discharge Plan B: Home Health, Rehab      Completed DC assessment with pt's spouse, Batsheva, at bedside. Pt not able to participate in assessment due to confusion (confused at baseline). Verified information on facesheet as correct. Reports that pt lives at listed address with her. Verified PCP and pharm. Denies HD. DME- RW, WC, SC,BSC. Reports that pt requires assistance with ambulating and has been falling a lot. Reports that she can no longer care for pt - states she is support [facility] but [facility] not in place. On eliquis. Verified insurance as medicare A&B as well as [employer] supplement and [facility]. [facility] [place] and [facility] in [place] (Dr. Villalta). Discussed SNF/assisted NH and inpt rehab. Ideally she does not want to place pt to live in a nursing home but is interested respite care or inpt rehab. Explained that pt is not eligible for SNF due to being in observation status and medicare. Verbalized understanding- would like placement close to home but verbalized that may have to go [facility]. DC plan is inpt rehab. CM following      Initial Assessment (most recent)     Adult Discharge  Assessment - 06/09/22 1519        Discharge Assessment    Assessment Type Discharge Planning Assessment     Confirmed/corrected address, phone number and insurance Yes     Confirmed Demographics Correct on Facesheet     Source of Information family     Does patient/caregiver understand observation status Yes     Communicated SAY with patient/caregiver Yes     Reason For Admission AMS/falls at home     Lives With spouse     Facility Arrived From: home     Do you expect to return to your current living situation? No     Do you have help at home or someone to help you manage your care at home? --     Prior to hospitilization cognitive status: Not Oriented to Time;Not Oriented to Place     Current cognitive status: Not Oriented to Place;Not Oriented to Time     Walking or Climbing Stairs Difficulty ambulation difficulty, requires equipment     Dressing/Bathing Difficulty bathing difficulty, requires equipment     Equipment Currently Used at Home bedside commode;walker, rolling;wheelchair;cane, straight     Readmission within 30 days? No     Patient currently being followed by outpatient case management? No     Do you currently have service(s) that help you manage your care at home? No     Do you take prescription medications? Yes     Do you have prescription coverage? Yes     Coverage [facility]     Do you have any problems affording any of your prescribed medications? No     Is the patient taking medications as prescribed? yes     How do you get to doctors appointments? family or friend will provide     Are you on dialysis? No     Do you take coumadin? No     Discharge Plan A Rehab     Discharge Plan B Home Health;Rehab     DME Needed Upon Discharge  none     Discharge Plan discussed with: Spouse/sig other     Discharge Barriers Identified None

## 2022-06-09 NOTE — ASSESSMENT & PLAN NOTE
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  Patient reports compliance with this Eliquis.  Wife does report he has trouble swallowing.  Will repeat MRI brain due to patient's risk factors for stroke.  EEG pending also to evaluate patient's for seizures encephalopathy.  Neurological labs also pending.  Will follow up.       Patient to follow up with [facility] at [phone] within 3 days from discharge.     Stroke education was provided including stroke risk factors modification and any acute neurological changes including weakness, confusion, visual changes to come straight to the ER.     All questions were answered.

## 2022-06-09 NOTE — PHARMACY MED REC
"Admission Medication History     The home medication history was taken by Maira Watkins CPhT.    Medication history obtained from [facility]     You may go to "Admission" then "Reconcile Home Medications" tabs to review and/or act upon these items.      The home medication list has been updated by the Pharmacy department.    Please read ALL comments highlighted in yellow.    Please address this information as you see fit.     Feel free to contact us if you have any questions or require assistance.      The medications listed below were removed from the home medication list.  Please reorder if appropriate:  Patient reports no longer taking the following medication(s):   Albuterol Inhaler   Amlodipine 10mg   Vitamin B12   Tradjenta 5mg   Oxycodone 5mg   Tamsulosin 0.4mg   Silvestre Inhaler       Maira Watkins CPhT.  [phone]      .          "

## 2022-06-10 PROBLEM — I63.9 ISCHEMIC EMBOLIC STROKE: Status: ACTIVE | Noted: 2022-06-10

## 2022-06-10 LAB
ALBUMIN SERPL BCP-MCNC: 2.9 G/DL (ref 3.5–5.2)
ALP SERPL-CCNC: 95 U/L (ref 55–135)
ALT SERPL W/O P-5'-P-CCNC: 14 U/L (ref 10–44)
AMMONIA PLAS-SCNC: 13 UMOL/L (ref 10–50)
ANION GAP SERPL CALC-SCNC: 11 MMOL/L (ref 8–16)
AORTIC ROOT ANNULUS: 3.37 CM
AORTIC VALVE CUSP SEPERATION: 1.82 CM
AST SERPL-CCNC: 17 U/L (ref 10–40)
AV INDEX (PROSTH): 0.78
AV MEAN GRADIENT: 3 MMHG
AV PEAK GRADIENT: 5 MMHG
AV VALVE AREA: 2.65 CM2
AV VELOCITY RATIO: 0.76
BASOPHILS # BLD AUTO: 0.02 K/UL (ref 0–0.2)
BASOPHILS NFR BLD: 0.5 % (ref 0–1.9)
BILIRUB SERPL-MCNC: 0.5 MG/DL (ref 0.1–1)
BSA FOR ECHO PROCEDURE: 1.84 M2
BUN SERPL-MCNC: 24 MG/DL (ref 8–23)
CALCIUM SERPL-MCNC: 8.8 MG/DL (ref 8.7–10.5)
CHLORIDE SERPL-SCNC: 107 MMOL/L (ref 95–110)
CO2 SERPL-SCNC: 25 MMOL/L (ref 23–29)
CREAT SERPL-MCNC: 1.4 MG/DL (ref 0.5–1.4)
CV ECHO LV RWT: 0.53 CM
DIFFERENTIAL METHOD: ABNORMAL
DOP CALC AO PEAK VEL: 1.1 M/S
DOP CALC AO VTI: 18.26 CM
DOP CALC LVOT AREA: 3.4 CM2
DOP CALC LVOT DIAMETER: 2.08 CM
DOP CALC LVOT PEAK VEL: 0.84 M/S
DOP CALC LVOT STROKE VOLUME: 48.46 CM3
DOP CALC MV VTI: 20.52 CM
DOP CALCLVOT PEAK VEL VTI: 14.27 CM
E WAVE DECELERATION TIME: 225.95 MSEC
E/A RATIO: 0.77
E/E' RATIO: 10.92 M/S
ECHO LV POSTERIOR WALL: 1.03 CM (ref 0.6–1.1)
EJECTION FRACTION: 75 %
EOSINOPHIL # BLD AUTO: 0.1 K/UL (ref 0–0.5)
EOSINOPHIL NFR BLD: 1.6 % (ref 0–8)
ERYTHROCYTE [DISTWIDTH] IN BLOOD BY AUTOMATED COUNT: 14.4 % (ref 11.5–14.5)
EST. GFR  (AFRICAN AMERICAN): 55 ML/MIN/1.73 M^2
EST. GFR  (NON AFRICAN AMERICAN): 47 ML/MIN/1.73 M^2
FOLATE SERPL-MCNC: 9.1 NG/ML (ref 4–24)
FRACTIONAL SHORTENING: 28 % (ref 28–44)
GLUCOSE SERPL-MCNC: 103 MG/DL (ref 70–110)
HCT VFR BLD AUTO: 28 % (ref 40–54)
HGB BLD-MCNC: 9.6 G/DL (ref 14–18)
IMM GRANULOCYTES # BLD AUTO: 0.01 K/UL (ref 0–0.04)
IMM GRANULOCYTES NFR BLD AUTO: 0.2 % (ref 0–0.5)
INTERVENTRICULAR SEPTUM: 0.87 CM (ref 0.6–1.1)
LEFT ATRIUM SIZE: 3.5 CM
LEFT INTERNAL DIMENSION IN SYSTOLE: 2.8 CM (ref 2.1–4)
LEFT VENTRICLE DIASTOLIC VOLUME INDEX: 35.94 ML/M2
LEFT VENTRICLE DIASTOLIC VOLUME: 66.49 ML
LEFT VENTRICLE MASS INDEX: 62 G/M2
LEFT VENTRICLE SYSTOLIC VOLUME INDEX: 16 ML/M2
LEFT VENTRICLE SYSTOLIC VOLUME: 29.58 ML
LEFT VENTRICULAR INTERNAL DIMENSION IN DIASTOLE: 3.91 CM (ref 3.5–6)
LEFT VENTRICULAR MASS: 114.04 G
LV LATERAL E/E' RATIO: 10.14 M/S
LV SEPTAL E/E' RATIO: 11.83 M/S
LYMPHOCYTES # BLD AUTO: 0.6 K/UL (ref 1–4.8)
LYMPHOCYTES NFR BLD: 12.9 % (ref 18–48)
MCH RBC QN AUTO: 31.5 PG (ref 27–31)
MCHC RBC AUTO-ENTMCNC: 34.3 G/DL (ref 32–36)
MCV RBC AUTO: 92 FL (ref 82–98)
MONOCYTES # BLD AUTO: 0.6 K/UL (ref 0.3–1)
MONOCYTES NFR BLD: 12.4 % (ref 4–15)
MV MEAN GRADIENT: 1 MMHG
MV PEAK A VEL: 0.92 M/S
MV PEAK E VEL: 0.71 M/S
MV PEAK GRADIENT: 5 MMHG
MV STENOSIS PRESSURE HALF TIME: 69.38 MS
MV VALVE AREA BY CONTINUITY EQUATION: 2.36 CM2
MV VALVE AREA P 1/2 METHOD: 3.17 CM2
NEUTROPHILS # BLD AUTO: 3.2 K/UL (ref 1.8–7.7)
NEUTROPHILS NFR BLD: 72.4 % (ref 38–73)
NRBC BLD-RTO: 0 /100 WBC
PISA TR MAX VEL: 2.52 M/S
PLATELET # BLD AUTO: 146 K/UL (ref 150–450)
PMV BLD AUTO: 11.4 FL (ref 9.2–12.9)
POCT GLUCOSE: 153 MG/DL (ref 70–110)
POTASSIUM SERPL-SCNC: 4 MMOL/L (ref 3.5–5.1)
PROT SERPL-MCNC: 7.2 G/DL (ref 6–8.4)
PV PEAK VELOCITY: 1 CM/S
RA PRESSURE: 3 MMHG
RBC # BLD AUTO: 3.05 M/UL (ref 4.6–6.2)
RIGHT VENTRICULAR END-DIASTOLIC DIMENSION: 3 CM
RPR SER QL: NORMAL
RV TISSUE DOPPLER FREE WALL SYSTOLIC VELOCITY 1 (APICAL 4 CHAMBER VIEW): 16.52 CM/S
SODIUM SERPL-SCNC: 143 MMOL/L (ref 136–145)
TDI LATERAL: 0.07 M/S
TDI SEPTAL: 0.06 M/S
TDI: 0.07 M/S
TR MAX PG: 25 MMHG
TRICUSPID ANNULAR PLANE SYSTOLIC EXCURSION: 1.87 CM
TV REST PULMONARY ARTERY PRESSURE: 28 MMHG
VIT B12 SERPL-MCNC: 884 PG/ML (ref 210–950)
WBC # BLD AUTO: 4.43 K/UL (ref 3.9–12.7)

## 2022-06-10 PROCEDURE — S4991 NICOTINE PATCH NONLEGEND: HCPCS | Performed by: HOSPITALIST

## 2022-06-10 PROCEDURE — 99900035 HC TECH TIME PER 15 MIN (STAT)

## 2022-06-10 PROCEDURE — 94760 N-INVAS EAR/PLS OXIMETRY 1: CPT

## 2022-06-10 PROCEDURE — 11000001 HC ACUTE MED/SURG PRIVATE ROOM

## 2022-06-10 PROCEDURE — 94761 N-INVAS EAR/PLS OXIMETRY MLT: CPT

## 2022-06-10 PROCEDURE — 85025 COMPLETE CBC W/AUTO DIFF WBC: CPT | Performed by: NURSE PRACTITIONER

## 2022-06-10 PROCEDURE — 82140 ASSAY OF AMMONIA: CPT | Performed by: NURSE PRACTITIONER

## 2022-06-10 PROCEDURE — 80053 COMPREHEN METABOLIC PANEL: CPT | Performed by: NURSE PRACTITIONER

## 2022-06-10 PROCEDURE — 97116 GAIT TRAINING THERAPY: CPT | Mod: CQ

## 2022-06-10 PROCEDURE — 95819 EEG AWAKE AND ASLEEP: CPT

## 2022-06-10 PROCEDURE — 82607 VITAMIN B-12: CPT | Performed by: NURSE PRACTITIONER

## 2022-06-10 PROCEDURE — 97110 THERAPEUTIC EXERCISES: CPT

## 2022-06-10 PROCEDURE — 84425 ASSAY OF VITAMIN B-1: CPT | Performed by: NURSE PRACTITIONER

## 2022-06-10 PROCEDURE — 86592 SYPHILIS TEST NON-TREP QUAL: CPT | Performed by: NURSE PRACTITIONER

## 2022-06-10 PROCEDURE — 25000003 PHARM REV CODE 250: Performed by: NURSE PRACTITIONER

## 2022-06-10 PROCEDURE — 25000003 PHARM REV CODE 250: Performed by: HOSPITALIST

## 2022-06-10 PROCEDURE — 92611 MOTION FLUOROSCOPY/SWALLOW: CPT

## 2022-06-10 PROCEDURE — 82746 ASSAY OF FOLIC ACID SERUM: CPT | Performed by: NURSE PRACTITIONER

## 2022-06-10 RX ORDER — ATORVASTATIN CALCIUM 40 MG/1
40 TABLET, FILM COATED ORAL DAILY
Status: DISCONTINUED | OUTPATIENT
Start: 2022-06-11 | End: 2022-06-13

## 2022-06-10 RX ADMIN — DOCUSATE SODIUM AND SENNOSIDES 1 TABLET: 8.6; 5 TABLET, FILM COATED ORAL at 09:06

## 2022-06-10 RX ADMIN — LOSARTAN POTASSIUM 100 MG: 100 TABLET, FILM COATED ORAL at 09:06

## 2022-06-10 RX ADMIN — NICOTINE 1 PATCH: 14 PATCH TRANSDERMAL at 09:06

## 2022-06-10 RX ADMIN — APIXABAN 2.5 MG: 2.5 TABLET, FILM COATED ORAL at 09:06

## 2022-06-10 RX ADMIN — LEVETIRACETAM 500 MG: 500 TABLET, FILM COATED ORAL at 09:06

## 2022-06-10 RX ADMIN — ATORVASTATIN CALCIUM 20 MG: 20 TABLET, FILM COATED ORAL at 09:06

## 2022-06-10 RX ADMIN — MELATONIN TAB 3 MG 6 MG: 3 TAB at 09:06

## 2022-06-10 NOTE — ASSESSMENT & PLAN NOTE
Unsure of cause.  Initial brain imaging unremarkable.  Lab results don't indicate a metabolic cause.  Will consult with neurology for help with diagnosis.

## 2022-06-10 NOTE — PT/OT/SLP PROGRESS
Occupational Therapy   Treatment    Name: Tyler Todd  MRN: [id number]  Admitting Diagnosis:  Encephalopathy       Recommendations:     Discharge Recommendations: rehabilitation facility  Discharge Equipment Recommendations:   (TBD)  Barriers to discharge:       Assessment:     Tyler Todd is a 81 y.o. male with a medical diagnosis of Encephalopathy.  He presents with the following performance deficits affecting function are weakness, impaired endurance, impaired self care skills, impaired functional mobilty, gait instability, impaired balance, decreased safety awareness, impaired cognition. Pt was agreeable to OT. Spouse present. OT provided instruction in graded BUE therapeutic exercises to increase strength and functional activity tolerance for participation in ADL's. Pt required cues and demonstration to complete.     Rehab Prognosis:  Good; patient would benefit from acute skilled OT services to address these deficits and reach maximum level of function.       Plan:     Patient to be seen 4 x/week to address the above listed problems via self-care/home management, therapeutic activities, therapeutic exercises  · Plan of Care Expires: 06/30/22  · Plan of Care Reviewed with: patient, spouse    Subjective     Pain/Comfort:  · Pain Rating 1: 0/10  · Pain Rating Post-Intervention 1: 0/10    Objective:     Communicated with: Nurse Freedman prior to session.  Patient found HOB elevated with bed alarm, peripheral IV upon OT entry to room.    General Precautions: Standard, aspiration, fall   Orthopedic Precautions:N/A   Braces: N/A  Respiratory Status: Room air     Occupational Performance:         [facility] 6 Click ADL:      Treatment & Education:  OT provided education in role of OT. Pt/spouse verbalized understanding and pt was agreeable to OT.  OT provided instruction in graded BUE therapeutic exercises to increase strength and functional activity tolerance for ADL's. Pt required cues and demonstration to  complete.    Patient left HOB elevated with all lines intact, call button in reach, bed alarm on and Spouse presentEducation:      GOALS:   Multidisciplinary Problems     Occupational Therapy Goals        Problem: Occupational Therapy    Goal Priority Disciplines Outcome Interventions   Occupational Therapy Goal     OT, PT/OT Ongoing, Progressing    Description: Goals to be met by: 6/30/2022     Patient will increase functional independence with ADLs by performing:    LE Dressing with Modified [place].  Grooming while standing at sink with Modified [place].  Toileting from toilet with Modified [place] for hygiene and clothing management.   Supine to sit with Modified [place].  Toilet transfer to toilet with Modified [place].                     Time Tracking:     OT Date of Treatment: 06/10/22  OT Start Time: 1337  OT Stop Time: 1355  OT Total Time (min): 18 min    Billable Minutes:Therapeutic Exercise 18    OT/ELIZABETH: OT          6/10/2022

## 2022-06-10 NOTE — ASSESSMENT & PLAN NOTE
Patient with Paroxysmal (<7 days) atrial fibrillation which is controlled currently with Calcium Channel Blocker. Patient is currently in sinus rhythm.[id number] Score: 4. Anticoagulation indicated. Anticoagulation done with Eliquis.

## 2022-06-10 NOTE — ASSESSMENT & PLAN NOTE
Patient's anemia is currently controlled. Has not received any PRBCs to date.. Etiology likely d/t CKD  Current CBC reviewed-   Lab Results   Component Value Date    HGB 9.6 (L) 06/09/2022    HCT 28.8 (L) 06/09/2022     Monitor serial CBC and transfuse if patient becomes hemodynamically unstable, symptomatic or H/H drops below 7/21.

## 2022-06-10 NOTE — ASSESSMENT & PLAN NOTE
Creatine stable for now. BMP reviewed- noted Estimated Creatinine Clearance: 36.4 mL/min (A) (based on SCr of 1.6 mg/dL (H)). according to latest data. Monitor UOP and serial BMP and adjust therapy as needed. Renally dose meds.

## 2022-06-10 NOTE — CARE UPDATE
06/09/22 1924   Patient Assessment/Suction   Level of Consciousness (AVPU) responds to voice   Respiratory Effort Normal   PRE-TX-O2   O2 Device (Oxygen Therapy) room air   SpO2 96 %   Pulse Oximetry Type Intermittent   $ Pulse Oximetry - Multiple Charge Pulse Oximetry - Multiple   Pulse 68   Resp 17   Aerosol Therapy   $ Aerosol Therapy Charges PRN treatment not required   Respiratory Treatment Status (SVN) PRN treatment not required

## 2022-06-10 NOTE — PROCEDURES
Modified Barium Swallow    Patient Name:  Tyler Todd   MRN:  [id number]      Recommendations:     Recommendations:                General Recommendations:  Dysphagia therapy  Diet recommendations:  Dental Soft, Soft & Bite Sized Diet - IDDSI Level 6 (no fresh/canned fruits x bananas, no mixed consistencies (cereal, soup, etc)), Nectar Thick   Aspiration Precautions: Assistance with meals and Assistance with thickening liquids, Double swallow with each bite/sip, Eliminate distractions, Meds whole 1 at a time, Small bites/sips and Standard aspiration precautions   General Precautions: Standard, aspiration, fall, nectar thick  Communication strategies:  none    Referral     Reason for Referral  Patient was referred for a Modified Barium Swallow Study to assess the efficiency of his/her swallow function, rule out aspiration and make recommendations regarding safe dietary consistencies, effective compensatory strategies, and safe eating environment.     Diagnosis: Encephalopathy       History:     Past Medical History:   Diagnosis Date    Back pain     Cancer 2012    lung cancer chemo and radiation    COPD (chronic obstructive pulmonary disease)     Coronary artery disease 2002, 2004    s/p 3 stents;  Dr. Interiano    Diabetes mellitus, type 2     Digestive disorder     Hiatal hernia     History of lung cancer 7/6/2021    Hyperlipidemia     Hypertension     Lung cancer     Neck pain     Prostate CA        Objective:     Current Respiratory Status: 06/10/22    Alert: yes    Cooperative: yes    Follows Directions: yes    Visualization  · Patient was seen in the lateral view    Oral Peripheral Examination  · Oral Musculature: right weakness  · Dentition: present and adequate, scattered dentition (poor oral hygeine)  · Secretion Management: adequate  · Mucosal Quality: adequate  · Mandibular Strength and Mobility: WFL  · Oral Labial Strength and Mobility: functional pursing, impaired retraction  · Lingual  "Strength and Mobility: impaired protrusion, impaired strength  · Velar Elevation: WFL  · Volitional Cough: adequate  · Volitional Swallow: able to palpate larygneal rise  · Voice Prior to PO Intake: clear       06/10/22 1538   Speech Time Calculation   Speech Start Time 1315   Speech Stop Time 1330   Speech Total (min) 15 min   General Information   SLP Treatment Date 06/10/22   Chest X-ray results no acute findings   CT/MRI results "1. Motion limited study.  Multiple foci of acute/recent ischemia/infarction involving the medial left occipital lobe, right paramidline abrahan, left red nucleus, and right hippocampus.  Given multiple vascular territories of involvement, embolic disease (either carotid plaque or cardiac origin) cannot be excluded.  2. Supratentorial cerebral volume loss which is prominent even for the patient's advanced chronologic age.  3. Multiple foci of remote lacunar infarction within the bilateral thalami, abrahan, bilateral cerebellar peduncles, and left and right cerebellar hemispheres."   Current Respiratory Status room air   General Precautions aspiration;fall;nectar thick   Current Diet   Current Diet Textures   (IDDSI 6)   Current Liquid Consistencies Thin   Subjective   Patient states No c/o   Family states Wife endorses frequent coughing with PO since recent CVA   Pain/Comfort   Pain Rating no pain   Oral Musculature Evaluation   Oral Musculature right weakness   Dentition present and adequate;scattered dentition  (poor oral hygiene)   Secretion Management adequate   Mucosal Quality adequate   Mandibular Strength and Mobility WFL   Oral Labial Strength and Mobility functional pursing;impaired retraction   Lingual Strength and Mobility impaired protrusion;impaired strength   Velar Elevation WFL        MBS Eval: Thin Liquid Trial   Mode of Presentation, Thin Liquid self fed  (5mL x2, 10mLx2, via cup and via straw)   Oral Prep/Phase, Thin Liquid premature spillage;piecemeal deglutition;decreased " bolus control   Oral Residue, Thin Liquid back posterior tongue;mid posterior tongue   Pharyngeal Phase, Thin Liquid impaired;decreased base of tongue retraction;immediate coughing/choking;pyriform sinuses pooling;pyriform sinuses stasis;reduction in laryngeal elevation;premature spillage;vallecular stasis;vallecular pooling   Rosenbeck's 8-Point Penetration-Aspiration Scale, Thin Liquids (7) Material enters the airway, passes below the vocal folds, and is not ejected from the trachea despite effort.   Penetration/Aspiration, Thin Liquid   (Aspiration x1 via cup with immediate reactive cough. Deep penetration via cup and via straw)   Esophageal Phase, Thin retrograde flow  (mild)   Successful Strategies Trialed During Procedure, Thin Liquid multiple swallows        MBS Eval: Nectar Thick Liquid Trial   Mode of Presentation, Nectar   (5mLx2, 10mLx2, via cup)   Oral Prep/Phase, Juda WFL   Oral Residue, Nectar back posterior tongue;mid posterior tongue   Pharyngeal Phase, Nectar impaired;decreased base of tongue retraction;pyriform sinuses stasis;reduction in laryngeal elevation;vallecular stasis   Rosenbeck's 8-Point Penetration-Aspiration Scale, Nectar Thick Liquids (3) Material enters the airway, remains above the vocal folds, and is not ejected from the airway.   Penetration/Aspiration, Nectar   (Deep penetration on 1 of 2 5mL trials)   Esophageal Phase, Nectar retrograde flow   Successful Strategies Trialed During Procedure, Nectar multiple swallows        MBS Eval: Pureed Trial   Mode of Presentation, Puree spoon;fed by clinician   Oral Residue, Puree back posterior tongue   Pharyngeal Phase, Puree impaired;decreased base of tongue retraction;reduction in laryngeal elevation;vallecular stasis;pyriform sinuses stasis   Rosenbeck's 8-Point Penetration-Aspiration Scale, Pureed (1) Material does not enter airway.   Successful Strategies Trialed During Procedure, Puree multiple swallows        MBS Eval: Soft Solid  Trial   Mode of Presentation, Semisolid spoon;fed by clinician   Oral Prep/Phase, Semisolid decreased bolus control;premature spillage;piecemeal deglutition   Oral Residue, Semisolid back posterior tongue   Pharyngeal Phase, Semisolid impaired;decreased base of tongue retraction;premature spillage;pyriform sinuses pooling;pyriform sinuses stasis;reduction in laryngeal elevation;vallecular pooling;vallecular stasis   Rosenbeck's 8-Point Penetration-Aspiration Scale, Semisolid (1) Material does not enter airway.   Recommendations   Solid Diet Level Dental Soft;Soft & Bite Sized Diet - IDDSI Level 6  (no fresh/canned fruits x bananas, no mixed consistencies (cereal, soup, etc))   Liquid Diet Level Nectar Thick   Plan   Plan Patient Education;Family Education;Dysphagia Therapy;Plan of care initiated   Therapy Frequency minimum of 5 times a week   Plan of Care Expires on 06/24/22   SLP Follow-up   SLP Follow-up? Yes   SLP - Next Visit Date 06/11/22   Treatment/Billable Minutes   Motion Fluoro Swallow, Cine/Vid 15   Total Time 15     Cervical Esophageal Phase  · Retrograde flow (mild)    Assessment:     Impressions  · MBSS completed. Pt presents with mild-moderate oropharyngeal dysphagia. Single episode of aspiration noted with thin liquids via cup with reactive cough noted. Consistent BOT, valleculae and pyriform sinus stasis noted across all trials. Pt able to reduce residue with subsequent swallow bu unable to clear. Please see flowsheet above for further details. REC downgrading diet to mechanical soft textures (IDDSI 5) with NECTAR thick liquids. No fresh/canned fruits x bananas, no mixed consistencies. POC updated.    Prognosis: Fair    Barriers:  · Cognitive status    Plan  Initiate dysphagia therapy    Education  Results were discussed with patient. Results were discussed with Medical Team who was in agreement with plan.     Goals:   Multidisciplinary Problems     SLP Goals        Problem: SLP    Goal Priority  Disciplines Outcome   SLP Goal     SLP Ongoing, Progressing   Description: 1. Pt will undergo MBSS to objectively assess swallow physiology and determine presence/severity of dysphagia--MET  2. The patient will demonstrate use of aspiration precautions/compensatory strategies (SGS, swallow x2) during meals with SPV.  3. Perform effortful swallow, Mendelsohn and CTAR with MIN verbal and visual cues to improve pharyngeal strength for the support of safe PO intake.  4. Consume trials of thin liquids via cup no overt s/s penetration/aspiration.  5. Lingual resistance exercises with MIN A                         Plan:   · Patient to be seen:  Therapy Frequency: 5 x/week   · Plan of Care expires:  06/24/22  · Plan of Care reviewed with:  patient, spouse        Discharge recommendations:  rehabilitation facility   Barriers to Discharge:  Level of Skilled Assistance Needed , and Safety Awareness .    Time Tracking:   SLP Treatment Date:   06/10/22  Speech Start Time:  1315  Speech Stop Time:  1330     Speech Total Time (min):  15 min    06/10/2022

## 2022-06-10 NOTE — PLAN OF CARE
Problem: SLP  Goal: SLP Goal  Description: 1. Pt will undergo MBSS to objectively assess swallow physiology and determine presence/severity of dysphagia--MET  2. The patient will demonstrate use of aspiration precautions/compensatory strategies (SGS, swallow x2) during meals with SPV.  3. Perform effortful swallow, Mendelsohn and CTAR with MIN verbal and visual cues to improve pharyngeal strength for the support of safe PO intake.  4. Consume trials of thin liquids via cup no overt s/s penetration/aspiration.  5. Lingual resistance exercises with MIN A        Outcome: Ongoing, Progressing    MBSS completed. Pt presents with mild-moderate oropharyngeal dysphagia. REC downgrading diet to mechanical soft textures (IDDSI 5) with NECTAR thick liquids. No fresh/canned fruits x bananas, no mixed consistencies. POC updated.

## 2022-06-10 NOTE — PLAN OF CARE
Problem: Occupational Therapy  Goal: Occupational Therapy Goal  Description: Goals to be met by: 6/30/2022     Patient will increase functional independence with ADLs by performing:    LE Dressing with Modified [place].  Grooming while standing at sink with Modified [place].  Toileting from toilet with Modified [place] for hygiene and clothing management.   Supine to sit with Modified [place].  Toilet transfer to toilet with Modified [place].    Outcome: Ongoing, Progressing  Continue with POC

## 2022-06-10 NOTE — ASSESSMENT & PLAN NOTE
Patient's FSGs are controlled on current medication regimen.  Last A1c reviewed-   Lab Results   Component Value Date    HGBA1C 6.7 (H) 12/02/2021     Most recent fingerstick glucose reviewed-   Recent Labs   Lab 06/09/22  0803 06/09/22  1105 06/09/22  1631   POCTGLUCOSE 78 80 168*     Current correctional scale  Medium  Maintain anti-hyperglycemic dose as follows-   Antihyperglycemics (From admission, onward)            Start     Stop Route Frequency Ordered    06/08/22 2113  insulin aspart U-100 pen 0-5 Units         -- SubQ Before meals & nightly PRN 06/08/22 2015        Hold Oral hypoglycemics while patient is in the hospital.

## 2022-06-10 NOTE — PT/OT/SLP PROGRESS
"Physical Therapy Treatment    Patient Name:  Tyler Todd   MRN:  [id number]    Recommendations:     Discharge Recommendations:  rehabilitation facility   Discharge Equipment Recommendations:  (TBD at next level of care)   Barriers to discharge: None    Assessment:     Tyler Todd is a 81 y.o. male admitted with a medical diagnosis of Encephalopathy.  He presents with the following impairments/functional limitations:  weakness, impaired endurance, impaired self care skills, impaired functional mobilty, gait instability, impaired balance, decreased safety awareness, impaired cognition . Awake, alert , initially responded, " I don't need to go nowhere", when offered to mobilize. Agreed with encouragement.  Ambulated 50' x 2 with rw and Min A with chair follow, seated rest required.    Rehab Prognosis: Fair; patient would benefit from acute skilled PT services to address these deficits and reach maximum level of function.    Recent Surgery: * No surgery found *      Plan:     During this hospitalization, patient to be seen 6 x/week to address the identified rehab impairments via gait training, therapeutic activities, therapeutic exercises and progress toward the following goals:    · Plan of Care Expires:  07/09/22    Subjective     Chief Complaint: none stated  Patient/Family Comments/goals: none stated  Pain/Comfort:  · Pain Rating 1: 0/10      Objective:     Communicated with nurse Freedman prior to session.  Patient found supine with bed alarm ([employer]) upon PT entry to room.     General Precautions: Standard, aspiration, fall   Orthopedic Precautions:N/A   Braces: N/A  Respiratory Status: Room air     Functional Mobility:  · Bed Mobility:     · Rolling Left:  contact guard assistance  · Rolling Right: contact guard assistance  · Supine to Sit: contact guard assistance  · Sit to Supine: contact guard assistance  · Transfers:     · Sit to Stand:  minimum assistance with rolling walker  · Gait: 50' x 2 with rw and " Min A.      AM-PAC 6 CLICK MOBILITY          Therapeutic Activities and Exercises:   Ambulated with rw and Min A.    Patient left supine with all lines intact, call button in reach, bed alarm on, nurse Tano notified and Elissa Barillas present..    GOALS:   Multidisciplinary Problems     Physical Therapy Goals        Problem: Physical Therapy    Goal Priority Disciplines Outcome Goal Variances Interventions   Physical Therapy Goal     PT, PT/OT      Description: Goals to be met by: 22    Patient will increase functional independence with mobility by performin. Supine to sit with Supervision  2. Sit to stand transfer with Supervision  3. Bed to chair transfer with Supervision using Rolling Walker  4. Gait  x 250 feet with Supervision using Rolling Walker.   5. Lower extremity exercise program x20 reps per handout, with supervision                   Time Tracking:     PT Received On: 06/10/22  PT Start Time: 953     PT Stop Time: 1005  PT Total Time (min): 12 min     Billable Minutes: Gait Training 12min    Treatment Type: Treatment  PT/PTA: PTA     PTA Visit Number: 1     06/10/2022

## 2022-06-10 NOTE — PROGRESS NOTES
[facility] Hospital Medicine  Progress Note    Patient Name: Tyler Todd  MRN: [id number]  Patient Class: OP- Observation   Admission Date: 6/8/2022  Length of Stay: 0 days  Attending Physician: Tom Nayak MD  Primary Care Provider: áSnchez Mast DO        Subjective:     Principal Problem:Encephalopathy        HPI:  Tyler Todd is a 81 y.o. male with a past medical history of COPD, HTN, HLD, DM type 2, CAD, prostate cancer and lung cancer presenting with fatigue, decreased appetite, confusion, bowel and bladder incontinence for the past 4 days.  Patient's wife reports he has been progressively becoming weaker for the past month and having frequent falls at home.  She states he has a history of 5 strokes and difficulty swallowing food without coughing.  She states his confusion and weakness started 4 days ago and has progressed.  ED workup revealed creatinine that is elevated from baseline.  Chest x-ray showed no acute abnormality.  CT Head showed an old 7 mm lacunar infarct, however it is new since the prior CT of July 2020. Patient was referred to Hospital Medicine and seen in the ED.  History limited due to altered mental status.  Patient is oriented to self only head and denies chest pain, shortness a breath, nausea, vomiting, diarrhea, fevers, and chills.  Patient's wife states she is unable to care for patient at home due to his increased weakness, confusion, and frequent falls.  Patient will be admitted to Hospital Medicine for further evaluation and management.      Overview/Hospital Course:  No notes on file    Interval History:  Was admitted last night with encephalopathy.  No new issues since that time.  SLP evaluated patient already and stated that patient should have dental soft diet.  Waiting on neurology to consult.    Review of Systems   Unable to perform ROS: Mental status change   Objective:     Vital Signs (Most Recent):  Temp: 97.3 °F (36.3 °C) (06/09/22  0046)  Pulse: 70 (06/09/22 0046)  Resp: 18 (06/09/22 0046)  BP: (!) 149/65 (06/09/22 0046)  SpO2: 98 % (06/09/22 0046)   Vital Signs (24h Range):  Temp:  [97.3 °F (36.3 °C)-98.3 °F (36.8 °C)] 97.3 °F (36.3 °C)  Pulse:  [70-88] 70  Resp:  [16-18] 18  SpO2:  [97 %-100 %] 98 %  BP: (104-149)/(59-68) 149/65     Weight: 68.8 kg (151 lb 10.8 oz)  Body mass index is 21.76 kg/m².    Physical Exam  Vitals reviewed.   Constitutional:       General: He is not in acute distress.     Appearance: He is not diaphoretic.   HENT:      Mouth/Throat:      Mouth: Mucous membranes are moist.   Eyes:      General: No scleral icterus.        Right eye: No discharge.         Left eye: No discharge.   Neck:      Vascular: No JVD.   Cardiovascular:      Rate and Rhythm: Normal rate and regular rhythm.   Pulmonary:      Effort: Pulmonary effort is normal.      Breath sounds: Normal breath sounds.   Abdominal:      General: Bowel sounds are normal. There is no distension.      Palpations: Abdomen is soft.      Tenderness: There is no abdominal tenderness.   Skin:     General: Skin is warm.      Findings: No rash.   Neurological:      Mental Status: He is alert. He is disoriented and confused.           Significant Labs: All pertinent labs within the past 24 hours have been reviewed.    Significant Imaging: I have reviewed all pertinent imaging results/findings within the past 24 hours.      Assessment/Plan:      * Encephalopathy  Unsure of cause.  Initial brain imaging unremarkable.  Lab results don't indicate a metabolic cause.  Will consult with neurology for help with diagnosis.      Anemia in stage 3 chronic kidney disease  Patient's anemia is currently controlled. Has not received any PRBCs to date.. Etiology likely d/t CKD  Current CBC reviewed-   Lab Results   Component Value Date    HGB 9.6 (L) 06/09/2022    HCT 28.8 (L) 06/09/2022     Monitor serial CBC and transfuse if patient becomes hemodynamically unstable, symptomatic or H/H  drops below 7/21.         Falls frequently  PT and OT treating daily.        Paroxysmal atrial fibrillation  Patient with Paroxysmal (<7 days) atrial fibrillation which is controlled currently with Calcium Channel Blocker. Patient is currently in sinus rhythm.[id number] Score: 4. Anticoagulation indicated. Anticoagulation done with Eliquis.        Hyperlipidemia   Patient is chronically on statin.will continue for now. Monitor clinically. Last LDL was   Lab Results   Component Value Date    LDLCALC 75.8 12/02/2021            CKD (chronic kidney disease), stage III  Creatine stable for now. BMP reviewed- noted Estimated Creatinine Clearance: 36.4 mL/min (A) (based on SCr of 1.6 mg/dL (H)). according to latest data. Monitor UOP and serial BMP and adjust therapy as needed. Renally dose meds.      Carcinoma of lung  Chronic, stable    Monitor for acute changes      Type 2 diabetes mellitus without complication  Patient's FSGs are controlled on current medication regimen.  Last A1c reviewed-   Lab Results   Component Value Date    HGBA1C 6.7 (H) 12/02/2021     Most recent fingerstick glucose reviewed-   Recent Labs   Lab 06/09/22  0803 06/09/22  1105 06/09/22  1631   POCTGLUCOSE 78 80 168*     Current correctional scale  Medium  Maintain anti-hyperglycemic dose as follows-   Antihyperglycemics (From admission, onward)            Start     Stop Route Frequency Ordered    06/08/22 2113  insulin aspart U-100 pen 0-5 Units         -- SubQ Before meals & nightly PRN 06/08/22 2015        Hold Oral hypoglycemics while patient is in the hospital.        Chronic prostatitis  Stable.        VTE Risk Mitigation (From admission, onward)         Ordered     apixaban tablet 2.5 mg  2 times daily         06/09/22 0133     IP VTE HIGH RISK PATIENT  Once         06/08/22 2015     Place sequential compression device  Until discontinued         06/08/22 2015     Reason for No Pharmacological VTE Prophylaxis  Once        Question:  Reasons:   Answer:  Already adequately anticoagulated on oral Anticoagulants    06/08/22 2015                Discharge Planning   SAY: 6/10/2022     Code Status: Full Code   Is the patient medically ready for discharge?:     Reason for patient still in hospital (select all that apply): Patient trending condition, Imaging and Consult recommendations  Discharge Plan A: Rehab                  Tom Nayak MD  Department of Hospital Medicine   [facility]

## 2022-06-10 NOTE — PLAN OF CARE
Patient accepted at [facility] rehab per mayelin Ziegler liaison. SAY 6/11, Karlie confirmed facility will accept patient over the weekend when medically clear for discharge to Rehab.       06/10/22 1610   Discharge Reassessment   Assessment Type Discharge Planning Reassessment   Did the patient's condition or plan change since previous assessment? Yes   Discharge Plan discussed with: Patient   Discharge Plan A Rehab   Discharge Plan B Rehab   Why the patient remains in the hospital Requires continued medical care   Post-Acute Status   Post-Acute Authorization Placement   Post-Acute Placement Status Set-up Complete/Auth obtained

## 2022-06-10 NOTE — PROCEDURES
DATE: 6/9/22    EEG NUMBER: ON     REFERRING PHYSICIAN:  Dr. Nayak      This EEG was performed to assess for subclinical seizures      ELECTROENCEPHALOGRAM REPORT     METHODOLOGY:  Electroencephalographic (EEG) recording is with electrodes placed according to the International 10-20 placement system.  Thirty two (32) channels of digital signal are simultaneously recorded from the scalp and may include EKG, EMG, and/or eye monitors.   Recording band pass was 0.1 to 512 hz.  Digital video recording of the patient is simultaneously recorded with the EEG.  The nursing staff report clinical symptoms and may press an event button when the patient has symptoms of clinical interest to the treating physicians.  EEG and video recording is stored and archived in digital format.  The entire recording is visually reviewed, and the times identified by computer analysis as being spikes or seizures are reviewed again.  Activation procedures which include photic stimulation, hyperventilation and instructing patients to perform simple task are done in selected patients.   Compresses spectral analysis (CSA) is also performed on the activity recorded from each individual channel.  This is displayed as a power display of frequencies from 0 to 30 Hz over time.   The CSA analysis is done and displayed continuously.  This is reviewed for asymmetries in power between homologous areas of the scalp and for presence of changes in power which can be seen when seizures occur.  Sections of suspected abnormalities on the CSA is then compared with the original EEG recording.                [employer] software was also utilized in the review of this study.  This software suite analyzes the EEG recording in multiple domains.  Coherence and rhythmicity is computed to identify EEG sections which may contain organized seizures.  Each channel undergoes analysis to detect presence of spike and sharp waves which have special and morphological  characteristic of epileptic activity.  The routine EEG recording is converted from spacial into frequency domain.  This is then displayed comparing homologous areas to identify areas of significant asymmetry.  Algorithm to identify non-cortically generated artifact is used to separate eye movement, EMG and other artifact from the EEG.     EEG FINDINGS:  The recording was obtained with a number of standard bipolar and referential montages during wakefulness, drowsiness and sleep.  In the alert state, the posterior background rhythm was a symmetric, well-modulated 8-9 Hz activity, which reacted symmetrically to eye opening.  Mixed theta range slowing was noted in both hemispheres.  Intermittent photic stimulation failed to evoke symmetric posterior driving responses. No abnormalities were activated by photic stimulation.  During drowsiness, the background rhythm waxed and waned and there were periods of slowing.  During stage II sleep, symmetric V waves and sleep spindles were noted. There were no interictal epileptiform abnormalities and no clinical or electrographic seizures were recorded.    The EKG channel revealed a sinus rhythm.     IMPRESSION:  This is an abnormal EEG during wakefulness, drowsiness and sleep. Diffuse slowing was noted.      CLINICAL CORRELATION:  The patient is a 81 year-old male who is being evaluated for altered sensorium. The patient is currently maintained on Keppra. This is an abnormal EEG during wakefulness, drowsiness and sleep.  The overall degree of disorganization and slowing for given age is suggestive of a mild encephalopathy, likely a toxic metabolic encephalopathy.  There is no evidence of an epileptic process on this recording.  No seizures were recorded during this study.        Tyler Todd is a 81 y.o. male with a past medical history of COPD, HTN, HLD, DM type 2, CAD, prostate cancer and lung cancer presenting with fatigue, decreased appetite, confusion

## 2022-06-10 NOTE — PROGRESS NOTES
Echo without a bubble study was done per Chaya Zapata  DNP, Neurology. Pt had an neg bubble study in Feb.

## 2022-06-11 LAB
ALBUMIN SERPL BCP-MCNC: 2.8 G/DL (ref 3.5–5.2)
ALP SERPL-CCNC: 101 U/L (ref 55–135)
ALT SERPL W/O P-5'-P-CCNC: 17 U/L (ref 10–44)
ANION GAP SERPL CALC-SCNC: 9 MMOL/L (ref 8–16)
AST SERPL-CCNC: 19 U/L (ref 10–40)
BASOPHILS # BLD AUTO: 0.02 K/UL (ref 0–0.2)
BASOPHILS NFR BLD: 0.5 % (ref 0–1.9)
BILIRUB SERPL-MCNC: 0.4 MG/DL (ref 0.1–1)
BUN SERPL-MCNC: 15 MG/DL (ref 8–23)
CALCIUM SERPL-MCNC: 8.7 MG/DL (ref 8.7–10.5)
CHLORIDE SERPL-SCNC: 107 MMOL/L (ref 95–110)
CHOLEST SERPL-MCNC: 107 MG/DL (ref 120–199)
CHOLEST/HDLC SERPL: 4.5 {RATIO} (ref 2–5)
CO2 SERPL-SCNC: 26 MMOL/L (ref 23–29)
CREAT SERPL-MCNC: 1.3 MG/DL (ref 0.5–1.4)
DIFFERENTIAL METHOD: ABNORMAL
EOSINOPHIL # BLD AUTO: 0.1 K/UL (ref 0–0.5)
EOSINOPHIL NFR BLD: 1.8 % (ref 0–8)
ERYTHROCYTE [DISTWIDTH] IN BLOOD BY AUTOMATED COUNT: 14.3 % (ref 11.5–14.5)
EST. GFR  (AFRICAN AMERICAN): 60 ML/MIN/1.73 M^2
EST. GFR  (NON AFRICAN AMERICAN): 52 ML/MIN/1.73 M^2
GLUCOSE SERPL-MCNC: 111 MG/DL (ref 70–110)
HCT VFR BLD AUTO: 27.5 % (ref 40–54)
HDLC SERPL-MCNC: 24 MG/DL (ref 40–75)
HDLC SERPL: 22.4 % (ref 20–50)
HGB BLD-MCNC: 9.4 G/DL (ref 14–18)
IMM GRANULOCYTES # BLD AUTO: 0.01 K/UL (ref 0–0.04)
IMM GRANULOCYTES NFR BLD AUTO: 0.2 % (ref 0–0.5)
LDLC SERPL CALC-MCNC: 71.4 MG/DL (ref 63–159)
LYMPHOCYTES # BLD AUTO: 0.7 K/UL (ref 1–4.8)
LYMPHOCYTES NFR BLD: 15.6 % (ref 18–48)
MCH RBC QN AUTO: 30.8 PG (ref 27–31)
MCHC RBC AUTO-ENTMCNC: 34.2 G/DL (ref 32–36)
MCV RBC AUTO: 90 FL (ref 82–98)
MONOCYTES # BLD AUTO: 0.7 K/UL (ref 0.3–1)
MONOCYTES NFR BLD: 15.1 % (ref 4–15)
NEUTROPHILS # BLD AUTO: 2.9 K/UL (ref 1.8–7.7)
NEUTROPHILS NFR BLD: 66.8 % (ref 38–73)
NONHDLC SERPL-MCNC: 83 MG/DL
NRBC BLD-RTO: 0 /100 WBC
PLATELET # BLD AUTO: 147 K/UL (ref 150–450)
PMV BLD AUTO: 11 FL (ref 9.2–12.9)
POCT GLUCOSE: 119 MG/DL (ref 70–110)
POCT GLUCOSE: 155 MG/DL (ref 70–110)
POTASSIUM SERPL-SCNC: 3.7 MMOL/L (ref 3.5–5.1)
PROT SERPL-MCNC: 6.8 G/DL (ref 6–8.4)
RBC # BLD AUTO: 3.05 M/UL (ref 4.6–6.2)
SODIUM SERPL-SCNC: 142 MMOL/L (ref 136–145)
TRIGL SERPL-MCNC: 58 MG/DL (ref 30–150)
WBC # BLD AUTO: 4.37 K/UL (ref 3.9–12.7)

## 2022-06-11 PROCEDURE — 97116 GAIT TRAINING THERAPY: CPT | Mod: CQ

## 2022-06-11 PROCEDURE — 94761 N-INVAS EAR/PLS OXIMETRY MLT: CPT

## 2022-06-11 PROCEDURE — 80061 LIPID PANEL: CPT | Performed by: NURSE PRACTITIONER

## 2022-06-11 PROCEDURE — 11000001 HC ACUTE MED/SURG PRIVATE ROOM

## 2022-06-11 PROCEDURE — 80053 COMPREHEN METABOLIC PANEL: CPT | Performed by: NURSE PRACTITIONER

## 2022-06-11 PROCEDURE — S4991 NICOTINE PATCH NONLEGEND: HCPCS | Performed by: HOSPITALIST

## 2022-06-11 PROCEDURE — 92526 ORAL FUNCTION THERAPY: CPT

## 2022-06-11 PROCEDURE — 25000003 PHARM REV CODE 250: Performed by: NURSE PRACTITIONER

## 2022-06-11 PROCEDURE — 25000003 PHARM REV CODE 250: Performed by: HOSPITALIST

## 2022-06-11 PROCEDURE — 99900035 HC TECH TIME PER 15 MIN (STAT)

## 2022-06-11 PROCEDURE — 85025 COMPLETE CBC W/AUTO DIFF WBC: CPT | Performed by: NURSE PRACTITIONER

## 2022-06-11 RX ADMIN — APIXABAN 2.5 MG: 2.5 TABLET, FILM COATED ORAL at 08:06

## 2022-06-11 RX ADMIN — ATORVASTATIN CALCIUM 40 MG: 40 TABLET, FILM COATED ORAL at 08:06

## 2022-06-11 RX ADMIN — LEVETIRACETAM 500 MG: 500 TABLET, FILM COATED ORAL at 08:06

## 2022-06-11 RX ADMIN — LEVETIRACETAM 500 MG: 500 TABLET, FILM COATED ORAL at 09:06

## 2022-06-11 RX ADMIN — APIXABAN 5 MG: 2.5 TABLET, FILM COATED ORAL at 08:06

## 2022-06-11 RX ADMIN — DOCUSATE SODIUM AND SENNOSIDES 1 TABLET: 8.6; 5 TABLET, FILM COATED ORAL at 08:06

## 2022-06-11 RX ADMIN — LOSARTAN POTASSIUM 100 MG: 100 TABLET, FILM COATED ORAL at 08:06

## 2022-06-11 RX ADMIN — NICOTINE 1 PATCH: 14 PATCH TRANSDERMAL at 08:06

## 2022-06-11 NOTE — PROGRESS NOTES
[facility]  Neurology  Progress Note    Patient Name: Tyler Todd  MRN: [id number]  Admission Date: 6/8/2022  Hospital Length of Stay: 1 days  Code Status: Full Code   Attending Provider: Tom Nayak MD  Primary Care Physician: Sánchez Mast DO   Principal Problem:Ischemic embolic stroke    Subjective:     Interval History:  Patient seen and examined.  Plan of care discussed with Dr.El Michel.  Patient alert and oriented to himself and place in bed.  He is not oriented to year.  He does not recall any recent changes in his gait, memory, or vision.  Patient reports that he did well and his speech test.  Discussed plan of care and new infarcts found on MRI.    6/11:  Pt seen and examined. POC discussed with Dr. Sadi Michel. Wife reports weakness and difficulty with swallowing for at least a week. She does not know why his eliquis was decreased to 2.5 mg BID.     Current Neurological Medications:  Eliquis, Lipitor, Keppra    Current Facility-Administered Medications   Medication Dose Route Frequency Provider Last Rate Last Admin    acetaminophen tablet 650 mg  650 mg Oral Q8H PRN Khadijah Mccollum NP        albuterol-ipratropium 2.5 mg-0.5 mg/3 mL nebulizer solution 3 mL  3 mL Nebulization Q6H PRN Khadijah Mccollum NP        aluminum-magnesium hydroxide-simethicone 200-200-20 mg/5 mL suspension 30 mL  30 mL Oral QID PRN Khadijah Mccollum NP        apixaban tablet 5 mg  5 mg Oral BID Chaya Zapata, KIRILL        atorvastatin tablet 40 mg  40 mg Oral Daily Tom Nayak MD   40 mg at 06/11/22 0856    dextrose 10% bolus 125 mL  12.5 g Intravenous PRN Khadijah Mccollum NP        dextrose 10% bolus 250 mL  25 g Intravenous PRN Khadijah Mccollum NP        glucagon (human recombinant) injection 1 mg  1 mg Intramuscular PRN Khadijah Mccollum NP        glucose chewable tablet 16 g  16 g Oral PRN Khadijah Mccollum NP        glucose chewable tablet 24 g  24 g Oral PRN Khadijah BAL  DIDI Mccollum        insulin aspart U-100 pen 0-5 Units  0-5 Units Subcutaneous QID (AC + HS) PRN Khadijah Mccollum NP        levETIRAcetam tablet 500 mg  500 mg Oral BID Khadijah Mccollum NP   500 mg at 06/11/22 0907    losartan tablet 100 mg  100 mg Oral Daily Khadijah Mccollum NP   100 mg at 06/11/22 0856    melatonin tablet 6 mg  6 mg Oral Nightly PRN Khadijah Mccollum NP   6 mg at 06/10/22 2151    naloxone 0.4 mg/mL injection 0.02 mg  0.02 mg Intravenous PRN Khadijah Mccollum NP        nicotine 14 mg/24 hr 1 patch  1 patch Transdermal Daily Tom Nayak MD   1 patch at 06/11/22 0859    ondansetron injection 4 mg  4 mg Intravenous Q6H PRN Khadijah Mccollum NP        potassium, sodium phosphates 280-160-250 mg packet 2 packet  2 packet Oral PRN Khadijah Mccollum NP        prochlorperazine injection Soln 5 mg  5 mg Intravenous Q6H PRN Khadijah Mccollum NP        senna-docusate 8.6-50 mg per tablet 1 tablet  1 tablet Oral BID Khadijah Mccollum NP   1 tablet at 06/11/22 0856    simethicone chewable tablet 80 mg  1 tablet Oral QID PRN Khadijah Mccollum NP        sodium chloride 0.9% flush 10 mL  10 mL Intravenous Q8H PRN Khadijah Mccollum NP         Facility-Administered Medications Ordered in Other Encounters   Medication Dose Route Frequency Provider Last Rate Last Admin    sodium chloride 0.9% injection    PRN Marcelino Monroe MD   4 mL at 02/19/19 1234       Review of Systems   Constitutional: Negative.  Negative for activity change, appetite change, chills and fever.   HENT: Negative.  Negative for congestion, mouth sores and trouble swallowing.    Eyes: Positive for visual disturbance. Negative for photophobia and pain.   Respiratory: Negative.  Negative for cough, choking, chest tightness and shortness of breath.    Cardiovascular: Negative.  Negative for chest pain, palpitations and leg swelling.   Gastrointestinal: Negative.  Negative for abdominal distention, abdominal pain, blood in  stool, constipation, diarrhea, nausea and vomiting.   Endocrine: Negative.  Negative for polydipsia, polyphagia and polyuria.   Genitourinary: Negative.  Negative for decreased urine volume, difficulty urinating, dysuria and enuresis.   Musculoskeletal: Negative.  Negative for arthralgias, back pain and gait problem.   Skin: Negative.  Negative for pallor and rash.   Allergic/Immunologic: Negative.  Negative for environmental allergies and food allergies.   Neurological: Negative.  Negative for dizziness, facial asymmetry, speech difficulty, weakness and headaches.   Hematological: Negative.    Psychiatric/Behavioral: Negative.  Negative for agitation, behavioral problems and confusion.     Objective:     Vital Signs (Most Recent):  Temp: 97.5 °F (36.4 °C) (06/11/22 1645)  Pulse: 78 (06/11/22 1645)  Resp: 18 (06/11/22 1645)  BP: 114/69 (06/11/22 1645)  SpO2: 98 % (06/11/22 1645) Vital Signs (24h Range):  Temp:  [97.1 °F (36.2 °C)-98.4 °F (36.9 °C)] 97.5 °F (36.4 °C)  Pulse:  [] 78  Resp:  [16-18] 18  SpO2:  [92 %-99 %] 98 %  BP: (114-140)/(58-72) 114/69     Weight: 70.5 kg (155 lb 6.8 oz)  Body mass index is 22.3 kg/m².    Physical Exam  Vitals reviewed.   HENT:      Head: Normocephalic.      Mouth/Throat:      Mouth: Mucous membranes are moist.   Eyes:      Extraocular Movements: Extraocular movements intact.      Pupils: Pupils are equal, round, and reactive to light.   Cardiovascular:      Rate and Rhythm: Normal rate.   Pulmonary:      Effort: Pulmonary effort is normal.   Musculoskeletal:         General: Normal range of motion.      Cervical back: Normal range of motion.   Neurological:      Mental Status: He is alert. He is disoriented.      Cranial Nerves: Cranial nerve deficit present.      Sensory: No sensory deficit.   Psychiatric:         Mood and Affect: Mood normal.         NEUROLOGICAL EXAMINATION:     MENTAL STATUS   Level of consciousness: alert    CRANIAL NERVES     CN II   Visual acuity:  decreased  Right visual field deficit: upper nasal and lower nasal quadrant(s)    CN III, IV, VI   Pupils are equal, round, and reactive to light.  Right pupil: Size: 3 mm.   Left pupil: Size: 3 mm.     CN V   Facial sensation intact.     CN VII   Right facial weakness: central    CN VIII   CN VIII normal.     MOTOR EXAM   Muscle bulk: normal  Overall muscle tone: normal    SENSORY EXAM   Light touch normal.       Significant Labs:  Lab Results   Component Value Date    LDLCALC 71.4 06/11/2022     Lab Results   Component Value Date    HGBA1C 6.7 (H) 12/02/2021     Lab Results   Component Value Date    TSH 0.936 02/02/2021         Significant Imaging:Echo Saline Bubble? Yes  · The estimated PA systolic pressure is 28 mmHg.  · The left ventricle is normal in size with concentric remodeling and   hyperdynamic systolic function.  · Grade I left ventricular diastolic dysfunction.  · Normal right ventricular size with normal right ventricular systolic   function.  · Normal central venous pressure (3 mmHg).  · Trivial pericardial effusion. 6 mm  · The estimated ejection fraction is 75%.  · Mild tricuspid regurgitation.  · Mild pulmonic regurgitation.     MRI Brain Without Contrast  Narrative: EXAMINATION:  MRI BRAIN WITHOUT CONTRAST    CLINICAL HISTORY:  Stroke, follow up;    TECHNIQUE:  Routine multiplanar MR imaging of the brain was performed without intravenous contrast administration.  Image quality is degraded by patient motion.    COMPARISON:  CT head contrast-06/08/2022 and 07/08/2020.    FINDINGS:  The most significant abnormality relates to the presence of acute/recent ischemia/infarction involving the medial left occipital lobe with multiple foci of diffusion restriction and adjacent cytotoxic cerebral edema noted on series 3 image 15-21 and series 8 image 13-18.  There is curvilinear/serpentine T1 hyperintense signal noted at the margins of the infarct (series 4 image 14-16).  There also small foci of diffusion  restriction present within the right paramidline abrahan on series 3, image 20, left red nucleus on series 3, image 19, and right hippocampus on series 3, image 18.    There is prominent supratentorial cerebral volume loss which is at the upper limits of normal in extent even for the patient's advanced chronologic age.  There are foci of remote lacunar infarction present within the left and right thalamus, abrahan, left and right cerebellar peduncle, and left and right cerebellar hemispheres.  There are few scattered punctate foci of T2/FLAIR signal hyperintensity within the subcortical and periventricular white matter of the supratentorial brain compatible with age-appropriate chronic microvascular ischemic change.  No intra-axial hemorrhage or mass appreciated.    The ventricular system is within normal limits for the extent of cerebral volume loss.  No definite hydrocephalus.  No extra-axial fluid collections or blood products.    Given the motion limited nature of the exam, accurate assessment of the left middle cerebral artery is suboptimal.  There is an empty sella variant.  No suprasellar or parasellar mass..  No Chiari malformation.    The paranasal sinuses and mastoid air cells are unremarkable.  The visualized orbits are unremarkable.    There is degenerative change of the cervical spine at C4-C5 where a 2 mm retrolisthesis of C4 on C5 and broad disc bulge are observed.  Impression: 1. Motion limited study.  Multiple foci of acute/recent ischemia/infarction involving the medial left occipital lobe, right paramidline abrahan, left red nucleus, and right hippocampus.  Given multiple vascular territories of involvement, embolic disease (either carotid plaque or cardiac origin) cannot be excluded.  2. Supratentorial cerebral volume loss which is prominent even for the patient's advanced chronologic age.  3. Multiple foci of remote lacunar infarction within the bilateral thalami, abrahan, bilateral cerebellar peduncles,  and left and right cerebellar hemispheres.  This report was flagged in Epic as abnormal.    Electronically signed by: Pasquale Fenton MD  Date:    06/10/2022  Time:    08:18        Assessment and Plan:  Embolic Stroke  Pt was receiving subtherapeutic Eliquis at 2.5 mg BID  He does not meet 2/3 criteria  Discussed risk vs benefits and bleeding risk with wife Mrs. Herman of full dose eliquis  -CT head:  Moderate brain atrophy; lacunar infarct new since 2020 at the right thalamus, left occipital lobe encephalomalacia new since July 2020  -MRI brain:  Acute/recent ischemia to the left occipital lobe, right paramidline abrahan, left red nucleus and right hypoCampus  -EEG mild encephalopathy  -echo EF 75%,   -LDL 71.4  -CTA 04/05/2022 shows high-grade stenosis of the basilar artery along with focal short-segment stenosis in the left vertebral artery     PLAN:  Embolic strokes.  Patient reports compliance with Eliquis. Dose was lowered 2.5 mg BID for unknown reasons. Symptoms began a week ago.  Increase dose back to 5 mg BID. Pt does not meet 2/3 criteria for lowered dose. Risk of bleeding discussed with wife.        [phone] 3 days from discharge.     Stroke education was provided including stroke risk factors modification and any acute neurological changes including weakness, confusion, visual changes to come straight to the ER.     All questions were answered.                                       Active Diagnoses:    Diagnosis Date Noted POA    PRINCIPAL PROBLEM:  Ischemic embolic stroke [I63.9] 06/10/2022 Yes    Falls frequently [R29.6] 06/09/2022 Not Applicable    Anemia in stage 3 chronic kidney disease [N18.30, D63.1] 06/09/2022 Yes    Paroxysmal atrial fibrillation [I48.0] 03/10/2020 Yes    CKD (chronic kidney disease), stage III [N18.30] 11/19/2012 Yes     Chronic    Hyperlipidemia [E78.5] 11/19/2012 Yes     Chronic    Carcinoma of lung [C34.90]  08/09/2012 Yes     Chronic    Type 2 diabetes mellitus without complication [E11.9] 08/15/2010 Yes    Chronic prostatitis [N41.1] 06/02/2010 Yes      Problems Resolved During this Admission:       VTE Risk Mitigation (From admission, onward)         Ordered     apixaban tablet 5 mg  2 times daily         06/11/22 1636     IP VTE HIGH RISK PATIENT  Once         06/08/22 2015     Place sequential compression device  Until discontinued         06/08/22 2015     Reason for No Pharmacological VTE Prophylaxis  Once        Question:  Reasons:  Answer:  Already adequately anticoagulated on oral Anticoagulants    06/08/22 2015                Chaya Zapata DNP  Neurology  [facility]

## 2022-06-11 NOTE — PT/OT/SLP PROGRESS
"Physical Therapy Treatment    Patient Name:  Tyler Todd   MRN:  [id number]    Recommendations:     Discharge Recommendations:  rehabilitation facility   Discharge Equipment Recommendations:  (TBD at next level of care)   Barriers to discharge: None    Assessment:     Tyler Todd is a 81 y.o. male admitted with a medical diagnosis of Ischemic embolic stroke.  He presents with the following impairments/functional limitations:  weakness, impaired endurance, impaired functional mobilty, impaired self care skills, gait instability, impaired balance, decreased safety awareness, impaired cognition .    Rehab Prognosis: Fair; patient would benefit from acute skilled PT services to address these deficits and reach maximum level of function.    Recent Surgery: * No surgery found *      Plan:     During this hospitalization, patient to be seen 6 x/week to address the identified rehab impairments via gait training, therapeutic activities, therapeutic exercises and progress toward the following goals:    · Plan of Care Expires:  07/09/22    Subjective     Chief Complaint: "Is this really necessary?"  Patient/Family Comments/goals:   Pain/Comfort:  ·        Objective:     Communicated with Zayda amaya prior to session.  Patient found supine with   upon PT entry to room.     General Precautions: Standard, aspiration, fall, nectar thick   Orthopedic Precautions:N/A   Braces:    Respiratory Status: Room air     Functional Mobility:  · Bed Mobility:     · Rolling Left:  minimum assistance  · Supine to Sit: minimum assistance  · Transfers:     · Sit to Stand:  minimum assistance with rolling walker  · Gait: 55' with min A.  Pt keeping B knees and hip flexed and leaning forward and sligthly to the R side during gait.  Pt's nurse reported patient HR increased to in the 140's during gait training.      AM-PAC 6 CLICK MOBILITY      Patient left up in chair with call button in reach, chair alarm on and Zayda amaya notified..    GOALS: "   Multidisciplinary Problems     Physical Therapy Goals        Problem: Physical Therapy    Goal Priority Disciplines Outcome Goal Variances Interventions   Physical Therapy Goal     PT, PT/OT Ongoing, Progressing     Description: Goals to be met by: 22    Patient will increase functional independence with mobility by performin. Supine to sit with Supervision  2. Sit to stand transfer with Supervision  3. Bed to chair transfer with Supervision using Rolling Walker  4. Gait  x 250 feet with Supervision using Rolling Walker.   5. Lower extremity exercise program x20 reps per handout, with supervision                   Time Tracking:     PT Received On: 22  PT Start Time: 1000     PT Stop Time: 1015  PT Total Time (min): 15 min     Billable Minutes: Gait Training 15    Treatment Type: Treatment  PT/PTA: PTA     PTA Visit Number: 2     2022

## 2022-06-11 NOTE — ASSESSMENT & PLAN NOTE
Creatine stable for now. BMP reviewed- noted Estimated Creatinine Clearance: 41.5 mL/min (based on SCr of 1.4 mg/dL). according to latest data. Monitor UOP and serial BMP and adjust therapy as needed. Renally dose meds.

## 2022-06-11 NOTE — PT/OT/SLP PROGRESS
Speech Language Pathology Treatment    Patient Name:  Tyler Todd   MRN:  [id number]  Admitting Diagnosis: Ischemic embolic stroke    Recommendations:                 General Recommendations:  Dysphagia therapy  Diet recommendations:  Mechanical soft (IDDSI 6, no FF or canned except bananas, no mixed bolus), Liquid Diet Level: Thin   Aspiration Precautions: 1 bite/sip at a time, Assistance with meals and Assistance with thickening liquids, Avoid talking while eating, Double swallow with each bite/sip, Eliminate distractions, Meds whole 1 at a time and Strict aspiration precautions   General Precautions: Standard, nectar thick, fall ([facility] soft IDDSI 5)  Communication strategies:  provide increased time to answer and go to room if call light pushed    Subjective     Why?  Patient goals: home soon    Objective:     Has the patient been evaluated by SLP for swallowing?   Yes  Keep patient NPO? No   Current Respiratory Status: room air      Pt seen for dysphagia tx.  Alert & cooperative, delayed.  See Care Plan for tx details.  Lunch tray sent with thin tea instead of nectar thick liquid.  Kitchen contacted re error.    Assessment:     Tyler Todd is a 81 y.o. male with an SLP diagnosis of Dysphagia.  He demo'd progress in completing exercises today.  Cot POC    Goals:   Multidisciplinary Problems     SLP Goals        Problem: SLP    Goal Priority Disciplines Outcome   SLP Goal     SLP Ongoing, Progressing   Description: 1. Pt will undergo MBSS to objectively assess swallow physiology and determine presence/severity of dysphagia--MET  2. The patient will demonstrate use of aspiration precautions/compensatory strategies (SGS, swallow x2) during meals with SPV.  3. Perform effortful swallow, Mendelsohn and CTAR with MIN verbal and visual cues to improve pharyngeal strength for the support of safe PO intake.  4. Consume trials of thin liquids via cup no overt s/s penetration/aspiration.  5. Lingual resistance exercises  with MIN A                         Plan:     · Patient to be seen:  5 x/week   · Plan of Care expires:  06/24/22  · Plan of Care reviewed with:  patient   · SLP Follow-Up:  Yes       Discharge recommendations:  rehabilitation facility   Barriers to Discharge:  None    Time Tracking:     SLP Treatment Date:   06/11/22  Speech Start Time:  1159  Speech Stop Time:  1217     Speech Total Time (min):  18 min    Billable Minutes: Treatment Swallowing Dysfunction 19 06/11/2022

## 2022-06-11 NOTE — ASSESSMENT & PLAN NOTE
Patient's anemia is currently controlled. Has not received any PRBCs to date.. Etiology likely d/t CKD  Current CBC reviewed-   Lab Results   Component Value Date    HGB 9.6 (L) 06/10/2022    HCT 28.0 (L) 06/10/2022     Monitor serial CBC and transfuse if patient becomes hemodynamically unstable, symptomatic or H/H drops below 7/21.

## 2022-06-11 NOTE — ASSESSMENT & PLAN NOTE
Was present on day 1 of admission.  Stroke pathway initiated.  MRI shows multiple strokes, likely indicating cardiac source of emboli.  Patient is probably not taking the Eliquis consistently.  Will increase the Lipitor to 40 mg.  Neurology service is consulting.  Echo to be done.

## 2022-06-11 NOTE — PLAN OF CARE
Problem: SLP  Goal: SLP Goal  Description: 1. Pt will undergo MBSS to objectively assess swallow physiology and determine presence/severity of dysphagia--MET  2. The patient will demonstrate use of aspiration precautions/compensatory strategies (SGS, swallow x2) during meals with SPV.  3. Perform effortful swallow, Mendelsohn and CTAR with MIN verbal and visual cues to improve pharyngeal strength for the support of safe PO intake.  4. Consume trials of thin liquids via cup no overt s/s penetration/aspiration.  5. Lingual resistance exercises with MIN A        Outcome: Ongoing, Progressing   2. Reviewed posted Swallowing Guidelines with pt & RN, pt practiced SGS given small ice chips and mod cues  3. Performed effortful swallows x10 with ice chips & 2x swallows per chip. Min cues  4. Ice chips consumed x 15 without s/s pharyngeal dysphagia.

## 2022-06-11 NOTE — PROGRESS NOTES
[facility] Hospital Medicine  Progress Note    Patient Name: Tyler Todd  MRN: [id number]  Patient Class: IP- Inpatient   Admission Date: 6/8/2022  Length of Stay: 0 days  Attending Physician: Tom Nayak MD  Primary Care Provider: Sánchez Mast DO        Subjective:     Principal Problem:Encephalopathy        HPI:  Tyler Todd is a 81 y.o. male with a past medical history of COPD, HTN, HLD, DM type 2, CAD, prostate cancer and lung cancer presenting with fatigue, decreased appetite, confusion, bowel and bladder incontinence for the past 4 days.  Patient's wife reports he has been progressively becoming weaker for the past month and having frequent falls at home.  She states he has a history of 5 strokes and difficulty swallowing food without coughing.  She states his confusion and weakness started 4 days ago and has progressed.  ED workup revealed creatinine that is elevated from baseline.  Chest x-ray showed no acute abnormality.  CT Head showed an old 7 mm lacunar infarct, however it is new since the prior CT of July 2020. Patient was referred to Hospital Medicine and seen in the ED.  History limited due to altered mental status.  Patient is oriented to self only head and denies chest pain, shortness a breath, nausea, vomiting, diarrhea, fevers, and chills.  Patient's wife states she is unable to care for patient at home due to his increased weakness, confusion, and frequent falls.  Patient will be admitted to Hospital Medicine for further evaluation and management.      Overview/Hospital Course:  No notes on file    Interval History:  MRI brain shows multiple strokes in different territories, indicating likely embolic source.  No change in clinical status.    Review of Systems   Unable to perform ROS: Mental status change   Objective:     Vital Signs (Most Recent):  Temp: 97.4 °F (36.3 °C) (06/10/22 1917)  Pulse: 96 (06/10/22 1917)  Resp: 18 (06/10/22 1917)  BP: 136/67 (06/10/22  1917)  SpO2: (!) 92 % (06/10/22 1917)   Vital Signs (24h Range):  Temp:  [96.9 °F (36.1 °C)-97.7 °F (36.5 °C)] 97.4 °F (36.3 °C)  Pulse:  [70-96] 96  Resp:  [16-18] 18  SpO2:  [92 %-98 %] 92 %  BP: (109-144)/(53-67) 136/67     Weight: 68.5 kg (151 lb)  Body mass index is 21.67 kg/m².    Intake/Output Summary (Last 24 hours) at 6/10/2022 2054  Last data filed at 6/10/2022 1800  Gross per 24 hour   Intake 720 ml   Output --   Net 720 ml      Physical Exam  Vitals reviewed.   Constitutional:       General: He is not in acute distress.     Appearance: He is not diaphoretic.   HENT:      Mouth/Throat:      Mouth: Mucous membranes are moist.   Eyes:      General: No scleral icterus.        Right eye: No discharge.         Left eye: No discharge.   Neck:      Vascular: No JVD.   Cardiovascular:      Rate and Rhythm: Normal rate and regular rhythm.   Pulmonary:      Effort: Pulmonary effort is normal.      Breath sounds: Normal breath sounds.   Abdominal:      General: Bowel sounds are normal. There is no distension.      Palpations: Abdomen is soft.      Tenderness: There is no abdominal tenderness.   Skin:     General: Skin is warm.      Findings: No rash.   Neurological:      Mental Status: He is alert. He is disoriented and confused.       Significant Labs: All pertinent labs within the past 24 hours have been reviewed.    Significant Imaging: I have reviewed all pertinent imaging results/findings within the past 24 hours.      Assessment/Plan:      * Encephalopathy  Encephalopathy ruled out.      Ischemic embolic stroke  Was present on day 1 of admission.  Stroke pathway initiated.  MRI shows multiple strokes, likely indicating cardiac source of emboli.  Patient is probably not taking the Eliquis consistently.  Will increase the Lipitor to 40 mg.  Neurology service is consulting.  Echo to be done.      Anemia in stage 3 chronic kidney disease  Patient's anemia is currently controlled. Has not received any PRBCs to  date.. Etiology likely d/t CKD  Current CBC reviewed-   Lab Results   Component Value Date    HGB 9.6 (L) 06/10/2022    HCT 28.0 (L) 06/10/2022     Monitor serial CBC and transfuse if patient becomes hemodynamically unstable, symptomatic or H/H drops below 7/21.         Falls frequently  PT and OT treating daily.  Falls are likely due to strokes.        Paroxysmal atrial fibrillation  Patient with Paroxysmal (<7 days) atrial fibrillation which is controlled currently with Calcium Channel Blocker. Patient is currently in sinus rhythm.[id number] Score: 4. Anticoagulation indicated. Anticoagulation done with Eliquis.        Hyperlipidemia   Patient is chronically on statin.will continue for now. Monitor clinically. Last LDL was   Lab Results   Component Value Date    LDLCALC 75.8 12/02/2021            CKD (chronic kidney disease), stage III  Creatine stable for now. BMP reviewed- noted Estimated Creatinine Clearance: 41.5 mL/min (based on SCr of 1.4 mg/dL). according to latest data. Monitor UOP and serial BMP and adjust therapy as needed. Renally dose meds.      Carcinoma of lung  Chronic, stable    Monitor for acute changes      Type 2 diabetes mellitus without complication  Patient's FSGs are controlled on current medication regimen.  Last A1c reviewed-   Lab Results   Component Value Date    HGBA1C 6.7 (H) 12/02/2021     Most recent fingerstick glucose reviewed-   Recent Labs   Lab 06/09/22  2348   POCTGLUCOSE 132*     Current correctional scale  Low  Maintain anti-hyperglycemic dose as follows-   Antihyperglycemics (From admission, onward)            Start     Stop Route Frequency Ordered    06/08/22 2113  insulin aspart U-100 pen 0-5 Units         -- SubQ Before meals & nightly PRN 06/08/22 2015        Hold Oral hypoglycemics while patient is in the hospital.        Chronic prostatitis  Stable.        VTE Risk Mitigation (From admission, onward)         Ordered     apixaban tablet 2.5 mg  2 times daily          06/09/22 0133     IP VTE HIGH RISK PATIENT  Once         06/08/22 2015     Place sequential compression device  Until discontinued         06/08/22 2015     Reason for No Pharmacological VTE Prophylaxis  Once        Question:  Reasons:  Answer:  Already adequately anticoagulated on oral Anticoagulants    06/08/22 2015                Discharge Planning   SAY: 6/12/2022     Code Status: Full Code   Is the patient medically ready for discharge?:     Reason for patient still in hospital (select all that apply): Patient trending condition, Treatment, Imaging and Consult recommendations  Discharge Plan A: Rehab                  Tom Nayak MD  Department of Hospital Medicine   [facility]

## 2022-06-11 NOTE — PLAN OF CARE
I spoke to Karlie with [facility] at [phone] and she was inquiring if the pt is ready for admit today- I sent Dr. Nayak a secure chat updating him that she has a bed and is ready if the pt is medically cleared. CM following.    06/11/22 1052   Post-Acute Status   Post-Acute Authorization Placement   Post-Acute Placement Status Pending post-acute provider review/more information requested

## 2022-06-11 NOTE — ASSESSMENT & PLAN NOTE
Patient with Paroxysmal (<7 days) atrial fibrillation which is controlled currently with Calcium Channel Blocker. Patient is currently in sinus rhythm.[id number] Score: 4. Anticoagulation indicated. Anticoagulation done with Eliquis.

## 2022-06-11 NOTE — CARE UPDATE
06/10/22 1957   Patient Assessment/Suction   Level of Consciousness (AVPU) responds to voice   Respiratory Effort Normal   PRE-TX-O2   O2 Device (Oxygen Therapy) room air   SpO2 97 %   Pulse Oximetry Type Intermittent   $ Pulse Oximetry - Multiple Charge Pulse Oximetry - Multiple   Pulse 92   Aerosol Therapy   $ Aerosol Therapy Charges PRN treatment not required

## 2022-06-11 NOTE — SUBJECTIVE & OBJECTIVE
Interval History:  MRI brain shows multiple strokes in different territories, indicating likely embolic source.  No change in clinical status.    Review of Systems   Unable to perform ROS: Mental status change   Objective:     Vital Signs (Most Recent):  Temp: 97.4 °F (36.3 °C) (06/10/22 1917)  Pulse: 96 (06/10/22 1917)  Resp: 18 (06/10/22 1917)  BP: 136/67 (06/10/22 1917)  SpO2: (!) 92 % (06/10/22 1917)   Vital Signs (24h Range):  Temp:  [96.9 °F (36.1 °C)-97.7 °F (36.5 °C)] 97.4 °F (36.3 °C)  Pulse:  [70-96] 96  Resp:  [16-18] 18  SpO2:  [92 %-98 %] 92 %  BP: (109-144)/(53-67) 136/67     Weight: 68.5 kg (151 lb)  Body mass index is 21.67 kg/m².    Intake/Output Summary (Last 24 hours) at 6/10/2022 2054  Last data filed at 6/10/2022 1800  Gross per 24 hour   Intake 720 ml   Output --   Net 720 ml      Physical Exam  Vitals reviewed.   Constitutional:       General: He is not in acute distress.     Appearance: He is not diaphoretic.   HENT:      Mouth/Throat:      Mouth: Mucous membranes are moist.   Eyes:      General: No scleral icterus.        Right eye: No discharge.         Left eye: No discharge.   Neck:      Vascular: No JVD.   Cardiovascular:      Rate and Rhythm: Normal rate and regular rhythm.   Pulmonary:      Effort: Pulmonary effort is normal.      Breath sounds: Normal breath sounds.   Abdominal:      General: Bowel sounds are normal. There is no distension.      Palpations: Abdomen is soft.      Tenderness: There is no abdominal tenderness.   Skin:     General: Skin is warm.      Findings: No rash.   Neurological:      Mental Status: He is alert. He is disoriented and confused.       Significant Labs: All pertinent labs within the past 24 hours have been reviewed.    Significant Imaging: I have reviewed all pertinent imaging results/findings within the past 24 hours.

## 2022-06-11 NOTE — PLAN OF CARE
Pt asleep in bed, no distress noted and pt denies any pain. Respiration is even and unlabored. No new signs or symptoms of stroke noted. Will continue to monitor.  Problem: Adult Inpatient Plan of Care  Goal: Plan of Care Review  Outcome: Ongoing, Progressing  Goal: Patient-Specific Goal (Individualized)  Outcome: Ongoing, Progressing  Goal: Optimal Comfort and Wellbeing  Outcome: Ongoing, Progressing     Problem: Diabetes Comorbidity  Goal: Blood Glucose Level Within Targeted Range  Outcome: Ongoing, Progressing     Problem: Adjustment to Illness (Stroke, Ischemic/Transient Ischemic Attack)  Goal: Optimal Coping  Outcome: Ongoing, Progressing     Problem: Bowel Elimination Impaired (Stroke, Ischemic/Transient Ischemic Attack)  Goal: Effective Bowel Elimination  Outcome: Ongoing, Progressing     Problem: Cerebral Tissue Perfusion (Stroke, Ischemic/Transient Ischemic Attack)  Goal: Optimal Cerebral Tissue Perfusion  Outcome: Ongoing, Progressing     Problem: Communication Impairment (Stroke, Ischemic/Transient Ischemic Attack)  Goal: Improved Communication Skills  Outcome: Ongoing, Progressing     Problem: Respiratory Compromise (Stroke, Ischemic/Transient Ischemic Attack)  Goal: Effective Oxygenation and Ventilation  Outcome: Ongoing, Progressing     Problem: Sensorimotor Impairment (Stroke, Ischemic/Transient Ischemic Attack)  Goal: Improved Sensorimotor Function  Outcome: Ongoing, Progressing     Problem: Swallowing Impairment (Stroke, Ischemic/Transient Ischemic Attack)  Goal: Optimal Eating and Swallowing without Aspiration  Outcome: Ongoing, Progressing

## 2022-06-12 PROBLEM — F01.50: Status: ACTIVE | Noted: 2022-06-12

## 2022-06-12 PROCEDURE — 25000003 PHARM REV CODE 250: Performed by: NURSE PRACTITIONER

## 2022-06-12 PROCEDURE — 11000001 HC ACUTE MED/SURG PRIVATE ROOM

## 2022-06-12 PROCEDURE — 51798 US URINE CAPACITY MEASURE: CPT

## 2022-06-12 PROCEDURE — 99900035 HC TECH TIME PER 15 MIN (STAT)

## 2022-06-12 PROCEDURE — 94761 N-INVAS EAR/PLS OXIMETRY MLT: CPT

## 2022-06-12 RX ORDER — ATORVASTATIN CALCIUM 40 MG/1
40 TABLET, FILM COATED ORAL DAILY
Start: 2022-06-12 | End: 2022-06-14 | Stop reason: SDUPTHER

## 2022-06-12 RX ADMIN — APIXABAN 5 MG: 2.5 TABLET, FILM COATED ORAL at 08:06

## 2022-06-12 RX ADMIN — DOCUSATE SODIUM AND SENNOSIDES 1 TABLET: 8.6; 5 TABLET, FILM COATED ORAL at 08:06

## 2022-06-12 RX ADMIN — LEVETIRACETAM 500 MG: 500 TABLET, FILM COATED ORAL at 08:06

## 2022-06-12 NOTE — CARE UPDATE
06/12/22 0825   Patient Assessment/Suction   Level of Consciousness (AVPU) alert   Respiratory Effort Unlabored   All Lung Fields Breath Sounds diminished;clear   Rhythm/Pattern, Respiratory unlabored   PRE-TX-O2   O2 Device (Oxygen Therapy) room air   SpO2 97 %   Pulse Oximetry Type Intermittent   $ Pulse Oximetry - Multiple Charge Pulse Oximetry - Multiple   Pulse (!) 114   Resp 18   Aerosol Therapy   $ Aerosol Therapy Charges PRN treatment not required   Respiratory Treatment Status (SVN) PRN treatment not required

## 2022-06-12 NOTE — NURSING
Pt is refusing his medications I explained the importance of him taking them. Also Explained that reason why he should continue to take his medications. Also Khadijah Gaspar NP also spoke with him and explained the importance of taking his maintence medications. He still refused.

## 2022-06-12 NOTE — ASSESSMENT & PLAN NOTE
Creatine stable for now. BMP reviewed- noted Estimated Creatinine Clearance: 45.9 mL/min (based on SCr of 1.3 mg/dL). according to latest data. Monitor UOP and serial BMP and adjust therapy as needed. Renally dose meds.

## 2022-06-12 NOTE — ASSESSMENT & PLAN NOTE
Was present on day 1 of admission.  Stroke pathway initiated.  MRI shows multiple strokes, likely indicating cardiac source of emboli.  Patient is probably not taking the Eliquis consistently.  Will increase the Lipitor to 40 mg.  Neurology service is consulting.  Echo showed normal LVEF and no thrombus.  Is there need for CHARMAINE?.

## 2022-06-12 NOTE — PLAN OF CARE
Problem: Adult Inpatient Plan of Care  Goal: Plan of Care Review  Outcome: Ongoing, Progressing  Goal: Patient-Specific Goal (Individualized)  Outcome: Ongoing, Progressing  Goal: Optimal Comfort and Wellbeing  Outcome: Ongoing, Progressing     Problem: Diabetes Comorbidity  Goal: Blood Glucose Level Within Targeted Range  Outcome: Ongoing, Progressing     Problem: Adjustment to Illness (Stroke, Ischemic/Transient Ischemic Attack)  Goal: Optimal Coping  Outcome: Ongoing, Progressing     Problem: Bowel Elimination Impaired (Stroke, Ischemic/Transient Ischemic Attack)  Goal: Effective Bowel Elimination  Outcome: Ongoing, Progressing     Problem: Cognitive Impairment (Stroke, Ischemic/Transient Ischemic Attack)  Goal: Optimal Cognitive Function  Outcome: Ongoing, Progressing     Problem: Communication Impairment (Stroke, Ischemic/Transient Ischemic Attack)  Goal: Improved Communication Skills  Outcome: Ongoing, Progressing     Problem: Respiratory Compromise (Stroke, Ischemic/Transient Ischemic Attack)  Goal: Effective Oxygenation and Ventilation  Outcome: Ongoing, Progressing     Problem: Sensorimotor Impairment (Stroke, Ischemic/Transient Ischemic Attack)  Goal: Improved Sensorimotor Function  Outcome: Ongoing, Progressing     Problem: Swallowing Impairment (Stroke, Ischemic/Transient Ischemic Attack)  Goal: Optimal Eating and Swallowing without Aspiration  Outcome: Ongoing, Progressing     Problem: Urinary Elimination Impaired (Stroke, Ischemic/Transient Ischemic Attack)  Goal: Effective Urinary Elimination  Outcome: Ongoing, Progressing

## 2022-06-12 NOTE — CARE UPDATE
06/11/22 2115   Patient Assessment/Suction   Level of Consciousness (AVPU) alert   Respiratory Effort Normal;Unlabored   Expansion/Accessory Muscles/Retractions expansion symmetric;no retractions;no use of accessory muscles   PRE-TX-O2   O2 Device (Oxygen Therapy) room air   SpO2 97 %   Pulse Oximetry Type Intermittent   Pulse 80   Resp 18   Aerosol Therapy   $ Aerosol Therapy Charges PRN treatment not required   Respiratory Treatment Status (SVN) PRN treatment not required

## 2022-06-12 NOTE — ASSESSMENT & PLAN NOTE
Patient's anemia is currently controlled. Has not received any PRBCs to date.. Etiology likely d/t CKD  Current CBC reviewed-   Lab Results   Component Value Date    HGB 9.4 (L) 06/11/2022    HCT 27.5 (L) 06/11/2022     Monitor serial CBC and transfuse if patient becomes hemodynamically unstable, symptomatic or H/H drops below 7/21.

## 2022-06-12 NOTE — PROGRESS NOTES
[facility] Hospital Medicine  Progress Note    Patient Name: Tyler Todd  MRN: [id number]  Patient Class: IP- Inpatient   Admission Date: 6/8/2022  Length of Stay: 1 days  Attending Physician: Tom Nayak MD  Primary Care Provider: Sánchez Mast DO        Subjective:     Principal Problem:Ischemic embolic stroke        HPI:  Tyler Todd is a 81 y.o. male with a past medical history of COPD, HTN, HLD, DM type 2, CAD, prostate cancer and lung cancer presenting with fatigue, decreased appetite, confusion, bowel and bladder incontinence for the past 4 days.  Patient's wife reports he has been progressively becoming weaker for the past month and having frequent falls at home.  She states he has a history of 5 strokes and difficulty swallowing food without coughing.  She states his confusion and weakness started 4 days ago and has progressed.  ED workup revealed creatinine that is elevated from baseline.  Chest x-ray showed no acute abnormality.  CT Head showed an old 7 mm lacunar infarct, however it is new since the prior CT of July 2020. Patient was referred to Hospital Medicine and seen in the ED.  History limited due to altered mental status.  Patient is oriented to self only head and denies chest pain, shortness a breath, nausea, vomiting, diarrhea, fevers, and chills.  Patient's wife states she is unable to care for patient at home due to his increased weakness, confusion, and frequent falls.  Patient will be admitted to Hospital Medicine for further evaluation and management.      Overview/Hospital Course:  No notes on file    Interval History:  no new issues.  Await neurology's recommendations on further testing.    Review of Systems   Unable to perform ROS: Mental status change   Objective:     Vital Signs (Most Recent):  Temp: 96.9 °F (36.1 °C) (06/11/22 1914)  Pulse: 80 (06/11/22 1914)  Resp: 18 (06/11/22 1914)  BP: 134/65 (06/11/22 1914)  SpO2: (!) 94 % (06/11/22 1914)   Vital  Signs (24h Range):  Temp:  [96.9 °F (36.1 °C)-98.4 °F (36.9 °C)] 96.9 °F (36.1 °C)  Pulse:  [] 80  Resp:  [16-18] 18  SpO2:  [94 %-99 %] 94 %  BP: (114-140)/(58-72) 134/65     Weight: 70.5 kg (155 lb 6.8 oz)  Body mass index is 22.3 kg/m².    Intake/Output Summary (Last 24 hours) at 6/11/2022 2043  Last data filed at 6/10/2022 2156  Gross per 24 hour   Intake 100 ml   Output --   Net 100 ml        Physical Exam  Vitals reviewed.   Constitutional:       General: He is not in acute distress.     Appearance: He is not diaphoretic.   HENT:      Mouth/Throat:      Mouth: Mucous membranes are moist.   Eyes:      General: No scleral icterus.        Right eye: No discharge.         Left eye: No discharge.   Neck:      Vascular: No JVD.   Cardiovascular:      Rate and Rhythm: Normal rate and regular rhythm.   Pulmonary:      Effort: Pulmonary effort is normal.      Breath sounds: Normal breath sounds.   Abdominal:      General: Bowel sounds are normal. There is no distension.      Palpations: Abdomen is soft.      Tenderness: There is no abdominal tenderness.   Skin:     General: Skin is warm.      Findings: No rash.   Neurological:      Mental Status: He is alert. He is disoriented and confused.       Significant Labs: All pertinent labs within the past 24 hours have been reviewed.    Significant Imaging: I have reviewed all pertinent imaging results/findings within the past 24 hours.      Assessment/Plan:      * Ischemic embolic stroke  Was present on day 1 of admission.  Stroke pathway initiated.  MRI shows multiple strokes, likely indicating cardiac source of emboli.  Patient is probably not taking the Eliquis consistently.  Will increase the Lipitor to 40 mg.  Neurology service is consulting.  Echo showed normal LVEF and no thrombus.  Is there need for CHARMAINE?.      Anemia in stage 3 chronic kidney disease  Patient's anemia is currently controlled. Has not received any PRBCs to date.. Etiology likely d/t CKD  Current  CBC reviewed-   Lab Results   Component Value Date    HGB 9.4 (L) 06/11/2022    HCT 27.5 (L) 06/11/2022     Monitor serial CBC and transfuse if patient becomes hemodynamically unstable, symptomatic or H/H drops below 7/21.         Falls frequently  PT and OT treating daily.  Falls are likely due to strokes.        Paroxysmal atrial fibrillation  Patient with Paroxysmal (<7 days) atrial fibrillation which is controlled currently with Calcium Channel Blocker. Patient is currently in sinus rhythm.[id number] Score: 4. Anticoagulation indicated. Anticoagulation done with Eliquis.        Hyperlipidemia   Patient is chronically on statin.will continue for now. Monitor clinically. Last LDL was   Lab Results   Component Value Date    LDLCALC 75.8 12/02/2021            CKD (chronic kidney disease), stage III  Creatine stable for now. BMP reviewed- noted Estimated Creatinine Clearance: 45.9 mL/min (based on SCr of 1.3 mg/dL). according to latest data. Monitor UOP and serial BMP and adjust therapy as needed. Renally dose meds.      Carcinoma of lung  Chronic, stable    Monitor for acute changes      Type 2 diabetes mellitus without complication  Patient's FSGs are controlled on current medication regimen.  Last A1c reviewed-   Lab Results   Component Value Date    HGBA1C 6.7 (H) 12/02/2021     Most recent fingerstick glucose reviewed-   Recent Labs   Lab 06/10/22  2155 06/11/22  0749   POCTGLUCOSE 153* 119*     Current correctional scale  Low  Maintain anti-hyperglycemic dose as follows-   Antihyperglycemics (From admission, onward)            Start     Stop Route Frequency Ordered    06/08/22 2113  insulin aspart U-100 pen 0-5 Units         -- SubQ Before meals & nightly PRN 06/08/22 2015        Hold Oral hypoglycemics while patient is in the hospital.        Chronic prostatitis  Stable.        VTE Risk Mitigation (From admission, onward)         Ordered     apixaban tablet 5 mg  2 times daily         06/11/22 1636     IP VTE  HIGH RISK PATIENT  Once         06/08/22 2015     Place sequential compression device  Until discontinued         06/08/22 2015     Reason for No Pharmacological VTE Prophylaxis  Once        Question:  Reasons:  Answer:  Already adequately anticoagulated on oral Anticoagulants    06/08/22 2015                Discharge Planning   SAY: 6/12/2022     Code Status: Full Code   Is the patient medically ready for discharge?:     Reason for patient still in hospital (select all that apply): Patient trending condition, Consult recommendations and Pending disposition  Discharge Plan A: Rehab                  Tom Nayak MD  Department of Hospital Medicine   [facility]

## 2022-06-12 NOTE — ASSESSMENT & PLAN NOTE
Patient's FSGs are controlled on current medication regimen.  Last A1c reviewed-   Lab Results   Component Value Date    HGBA1C 6.7 (H) 12/02/2021     Most recent fingerstick glucose reviewed-   Recent Labs   Lab 06/10/22  2155 06/11/22  0749   POCTGLUCOSE 153* 119*     Current correctional scale  Low  Maintain anti-hyperglycemic dose as follows-   Antihyperglycemics (From admission, onward)            Start     Stop Route Frequency Ordered    06/08/22 2113  insulin aspart U-100 pen 0-5 Units         -- SubQ Before meals & nightly PRN 06/08/22 2015        Hold Oral hypoglycemics while patient is in the hospital.

## 2022-06-12 NOTE — PLAN OF CARE
[facility] Transfer Orders        Admit to: Post Acute Medical Rehab    Diagnoses:   Active Hospital Problems    Diagnosis  POA    *Ischemic embolic stroke [I63.9]  Yes    Ischemic vascular dementia [F01.50]  Yes    Falls frequently [R29.6]  Not Applicable    Anemia in stage 3 chronic kidney disease [N18.30, D63.1]  Yes    Paroxysmal atrial fibrillation [I48.0]  Yes    CKD (chronic kidney disease), stage III [N18.30]  Yes     Chronic    Hyperlipidemia [E78.5]  Yes     Chronic    Carcinoma of lung [C34.90]  Yes     Chronic    Type 2 diabetes mellitus without complication [E11.9]  Yes    Chronic prostatitis [N41.1]  Yes      Resolved Hospital Problems   No resolved problems to display.     Allergies:   Review of patient's allergies indicates:   Allergen Reactions    Doxycycline Diarrhea     Severe diarrhea    Sulfa (sulfonamide antibiotics) Rash     Other reaction(s): Itching    Sulfasalazine Rash     Rash and itching mild       Code Status: Full    Vitals: Routine       Diet: 1500 Diabetic; low chol; low saturated fat; Dental soft; nectar thickened liquids    Activity: Activity as tolerated    Nursing Precautions: Aspiration  and Fall    Consults: PT to evaluate and treat, OT to evaluate and treat, and ST to evaluate and treat.    Oxygen: room air    Miscellaneous Care:   Diabetes Care: Diabetes: Check blood sugar. Fingerstick blood sugar AC and HS  Sliding Scale/Hypoglycemia Protocol: For FSG<80, give 1 amp D50 or 1 glucose tablet. For FSG , do nothing. For -200, give 1 unit of novolog in addition to pre-meal insulin. For -250, give 2 units of novolog in addition to pre-meal insulin. For -300, give 3 units of novolog in addition to pre-meal insulin. For 301-350, give 4 units of novolog in addition to pre-meal insulin. For 351-400, give 5 units of novolog in addition to pre-meal insulin. For FSG >400, give 5 units of novolog in addition to pre-meal  insulin and please call MD       Medications:   Current Discharge Medication List      CONTINUE these medications which have CHANGED    Details   atorvastatin (LIPITOR) 40 MG tablet Take 1 tablet (40 mg total) by mouth once daily.         CONTINUE these medications which have NOT CHANGED    Details   ELIQUIS 5 mg Tab Take 5 mg by mouth 2 (two) times daily.      irbesartan (AVAPRO) 300 MG tablet Take 1 tablet (300 mg total) by mouth once daily.    Comments: Hold for next 1 week      levETIRAcetam (KEPPRA) 500 MG Tab Take 500 mg by mouth 2 (two) times daily.      metoprolol succinate (TOPROL-XL) 100 MG 24 hr tablet Take 100 mg by mouth 2 (two) times daily.      repaglinide (PRANDIN) 1 MG tablet Take 1 mg by mouth 3 (three) times daily before meals.      acetaminophen (TYLENOL) 500 MG tablet Take 1,000 mg by mouth every 6 (six) hours as needed for Pain.      nitroGLYCERIN (NITROSTAT) 0.4 MG SL tablet Place 0.4 mg under the tongue every 5 (five) minutes as needed. As directed         STOP taking these medications       pregabalin (LYRICA) 100 MG capsule Comments:   Reason for Stopping:                   Tom Nayak MD   [facility]ist

## 2022-06-12 NOTE — SUBJECTIVE & OBJECTIVE
Interval History:  no new issues.  Await neurology's recommendations on further testing.    Review of Systems   Unable to perform ROS: Mental status change   Objective:     Vital Signs (Most Recent):  Temp: 96.9 °F (36.1 °C) (06/11/22 1914)  Pulse: 80 (06/11/22 1914)  Resp: 18 (06/11/22 1914)  BP: 134/65 (06/11/22 1914)  SpO2: (!) 94 % (06/11/22 1914)   Vital Signs (24h Range):  Temp:  [96.9 °F (36.1 °C)-98.4 °F (36.9 °C)] 96.9 °F (36.1 °C)  Pulse:  [] 80  Resp:  [16-18] 18  SpO2:  [94 %-99 %] 94 %  BP: (114-140)/(58-72) 134/65     Weight: 70.5 kg (155 lb 6.8 oz)  Body mass index is 22.3 kg/m².    Intake/Output Summary (Last 24 hours) at 6/11/2022 2043  Last data filed at 6/10/2022 2156  Gross per 24 hour   Intake 100 ml   Output --   Net 100 ml        Physical Exam  Vitals reviewed.   Constitutional:       General: He is not in acute distress.     Appearance: He is not diaphoretic.   HENT:      Mouth/Throat:      Mouth: Mucous membranes are moist.   Eyes:      General: No scleral icterus.        Right eye: No discharge.         Left eye: No discharge.   Neck:      Vascular: No JVD.   Cardiovascular:      Rate and Rhythm: Normal rate and regular rhythm.   Pulmonary:      Effort: Pulmonary effort is normal.      Breath sounds: Normal breath sounds.   Abdominal:      General: Bowel sounds are normal. There is no distension.      Palpations: Abdomen is soft.      Tenderness: There is no abdominal tenderness.   Skin:     General: Skin is warm.      Findings: No rash.   Neurological:      Mental Status: He is alert. He is disoriented and confused.       Significant Labs: All pertinent labs within the past 24 hours have been reviewed.    Significant Imaging: I have reviewed all pertinent imaging results/findings within the past 24 hours.

## 2022-06-12 NOTE — PLAN OF CARE
Problem: Skin Injury Risk Increased  Goal: Skin Health and Integrity  Outcome: Ongoing, Progressing     Problem: Adjustment to Illness (Stroke, Ischemic/Transient Ischemic Attack)  Goal: Optimal Coping  Outcome: Ongoing, Progressing     Problem: Bowel Elimination Impaired (Stroke, Ischemic/Transient Ischemic Attack)  Goal: Effective Bowel Elimination  Outcome: Ongoing, Progressing     Problem: Cognitive Impairment (Stroke, Ischemic/Transient Ischemic Attack)  Goal: Optimal Cognitive Function  Outcome: Ongoing, Progressing     Problem: Communication Impairment (Stroke, Ischemic/Transient Ischemic Attack)  Goal: Improved Communication Skills  Outcome: Ongoing, Progressing     Problem: Respiratory Compromise (Stroke, Ischemic/Transient Ischemic Attack)  Goal: Effective Oxygenation and Ventilation  Outcome: Ongoing, Progressing     Problem: Sensorimotor Impairment (Stroke, Ischemic/Transient Ischemic Attack)  Goal: Improved Sensorimotor Function  Outcome: Ongoing, Progressing     Problem: Swallowing Impairment (Stroke, Ischemic/Transient Ischemic Attack)  Goal: Optimal Eating and Swallowing without Aspiration  Outcome: Ongoing, Progressing     Problem: Urinary Elimination Impaired (Stroke, Ischemic/Transient Ischemic Attack)  Goal: Effective Urinary Elimination  Outcome: Ongoing, Progressing     Problem: Fall Injury Risk  Goal: Absence of Fall and Fall-Related Injury  Outcome: Ongoing, Progressing

## 2022-06-13 LAB
CREAT SERPL-MCNC: 1.3 MG/DL (ref 0.5–1.4)
EST. GFR  (AFRICAN AMERICAN): 60 ML/MIN/1.73 M^2
EST. GFR  (NON AFRICAN AMERICAN): 52 ML/MIN/1.73 M^2
ESTIMATED AVG GLUCOSE: 123 MG/DL (ref 68–131)
HBA1C MFR BLD: 5.9 % (ref 4–5.6)

## 2022-06-13 PROCEDURE — 25000003 PHARM REV CODE 250: Performed by: INTERNAL MEDICINE

## 2022-06-13 PROCEDURE — 97116 GAIT TRAINING THERAPY: CPT

## 2022-06-13 PROCEDURE — 25000003 PHARM REV CODE 250: Performed by: NURSE PRACTITIONER

## 2022-06-13 PROCEDURE — 82565 ASSAY OF CREATININE: CPT | Performed by: HOSPITALIST

## 2022-06-13 PROCEDURE — 92526 ORAL FUNCTION THERAPY: CPT

## 2022-06-13 PROCEDURE — 83036 HEMOGLOBIN GLYCOSYLATED A1C: CPT | Performed by: INTERNAL MEDICINE

## 2022-06-13 PROCEDURE — 93010 EKG 12-LEAD: ICD-10-PCS | Mod: ,,, | Performed by: SPECIALIST

## 2022-06-13 PROCEDURE — 93010 ELECTROCARDIOGRAM REPORT: CPT | Mod: ,,, | Performed by: SPECIALIST

## 2022-06-13 PROCEDURE — 25500020 PHARM REV CODE 255: Performed by: HOSPITALIST

## 2022-06-13 PROCEDURE — 11000001 HC ACUTE MED/SURG PRIVATE ROOM

## 2022-06-13 PROCEDURE — 99900035 HC TECH TIME PER 15 MIN (STAT)

## 2022-06-13 PROCEDURE — 36415 COLL VENOUS BLD VENIPUNCTURE: CPT | Performed by: HOSPITALIST

## 2022-06-13 PROCEDURE — 97530 THERAPEUTIC ACTIVITIES: CPT

## 2022-06-13 PROCEDURE — 94761 N-INVAS EAR/PLS OXIMETRY MLT: CPT

## 2022-06-13 PROCEDURE — S4991 NICOTINE PATCH NONLEGEND: HCPCS | Performed by: HOSPITALIST

## 2022-06-13 PROCEDURE — 93005 ELECTROCARDIOGRAM TRACING: CPT

## 2022-06-13 PROCEDURE — 25000003 PHARM REV CODE 250: Performed by: HOSPITALIST

## 2022-06-13 PROCEDURE — 36415 COLL VENOUS BLD VENIPUNCTURE: CPT | Performed by: INTERNAL MEDICINE

## 2022-06-13 RX ORDER — CLOPIDOGREL BISULFATE 75 MG/1
75 TABLET ORAL DAILY
Status: DISCONTINUED | OUTPATIENT
Start: 2022-06-13 | End: 2022-06-15 | Stop reason: HOSPADM

## 2022-06-13 RX ORDER — METOPROLOL SUCCINATE 50 MG/1
100 TABLET, EXTENDED RELEASE ORAL 2 TIMES DAILY
Status: DISCONTINUED | OUTPATIENT
Start: 2022-06-13 | End: 2022-06-15 | Stop reason: HOSPADM

## 2022-06-13 RX ORDER — DILTIAZEM HYDROCHLORIDE 5 MG/ML
10 INJECTION INTRAVENOUS ONCE
Status: COMPLETED | OUTPATIENT
Start: 2022-06-13 | End: 2022-06-13

## 2022-06-13 RX ORDER — ATORVASTATIN CALCIUM 40 MG/1
80 TABLET, FILM COATED ORAL DAILY
Status: DISCONTINUED | OUTPATIENT
Start: 2022-06-14 | End: 2022-06-15 | Stop reason: HOSPADM

## 2022-06-13 RX ADMIN — DILTIAZEM HYDROCHLORIDE 10 MG: 5 INJECTION, SOLUTION INTRAVENOUS at 05:06

## 2022-06-13 RX ADMIN — METOPROLOL SUCCINATE 100 MG: 50 TABLET, EXTENDED RELEASE ORAL at 08:06

## 2022-06-13 RX ADMIN — NICOTINE 1 PATCH: 14 PATCH TRANSDERMAL at 10:06

## 2022-06-13 RX ADMIN — DOCUSATE SODIUM AND SENNOSIDES 1 TABLET: 8.6; 5 TABLET, FILM COATED ORAL at 08:06

## 2022-06-13 RX ADMIN — IOHEXOL 50 ML: 350 INJECTION, SOLUTION INTRAVENOUS at 11:06

## 2022-06-13 RX ADMIN — CLOPIDOGREL BISULFATE 75 MG: 75 TABLET, FILM COATED ORAL at 10:06

## 2022-06-13 RX ADMIN — DOCUSATE SODIUM AND SENNOSIDES 1 TABLET: 8.6; 5 TABLET, FILM COATED ORAL at 10:06

## 2022-06-13 RX ADMIN — APIXABAN 5 MG: 2.5 TABLET, FILM COATED ORAL at 10:06

## 2022-06-13 RX ADMIN — LEVETIRACETAM 500 MG: 500 TABLET, FILM COATED ORAL at 08:06

## 2022-06-13 RX ADMIN — METOPROLOL SUCCINATE 100 MG: 50 TABLET, EXTENDED RELEASE ORAL at 10:06

## 2022-06-13 RX ADMIN — APIXABAN 5 MG: 2.5 TABLET, FILM COATED ORAL at 08:06

## 2022-06-13 RX ADMIN — LOSARTAN POTASSIUM 100 MG: 100 TABLET, FILM COATED ORAL at 10:06

## 2022-06-13 RX ADMIN — ACETAMINOPHEN 650 MG: 325 TABLET ORAL at 05:06

## 2022-06-13 RX ADMIN — LEVETIRACETAM 500 MG: 500 TABLET, FILM COATED ORAL at 10:06

## 2022-06-13 NOTE — ASSESSMENT & PLAN NOTE
I believe that patient has dementia at baseline; likely of vascular type.  He does not have decision-making capacity, and I will defer all conversations to his spouse.

## 2022-06-13 NOTE — CARE UPDATE
06/13/22 0754   Patient Assessment/Suction   Level of Consciousness (AVPU) alert   Respiratory Effort Unlabored   All Lung Fields Breath Sounds diminished;clear   Rhythm/Pattern, Respiratory unlabored   PRE-TX-O2   O2 Device (Oxygen Therapy) room air   SpO2 100 %   Pulse Oximetry Type Intermittent   $ Pulse Oximetry - Multiple Charge Pulse Oximetry - Multiple   Pulse 106   Resp 16   Aerosol Therapy   $ Aerosol Therapy Charges PRN treatment not required   Respiratory Treatment Status (SVN) PRN treatment not required

## 2022-06-13 NOTE — ASSESSMENT & PLAN NOTE
PT and OT treating daily.  Falls are likely due to strokes.  He will need to go to rehab for a week or two before going back home.

## 2022-06-13 NOTE — SUBJECTIVE & OBJECTIVE
Interval History:  becoming uncooperative with care.  Refusing his meds.  He's saying he is going to walk out of the hospital and go home.  He is cognitively impaired, however, and cannot make decisions for himself.    Review of Systems   Unable to perform ROS: Dementia   Objective:     Vital Signs (Most Recent):  Temp: 98.9 °F (37.2 °C) (06/12/22 1949)  Pulse: 96 (06/12/22 1949)  Resp: 18 (06/12/22 1949)  BP: (!) 153/64 (06/12/22 1949)  SpO2: 97 % (06/12/22 1949)   Vital Signs (24h Range):  Temp:  [97.2 °F (36.2 °C)-98.9 °F (37.2 °C)] 98.9 °F (37.2 °C)  Pulse:  [] 96  Resp:  [16-18] 18  SpO2:  [96 %-98 %] 97 %  BP: (111-153)/(63-76) 153/64     Weight: 69.7 kg (153 lb 10.6 oz)  Body mass index is 22.05 kg/m².    Intake/Output Summary (Last 24 hours) at 6/12/2022 2033  Last data filed at 6/12/2022 0700  Gross per 24 hour   Intake 60 ml   Output 300 ml   Net -240 ml        Physical Exam  Vitals reviewed.   Constitutional:       General: He is not in acute distress.     Appearance: He is not diaphoretic.   HENT:      Mouth/Throat:      Mouth: Mucous membranes are moist.   Eyes:      General: No scleral icterus.        Right eye: No discharge.         Left eye: No discharge.   Neck:      Vascular: No JVD.   Cardiovascular:      Rate and Rhythm: Normal rate and regular rhythm.   Pulmonary:      Effort: Pulmonary effort is normal.      Breath sounds: Normal breath sounds.   Abdominal:      General: Bowel sounds are normal. There is no distension.      Palpations: Abdomen is soft.      Tenderness: There is no abdominal tenderness.   Skin:     General: Skin is warm.      Findings: No rash.   Neurological:      Mental Status: He is alert. He is disoriented and confused.      Comments: He doesn't recall things even seconds after I say them.   Psychiatric:         Behavior: Behavior is agitated.         Cognition and Memory: Cognition is impaired. He exhibits impaired recent memory.       Significant Labs: All pertinent  labs within the past 24 hours have been reviewed.    Significant Imaging: I have reviewed all pertinent imaging results/findings within the past 24 hours.

## 2022-06-13 NOTE — PROGRESS NOTES
[facility]  Progress Note  Date: 2022 9:26 AM            Patient Name: Tyler Todd   MRN: [id number]   : 1940    AGE: 81 y.o.    LOS: 3 days Hospital Day: 6  Admit date: 2022  4:42 PM            HPI: Tyler Todd is a 81 y.o. male with a past medical history of COPD, HTN, HLD, DM type 2, CAD, prostate cancer and lung cancer presenting with fatigue, decreased appetite, confusion, bowel and bladder incontinence for the past 4 days.  Patient's wife reports he has been progressively becoming weaker for the past month and having frequent falls at home.  She states he has a history of 5 strokes and difficulty swallowing food without coughing.  She states his confusion and weakness started 4 days ago and has progressed.  ED workup revealed creatinine that is elevated from baseline.  Chest x-ray showed no acute abnormality.  CT Head showed an old 7 mm lacunar infarct, however it is new since the prior CT of 2020. Patient was referred to Hospital Medicine and seen in the ED.  History limited due to altered mental status.  Patient is oriented to self only head and denies chest pain, shortness a breath, nausea, vomiting, diarrhea, fevers, and chills.  Patient's wife states she is unable to care for patient at home due to his increased weakness, confusion, and frequent falls.  Patient will be admitted to Hospital Medicine for further evaluation and management.    MRI revealed embolic posterior circulation strokes.  CT angiogram head and neck is pending.  Etiology of stroke is unknown at this time.        2022: No acute events overnight. Patient was seen and examined by me this morning. Neuro exam is stable.  Continues to have right facial droop which is mild.  He denies any new symptoms.  He keeps stating that he would like to get discharged and go home.       Vitals:  Patient Vitals for the past 24 hrs:   BP Temp Temp src Pulse Resp SpO2   22 0754 120/62 -- -- 106 16  100 %   06/13/22 0545 (!) 143/65 -- -- 87 -- --   06/13/22 0544 127/86 -- -- 101 -- --   06/13/22 0540 109/76 -- -- (!) 155 -- --   06/13/22 0506 (!) 154/68 98.1 °F (36.7 °C) Oral 106 18 97 %   06/13/22 0257 (!) 120/58 97.4 °F (36.3 °C) Oral 99 18 98 %   06/12/22 2300 (!) 143/67 98.6 °F (37 °C) Oral 96 16 97 %   06/12/22 1949 (!) 153/64 98.9 °F (37.2 °C) Oral 96 18 97 %   06/12/22 1801 127/63 97.6 °F (36.4 °C) Oral 108 18 98 %   06/12/22 1525 130/66 97.8 °F (36.6 °C) Oral 104 16 96 %   06/12/22 1231 127/63 97.4 °F (36.3 °C) -- 106 16 97 %   06/12/22 1000 126/65 97.8 °F (36.6 °C) Oral 106 16 97 %     PHYSICAL EXAM:     GENERAL APPEARANCE: Well-developed, well-nourished male in no acute distress.  HEENT: Normocephalic and atraumatic. PERRL. Oropharynx unremarkable.  PULM: Comfortable on room air.  CV: RRR.  ABDOMEN: Soft, nontender.  EXTREMITIES: No signs of vascular compromise. Pulses present. No cyanosis, clubbing or edema.  SKIN: Clear; no rashes, lesions or skin breaks in exposed areas.      NEURO:   MENTAL STATUS: Patient awake and oriented to time, place, and person. Affect normal.  CRANIAL NERVES II-XII: Pupils equal, round and reactive to light. Extraocular movements full and intact. R facial asymmetry.  MOTOR: Normal bulk. Tone normal and symmetrical throughout.  No abnormal movements. No tremor.   Strength 4/5 throughout unless specified below.  REFLEXES: DTRs 2+; normal and symmetric throughout.   SENSATION: Sensation grossly intact to fine touch.  COORDINATION: Finger-to-nose normal for age and symmetric.  STATION: Romberg deferred.  GAIT: Deferred.    CURRENT SCHEDULED MEDICATIONS:   apixaban  5 mg Oral BID    [START ON 6/14/2022] atorvastatin  80 mg Oral Daily    clopidogreL  75 mg Oral Daily    levETIRAcetam  500 mg Oral BID    losartan  100 mg Oral Daily    nicotine  1 patch Transdermal Daily    senna-docusate 8.6-50 mg  1 tablet Oral BID     CURRENT INFUSIONS:    DATA:  Recent Labs   Lab  06/08/22  1811 06/09/22 0430 06/10/22  0355 06/10/22  0356 06/11/22  0415    144  --  143 142   K 4.6 4.1  --  4.0 3.7    108  --  107 107   CO2 24 23  --  25 26   BUN 27* 29*  --  24* 15   CREATININE 1.7* 1.6*  --  1.4 1.3   GLU 85 64*  --  103 111*   CALCIUM 9.3 8.9  --  8.8 8.7   AST 20 18  --  17 19   ALT 19 18  --  14 17   AMMONIA  --   --  13  --   --      Recent Labs   Lab 06/08/22  1811 06/09/22  0430 06/10/22  0356 06/11/22  0415   WBC 6.38 4.98 4.43 4.37   HGB 11.2* 9.6* 9.6* 9.4*   HCT 35.0* 28.8* 28.0* 27.5*    155 146* 147*     No results found for: PROTEINCSF, GLUCCSF, WBCCSF, RBCCSF  Hemoglobin A1C   Date Value Ref Range Status   12/02/2021 6.7 (H) 4.0 - 5.6 % Final     Comment:     ADA Screening Guidelines:  5.7-6.4%  Consistent with prediabetes  >or=6.5%  Consistent with diabetes    High levels of fetal hemoglobin interfere with the HbA1C  assay. Heterozygous hemoglobin variants (HbS, HgC, etc)do  not significantly interfere with this assay.   However, presence of multiple variants may affect accuracy.     08/09/2021 7.9 (H) 4.0 - 5.6 % Final     Comment:     ADA Screening Guidelines:  5.7-6.4%  Consistent with prediabetes  >or=6.5%  Consistent with diabetes    High levels of fetal hemoglobin interfere with the HbA1C  assay. Heterozygous hemoglobin variants (HbS, HgC, etc)do  not significantly interfere with this assay.   However, presence of multiple variants may affect accuracy.     04/29/2021 7.2 (H) 4.0 - 5.6 % Final     Comment:     ADA Screening Guidelines:  5.7-6.4%  Consistent with prediabetes  >or=6.5%  Consistent with diabetes    High levels of fetal hemoglobin interfere with the HbA1C  assay. Heterozygous hemoglobin variants (HbS, HgC, etc)do  not significantly interfere with this assay.   However, presence of multiple variants may affect accuracy.              I have personally reviewed and interpreted the pertinent imaging and lab results.  Imaging Results            CT Head Without Contrast (Final result)  Result time 06/08/22 18:06:53    Final result by Jose Pinedo Jr., MD (06/08/22 18:06:53)                 Impression:      Moderate brain atrophy with deep white matter ischemic changes.  A 7 mm lacunar infarct is of near CSF density in the right thalamus suggesting this is old, however it is new since the prior CT of July 18, 2020.    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    No intracranial hemorrhage or hematoma is noted.    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      Electronically signed by: Jose Pinedo MD  Date:    06/08/2022  Time:    18:06             Narrative:    EXAMINATION:  CT HEAD WITHOUT CONTRAST    CLINICAL HISTORY:  Mental status change, unknown cause;    TECHNIQUE:  Low dose axial images were obtained through the head.  Coronal and sagittal reformations were also performed. Contrast was not administered.    COMPARISON:  CT head of July 18, 2020    FINDINGS:  No cranial fracture is identified.  Scalp edema or hematoma is not noted.    The basal cisterns are enlarged but clear and symmetric.  There is no midline shift.  There is enlargement of the lateral ventricles cerebral sulci and sylvian fissures consistent with moderate brain atrophy.  Hypodensity in the central white matter is consistent with deep white matter ischemic changes.  There is a moderate size focus of encephalomalacia of the left occipital lobe consistent with a left posterior cerebral artery infarct.  This is new since the prior CT of July 18, 2020.  Hemorrhage or mass effect is not seen.  There is a 7 mm CSF density lacunar infarct of the right thalamus which is new from the  prior CT of July 18, 2020.  Other focal areas of increased or decreased CT density consistent with tumor, edema, CVA or hemorrhage is not seen.  No intracranial hemorrhage or hematoma is noted.    There is a calcified lesion adjacent to the medulla on the left which has progressively calcified since the prior CT scan and may represent an aneurysm of the left vertebral artery.                               X-Ray Chest AP Portable (Final result)  Result time 06/08/22 17:28:26    Final result by Jose Pinedo Jr., MD (06/08/22 17:28:26)                 Impression:      No acute abnormality.      Electronically signed by: Jose Pinedo MD  Date:    06/08/2022  Time:    17:28             Narrative:    EXAMINATION:  XR CHEST AP PORTABLE    CLINICAL HISTORY:  Chest Pain;    TECHNIQUE:  Single frontal view of the chest was performed.    COMPARISON:  Chest of July 24, 2021.    FINDINGS:  The lungs are clear, with normal appearance of pulmonary vasculature and no pleural effusion or pneumothorax.    The cardiac silhouette is normal in size. The hilar and mediastinal contours are unremarkable.    Bones are intact.                                        ASSESSMENT AND PLAN:    Embolic posterior circulation stroke  Paroxysmal atrial fibrillation    Workup  · CTH: No acute intracranial abnormality   · CTA head and neck:  High-grade stenosis of basilar artery.  High-grade stenosis of distal left V4 segment of the vertebral artery.  Patent right vertebral artery.  · MRI brain:  Acute ischemic infarct in the left occipital lobe, right paramedian abrahan, left midbrain and right medial temporal lobe/hippocampus.  · ECHO:  EF 75%, normal left atrium.  · LDL: 71.4  · HbA1c: pending       Plan  · Admitted for further stroke workup.  Etiology of stroke is atheroembolism in the setting of atherosclerotic disease with high-grade stenosis of proximal basilar artery.  · Start with Cikryk96 mg and Lipitor 80mg in addition to Eliquis 5 mg  b.i.d..  · Ideally patients with intracranial symptomatic high-grade stenosis should be on dual antiplatelet therapy however patient is on anticoagulation for paroxysmal atrial fibrillation and being on triple therapy with dual antiplatelet +anticoagulation will increase risk of bleeding and hence will consider Plavix +Eliquis.  · Normalize BP.   · The patient is recurrent strokes despite medical management, will need to consider basilar artery stenting  · PT OT  · Speech therapy  · DVT prophylaxis with chemo/SCD prophylaxis  · Discussed lifestyle modifications as prophylactic measures for stroke prevention including, adequate blood pressure management, healthy diet and regular exercise.        38 minutes of care time has been spent evaluating with the patient. Time includes chart review not limited to diagnostic imaging, labs, and vitals, patient assessment, discussion with family and nursing, current order evaluations, and new order entries.      King Unger MD  Neurology/vascular Neurology  Date of Service: 06/13/2022  9:26 AM    Please note: This note was transcribed using voice recognition software. Because of this technology there are often uinintended grammatical, spelling, and other transcription errors. Please disregard these errors.

## 2022-06-13 NOTE — PLAN OF CARE
[facility]  Discharge Reassessment    Primary Care Provider: Sánchez Mast DO    Expected Discharge Date:      Pt not cleared for DC per neuro standpoint. Pt will DC to [facility] once medically cleared. Ebonie with [facility] to be notified once ready for DC at [phone]    Reassessment (most recent)     Discharge Reassessment - 06/13/22 1519        Discharge Reassessment    Assessment Type Discharge Planning Reassessment     Did the patient's condition or plan change since previous assessment? No     Discharge Plan discussed with: Patient     Communicated SAY with patient/caregiver Yes     Discharge Plan A Return to nursing home     Discharge Plan B Skilled Nursing Facility

## 2022-06-13 NOTE — ASSESSMENT & PLAN NOTE
Was present on day 1 of admission.  Stroke pathway initiated.  MRI shows multiple strokes, likely indicating cardiac source of emboli.  Patient is probably not taking the Eliquis consistently.  Will increase the Lipitor to 40 mg.  Neurology service is consulting.  Echo showed normal LVEF and no thrombus.  No need for CHARMAINE.  Neurology increased the dose of the apixaban from 2.5 mg to 5 mg.  Pt has been accepted by [facility] in [place], [place].

## 2022-06-13 NOTE — ASSESSMENT & PLAN NOTE
Patient's FSGs are controlled on current medication regimen.  Last A1c reviewed-   Lab Results   Component Value Date    HGBA1C 6.7 (H) 12/02/2021     Most recent fingerstick glucose reviewed-   Recent Labs   Lab 06/11/22 2047   POCTGLUCOSE 155*     Current correctional scale  Low  Maintain anti-hyperglycemic dose as follows-   Antihyperglycemics (From admission, onward)            Start     Stop Route Frequency Ordered    06/08/22 2113  insulin aspart U-100 pen 0-5 Units         -- SubQ Before meals & nightly PRN 06/08/22 2015        Hold Oral hypoglycemics while patient is in the hospital.

## 2022-06-13 NOTE — ASSESSMENT & PLAN NOTE
Creatine stable for now. BMP reviewed- noted Estimated Creatinine Clearance: 45.4 mL/min (based on SCr of 1.3 mg/dL). according to latest data. Monitor UOP and serial BMP and adjust therapy as needed. Renally dose meds.

## 2022-06-13 NOTE — PT/OT/SLP PROGRESS
"Physical Therapy Treatment    Patient Name:  Tyler Todd   MRN:  [id number]    Recommendations:     Discharge Recommendations:  rehabilitation facility   Discharge Equipment Recommendations: other (see comments) (TBD at next level of care)   Barriers to discharge: None    Assessment:     Tyler Todd is a 81 y.o. male admitted with a medical diagnosis of Ischemic embolic stroke.  He presents with the following impairments/functional limitations:  weakness, impaired endurance, impaired self care skills, impaired functional mobilty, gait instability, impaired balance, impaired cognition, decreased coordination, decreased upper extremity function, decreased lower extremity function, decreased safety awareness Pt found supine in bed with bed alarm off and agreeable to working with PT. Pt alert and talking with PT but not oriented to place or time..  Pt tolerated session well and required Madhu for safe mobilization during session today. Pt would benefit from acute PT during hospitalization to increase strength, endurance and safety with mobility and would benefit from gait training therex, and therapeutic activity prior to discharge home.  .    Rehab Prognosis: Fair; patient would benefit from acute skilled PT services to address these deficits and reach maximum level of function.    Recent Surgery: * No surgery found *      Plan:     During this hospitalization, patient to be seen 6 x/week to address the identified rehab impairments via gait training, therapeutic activities, therapeutic exercises, neuromuscular re-education and progress toward the following goals:    · Plan of Care Expires:  07/09/22    Subjective     Chief Complaint: "Im ready to go home"  Patient/Family Comments/goals: none stated  Pain/Comfort:  · Pain Rating 1: 0/10      Objective:     Communicated with RN prior to session.  Patient found supine with bed alarm, peripheral IV, telemetry upon PT entry to room.     General Precautions: Standard, fall "   Orthopedic Precautions:N/A   Braces:    Respiratory Status: Nasal cannula, flow 2 L/min     Functional Mobility:  · Bed Mobility:     · Supine to Sit: minimum assistance  · Sit to Supine: minimum assistance  · Transfers:     · Sit to Stand:  modified independence with no AD  · Gait: 2x100' with RW and CGA      AM-PAC 6 CLICK MOBILITY          Therapeutic Activities and Exercises:   Pt was educated on the following: call light use, importance of OOB activity and functional mobility to negate the negative effects of prolonged bed rest during this hospitalization, safe transfers/ambulation and discharge planning recommendations/options.       Patient left supine with all lines intact, call button in reach, bed alarm on, RN notified and llll present..    GOALS:   Multidisciplinary Problems     Physical Therapy Goals        Problem: Physical Therapy    Goal Priority Disciplines Outcome Goal Variances Interventions   Physical Therapy Goal     PT, PT/OT Ongoing, Progressing     Description: Goals to be met by: 22    Patient will increase functional independence with mobility by performin. Supine to sit with Supervision  2. Sit to stand transfer with Supervision  3. Bed to chair transfer with Supervision using Rolling Walker  4. Gait  x 250 feet with Supervision using Rolling Walker.   5. Lower extremity exercise program x20 reps per handout, with supervision                   Time Tracking:     PT Received On: 22  PT Start Time: 1100     PT Stop Time: 1123  PT Total Time (min): 23 min     Billable Minutes: Gait Training 12 and Therapeutic Activity 11    Treatment Type: Treatment  PT/PTA: PT     PTA Visit Number: 2     2022

## 2022-06-13 NOTE — PLAN OF CARE
Problem: SLP  Goal: SLP Goal  Description: 1. Pt will undergo MBSS to objectively assess swallow physiology and determine presence/severity of dysphagia--MET  2. The patient will demonstrate use of aspiration precautions/compensatory strategies (SGS, swallow x2) during meals with SPV.  3. Perform effortful swallow, Mendelsohn and CTAR with MIN verbal and visual cues to improve pharyngeal strength for the support of safe PO intake.  4. Consume trials of thin liquids via cup no overt s/s penetration/aspiration.  5. Lingual resistance exercises with MIN A        Outcome: Ongoing, Progressing    Tolerating current diet with SPV. Continue POC

## 2022-06-13 NOTE — PROGRESS NOTES
[facility] Hospital Medicine  Progress Note    Patient Name: Tyler Todd  MRN: [id number]  Patient Class: IP- Inpatient   Admission Date: 6/8/2022  Length of Stay: 2 days  Attending Physician: Tom Nayak MD  Primary Care Provider: Sánchez Mast DO        Subjective:     Principal Problem:Ischemic embolic stroke        HPI:  Tyler Todd is a 81 y.o. male with a past medical history of COPD, HTN, HLD, DM type 2, CAD, prostate cancer and lung cancer presenting with fatigue, decreased appetite, confusion, bowel and bladder incontinence for the past 4 days.  Patient's wife reports he has been progressively becoming weaker for the past month and having frequent falls at home.  She states he has a history of 5 strokes and difficulty swallowing food without coughing.  She states his confusion and weakness started 4 days ago and has progressed.  ED workup revealed creatinine that is elevated from baseline.  Chest x-ray showed no acute abnormality.  CT Head showed an old 7 mm lacunar infarct, however it is new since the prior CT of July 2020. Patient was referred to Hospital Medicine and seen in the ED.  History limited due to altered mental status.  Patient is oriented to self only head and denies chest pain, shortness a breath, nausea, vomiting, diarrhea, fevers, and chills.  Patient's wife states she is unable to care for patient at home due to his increased weakness, confusion, and frequent falls.  Patient will be admitted to Hospital Medicine for further evaluation and management.      Overview/Hospital Course:  No notes on file    Interval History:  becoming uncooperative with care.  Refusing his meds.  He's saying he is going to walk out of the hospital and go home.  He is cognitively impaired, however, and cannot make decisions for himself.    Review of Systems   Unable to perform ROS: Dementia   Objective:     Vital Signs (Most Recent):  Temp: 98.9 °F (37.2 °C) (06/12/22  1949)  Pulse: 96 (06/12/22 1949)  Resp: 18 (06/12/22 1949)  BP: (!) 153/64 (06/12/22 1949)  SpO2: 97 % (06/12/22 1949)   Vital Signs (24h Range):  Temp:  [97.2 °F (36.2 °C)-98.9 °F (37.2 °C)] 98.9 °F (37.2 °C)  Pulse:  [] 96  Resp:  [16-18] 18  SpO2:  [96 %-98 %] 97 %  BP: (111-153)/(63-76) 153/64     Weight: 69.7 kg (153 lb 10.6 oz)  Body mass index is 22.05 kg/m².    Intake/Output Summary (Last 24 hours) at 6/12/2022 2033  Last data filed at 6/12/2022 0700  Gross per 24 hour   Intake 60 ml   Output 300 ml   Net -240 ml        Physical Exam  Vitals reviewed.   Constitutional:       General: He is not in acute distress.     Appearance: He is not diaphoretic.   HENT:      Mouth/Throat:      Mouth: Mucous membranes are moist.   Eyes:      General: No scleral icterus.        Right eye: No discharge.         Left eye: No discharge.   Neck:      Vascular: No JVD.   Cardiovascular:      Rate and Rhythm: Normal rate and regular rhythm.   Pulmonary:      Effort: Pulmonary effort is normal.      Breath sounds: Normal breath sounds.   Abdominal:      General: Bowel sounds are normal. There is no distension.      Palpations: Abdomen is soft.      Tenderness: There is no abdominal tenderness.   Skin:     General: Skin is warm.      Findings: No rash.   Neurological:      Mental Status: He is alert. He is disoriented and confused.      Comments: He doesn't recall things even seconds after I say them.   Psychiatric:         Behavior: Behavior is agitated.         Cognition and Memory: Cognition is impaired. He exhibits impaired recent memory.       Significant Labs: All pertinent labs within the past 24 hours have been reviewed.    Significant Imaging: I have reviewed all pertinent imaging results/findings within the past 24 hours.      Assessment/Plan:      * Ischemic embolic stroke  Was present on day 1 of admission.  Stroke pathway initiated.  MRI shows multiple strokes, likely indicating cardiac source of  emboli.  Patient is probably not taking the Eliquis consistently.  Will increase the Lipitor to 40 mg.  Neurology service is consulting.  Echo showed normal LVEF and no thrombus.  No need for CHARMAINE.  Neurology increased the dose of the apixaban from 2.5 mg to 5 mg.  Pt has been accepted by [facility] in [place], [place].      Ischemic vascular dementia  I believe that patient has dementia at baseline; likely of vascular type.  He does not have decision-making capacity, and I will defer all conversations to his spouse.      Anemia in stage 3 chronic kidney disease  Patient's anemia is currently controlled. Has not received any PRBCs to date.. Etiology likely d/t CKD  Current CBC reviewed-   Lab Results   Component Value Date    HGB 9.4 (L) 06/11/2022    HCT 27.5 (L) 06/11/2022     Monitor serial CBC and transfuse if patient becomes hemodynamically unstable, symptomatic or H/H drops below 7/21.         Falls frequently  PT and OT treating daily.  Falls are likely due to strokes.  He will need to go to rehab for a week or two before going back home.        Paroxysmal atrial fibrillation  Patient with Paroxysmal (<7 days) atrial fibrillation which is controlled currently with Calcium Channel Blocker. Patient is currently in sinus rhythm.[id number] Score: 4. Anticoagulation indicated. Anticoagulation done with Eliquis.        Hyperlipidemia   Patient is chronically on statin.will continue for now. Monitor clinically. Last LDL was   Lab Results   Component Value Date    LDLCALC 75.8 12/02/2021            CKD (chronic kidney disease), stage III  Creatine stable for now. BMP reviewed- noted Estimated Creatinine Clearance: 45.4 mL/min (based on SCr of 1.3 mg/dL). according to latest data. Monitor UOP and serial BMP and adjust therapy as needed. Renally dose meds.      Carcinoma of lung  Chronic, stable    Monitor for acute changes      Type 2 diabetes mellitus without complication  Patient's FSGs are controlled on current  medication regimen.  Last A1c reviewed-   Lab Results   Component Value Date    HGBA1C 6.7 (H) 12/02/2021     Most recent fingerstick glucose reviewed-   Recent Labs   Lab 06/11/22  2047   POCTGLUCOSE 155*     Current correctional scale  Low  Maintain anti-hyperglycemic dose as follows-   Antihyperglycemics (From admission, onward)            Start     Stop Route Frequency Ordered    06/08/22 2113  insulin aspart U-100 pen 0-5 Units         -- SubQ Before meals & nightly PRN 06/08/22 2015        Hold Oral hypoglycemics while patient is in the hospital.        Chronic prostatitis  Stable.        VTE Risk Mitigation (From admission, onward)         Ordered     apixaban tablet 5 mg  2 times daily         06/11/22 1636     IP VTE HIGH RISK PATIENT  Once         06/08/22 2015     Place sequential compression device  Until discontinued         06/08/22 2015     Reason for No Pharmacological VTE Prophylaxis  Once        Question:  Reasons:  Answer:  Already adequately anticoagulated on oral Anticoagulants    06/08/22 2015                Discharge Planning   SAY: 6/12/2022     Code Status: Full Code   Is the patient medically ready for discharge?:     Reason for patient still in hospital (select all that apply): Patient trending condition and Pending disposition  Discharge Plan A: Rehab                  Tom Nayak MD  Department of Hospital Medicine   [facility]

## 2022-06-13 NOTE — PT/OT/SLP PROGRESS
"Speech Language Pathology Treatment    Patient Name:  Tyler Todd   MRN:  [id number]  Admitting Diagnosis: Ischemic embolic stroke    Recommendations:                 General Recommendations:  Dysphagia therapy  Diet recommendations:  Dental Soft, Soft & Bite Sized Diet - IDDSI Level 6 (no fresh/canned fruits x bananas, no mixed consistencies (cereal, soup, etc)), Nectar Thick   Aspiration Precautions: Assistance with meals and Assistance with thickening liquids, Double swallow with each bite/sip, Eliminate distractions, Meds whole 1 at a time, Small bites/sips and Standard aspiration precautions   General Precautions: Standard, aspiration, fall, nectar thick  Communication strategies:  none    Subjective     "Where's the doctor?"  "I want to go home today."    Respiratory Status: Room air    Objective:   Pt seen with lunch tray. RN initially at bedside to provide supervision with meal as recommended. Pt awake, upright and cooperative. Anxious to see doctor. Poor insight into current medical consistent and deficits.     Pt consumed lunch with no overt s/s airway compromise. He demonstrated use of small bites and slow pace independently. He required MOD/MAX verbal cues to use of double swallow.     Has the patient been evaluated by SLP for swallowing?   Yes  Keep patient NPO? No   Current Respiratory Status: room air      Assessment:     Tyler Todd is a 81 y.o. male with an SLP diagnosis of Dysphagia.  He presents with tolerance of current diet given supervision. Continue POC.    Goals:   Multidisciplinary Problems     SLP Goals        Problem: SLP    Goal Priority Disciplines Outcome   SLP Goal     SLP Ongoing, Progressing   Description: 1. Pt will undergo MBSS to objectively assess swallow physiology and determine presence/severity of dysphagia--MET  2. The patient will demonstrate use of aspiration precautions/compensatory strategies (SGS, swallow x2) during meals with SPV.  3. Perform effortful swallow, " Mendelsohn and CTAR with MIN verbal and visual cues to improve pharyngeal strength for the support of safe PO intake.  4. Consume trials of thin liquids via cup no overt s/s penetration/aspiration.  5. Lingual resistance exercises with MIN A                         Plan:     · Patient to be seen:  5 x/week   · Plan of Care expires:  06/24/22  · Plan of Care reviewed with:  patient   · SLP Follow-Up:  Yes       Discharge recommendations:  rehabilitation facility   Barriers to Discharge:  Level of Skilled Assistance Needed , and Safety Awareness .    Time Tracking:     SLP Treatment Date:   06/13/22  Speech Start Time:  1230  Speech Stop Time:  1243     Speech Total Time (min):  13 min    Billable Minutes: Treatment Swallowing Dysfunction 13 and Total Time 13    06/13/2022

## 2022-06-14 LAB — SARS-COV-2 RDRP RESP QL NAA+PROBE: NEGATIVE

## 2022-06-14 PROCEDURE — 99900035 HC TECH TIME PER 15 MIN (STAT)

## 2022-06-14 PROCEDURE — 25000003 PHARM REV CODE 250: Performed by: INTERNAL MEDICINE

## 2022-06-14 PROCEDURE — 25000003 PHARM REV CODE 250: Performed by: NURSE PRACTITIONER

## 2022-06-14 PROCEDURE — 94761 N-INVAS EAR/PLS OXIMETRY MLT: CPT

## 2022-06-14 PROCEDURE — 97116 GAIT TRAINING THERAPY: CPT | Mod: CQ

## 2022-06-14 PROCEDURE — S4991 NICOTINE PATCH NONLEGEND: HCPCS | Performed by: HOSPITALIST

## 2022-06-14 PROCEDURE — 25000003 PHARM REV CODE 250: Performed by: HOSPITALIST

## 2022-06-14 PROCEDURE — 11000001 HC ACUTE MED/SURG PRIVATE ROOM

## 2022-06-14 PROCEDURE — U0002 COVID-19 LAB TEST NON-CDC: HCPCS | Performed by: INTERNAL MEDICINE

## 2022-06-14 RX ORDER — CLOPIDOGREL BISULFATE 75 MG/1
75 TABLET ORAL DAILY
Qty: 30 TABLET | Refills: 2 | Status: SHIPPED | OUTPATIENT
Start: 2022-06-15

## 2022-06-14 RX ORDER — ATORVASTATIN CALCIUM 40 MG/1
40 TABLET, FILM COATED ORAL DAILY
Qty: 30 TABLET | Refills: 2 | Status: ON HOLD | OUTPATIENT
Start: 2022-06-14 | End: 2022-10-26 | Stop reason: HOSPADM

## 2022-06-14 RX ORDER — APIXABAN 5 MG/1
5 TABLET, FILM COATED ORAL 2 TIMES DAILY
Qty: 60 TABLET | Refills: 2 | Status: ON HOLD | OUTPATIENT
Start: 2022-06-14 | End: 2022-10-26 | Stop reason: HOSPADM

## 2022-06-14 RX ADMIN — DOCUSATE SODIUM AND SENNOSIDES 1 TABLET: 8.6; 5 TABLET, FILM COATED ORAL at 09:06

## 2022-06-14 RX ADMIN — LEVETIRACETAM 500 MG: 500 TABLET, FILM COATED ORAL at 09:06

## 2022-06-14 RX ADMIN — NICOTINE 1 PATCH: 14 PATCH TRANSDERMAL at 09:06

## 2022-06-14 RX ADMIN — APIXABAN 5 MG: 2.5 TABLET, FILM COATED ORAL at 09:06

## 2022-06-14 RX ADMIN — METOPROLOL SUCCINATE 100 MG: 50 TABLET, EXTENDED RELEASE ORAL at 09:06

## 2022-06-14 NOTE — CARE UPDATE
06/14/22 0857   Patient Assessment/Suction   Level of Consciousness (AVPU) alert   Respiratory Effort Unlabored   All Lung Fields Breath Sounds diminished;clear   PRE-TX-O2   O2 Device (Oxygen Therapy) room air   SpO2 99 %   Pulse Oximetry Type Intermittent   $ Pulse Oximetry - Multiple Charge Pulse Oximetry - Multiple   Pulse 75   Resp 18   Aerosol Therapy   $ Aerosol Therapy Charges PRN treatment not required   Respiratory Treatment Status (SVN) PRN treatment not required

## 2022-06-14 NOTE — PLAN OF CARE
Pt alert and oriented. Up to bedside commode. Walked with therapy. Has been very agitated today refusing things. Plan of care continued. Call light in reach.

## 2022-06-14 NOTE — ASSESSMENT & PLAN NOTE
Was present on day 1 of admission.  Stroke pathway initiated.  MRI shows multiple strokes, likely indicating cardiac source of emboli.  Patient is probably not taking the Eliquis consistently.  Will increase the Lipitor to 40 mg.  Neurology service is consulting.  Echo showed normal LVEF and no thrombus.  No need for CHARMAINE.  Neurology increased the dose of the apixaban from 2.5 mg to 5 mg.  Neurologist wants to assess the arterial system in the neck and brain.  Pt has been accepted by [facility] in [place], [place].

## 2022-06-14 NOTE — HOSPITAL COURSE
Patient underwent workup for his presenting symptoms.  Brain imaging showed multiple infarcts in different vascular territories, which had the appearance of embolic phenomena.  Neurology service consulted with us.  They recommended that patient increase the dose of apixaban from 2.5 mg BID to 5 mg.  Echocardiogram was done, which was negative for intracardiac thrombus.  CHARMAINE was not seen as necessary.  PT and OT treated patient daily.  Neurologist wanted to assess the vasculature and therefore ordered a CTA.

## 2022-06-14 NOTE — PLAN OF CARE
SW met with pt at bedside about discharge plans. Pt is insisting on discharging home with his spouse, Batsheva. Pt is agreeable to home health and has DME at home to assist with ambulation. Pt stated that he has sisters and family in [place] that can assist in transporting him home. CM following.    06/14/22 1152   Discharge Assessment   Assessment Type Discharge Planning Reassessment

## 2022-06-14 NOTE — SUBJECTIVE & OBJECTIVE
Interval History:  No new issues.  Patient continues to show cognitive dysfunction.  He constantly says he's going to go home, despite our reminding him that he will go to rehab.    Review of Systems   Unable to perform ROS: Dementia   Objective:     Vital Signs (Most Recent):  Temp: 97.2 °F (36.2 °C) (06/13/22 1934)  Pulse: 77 (06/13/22 1934)  Resp: 20 (06/13/22 1934)  BP: 136/60 (06/13/22 1934)  SpO2: 98 % (06/13/22 1532)   Vital Signs (24h Range):  Temp:  [97.2 °F (36.2 °C)-98.6 °F (37 °C)] 97.2 °F (36.2 °C)  Pulse:  [] 77  Resp:  [16-20] 20  SpO2:  [97 %-100 %] 98 %  BP: (109-154)/(58-86) 136/60     Weight: 69.7 kg (153 lb 10.6 oz)  Body mass index is 22.05 kg/m².  No intake or output data in the 24 hours ending 06/13/22 2124     Physical Exam  Vitals reviewed.   Constitutional:       General: He is not in acute distress.     Appearance: He is not diaphoretic.   HENT:      Mouth/Throat:      Mouth: Mucous membranes are moist.   Eyes:      General: No scleral icterus.        Right eye: No discharge.         Left eye: No discharge.   Neck:      Vascular: No JVD.   Cardiovascular:      Rate and Rhythm: Normal rate and regular rhythm.   Pulmonary:      Effort: Pulmonary effort is normal.      Breath sounds: Normal breath sounds.   Abdominal:      General: Bowel sounds are normal. There is no distension.      Palpations: Abdomen is soft.      Tenderness: There is no abdominal tenderness.   Skin:     General: Skin is warm.      Findings: No rash.   Neurological:      Mental Status: He is alert. He is disoriented and confused.      Comments: He doesn't recall things even seconds after I say them.   Psychiatric:         Behavior: Behavior is agitated.         Cognition and Memory: Cognition is impaired. He exhibits impaired recent memory.       Significant Labs: All pertinent labs within the past 24 hours have been reviewed.    Significant Imaging: I have reviewed all pertinent imaging results/findings within the  past 24 hours.

## 2022-06-14 NOTE — PT/OT/SLP PROGRESS
"Physical Therapy Treatment    Patient Name:  Tyler Todd   MRN:  [id number]    Recommendations:     Discharge Recommendations:  rehabilitation facility   Discharge Equipment Recommendations: other (see comments) (TBD at next level of care)   Barriers to discharge: None    Assessment:     Tyler Todd is a 81 y.o. male admitted with a medical diagnosis of Ischemic embolic stroke.  He presents with the following impairments/functional limitations:  weakness, impaired endurance, impaired self care skills, impaired functional mobilty, gait instability, impaired balance, decreased lower extremity function, decreased safety awareness, impaired cognition . Required 2 attempts to participate. Refused initially, " I am not doing nothing, I want to go home". Agreed second attempt. Required CGA for safety with mobility . Ambulated 100' with rw and CGA, verbal cues to stay within walker. Returned to room to bed.     Rehab Prognosis: Fair; patient would benefit from acute skilled PT services to address these deficits and reach maximum level of function.    Recent Surgery: * No surgery found *      Plan:     During this hospitalization, patient to be seen 6 x/week to address the identified rehab impairments via gait training, therapeutic activities, therapeutic exercises and progress toward the following goals:    · Plan of Care Expires:  07/09/22    Subjective     Chief Complaint: Wants to go home.   Patient/Family Comments/goals: to return home  Pain/Comfort:  · Pain Rating 1: 0/10      Objective:     Communicated with nurse Freedman prior to session.  Patient found supine with bed alarm, telemetry (Elissa Sys at bedside) upon PT entry to room.     General Precautions: Standard, fall   Orthopedic Precautions:N/A   Braces: N/A  Respiratory Status: Room air     Functional Mobility:  · Bed Mobility:     · Supine to Sit: contact guard assistance  · Sit to Supine: contact guard assistance  · Transfers:     · Sit to Stand:  contact " guard assistance with rolling walker  · Gait: 100' with rw and CGA      AM-PAC 6 CLICK MOBILITY          Therapeutic Activities and Exercises:   Ambulated with rw and CGA, verbal cues for safe technique.     Patient left supine with all lines intact, call button in reach, bed alarm on, nurse Tano notified and Elissa Barillas present..    GOALS:   Multidisciplinary Problems     Physical Therapy Goals        Problem: Physical Therapy    Goal Priority Disciplines Outcome Goal Variances Interventions   Physical Therapy Goal     PT, PT/OT Ongoing, Progressing     Description: Goals to be met by: 22    Patient will increase functional independence with mobility by performin. Supine to sit with Supervision  2. Sit to stand transfer with Supervision  3. Bed to chair transfer with Supervision using Rolling Walker  4. Gait  x 250 feet with Supervision using Rolling Walker.   5. Lower extremity exercise program x20 reps per handout, with supervision                   Time Tracking:     PT Received On: 22  PT Start Time: 1142     PT Stop Time: 1152  PT Total Time (min): 10 min     Billable Minutes: Gait Training 10min    Treatment Type: Treatment  PT/PTA: PTA     PTA Visit Number: 3     2022

## 2022-06-14 NOTE — PROGRESS NOTES
[facility] Hospital Medicine  Progress Note    Patient Name: Tyler Todd  MRN: [id number]  Patient Class: IP- Inpatient   Admission Date: 6/8/2022  Length of Stay: 3 days  Attending Physician: Tom Nayak MD  Primary Care Provider: Sánchez Mast DO        Subjective:     Principal Problem:Ischemic embolic stroke        HPI:  Tyler Todd is a 81 y.o. male with a past medical history of COPD, HTN, HLD, DM type 2, CAD, prostate cancer and lung cancer presenting with fatigue, decreased appetite, confusion, bowel and bladder incontinence for the past 4 days.  Patient's wife reports he has been progressively becoming weaker for the past month and having frequent falls at home.  She states he has a history of 5 strokes and difficulty swallowing food without coughing.  She states his confusion and weakness started 4 days ago and has progressed.  ED workup revealed creatinine that is elevated from baseline.  Chest x-ray showed no acute abnormality.  CT Head showed an old 7 mm lacunar infarct, however it is new since the prior CT of July 2020. Patient was referred to Hospital Medicine and seen in the ED.  History limited due to altered mental status.  Patient is oriented to self only head and denies chest pain, shortness a breath, nausea, vomiting, diarrhea, fevers, and chills.  Patient's wife states she is unable to care for patient at home due to his increased weakness, confusion, and frequent falls.  Patient will be admitted to Hospital Medicine for further evaluation and management.      Overview/Hospital Course:  Patient underwent workup for his presenting symptoms.  Brain imaging showed multiple infarcts in different vascular territories, which had the appearance of embolic phenomena.  Neurology service consulted with us.  They recommended that patient increase the dose of apixaban from 2.5 mg BID to 5 mg.  Echocardiogram was done, which was negative for intracardiac thrombus.  CHARMAINE was  not seen as necessary.  PT and OT treated patient daily.  Neurologist wanted to assess the vasculature and therefore ordered a CTA.      Interval History:  No new issues.  Patient continues to show cognitive dysfunction.  He constantly says he's going to go home, despite our reminding him that he will go to rehab.    Review of Systems   Unable to perform ROS: Dementia   Objective:     Vital Signs (Most Recent):  Temp: 97.2 °F (36.2 °C) (06/13/22 1934)  Pulse: 77 (06/13/22 1934)  Resp: 20 (06/13/22 1934)  BP: 136/60 (06/13/22 1934)  SpO2: 98 % (06/13/22 1532)   Vital Signs (24h Range):  Temp:  [97.2 °F (36.2 °C)-98.6 °F (37 °C)] 97.2 °F (36.2 °C)  Pulse:  [] 77  Resp:  [16-20] 20  SpO2:  [97 %-100 %] 98 %  BP: (109-154)/(58-86) 136/60     Weight: 69.7 kg (153 lb 10.6 oz)  Body mass index is 22.05 kg/m².  No intake or output data in the 24 hours ending 06/13/22 2124     Physical Exam  Vitals reviewed.   Constitutional:       General: He is not in acute distress.     Appearance: He is not diaphoretic.   HENT:      Mouth/Throat:      Mouth: Mucous membranes are moist.   Eyes:      General: No scleral icterus.        Right eye: No discharge.         Left eye: No discharge.   Neck:      Vascular: No JVD.   Cardiovascular:      Rate and Rhythm: Normal rate and regular rhythm.   Pulmonary:      Effort: Pulmonary effort is normal.      Breath sounds: Normal breath sounds.   Abdominal:      General: Bowel sounds are normal. There is no distension.      Palpations: Abdomen is soft.      Tenderness: There is no abdominal tenderness.   Skin:     General: Skin is warm.      Findings: No rash.   Neurological:      Mental Status: He is alert. He is disoriented and confused.      Comments: He doesn't recall things even seconds after I say them.   Psychiatric:         Behavior: Behavior is agitated.         Cognition and Memory: Cognition is impaired. He exhibits impaired recent memory.       Significant Labs: All pertinent  labs within the past 24 hours have been reviewed.    Significant Imaging: I have reviewed all pertinent imaging results/findings within the past 24 hours.      Assessment/Plan:      * Ischemic embolic stroke  Was present on day 1 of admission.  Stroke pathway initiated.  MRI shows multiple strokes, likely indicating cardiac source of emboli.  Patient is probably not taking the Eliquis consistently.  Will increase the Lipitor to 40 mg.  Neurology service is consulting.  Echo showed normal LVEF and no thrombus.  No need for CHARMAINE.  Neurology increased the dose of the apixaban from 2.5 mg to 5 mg.  Neurologist wants to assess the arterial system in the neck and brain.  Pt has been accepted by [facility] in [place], [place].      Ischemic vascular dementia  I believe that patient has dementia at baseline; likely of vascular type.  He does not have decision-making capacity, and I will defer all conversations to his spouse.      Anemia in stage 3 chronic kidney disease  Patient's anemia is currently controlled. Has not received any PRBCs to date.. Etiology likely d/t CKD  Current CBC reviewed-   Lab Results   Component Value Date    HGB 9.4 (L) 06/11/2022    HCT 27.5 (L) 06/11/2022     Monitor serial CBC and transfuse if patient becomes hemodynamically unstable, symptomatic or H/H drops below 7/21.         Falls frequently  PT and OT treating daily.  Falls are likely due to strokes.  He will need to go to rehab for a week or two before going back home.        Paroxysmal atrial fibrillation  Patient with Paroxysmal (<7 days) atrial fibrillation which is controlled currently with Calcium Channel Blocker. Patient is currently in sinus rhythm.[id number] Score: 4. Anticoagulation indicated. Anticoagulation done with Eliquis.        Hyperlipidemia   Patient is chronically on statin.will continue for now. Monitor clinically. Last LDL was   Lab Results   Component Value Date    LDLCALC 75.8 12/02/2021            CKD (chronic kidney  disease), stage III  Creatine stable for now. BMP reviewed- noted Estimated Creatinine Clearance: 45.4 mL/min (based on SCr of 1.3 mg/dL). according to latest data. Monitor UOP and serial BMP and adjust therapy as needed. Renally dose meds.      Carcinoma of lung  Chronic, stable    Monitor for acute changes      Type 2 diabetes mellitus without complication  Patient's FSGs are controlled on current medication regimen.  Last A1c reviewed-   Lab Results   Component Value Date    HGBA1C 6.7 (H) 12/02/2021     Most recent fingerstick glucose reviewed-   No results for input(s): POCTGLUCOSE in the last 24 hours.  Current correctional scale  Low  Maintain anti-hyperglycemic dose as follows-   Antihyperglycemics (From admission, onward)            Start     Stop Route Frequency Ordered    06/08/22 2113  insulin aspart U-100 pen 0-5 Units         -- SubQ Before meals & nightly PRN 06/08/22 2015        Hold Oral hypoglycemics while patient is in the hospital.        Chronic prostatitis  Stable.        VTE Risk Mitigation (From admission, onward)         Ordered     apixaban tablet 5 mg  2 times daily         06/11/22 1636     IP VTE HIGH RISK PATIENT  Once         06/08/22 2015     Place sequential compression device  Until discontinued         06/08/22 2015     Reason for No Pharmacological VTE Prophylaxis  Once        Question:  Reasons:  Answer:  Already adequately anticoagulated on oral Anticoagulants    06/08/22 2015                Discharge Planning   SAY: 6/14/2022     Code Status: Full Code   Is the patient medically ready for discharge?:     Reason for patient still in hospital (select all that apply): Patient trending condition, Imaging and Consult recommendations  Discharge Plan A: Return to nursing home                  Tom Nayak MD  Department of Hospital Medicine   [facility]

## 2022-06-14 NOTE — PLAN OF CARE
"CM spoke to pts wife Batsheva at [phone] to explain that the pt has discharge orders for home with HH and is refusing to go to [facility] rehab. She stated to please not sent the pt home and that " I am not coming to get him." I again explained that the pt had walked over 100 feet with physical therapy and was adamant that he is going home. She stated that HH is in the home for less than 30 minutes and she is 79 years old and unable to care for him. I explained that he is unable to remain in the hospital. She asked if she could speak to him. I told her that I would go to his room and call her right back. CM went to pts room and called Ms. Herman back 5 times in which she did not answer her phone and her voicemail does not have the option to leave a message. I updated pt that Mrs. Herman stated that she was not going to pick him up and he stated to call Kailey Gonsales - he does not have her number but it should be in the phone book under the [place] area. CM following.    SOPHIA able to locate pts sister Kailey Tinajero at [phone]- I assisted pt in calling and speaking to his sister via telephone and updated emergency contacts in [facility].     06/14/22 7938   Discharge Assessment   Assessment Type Discharge Planning Reassessment     "

## 2022-06-14 NOTE — PLAN OF CARE
Problem: Adjustment to Illness (Stroke, Ischemic/Transient Ischemic Attack)  Goal: Optimal Coping  Outcome: Ongoing, Progressing     Problem: Cognitive Impairment (Stroke, Ischemic/Transient Ischemic Attack)  Goal: Optimal Cognitive Function  Outcome: Ongoing, Progressing     Problem: Communication Impairment (Stroke, Ischemic/Transient Ischemic Attack)  Goal: Improved Communication Skills  Outcome: Ongoing, Progressing

## 2022-06-14 NOTE — PLAN OF CARE
CM called pts spouse Batsheva at [phone] to talk about pts refusal to go to [facility] . PTs voicemail full and unable to leave a message.    06/14/22 1153   Discharge Assessment   Assessment Type Discharge Planning Reassessment

## 2022-06-14 NOTE — PLAN OF CARE
I left a message for Karlie with [facility] at [phone] to let her know that the pt is ready for discharge today. I left her a detailed message for a return call back.  I also left a message with Ebonie at [phone].  CM following.    Per Ebonie with [facility] - she has a bed for the pt to admit today- she just needs discharge orders, todays MAR and a rapid covid test.  I updated CHAD Lucio CM Supervisor for a rapid covid order. CM following.     06/14/22 1033   Post-Acute Status   Post-Acute Authorization Placement   Post-Acute Placement Status Pending post-acute provider review/more information requested

## 2022-06-14 NOTE — PLAN OF CARE
Pts MAR, AVS and previous negative covid test sent to [facility] for review. CM awaiting rapid covid test results.    06/14/22 1131   Post-Acute Status   Post-Acute Authorization Placement   Post-Acute Placement Status Pending post-acute provider review/more information requested

## 2022-06-14 NOTE — PLAN OF CARE
POC discussed with patient, verbalized understanding. Patient with uneventful night. Up multiple times to bedside commode with stand by assist to pivot only, had bm. Very unsteady and weak. VS stable. SR on telemetry. No complaints voiced. Tele sitter in room, bed alarm in use. Call light at bedside.

## 2022-06-14 NOTE — PLAN OF CARE
Psych consult ordered to determine if pt has capacity to refuse rehab placement. Pts wife refusing to pick him up. CM following.      Per Dr. Sultana- pt not discharging home today.     06/14/22 1444   Discharge Assessment   Assessment Type Discharge Planning Reassessment

## 2022-06-14 NOTE — NURSING
Pt very agitated. Refusing meds. Stating he is leaving this place one way or another, that he is going to get up and walk out the door. Explained he is going to [facility] for therapy he says he is not going. Refused to eat breakfast also.

## 2022-06-14 NOTE — PT/OT/SLP PROGRESS
"Speech Language Pathology      Tyler Todd  MRN: [id number]    Patient not seen today secondary to Patient unwilling to participate, Increased agitation ("I don't want to do anything. I just want to discharge."). Pt with acute CVA. Will request order for cognitive communication evaluation. Will follow-up 6/15/22.        "

## 2022-06-14 NOTE — ASSESSMENT & PLAN NOTE
Patient with Paroxysmal (<7 days) atrial fibrillation which is controlled currently with Calcium Channel Blocker. Patient is currently in sinus rhythm.[id number] Score: 4. Anticoagulation indicated. Anticoagulation done with Eliquis.

## 2022-06-14 NOTE — PROGRESS NOTES
[facility]  Progress Note  Date: 2022 9:26 AM            Patient Name: Tyler Todd   MRN: [id number]   : 1940    AGE: 81 y.o.    LOS: 4 days Hospital Day: 7  Admit date: 2022  4:42 PM            HPI: Tyler Todd is a 81 y.o. male with a past medical history of COPD, HTN, HLD, DM type 2, CAD, prostate cancer and lung cancer presenting with fatigue, decreased appetite, confusion, bowel and bladder incontinence for the past 4 days.  Patient's wife reports he has been progressively becoming weaker for the past month and having frequent falls at home.  She states he has a history of 5 strokes and difficulty swallowing food without coughing.  She states his confusion and weakness started 4 days ago and has progressed.  ED workup revealed creatinine that is elevated from baseline.  Chest x-ray showed no acute abnormality.  CT Head showed an old 7 mm lacunar infarct, however it is new since the prior CT of 2020. Patient was referred to Hospital Medicine and seen in the ED.  History limited due to altered mental status.  Patient is oriented to self only head and denies chest pain, shortness a breath, nausea, vomiting, diarrhea, fevers, and chills.  Patient's wife states she is unable to care for patient at home due to his increased weakness, confusion, and frequent falls.  Patient will be admitted to Hospital Medicine for further evaluation and management.    MRI revealed embolic posterior circulation strokes.  CT angiogram head and neck is pending.  Etiology of stroke is unknown at this time.        2022: No acute events overnight. Patient was seen and examined by me this morning. Neuro exam is stable.  Continues to have right facial droop which is mild.  He denies any new symptoms.  He keeps stating that he would like to get discharged and go home.    2022:  Patient was seen examined by me this morning.  He denies any new complaints this morning.  He continues to  have right facial droop.  CT angiogram head and neck done yesterday.       Vitals:  Patient Vitals for the past 24 hrs:   BP Temp Temp src Pulse Resp SpO2   06/14/22 0857 -- -- -- 75 18 99 %   06/14/22 0747 (!) 142/66 97.9 °F (36.6 °C) -- 72 16 99 %   06/14/22 0317 (!) 140/67 97.5 °F (36.4 °C) -- 77 18 97 %   06/13/22 2338 (!) 140/67 97.5 °F (36.4 °C) Oral 75 16 96 %   06/13/22 2029 -- -- -- 79 18 98 %   06/13/22 1934 136/60 97.2 °F (36.2 °C) -- 77 20 97 %   06/13/22 1900 130/60 97.2 °F (36.2 °C) -- 77 20 --   06/13/22 1532 130/81 97.8 °F (36.6 °C) -- 82 18 98 %   06/13/22 1350 126/73 97.2 °F (36.2 °C) Oral 76 16 98 %   06/13/22 1345 121/63 97.9 °F (36.6 °C) Oral 106 16 100 %   06/13/22 1050 124/63 98.1 °F (36.7 °C) Oral 104 18 100 %     PHYSICAL EXAM:     GENERAL APPEARANCE: Well-developed, well-nourished male in no acute distress.  HEENT: Normocephalic and atraumatic. PERRL. Oropharynx unremarkable.  PULM: Comfortable on room air.  CV: RRR.  ABDOMEN: Soft, nontender.  EXTREMITIES: No signs of vascular compromise. Pulses present. No cyanosis, clubbing or edema.  SKIN: Clear; no rashes, lesions or skin breaks in exposed areas.      NEURO:   MENTAL STATUS: Patient awake and oriented to time, place, and person. Affect normal.  CRANIAL NERVES II-XII: Pupils equal, round and reactive to light. Extraocular movements full and intact. R facial asymmetry.  MOTOR: Normal bulk. Tone normal and symmetrical throughout.  No abnormal movements. No tremor.   Strength 4/5 throughout unless specified below.  REFLEXES: DTRs 2+; normal and symmetric throughout.   SENSATION: Sensation grossly intact to fine touch.  COORDINATION: Finger-to-nose normal for age and symmetric.  STATION: Romberg deferred.  GAIT: Deferred.    CURRENT SCHEDULED MEDICATIONS:   apixaban  5 mg Oral BID    atorvastatin  80 mg Oral Daily    clopidogreL  75 mg Oral Daily    levETIRAcetam  500 mg Oral BID    losartan  100 mg Oral Daily    metoprolol succinate   100 mg Oral BID    nicotine  1 patch Transdermal Daily    senna-docusate 8.6-50 mg  1 tablet Oral BID     CURRENT INFUSIONS:    DATA:  Recent Labs   Lab 06/08/22  1811 06/09/22  0430 06/10/22  0355 06/10/22  0356 06/11/22  0415 06/13/22  0953    144  --  143 142  --    K 4.6 4.1  --  4.0 3.7  --     108  --  107 107  --    CO2 24 23  --  25 26  --    BUN 27* 29*  --  24* 15  --    CREATININE 1.7* 1.6*  --  1.4 1.3 1.3   GLU 85 64*  --  103 111*  --    CALCIUM 9.3 8.9  --  8.8 8.7  --    AST 20 18  --  17 19  --    ALT 19 18  --  14 17  --    AMMONIA  --   --  13  --   --   --      Recent Labs   Lab 06/08/22  1811 06/09/22  0430 06/10/22  0356 06/11/22  0415   WBC 6.38 4.98 4.43 4.37   HGB 11.2* 9.6* 9.6* 9.4*   HCT 35.0* 28.8* 28.0* 27.5*    155 146* 147*     No results found for: PROTEINCSF, GLUCCSF, WBCCSF, RBCCSF  Hemoglobin A1C   Date Value Ref Range Status   06/13/2022 5.9 (H) 4.0 - 5.6 % Final     Comment:     ADA Screening Guidelines:  5.7-6.4%  Consistent with prediabetes  >or=6.5%  Consistent with diabetes    High levels of fetal hemoglobin interfere with the HbA1C  assay. Heterozygous hemoglobin variants (HbS, HgC, etc)do  not significantly interfere with this assay.   However, presence of multiple variants may affect accuracy.     12/02/2021 6.7 (H) 4.0 - 5.6 % Final     Comment:     ADA Screening Guidelines:  5.7-6.4%  Consistent with prediabetes  >or=6.5%  Consistent with diabetes    High levels of fetal hemoglobin interfere with the HbA1C  assay. Heterozygous hemoglobin variants (HbS, HgC, etc)do  not significantly interfere with this assay.   However, presence of multiple variants may affect accuracy.     08/09/2021 7.9 (H) 4.0 - 5.6 % Final     Comment:     ADA Screening Guidelines:  5.7-6.4%  Consistent with prediabetes  >or=6.5%  Consistent with diabetes    High levels of fetal hemoglobin interfere with the HbA1C  assay. Heterozygous hemoglobin variants (HbS, HgC, etc)do  not  significantly interfere with this assay.   However, presence of multiple variants may affect accuracy.              I have personally reviewed and interpreted the pertinent imaging and lab results.  Imaging Results           CT Head Without Contrast (Final result)  Result time 06/08/22 18:06:53    Final result by Jose Pinedo Jr., MD (06/08/22 18:06:53)                 Impression:      Moderate brain atrophy with deep white matter ischemic changes.  A 7 mm lacunar infarct is of near CSF density in the right thalamus suggesting this is old, however it is new since the prior CT of July 18, 2020.    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    No intracranial hemorrhage or hematoma is noted.    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      Electronically signed by: Jose Pinedo MD  Date:    06/08/2022  Time:    18:06             Narrative:    EXAMINATION:  CT HEAD WITHOUT CONTRAST    CLINICAL HISTORY:  Mental status change, unknown cause;    TECHNIQUE:  Low dose axial images were obtained through the head.  Coronal and sagittal reformations were also performed. Contrast was not administered.    COMPARISON:  CT head of July 18, 2020    FINDINGS:  No cranial fracture is identified.  Scalp edema or hematoma is not noted.    The basal cisterns are enlarged but clear and symmetric.  There is no midline shift.  There is enlargement of the lateral ventricles cerebral sulci and sylvian fissures consistent with moderate brain atrophy.  Hypodensity in the central white matter is consistent with deep white matter ischemic changes.  There is a moderate size focus of encephalomalacia of the left occipital lobe consistent  with a left posterior cerebral artery infarct.  This is new since the prior CT of July 18, 2020.  Hemorrhage or mass effect is not seen.  There is a 7 mm CSF density lacunar infarct of the right thalamus which is new from the prior CT of July 18, 2020.  Other focal areas of increased or decreased CT density consistent with tumor, edema, CVA or hemorrhage is not seen.  No intracranial hemorrhage or hematoma is noted.    There is a calcified lesion adjacent to the medulla on the left which has progressively calcified since the prior CT scan and may represent an aneurysm of the left vertebral artery.                               X-Ray Chest AP Portable (Final result)  Result time 06/08/22 17:28:26    Final result by Jose Pinedo Jr., MD (06/08/22 17:28:26)                 Impression:      No acute abnormality.      Electronically signed by: Jose Pinedo MD  Date:    06/08/2022  Time:    17:28             Narrative:    EXAMINATION:  XR CHEST AP PORTABLE    CLINICAL HISTORY:  Chest Pain;    TECHNIQUE:  Single frontal view of the chest was performed.    COMPARISON:  Chest of July 24, 2021.    FINDINGS:  The lungs are clear, with normal appearance of pulmonary vasculature and no pleural effusion or pneumothorax.    The cardiac silhouette is normal in size. The hilar and mediastinal contours are unremarkable.    Bones are intact.                                        ASSESSMENT AND PLAN:    Embolic posterior circulation stroke  Paroxysmal atrial fibrillation    Workup  · CTH: No acute intracranial abnormality   · CTA head and neck:  High-grade stenosis/filling defect of basilar artery.  High-grade stenosis of distal left V4 segment of the vertebral artery.  Patent right vertebral artery.  · MRI brain:  Acute ischemic infarct in the left occipital lobe, right paramedian abrahan, left midbrain and right medial temporal lobe/hippocampus.  · ECHO:  EF 75%, normal left atrium.  · LDL: 71.4  · HbA1c: pending        Plan  · Admitted for further stroke workup.  Etiology of stroke is atheroembolism in the setting of atherosclerotic disease with high-grade stenosis of proximal basilar artery.  · Start with Ssozio57 mg and Lipitor 80mg in addition to Eliquis 5 mg b.i.d..  · Ideally patients with intracranial symptomatic high-grade stenosis should be on maximal medical therapy with dual antiplatelet therapy for 3 months however patient is on anticoagulation for paroxysmal atrial fibrillation and being on triple therapy with dual antiplatelet +anticoagulation will increase risk of bleeding and hence will consider Plavix +Eliquis.  · Normalize BP.   · The patient is recurrent strokes despite medical management, will need to consider basilar artery stenting.  Recommend outpatient follow-up with Interventional vascular Neurology.  · PT OT  · Speech therapy  · DVT prophylaxis with chemo/SCD prophylaxis  · Discussed lifestyle modifications as prophylactic measures for stroke prevention including, adequate blood pressure management, healthy diet and regular exercise.        38 minutes of care time has been spent evaluating with the patient. Time includes chart review not limited to diagnostic imaging, labs, and vitals, patient assessment, discussion with family and nursing, current order evaluations, and new order entries.      King Unger MD  Neurology/vascular Neurology  Date of Service: 06/14/2022  9:26 AM    Please note: This note was transcribed using voice recognition software. Because of this technology there are often uinintended grammatical, spelling, and other transcription errors. Please disregard these errors.

## 2022-06-14 NOTE — ASSESSMENT & PLAN NOTE
Patient's FSGs are controlled on current medication regimen.  Last A1c reviewed-   Lab Results   Component Value Date    HGBA1C 6.7 (H) 12/02/2021     Most recent fingerstick glucose reviewed-   No results for input(s): POCTGLUCOSE in the last 24 hours.  Current correctional scale  Low  Maintain anti-hyperglycemic dose as follows-   Antihyperglycemics (From admission, onward)            Start     Stop Route Frequency Ordered    06/08/22 2113  insulin aspart U-100 pen 0-5 Units         -- SubQ Before meals & nightly PRN 06/08/22 2015        Hold Oral hypoglycemics while patient is in the hospital.

## 2022-06-14 NOTE — DISCHARGE SUMMARY
[facility] Hospital Medicine  Discharge Summary      Patient Name: Tyler Todd  MRN: [id number]  Admission Date: 6/8/2022  Hospital Length of Stay: 5 days  Discharge Date and Time:  06/15/2022 2:39 PM  Attending Physician: Lg Sultana MD   Discharging Provider: Lg Sultana MD  Primary Care Provider: Sánchez Mast DO        HPI:     81 y.o. male with a past medical history of COPD, HTN, HLD, DM type 2, CAD, prostate cancer and lung cancer presenting with fatigue, decreased appetite, confusion, bowel and bladder incontinence for the past 4 days.  Patient's wife reports he has been progressively becoming weaker for the past month and having frequent falls at home.  She states he has a history of 5 strokes and difficulty swallowing food without coughing.  She states his confusion and weakness started 4 days ago and has progressed.  ED workup revealed creatinine that is elevated from baseline.  Chest x-ray showed no acute abnormality.  CT Head showed an old 7 mm lacunar infarct, however it is new since the prior CT of July 2020. Patient was referred to Hospital Medicine and seen in the ED.  History limited due to altered mental status.  Patient is oriented to self only head and denies chest pain, shortness a breath, nausea, vomiting, diarrhea, fevers, and chills.  Patient's wife states she is unable to care for patient at home due to his increased weakness, confusion, and frequent falls.  Patient will be admitted to Hospital Medicine for further evaluation and management.         * No surgery found *      Hospital Course:     The patient was admitted with weakness and falls.  He was diagnosed with an acute CVA.  There were multiple foci of acute ischemia throughout only the posterior circulation.  This suggests a atherosclerotic embolic origin and not a cardioembolic origin.  As such the patient will be placed on Plavix by the recommendation of Neurology.  He is to follow without  service in 1 month.    Furthermore given his history of atrial fibrillation, his Eliquis will be increased to 5 twice a day given his weight renal function and age.       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  Neurology service is consulting.  Echo showed normal LVEF and no thrombus.  No need for CHARMAINE.  Neurology increased the dose of the apixaban from 2.5 mg to 5 mg.  As above  -Patient seen by psychiatry who determined patient did not have capacity to refuse rehab. Family has advised rehab as they can not take care of him in current state without rehabilitation.         Ischemic vascular dementia  -     Anemia in stage 3 chronic kidney disease  Patient's anemia is currently controlled. Has not received any PRBCs to date.. Etiology likely d/t CKD  Current CBC reviewed-         Lab Results   Component Value Date     HGB 9.4 (L) 06/11/2022     HCT 27.5 (L) 06/11/2022      Monitor serial CBC and transfuse if patient becomes hemodynamically unstable, symptomatic or H/H drops below 7/21.    -Stable.         Falls frequently  PT and OT treating daily.  Falls are likely due to strokes.  He will need to go to rehab for a week or two before going back home.           Paroxysmal atrial fibrillation  Patient with Paroxysmal (<7 days) atrial fibrillation which is controlled currently with Calcium Channel Blocker. Patient is currently in sinus rhythm.[id number] Score: 4. Anticoagulation indicated. Anticoagulation done with Eliquis.           Hyperlipidemia   Patient is chronically on statin.will continue for now. Monitor clinically. Last LDL was         Lab Results   Component Value Date     LDLCALC 75.8 12/02/2021               CKD (chronic kidney disease), stage III  Creatine stable for now. BMP reviewed- noted Estimated Creatinine Clearance: 45.4 mL/min (based on SCr of 1.3 mg/dL). according to latest data. Monitor UOP and serial BMP and adjust therapy as  needed. Renally dose meds.        Carcinoma of lung  Chronic, stable     Monitor for acute changes        Type 2 diabetes mellitus without complication  Patient's FSGs are controlled on current medication regimen.  Last A1c reviewed-         Lab Results   Component Value Date     HGBA1C 6.7 (H) 12/02/2021      Most recent fingerstick glucose reviewed-   No results for input(s): POCTGLUCOSE in the last 24 hours.  Current correctional scale  Low  Maintain anti-hyperglycemic dose as follows-              Antihyperglycemics (From admission, onward)                            Start     Stop Route Frequency Ordered     06/08/22 2113   insulin aspart U-100 pen 0-5 Units         -- SubQ Before meals & nightly PRN 06/08/22 2015          Not requiring correction scale in house. (Also refusing blood sticks)           Chronic prostatitis  Stable.       Behavioral issues  -Psychiatry consulted. They have recommended consideration of zyprexa 2.5 PO/IM if needed for agitation. QTc is prolonged however and this should likely be avoided for repeated doses or EKGs monitored if more than single doses are given.             Consults:   Consults (From admission, onward)        Status Ordering Provider     Inpatient consult to Telemedicine - Psyc  Once        Provider:  Trey Anaya DO    Acknowledged JEREMY BELLE     Inpatient consult to Telemedicine - Psyc  Once        Provider:  Radha Bauer MD    Acknowledged JEREMY BELLE     Case Management/  Once        Provider:  (Not yet assigned)    Completed JEAN MONROY     Inpatient consult to Neurology  Once        Provider:  Noe Michel MD    Acknowledged JEAN MONROY          Final Active Diagnoses:    Diagnosis Date Noted POA    PRINCIPAL PROBLEM:  Ischemic embolic stroke [I63.9] 06/10/2022 Yes    Ischemic vascular dementia [F01.50] 06/12/2022 Yes    Falls frequently [R29.6] 06/09/2022 Not Applicable    Anemia in stage 3 chronic kidney disease  [N18.30, D63.1] 06/09/2022 Yes    Paroxysmal atrial fibrillation [I48.0] 03/10/2020 Yes    CKD (chronic kidney disease), stage III [N18.30] 11/19/2012 Yes     Chronic    Hyperlipidemia [E78.5] 11/19/2012 Yes     Chronic    Carcinoma of lung [C34.90] 08/09/2012 Yes     Chronic    Type 2 diabetes mellitus without complication [E11.9] 08/15/2010 Yes    Chronic prostatitis [N41.1] 06/02/2010 Yes      Problems Resolved During this Admission:      Discharged Condition: stable    Disposition: Rehab Facility    Follow Up:   Follow-up Information     Sánchez Mast DO. Schedule an appointment as soon as possible for a visit in 1 week(s).    Specialty: Hematology and Oncology  Contact information:  [address]   [address]  [facility] [address]  [phone]                       Patient Instructions:      Ambulatory referral/consult to Neurology   Standing Status: Future   Referral Priority: Routine Referral Type: Consultation   Referral Reason: Specialty Services Required   Requested Specialty: Neurology     Ambulatory referral/consult to Home Health   Standing Status: Future   Referral Priority: Routine Referral Type: Home Health   Referral Reason: Specialty Services Required   Requested Specialty: Home Health Services   Number of Visits Requested: 1     Diet Cardiac   Standing Status:    Order Comments: See Stroke Patient Education Guide Booklet for details.     Diet general   Order Comments: Nectar thickened liquids     Order Specific Question Answer Comments   Additional restrictions: Dental Soft    Additional restrictions: Low Chol/Sat Fat    Additional restrictions: Diabetic 1500      Activity as tolerated     Notify your health care provider if you experience any of the following:  temperature >100.4     Notify your health care provider if you experience any of the following:  persistent nausea and vomiting or diarrhea     Notify your health care provider if you  experience any of the following:  severe uncontrolled pain     Notify your health care provider if you experience any of the following:  redness, tenderness, or signs of infection (pain, swelling, redness, odor or green/yellow discharge around incision site)     Notify your health care provider if you experience any of the following:  difficulty breathing or increased cough     Notify your health care provider if you experience any of the following:  severe persistent headache     Notify your health care provider if you experience any of the following:  worsening rash     Notify your health care provider if you experience any of the following:  persistent dizziness, light-headedness, or visual disturbances     Notify your health care provider if you experience any of the following:  increased confusion or weakness     Activity as tolerated     Medications:  Reconciled Home Medications:      Medication List      START taking these medications    clopidogreL 75 mg tablet  Commonly known as: PLAVIX  Take 1 tablet (75 mg total) by mouth once daily.        CHANGE how you take these medications    atorvastatin 40 MG tablet  Commonly known as: LIPITOR  Take 1 tablet (40 mg total) by mouth once daily.  What changed:   · medication strength  · how much to take        CONTINUE taking these medications    acetaminophen 500 MG tablet  Commonly known as: TYLENOL  Take 1,000 mg by mouth every 6 (six) hours as needed for Pain.     ELIQUIS 5 mg Tab  Generic drug: apixaban  Take 1 tablet (5 mg total) by mouth 2 (two) times daily.     irbesartan 300 MG tablet  Commonly known as: AVAPRO  Take 1 tablet (300 mg total) by mouth once daily.     levETIRAcetam 500 MG Tab  Commonly known as: KEPPRA  Take 500 mg by mouth 2 (two) times daily.     metoprolol succinate 100 MG 24 hr tablet  Commonly known as: TOPROL-XL  Take 100 mg by mouth 2 (two) times daily.     nitroGLYCERIN 0.4 MG SL tablet  Commonly known as: NITROSTAT  Place 0.4 mg under  the tongue every 5 (five) minutes as needed. As directed     repaglinide 1 MG tablet  Commonly known as: PRANDIN  Take 1 mg by mouth 3 (three) times daily before meals.        STOP taking these medications    pregabalin 100 MG capsule  Commonly known as: LYRICA                Pending Diagnostic Studies:     Procedure Component Value Units Date/Time    EKG 12-lead [[id number]] Collected: 06/13/22 0524    Order Status: Sent Lab Status: In process Updated: 06/14/22 1016    Narrative:      Test Reason : R07.9,    Vent. Rate : 151 BPM     Atrial Rate : 150 BPM     P-R Int : 000 ms          QRS Dur : 070 ms      QT Int : 316 ms       P-R-T Axes : 000 055 111 degrees     QTc Int : 500 ms    Supraventricular tachycardia  Nonspecific ST and T wave abnormality  Abnormal ECG  When compared with ECG of 13-JUN-2022 05:24,  Fusion complexes are no longer Present  Premature ventricular complexes are no longer Present    Referred By: [email or web address]   SELF           Confirmed By:     Fl Modified Barium Swallow Speech [[id number]] Resulted: 06/10/22 1334    Order Status: Sent Lab Status: In process Updated: 06/10/22 1334    Vitamin B1 [[id number]] Collected: 06/10/22 0355    Order Status: Sent Lab Status: In process Updated: 06/10/22 0447    Specimen: Blood         Indwelling Lines/Drains at time of discharge:   Lines/Drains/Airways     None                 Time spent on the discharge of patient: 35 minutes         Lg Sultana MD  Department of Hospital Medicine  [facility]

## 2022-06-15 VITALS
HEIGHT: 70 IN | OXYGEN SATURATION: 98 % | RESPIRATION RATE: 16 BRPM | SYSTOLIC BLOOD PRESSURE: 149 MMHG | WEIGHT: 146.63 LBS | TEMPERATURE: 99 F | HEART RATE: 75 BPM | DIASTOLIC BLOOD PRESSURE: 67 MMHG | BODY MASS INDEX: 20.99 KG/M2

## 2022-06-15 LAB
BASOPHILS # BLD AUTO: 0.01 K/UL (ref 0–0.2)
BASOPHILS NFR BLD: 0.2 % (ref 0–1.9)
DIFFERENTIAL METHOD: ABNORMAL
EOSINOPHIL # BLD AUTO: 0.1 K/UL (ref 0–0.5)
EOSINOPHIL NFR BLD: 2.1 % (ref 0–8)
ERYTHROCYTE [DISTWIDTH] IN BLOOD BY AUTOMATED COUNT: 14.5 % (ref 11.5–14.5)
HCT VFR BLD AUTO: 29.9 % (ref 40–54)
HGB BLD-MCNC: 9.8 G/DL (ref 14–18)
IMM GRANULOCYTES # BLD AUTO: 0.02 K/UL (ref 0–0.04)
IMM GRANULOCYTES NFR BLD AUTO: 0.5 % (ref 0–0.5)
LYMPHOCYTES # BLD AUTO: 0.7 K/UL (ref 1–4.8)
LYMPHOCYTES NFR BLD: 15.2 % (ref 18–48)
MCH RBC QN AUTO: 30 PG (ref 27–31)
MCHC RBC AUTO-ENTMCNC: 32.8 G/DL (ref 32–36)
MCV RBC AUTO: 91 FL (ref 82–98)
MONOCYTES # BLD AUTO: 0.6 K/UL (ref 0.3–1)
MONOCYTES NFR BLD: 13.5 % (ref 4–15)
NEUTROPHILS # BLD AUTO: 2.9 K/UL (ref 1.8–7.7)
NEUTROPHILS NFR BLD: 68.5 % (ref 38–73)
NRBC BLD-RTO: 0 /100 WBC
PLATELET # BLD AUTO: 176 K/UL (ref 150–450)
PMV BLD AUTO: 10.8 FL (ref 9.2–12.9)
RBC # BLD AUTO: 3.27 M/UL (ref 4.6–6.2)
WBC # BLD AUTO: 4.29 K/UL (ref 3.9–12.7)

## 2022-06-15 PROCEDURE — 25000003 PHARM REV CODE 250: Performed by: HOSPITALIST

## 2022-06-15 PROCEDURE — 99900035 HC TECH TIME PER 15 MIN (STAT)

## 2022-06-15 PROCEDURE — 25000003 PHARM REV CODE 250: Performed by: INTERNAL MEDICINE

## 2022-06-15 PROCEDURE — 85025 COMPLETE CBC W/AUTO DIFF WBC: CPT | Performed by: INTERNAL MEDICINE

## 2022-06-15 PROCEDURE — 94761 N-INVAS EAR/PLS OXIMETRY MLT: CPT

## 2022-06-15 PROCEDURE — G0425 INPT/ED TELECONSULT30: HCPCS | Mod: 95,,, | Performed by: PSYCHIATRY & NEUROLOGY

## 2022-06-15 PROCEDURE — G0425 PR INPT TELEHEALTH CONSULT 30M: ICD-10-PCS | Mod: 95,,, | Performed by: PSYCHIATRY & NEUROLOGY

## 2022-06-15 PROCEDURE — 25000003 PHARM REV CODE 250: Performed by: NURSE PRACTITIONER

## 2022-06-15 PROCEDURE — 92523 SPEECH SOUND LANG COMPREHEN: CPT

## 2022-06-15 PROCEDURE — S4991 NICOTINE PATCH NONLEGEND: HCPCS | Performed by: HOSPITALIST

## 2022-06-15 PROCEDURE — 36415 COLL VENOUS BLD VENIPUNCTURE: CPT | Performed by: INTERNAL MEDICINE

## 2022-06-15 RX ORDER — OLANZAPINE 2.5 MG/1
2.5 TABLET ORAL ONCE
Status: COMPLETED | OUTPATIENT
Start: 2022-06-15 | End: 2022-06-15

## 2022-06-15 RX ADMIN — APIXABAN 5 MG: 2.5 TABLET, FILM COATED ORAL at 08:06

## 2022-06-15 RX ADMIN — ATORVASTATIN CALCIUM 80 MG: 40 TABLET, FILM COATED ORAL at 08:06

## 2022-06-15 RX ADMIN — LEVETIRACETAM 500 MG: 500 TABLET, FILM COATED ORAL at 08:06

## 2022-06-15 RX ADMIN — DOCUSATE SODIUM AND SENNOSIDES 1 TABLET: 8.6; 5 TABLET, FILM COATED ORAL at 08:06

## 2022-06-15 RX ADMIN — OLANZAPINE 2.5 MG: 2.5 TABLET, FILM COATED ORAL at 03:06

## 2022-06-15 RX ADMIN — NICOTINE 1 PATCH: 14 PATCH TRANSDERMAL at 08:06

## 2022-06-15 RX ADMIN — CLOPIDOGREL BISULFATE 75 MG: 75 TABLET, FILM COATED ORAL at 08:06

## 2022-06-15 RX ADMIN — METOPROLOL SUCCINATE 100 MG: 50 TABLET, EXTENDED RELEASE ORAL at 08:06

## 2022-06-15 RX ADMIN — LOSARTAN POTASSIUM 100 MG: 100 TABLET, FILM COATED ORAL at 08:06

## 2022-06-15 NOTE — CARE UPDATE
06/15/22 0714   Patient Assessment/Suction   Level of Consciousness (AVPU) alert   Respiratory Effort Unlabored   All Lung Fields Breath Sounds diminished;clear   PRE-TX-O2   O2 Device (Oxygen Therapy) room air   SpO2 97 %   Pulse Oximetry Type Intermittent   $ Pulse Oximetry - Multiple Charge Pulse Oximetry - Multiple   Pulse 74   Resp 18   Aerosol Therapy   $ Aerosol Therapy Charges PRN treatment not required   Respiratory Treatment Status (SVN) PRN treatment not required

## 2022-06-15 NOTE — CONSULTS
"[facility]  Psychiatry  Telepsychiatry Consult Note    Please see previous notes:    Patient agreeable to consultation via telepsychiatry.    Tele-Consultation from Psychiatry started: 6/15/2022 at 12:26 PM  The chief complaint leading to psychiatric consultation is: "Capacity regarding decision not to go to SNF. Family can not take care of him at home. PT has recommended SNF"  This consultation was requested by Dr Sultana, the Emergency Department attending physician.  The location of the consulting psychiatrist is [place].  The patient location is  [facility] PERICO/PROGRESSIVE CARE *   The patient arrived at the ED at: 6/8/22    Also present with the patient at the time of the consultation: RN    Patient Identification:   Tyler Todd is a 81 y.o. male.    Patient information was obtained from patient, past medical records, ER records and primary team.      Inpatient consult to Telemedicine - Psyc  Consult performed by: Kaylee Campo MD  Consult ordered by: Lg Sultana MD        Consult Start Time: 06/15/2022 12:27 CDT  Consult End Time: 06/15/2022 12:56 CDT        Subjective:     History of Present Illness:  Per progress note 6/14/22: "HPI:  Tyler Todd is a 81 y.o. male with a past medical history of COPD, HTN, HLD, DM type 2, CAD, prostate cancer and lung cancer presenting with fatigue, decreased appetite, confusion, bowel and bladder incontinence for the past 4 days.  Patient's wife reports he has been progressively becoming weaker for the past month and having frequent falls at home.  She states he has a history of 5 strokes and difficulty swallowing food without coughing.  She states his confusion and weakness started 4 days ago and has progressed.  ED workup revealed creatinine that is elevated from baseline.  Chest x-ray showed no acute abnormality.  CT Head showed an old 7 mm lacunar infarct, however it is new since the prior CT of July 2020. Patient was referred to Hospital Medicine and " "seen in the ED.  History limited due to altered mental status.  Patient is oriented to self only head and denies chest pain, shortness a breath, nausea, vomiting, diarrhea, fevers, and chills.  Patient's wife states she is unable to care for patient at home due to his increased weakness, confusion, and frequent falls.  Patient will be admitted to Hospital Medicine for further evaluation and management.     Overview/Hospital Course:  Patient underwent workup for his presenting symptoms.  Brain imaging showed multiple infarcts in different vascular territories, which had the appearance of embolic phenomena.  Neurology service consulted with us.  They recommended that patient increase the dose of apixaban from 2.5 mg BID to 5 mg.  Echocardiogram was done, which was negative for intracardiac thrombus.  CHARMAINE was not seen as necessary.  PT and OT treated patient daily.  Neurologist wanted to assess the vasculature and therefore ordered a CTA.     Interval History:  No new issues.  Patient continues to show cognitive dysfunction.  He constantly says he's going to go home, despite our reminding him that he will go to rehab.  Spoke to wife who said she can't take him home"    Pt is a 73 y/o male with PMH as below and no known prior psychiatric hx admitted to medicine as above. Per chart review, pt pending SNF placement, refused VS & OT today. On interview, pt immediately appeared irritable and suspicious, after introduction says "I know what you say she is..Why is she rolling around me through the door...Give me a discharge out of here, I'm sick of y'all lying." Attempted to orient to purpose of capacity evaluation, says "you not my type of doctor...Your evaluation really don't count to me." Attempted to assess orientation, says year is 2006 and month is "27," asked if he understands reason for hospital admission says "I have a brain injury." Discussed recommendation for additional rehabilitation and asked if pt understood " "importance, says "I'm going home...so help me God I'm getting out of here." Asked how pt would take care of himself at home, says his wife will take care of him; discussed wife assertion that she cannot, pt unable to discuss any safe alternatives or explain reasoning behind his refusal. Pt eventually said "I don't want to talk to you, get out." Despite multiple attempts, the comprehensive psychiatric assessment was limited due to pts refusal to participate.    Psychiatric History:   Previous Psychiatric Hospitalizations: unable to assess  Previous Medication Trials: unable to assess  Previous Suicide Attempts:unable to assess  History of Violence: unable to assess  History of Depression: unable to assess  History of Uma: unable to assess  History of Auditory/Visual Hallucination unable to assess  History of Delusions: unable to assess  Outpatient psychiatrist (current & past): unable to assess    Substance Abuse History:  Tobacco:Yes  Alcohol: unable to assess  Illicit Substances:unable to assess  Detox/Rehab: unable to assess    Legal History: Past charges/incarcerations: unable to assess     Family Psychiatric History: unable to assess      Social History:  Developmental/Childhood:unable to assess  *Education:unable to assess  Employment Status/Finances:unable to assess   Relationship Status/Sexual Orientation: unable to assess  Children: unable to assess  Housing Status: unable to assess    history:  unable to assess  Access to gun: unable to assess  Muslim:unable to assess  Recreational activities:unable to assess    Psychiatric Mental Status Exam:  Arousal: alert  Sensorium/Orientation: oriented to person, disoriented to year, month, situation  Behavior/Cooperation: uncooperative, hostile   Speech: normal tone, normal rate, normal pitch, loud, spontaneous  Language: grossly intact  Mood: unable to assess  Affect: irritable  Thought Process: goal-directed  Thought Content:   Auditory hallucinations: " "unable to assess  Visual hallucinations: unable to assess  Paranoia: YES: thinks staff is "lying"     Delusions:  unable to assess  Suicidal ideation: unable to assess  Homicidal ideation: unable to assess  Attention/Concentration:intact, unable to assess formally  Memory: unable to assess  Fund of Knowledge: vocabulary appropriate  Abstract reasoning: unable to assess  Insight:limited  Judgment: limited      Past Medical History:   Past Medical History:   Diagnosis Date    Back pain     Cancer 2012    lung cancer chemo and radiation    COPD (chronic obstructive pulmonary disease)     Coronary artery disease 2002, 2004    s/p 3 stents;  Dr. Interiano    Diabetes mellitus, type 2     Digestive disorder     Hiatal hernia     History of lung cancer 7/6/2021    Hyperlipidemia     Hypertension     Lung cancer     Neck pain     Prostate CA       Laboratory Data:   Labs Reviewed   CBC W/ AUTO DIFFERENTIAL - Abnormal; Notable for the following components:       Result Value    RBC 3.75 (*)     Hemoglobin 11.2 (*)     Hematocrit 35.0 (*)     RDW 14.6 (*)     Lymph # 0.6 (*)     Gran % 80.8 (*)     Lymph % 9.2 (*)     All other components within normal limits    Narrative:     Collection has been rescheduled by [facility] at 06/08/2022 17:46 Reason:   Patient unavailable pt in ct and nurse mikel said she will start a line   COMPREHENSIVE METABOLIC PANEL - Abnormal; Notable for the following components:    BUN 27 (*)     Creatinine 1.7 (*)     Albumin 3.2 (*)     eGFR if  43 (*)     eGFR if non  38 (*)     All other components within normal limits    Narrative:     Collection has been rescheduled by [facility] at 06/08/2022 17:46 Reason:   Patient unavailable pt in ct and nurse mikel said she will start a line   URINALYSIS, REFLEX TO URINE CULTURE - Abnormal; Notable for the following components:    Ketones, UA 1+ (*)     All other components within normal limits    Narrative:     Specimen " Source->Urine   LACTIC ACID, PLASMA    Narrative:     Collection has been rescheduled by [facility] at 06/08/2022 17:46 Reason:   Patient unavailable pt in ct and nurse mikel said she will start a line   LIPASE    Narrative:     Collection has been rescheduled by [facility] at 06/08/2022 17:46 Reason:   Patient unavailable pt in ct and nurse mikel said she will start a line   SARS-COV-2 RNA AMPLIFICATION, QUAL   POCT GLUCOSE       Neurological History:  Seizures: Yes  Head trauma: Yes multiple recent falls    Allergies:   Review of patient's allergies indicates:   Allergen Reactions    Doxycycline Diarrhea     Severe diarrhea    Sulfa (sulfonamide antibiotics) Rash     Other reaction(s): Itching    Sulfasalazine Rash     Rash and itching mild       Medications in ER:   Medications   losartan tablet 100 mg (100 mg Oral Given 6/15/22 0845)   sodium chloride 0.9% flush 10 mL (has no administration in time range)   albuterol-ipratropium 2.5 mg-0.5 mg/3 mL nebulizer solution 3 mL (has no administration in time range)   melatonin tablet 6 mg (6 mg Oral Given 6/10/22 2151)   ondansetron injection 4 mg (has no administration in time range)   prochlorperazine injection Soln 5 mg (has no administration in time range)   senna-docusate 8.6-50 mg per tablet 1 tablet (1 tablet Oral Given 6/15/22 0845)   acetaminophen tablet 650 mg (650 mg Oral Given 6/13/22 0510)   simethicone chewable tablet 80 mg (has no administration in time range)   aluminum-magnesium hydroxide-simethicone 200-200-20 mg/5 mL suspension 30 mL (has no administration in time range)   naloxone 0.4 mg/mL injection 0.02 mg (has no administration in time range)   potassium, sodium phosphates 280-160-250 mg packet 2 packet (has no administration in time range)   glucose chewable tablet 16 g (has no administration in time range)   glucose chewable tablet 24 g (has no administration in time range)   glucagon (human recombinant) injection 1 mg (has no administration in time range)    dextrose 10% bolus 125 mL (has no administration in time range)   dextrose 10% bolus 250 mL (has no administration in time range)   insulin aspart U-100 pen 0-5 Units (has no administration in time range)   levETIRAcetam tablet 500 mg (500 mg Oral Given 6/15/22 0845)   nicotine 14 mg/24 hr 1 patch (1 patch Transdermal Patch Applied 6/15/22 0846)   apixaban tablet 5 mg (5 mg Oral Given 6/15/22 0845)   atorvastatin tablet 80 mg (80 mg Oral Given 6/15/22 0845)   clopidogreL tablet 75 mg (75 mg Oral Given 6/15/22 0845)   metoprolol succinate (TOPROL-XL) 24 hr tablet 100 mg (100 mg Oral Given 6/15/22 0845)   0.9%  NaCl infusion ( Intravenous New Bag 6/8/22 8341)   diltiaZEM injection 10 mg (10 mg Intravenous Given 6/13/22 0541)   iohexoL (OMNIPAQUE 350) injection 50 mL (50 mLs Intravenous Given 6/13/22 1157)       Medications at home:   Current Discharge Medication List      START taking these medications    Details   clopidogreL (PLAVIX) 75 mg tablet Take 1 tablet (75 mg total) by mouth once daily.  Qty: 30 tablet, Refills: 2         CONTINUE these medications which have CHANGED    Details   atorvastatin (LIPITOR) 40 MG tablet Take 1 tablet (40 mg total) by mouth once daily.  Qty: 30 tablet, Refills: 2      ELIQUIS 5 mg Tab Take 1 tablet (5 mg total) by mouth 2 (two) times daily.  Qty: 60 tablet, Refills: 2         CONTINUE these medications which have NOT CHANGED    Details   irbesartan (AVAPRO) 300 MG tablet Take 1 tablet (300 mg total) by mouth once daily.    Comments: Hold for next 1 week      levETIRAcetam (KEPPRA) 500 MG Tab Take 500 mg by mouth 2 (two) times daily.      metoprolol succinate (TOPROL-XL) 100 MG 24 hr tablet Take 100 mg by mouth 2 (two) times daily.      repaglinide (PRANDIN) 1 MG tablet Take 1 mg by mouth 3 (three) times daily before meals.      acetaminophen (TYLENOL) 500 MG tablet Take 1,000 mg by mouth every 6 (six) hours as needed for Pain.      nitroGLYCERIN (NITROSTAT) 0.4 MG SL tablet  Place 0.4 mg under the tongue every 5 (five) minutes as needed. As directed         STOP taking these medications       pregabalin (LYRICA) 100 MG capsule Comments:   Reason for Stopping:                 Assessment - Diagnosis - Goals:     Diagnosis/Impression: Vascular dementia, r/o acute encephalopathy 2/2 CVA    Rec:   CAPACITY EVALUATION     Definition of Capacity for a given decision requires that the patient:  · Understands the proposed treatment and/or the proposed options for his care.  · Appreciates his own medical situation and can abstract how the treatments/proposed options for care apply to his medical situation.  · Understands the consequences of his medical decisions in a reasonable and factual manner supported by the facts and his own values in a consistent fashion.  · Demonstrates the ability to communicate a choice clearly and consistently.    Capacity for:  · Refusing safe discharge (SNF)    Assessment of capacity for the above reason for Tyler Todd  :  · This patient understands the clinical condition relation to the evaluation:  No.  · This patient understands the nature of the proposed assessments and/or treatments:  No.  · This patient understands the risks and benefits of the proposed assessments and/or treatments:  No.  · This patient understands the risks and benefits of refusing the proposed assessments and/or treatments:  No.  · This patient is not in agreement with the assessment and does not consent to SNF placement.  · This patient has evidence of impaired insight and/or judgement:  Yes.    Based upon my evaluation of Tyler Todd regarding his decision-making capacity for refusing safe discharge (SNF), I found with reasonable certainty that he does not have capacity to make medical decisions on his behalf at the time of this evaluation.  (Please contact next of kin to make medical decisions on pts behalf if pt does not have capacity)      Time with patient, chart: 30      More  than 50% of the time was spent counseling/coordinating care    Consulting clinician was informed of the encounter and consult note.    Consultation ended: 6/15/2022 at 12:56 PM      Kaylee Campo MD   Psychiatry  [facility]

## 2022-06-15 NOTE — PLAN OF CARE
Notified Karlie with [facility] that psych consult is completed and pt now ready for transfer to their facility. States she will call me back shortly with number for report and transportation details.

## 2022-06-15 NOTE — PLAN OF CARE
[facility]     Department of Hospital Medicine     [address] [place] [address]     [phone] after hours  [phone] fax       Inpatient rehab ORDERS    06/15/2022    Admit to Inpatient rehab:                                                  Diagnoses:  Active Hospital Problems    Diagnosis  POA    *Ischemic embolic stroke [I63.9]  Yes    Ischemic vascular dementia [F01.50]  Yes    Falls frequently [R29.6]  Not Applicable    Anemia in stage 3 chronic kidney disease [N18.30, D63.1]  Yes    Paroxysmal atrial fibrillation [I48.0]  Yes    CKD (chronic kidney disease), stage III [N18.30]  Yes     Chronic    Hyperlipidemia [E78.5]  Yes     Chronic    Carcinoma of lung [C34.90]  Yes     Chronic    Type 2 diabetes mellitus without complication [E11.9]  Yes    Chronic prostatitis [N41.1]  Yes      Resolved Hospital Problems   No resolved problems to display.       Patient is homebound due to:  Ischemic embolic stroke    Allergies:  Review of patient's allergies indicates:   Allergen Reactions    Doxycycline Diarrhea     Severe diarrhea    Sulfa (sulfonamide antibiotics) Rash     Other reaction(s): Itching    Sulfasalazine Rash     Rash and itching mild       Vitals:       Every shift     Diet: Dysphagia Soft (IDDSI level 6) and nectar thick liquids        Acitivities:      - Up in a chair each morning as tolerated        LABS: CBC, CMP, Mg, Phos twice weekly for two weeks then routine.         Nursing Precautions:     - Aspiration precautions:             - Total assistance with meals            -  Upright 90 degrees befor during and after meals             -  Suction at bedside          - Fall precautions per nursing home protocol  - Decubitus precautions:        -  for positioning   - Pressure reducing foam mattress   - Turn patient every two hours. Use wedge pillows to anchor patient    CONSULTS:     Physical Therapy to evaluate and  treat     Occupational Therapy to evaluate and treat     Speech Therapy  to evaluate and treat     Nutrition to evaluate and recommend diet     Psychiatry to evaluate and follow patients for delirium    MISCELLANEOUS CARE:  Routine Skin for Bedridden Patients:  Apply moisture barrier cream to all    skin folds and wet areas in perineal area daily and after baths and                           all bowel movements.      Medications: Discontinue all previous medication orders, if any. See new list below.       Medication List      START taking these medications    clopidogreL 75 mg tablet  Commonly known as: PLAVIX  Take 1 tablet (75 mg total) by mouth once daily.        CHANGE how you take these medications    atorvastatin 40 MG tablet  Commonly known as: LIPITOR  Take 1 tablet (40 mg total) by mouth once daily.  What changed:   · medication strength  · how much to take        CONTINUE taking these medications    acetaminophen 500 MG tablet  Commonly known as: TYLENOL  Take 1,000 mg by mouth every 6 (six) hours as needed for Pain.     ELIQUIS 5 mg Tab  Generic drug: apixaban  Take 1 tablet (5 mg total) by mouth 2 (two) times daily.     irbesartan 300 MG tablet  Commonly known as: AVAPRO  Take 1 tablet (300 mg total) by mouth once daily.     levETIRAcetam 500 MG Tab  Commonly known as: KEPPRA  Take 500 mg by mouth 2 (two) times daily.     metoprolol succinate 100 MG 24 hr tablet  Commonly known as: TOPROL-XL  Take 100 mg by mouth 2 (two) times daily.     nitroGLYCERIN 0.4 MG SL tablet  Commonly known as: NITROSTAT  Place 0.4 mg under the tongue every 5 (five) minutes as needed. As directed     repaglinide 1 MG tablet  Commonly known as: PRANDIN  Take 1 mg by mouth 3 (three) times daily before meals.        STOP taking these medications    pregabalin 100 MG capsule  Commonly known as: LYRICA                  _________________________________  Lg Sultana MD  06/15/2022

## 2022-06-15 NOTE — PT/OT/SLP EVAL
Speech Language Pathology Evaluation  Cognitive Communication    Patient Name:  Tyler Todd   MRN:  [id number]  Admitting Diagnosis: Ischemic embolic stroke    Recommendations:     Recommendations:                General Recommendations:  Dysphagia therapy, Cognitive-linguistic therapy and Participate in completion of cognitive communication evaluation  Diet recommendations:  Dental Soft, Soft & Bite Sized Diet - IDDSI Level 6 (no fresh/canned fruits x bananas, no mixed consistencies (cereal, soup, etc)), Nectar Thick   Aspiration Precautions: Assistance with meals and Assistance with thickening liquids, Double swallow with each bite/sip, Eliminate distractions, Meds whole 1 at a time, Small bites/sips and Standard aspiration precautions   General Precautions: Standard, aspiration, fall, nectar thick  Communication strategies:  none    History:     Past Medical History:   Diagnosis Date    Back pain     Cancer 2012    lung cancer chemo and radiation    COPD (chronic obstructive pulmonary disease)     Coronary artery disease 2002, 2004    s/p 3 stents;  Dr. Interiano    Diabetes mellitus, type 2     Digestive disorder     Hiatal hernia     History of lung cancer 7/6/2021    Hyperlipidemia     Hypertension     Lung cancer     Neck pain     Prostate CA        Past Surgical History:   Procedure Laterality Date    ANKLE SURGERY Right 1970s    CORONARY ANGIOPLASTY WITH STENT PLACEMENT      CYSTOSCOPY W/ RETROGRADES Bilateral 2/24/2020    Procedure: CYSTOSCOPY, WITH RETROGRADE PYELOGRAM;  Surgeon: Nuha Soto MD;  Location: [facility];  Service: Urology;  Laterality: Bilateral;    ENDOBRONCHIAL ULTRASOUND N/A 6/2/2021    Procedure: ENDOBRONCHIAL ULTRASOUND (EBUS);  Surgeon: Rob Arrington MD;  Location: [facility];  Service: Pulmonary;  Laterality: N/A;    EPIDURAL STEROID INJECTION INTO CERVICAL SPINE N/A 2/19/2019    Procedure: Injection-steroid-epidural-cervical C7-T1;  Surgeon: Marcelino Monroe MD;   Location: [facility] OR;  Service: Pain Management;  Laterality: N/A;    INJECTION OF ANESTHETIC AGENT AROUND MEDIAL BRANCH NERVES INNERVATING LUMBAR FACET JOINT Bilateral 6/4/2018    Procedure: MEDIAL BRANCH, LUMBAR L3,4,5;  Surgeon: Marcelino Monroe MD;  Location: [facility] OR;  Service: Pain Management;  Laterality: Bilateral;  L3, 4, 5    MEDIASTINOSCOPY      RADIOFREQUENCY ABLATION OF LUMBAR MEDIAL BRANCH NERVE AT SINGLE LEVEL Bilateral 7/16/2018    Procedure: RADIOFREQUENCY ABLATION, NERVE, MEDIAL BRANCH, LUMBAR, 1 LEVEL;  Surgeon: Marcelino Monroe MD;  Location: [facility] OR;  Service: Pain Management;  Laterality: Bilateral;  L3, 4, 5  lumbar  j-Grab pain management generator  SN [id number]  80 degrees for 75 seconds x2    TRANSRECTAL ULTRASOUND OF PROSTATE WITH INSERTION OF GOLD FIDUCIAL MARKER N/A 2/24/2020    Procedure: ULTRASOUND, PROSTATE, RECTAL APPROACH, WITH GOLD FIDUCIAL MARKER INSERTION;  Surgeon: Nuha Soto MD;  Location: [facility] OR;  Service: Urology;  Laterality: N/A;       Social History: Patient lives with wife.    Prior Intubation HX:  None this admit    Modified Barium Swallow: None in Epic    Imaging:  CTA Head and Neck (xpd)   Final Result   Abnormal      1. There is no intracranial hemorrhage.  There are multiple infarctions in the brain discussed above, some are remote and some were acute on recent brain MRI.   2. There is a filling defect within the distal basilar artery on the right limiting flow to the right superior cerebellar and posterior cerebral arteries.   3. There is mild nonocclusive stenosis of the cavernous segments of both internal carotid arteries.  The anterior circulation is otherwise normal without critical stenosis or large vessel occlusion.   4. There is irregular hard and soft plaque in the aortic arch as well as at the origin of the great vessels resulting in moderate nonocclusive stenosis at the origin of the right common carotid artery.  There is no stenosis of the internal  carotid arteries.  There is also nonocclusive stenosis at the brachiocephalic trunk origin and left common carotid and subclavian arteries.  Some of this plaque appears ulcerated.   5. There is moderate stenosis  of the very distal V4 segment of the left vertebral artery.  However, the right vertebral artery is widely patent and supplies the basilar artery   This report was flagged in Epic as abnormal.         Electronically signed by: Rene Dick MD   Date:    06/13/2022   Time:    12:41      MRI Brain Without Contrast   Final Result   Abnormal      1. Motion limited study.  Multiple foci of acute/recent ischemia/infarction involving the medial left occipital lobe, right paramidline abrahan, left red nucleus, and right hippocampus.  Given multiple vascular territories of involvement, embolic disease (either carotid plaque or cardiac origin) cannot be excluded.   2. Supratentorial cerebral volume loss which is prominent even for the patient's advanced chronologic age.   3. Multiple foci of remote lacunar infarction within the bilateral thalami, abrahan, bilateral cerebellar peduncles, and left and right cerebellar hemispheres.   This report was flagged in Epic as abnormal.         Electronically signed by: Pasquale Fenton MD   Date:    06/10/2022   Time:    08:18      CT Head Without Contrast   Final Result   Abnormal      Moderate brain atrophy with deep white matter ischemic changes.  A 7 mm lacunar infarct is of near CSF density in the right thalamus suggesting this is old, however it is new since the prior CT of July 18, 2020.      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      No intracranial hemorrhage or hematoma is noted.      A 6 mm calcified lesion to the left of the medulla at the foramen magnum may represent a calcified left vertebral artery  "aneurysm      This report was flagged in Epic as abnormal.         Electronically signed by: Jose Pinedo MD   Date:    06/08/2022   Time:    18:06      X-Ray Chest AP Portable   Final Result      No acute abnormality.         Electronically signed by: Jose Pinedo MD   Date:    06/08/2022   Time:    17:28      Fl Modified Barium Swallow Speech    (Results Pending)      Subjective     Initially agrees to evaluation stating, "I don't see why not."  "I was supposed to get out of here 2 days ago!"  "I don't want you to come see me tomorrow!"    Pain/Comfort:  · Pain Rating 1: 0/10    Respiratory Status: Room air    Objective:   Pt seen today for cognitive communication. Awake and alert upon entering room. Lunch at bedside, however, pt refuses to eat despite encouragement. Became increasingly agitated/uncooperative as session progressed. Anxious to be discharged.     Cognitive Status:    Attention --easily distracted  Perseveration Not present  Orientation Person and Place  [employer] --impaired  Safety awareness --POOR insight into current medical condition, physical limitations and cognitive deficits      Receptive Language:   Comprehension:      Questions Simple yes/no --WNL  Complex yes/no  --62% accuracy  Commands  One step --WFL  two step basic commands --WFL  multistep basic commands --75% accuracy for 3 step body part commands    Expressive Language:  Verbal:    WFL    Motor Speech:  DNT, however, speech is 100% intellgibile    Visual-Spatial:  DNT, however, evidence of mild R visual inattention noted on prior sessions    Reading:   DNT     Written Expression:   DNT    Assessment:   Tyler Todd is a 81 y.o. male with an SLP diagnosis of Cognitive-Linguistic Impairment. Cognitive communication evaluation initiated but terminated prior to completion 2° increased agitation.    He presents with poor awareness of current medical condition, physical limitations and cognitive deficits. Will " continue to follow for ongoing evaluation and treatment.    Goals:   Multidisciplinary Problems     SLP Goals        Problem: SLP    Goal Priority Disciplines Outcome   SLP Goal     SLP Ongoing, Progressing   Description: 1. Pt will undergo MBSS to objectively assess swallow physiology and determine presence/severity of dysphagia--MET  2. The patient will demonstrate use of aspiration precautions/compensatory strategies (SGS, swallow x2) during meals with SPV.  3. Perform effortful swallow, Mendelsohn and CTAR with MIN verbal and visual cues to improve pharyngeal strength for the support of safe PO intake.  4. Consume trials of thin liquids via cup no overt s/s penetration/aspiration.  5. Lingual resistance exercises with MIN A  6. Pt will participate in the completion of cognitive communication evaluation                         Plan:   · Patient to be seen:  5 x/week   · Plan of Care expires:  06/24/22  · Plan of Care reviewed with:  patient   · SLP Follow-Up:  Yes       Discharge recommendations:  Discharge Facility/Level of Care Needs: rehabilitation facility   Barriers to Discharge:  Level of Skilled Assistance Needed , and Safety Awareness .    Time Tracking:   SLP Treatment Date:   06/15/22  Speech Start Time:  1214  Speech Stop Time:  1225     Speech Total Time (min):  11 min    Billable Minutes: Eval 11  and Total Time 11    06/15/2022

## 2022-06-15 NOTE — PLAN OF CARE
Pt clear for DC from case management standpoint. Discharging to [facility]         06/15/22 1425   Final Note   Assessment Type Final Discharge Note   Anticipated Discharge Disposition Rehab

## 2022-06-15 NOTE — PT/OT/SLP PROGRESS
Physical Therapy      Patient Name:  Tyler Todd   MRN:  [id number]    Patient not seen today secondary to Patient unwilling to participate (2 attempts made, agreeable initially then quickly declined.). Will follow-up 6/16/22.

## 2022-06-15 NOTE — PT/OT/SLP PROGRESS
Occupational Therapy      Patient Name:  Tyler Todd   MRN:  [id number]    Patient not seen today secondary to Patient unwilling to participate. Per patient's nurse, patient scheduled to discharge today.    6/15/2022   No

## 2022-06-15 NOTE — PLAN OF CARE
Problem: SLP  Goal: SLP Goal  Description: 1. Pt will undergo MBSS to objectively assess swallow physiology and determine presence/severity of dysphagia--MET  2. The patient will demonstrate use of aspiration precautions/compensatory strategies (SGS, swallow x2) during meals with SPV.  3. Perform effortful swallow, Mendelsohn and CTAR with MIN verbal and visual cues to improve pharyngeal strength for the support of safe PO intake.  4. Consume trials of thin liquids via cup no overt s/s penetration/aspiration.  5. Lingual resistance exercises with MIN A  6. Pt will participate in the completion of cognitive communication evaluation        6/15/2022 1404 by Meredith Holbrook, CCC-SLP  Outcome: Ongoing, Progressing    Cognitive communication evaluation initiated but terminated prior to completion 2° increased agitation.

## 2022-06-15 NOTE — PLAN OF CARE
Pt in bed awake and alert, denies any pain or changes in condition. Respiration is even and unlabored. No new signs or symptoms of stroke noted.   Problem: Adult Inpatient Plan of Care  Goal: Plan of Care Review  Outcome: Ongoing, Progressing  Goal: Patient-Specific Goal (Individualized)  Outcome: Ongoing, Progressing  Goal: Optimal Comfort and Wellbeing  Outcome: Ongoing, Progressing  Goal: Readiness for Transition of Care  Outcome: Ongoing, Progressing     Problem: Adjustment to Illness (Stroke, Ischemic/Transient Ischemic Attack)  Goal: Optimal Coping  Outcome: Ongoing, Progressing     Problem: Cerebral Tissue Perfusion (Stroke, Ischemic/Transient Ischemic Attack)  Goal: Optimal Cerebral Tissue Perfusion  Outcome: Ongoing, Progressing     Problem: Communication Impairment (Stroke, Ischemic/Transient Ischemic Attack)  Goal: Improved Communication Skills  Outcome: Ongoing, Progressing     Problem: Functional Ability Impaired (Stroke, Ischemic/Transient Ischemic Attack)  Goal: Optimal Functional Ability  Outcome: Ongoing, Progressing     Problem: Sensorimotor Impairment (Stroke, Ischemic/Transient Ischemic Attack)  Goal: Improved Sensorimotor Function  Outcome: Ongoing, Progressing     Problem: Urinary Elimination Impaired (Stroke, Ischemic/Transient Ischemic Attack)  Goal: Effective Urinary Elimination  Outcome: Ongoing, Progressing

## 2022-06-15 NOTE — PLAN OF CARE
DC orders and neg covid test sent to [facility] via [facility]    Spoke with Ebonie from [facility]- [facility] Karlie is back and covering again but they do have bed for admit today. [facility] Karlie will call me shortly to help with transfer to their facility       06/15/22 1052   Post-Acute Status   Post-Acute Authorization Placement   Post-Acute Placement Status Referrals Sent

## 2022-06-15 NOTE — NURSING
Report called to charge nurse at \[facility]\"". Awaiting w/c imelda.

## 2022-06-15 NOTE — PLAN OF CARE
Per Karlie with [facility]- ready to accept pt. States can set up transportation whenever pt is ready. Updated Karlie that we are waiting on psych consult to be completed prior to transfer- will call Karlie once consult completed.    Notified nursing that consult needs to be completed ASAP       06/15/22 1227   Post-Acute Status   Post-Acute Authorization Placement   Post-Acute Placement Status Set-up Complete/Auth obtained

## 2022-06-15 NOTE — PLAN OF CARE
Per Karlie with [facility], report can be called to [phone] at 4PM. Report can be called to charge nurse or Jennifer. Karlie will arrange for transportation ([facility]) after report has been called       06/15/22 1344   Post-Acute Status   Post-Acute Authorization Placement   Post-Acute Placement Status Set-up Complete/Auth obtained

## 2022-06-16 LAB — VIT B1 BLD-MCNC: 51 UG/L (ref 38–122)

## 2022-06-16 NOTE — NURSING
Upon discharge, patient is AAOx2, no cardiac or respiratory complications. Follow up care explained to patient. EMS placed pt [facility]

## 2022-06-17 ENCOUNTER — TELEPHONE (OUTPATIENT)
Dept: MEDSURG UNIT | Facility: HOSPITAL | Age: 82
End: 2022-06-17
Payer: MEDICARE

## 2022-09-03 ENCOUNTER — HOSPITAL ENCOUNTER (INPATIENT)
Facility: HOSPITAL | Age: 82
LOS: 2 days | Discharge: HOME HEALTH | DRG: 379 | End: 2022-09-06
Attending: EMERGENCY MEDICINE | Admitting: INTERNAL MEDICINE
Payer: MEDICARE

## 2022-09-03 DIAGNOSIS — K92.2 GI BLEED: ICD-10-CM

## 2022-09-03 LAB
ABO + RH BLD: NORMAL
ALBUMIN SERPL BCP-MCNC: 2.9 G/DL (ref 3.5–5.2)
ALP SERPL-CCNC: 63 U/L (ref 55–135)
ALT SERPL W/O P-5'-P-CCNC: 27 U/L (ref 10–44)
ANION GAP SERPL CALC-SCNC: 8 MMOL/L (ref 8–16)
APTT PPP: 40.2 SEC (ref 23.3–35.1)
AST SERPL-CCNC: 38 U/L (ref 10–40)
BASOPHILS # BLD AUTO: 0.01 K/UL (ref 0–0.2)
BASOPHILS NFR BLD: 0.2 % (ref 0–1.9)
BILIRUB SERPL-MCNC: 0.5 MG/DL (ref 0.1–1)
BLD GP AB SCN CELLS X3 SERPL QL: NORMAL
BUN SERPL-MCNC: 31 MG/DL (ref 8–23)
CALCIUM SERPL-MCNC: 8.6 MG/DL (ref 8.7–10.5)
CHLORIDE SERPL-SCNC: 107 MMOL/L (ref 95–110)
CK SERPL-CCNC: 103 U/L (ref 20–200)
CO2 SERPL-SCNC: 27 MMOL/L (ref 23–29)
CREAT SERPL-MCNC: 1.5 MG/DL (ref 0.5–1.4)
DIFFERENTIAL METHOD: ABNORMAL
EOSINOPHIL # BLD AUTO: 0 K/UL (ref 0–0.5)
EOSINOPHIL NFR BLD: 0.5 % (ref 0–8)
ERYTHROCYTE [DISTWIDTH] IN BLOOD BY AUTOMATED COUNT: 17.2 % (ref 11.5–14.5)
EST. GFR  (NO RACE VARIABLE): 47.1 ML/MIN/1.73 M^2
FERRITIN SERPL-MCNC: 226 NG/ML (ref 20–300)
FOLATE SERPL-MCNC: 19.6 NG/ML (ref 4–24)
GLUCOSE SERPL-MCNC: 124 MG/DL (ref 70–110)
GLUCOSE SERPL-MCNC: 141 MG/DL (ref 70–110)
HCT VFR BLD AUTO: 27.3 % (ref 40–54)
HCT VFR BLD AUTO: 27.9 % (ref 40–54)
HGB BLD-MCNC: 8.8 G/DL (ref 14–18)
HGB BLD-MCNC: 9 G/DL (ref 14–18)
IMM GRANULOCYTES # BLD AUTO: 0.04 K/UL (ref 0–0.04)
IMM GRANULOCYTES NFR BLD AUTO: 0.7 % (ref 0–0.5)
IRON SERPL-MCNC: 34 UG/DL (ref 45–160)
LIPASE SERPL-CCNC: 24 U/L (ref 4–60)
LYMPHOCYTES # BLD AUTO: 0.5 K/UL (ref 1–4.8)
LYMPHOCYTES NFR BLD: 8.6 % (ref 18–48)
MAGNESIUM SERPL-MCNC: 1.8 MG/DL (ref 1.6–2.6)
MCH RBC QN AUTO: 31.8 PG (ref 27–31)
MCHC RBC AUTO-ENTMCNC: 33 G/DL (ref 32–36)
MCV RBC AUTO: 97 FL (ref 82–98)
MONOCYTES # BLD AUTO: 0.5 K/UL (ref 0.3–1)
MONOCYTES NFR BLD: 9.1 % (ref 4–15)
NEUTROPHILS # BLD AUTO: 4.6 K/UL (ref 1.8–7.7)
NEUTROPHILS NFR BLD: 80.9 % (ref 38–73)
NRBC BLD-RTO: 0 /100 WBC
PLATELET # BLD AUTO: 162 K/UL (ref 150–450)
PMV BLD AUTO: 11.2 FL (ref 9.2–12.9)
POTASSIUM SERPL-SCNC: 4.7 MMOL/L (ref 3.5–5.1)
PROT SERPL-MCNC: 7.1 G/DL (ref 6–8.4)
RBC # BLD AUTO: 2.83 M/UL (ref 4.6–6.2)
RETICS/RBC NFR AUTO: 1.4 % (ref 0.4–2)
SARS-COV-2 RDRP RESP QL NAA+PROBE: NEGATIVE
SATURATED IRON: 20 % (ref 20–50)
SODIUM SERPL-SCNC: 142 MMOL/L (ref 136–145)
TOTAL IRON BINDING CAPACITY: 174 UG/DL (ref 250–450)
TRANSFERRIN SERPL-MCNC: 124 MG/DL (ref 200–375)
TROPONIN I SERPL DL<=0.01 NG/ML-MCNC: <0.03 NG/ML
VALPROATE SERPL-MCNC: 22.1 UG/ML (ref 50–100)
VIT B12 SERPL-MCNC: 1063 PG/ML (ref 210–950)
WBC # BLD AUTO: 5.7 K/UL (ref 3.9–12.7)

## 2022-09-03 PROCEDURE — 84484 ASSAY OF TROPONIN QUANT: CPT | Performed by: EMERGENCY MEDICINE

## 2022-09-03 PROCEDURE — 93010 EKG 12-LEAD: ICD-10-PCS | Mod: ,,, | Performed by: INTERNAL MEDICINE

## 2022-09-03 PROCEDURE — 83690 ASSAY OF LIPASE: CPT | Performed by: EMERGENCY MEDICINE

## 2022-09-03 PROCEDURE — G0378 HOSPITAL OBSERVATION PER HR: HCPCS

## 2022-09-03 PROCEDURE — 36415 COLL VENOUS BLD VENIPUNCTURE: CPT | Performed by: NURSE PRACTITIONER

## 2022-09-03 PROCEDURE — 82607 VITAMIN B-12: CPT | Performed by: NURSE PRACTITIONER

## 2022-09-03 PROCEDURE — 93010 ELECTROCARDIOGRAM REPORT: CPT | Mod: ,,, | Performed by: INTERNAL MEDICINE

## 2022-09-03 PROCEDURE — 82728 ASSAY OF FERRITIN: CPT | Performed by: NURSE PRACTITIONER

## 2022-09-03 PROCEDURE — 63600175 PHARM REV CODE 636 W HCPCS: Performed by: NURSE PRACTITIONER

## 2022-09-03 PROCEDURE — 82746 ASSAY OF FOLIC ACID SERUM: CPT | Performed by: NURSE PRACTITIONER

## 2022-09-03 PROCEDURE — 85730 THROMBOPLASTIN TIME PARTIAL: CPT | Performed by: EMERGENCY MEDICINE

## 2022-09-03 PROCEDURE — 85014 HEMATOCRIT: CPT | Mod: 91 | Performed by: NURSE PRACTITIONER

## 2022-09-03 PROCEDURE — C9113 INJ PANTOPRAZOLE SODIUM, VIA: HCPCS | Performed by: NURSE PRACTITIONER

## 2022-09-03 PROCEDURE — 25000003 PHARM REV CODE 250: Performed by: EMERGENCY MEDICINE

## 2022-09-03 PROCEDURE — 86920 COMPATIBILITY TEST SPIN: CPT | Performed by: INTERNAL MEDICINE

## 2022-09-03 PROCEDURE — 86901 BLOOD TYPING SEROLOGIC RH(D): CPT | Performed by: EMERGENCY MEDICINE

## 2022-09-03 PROCEDURE — 25000003 PHARM REV CODE 250: Performed by: NURSE PRACTITIONER

## 2022-09-03 PROCEDURE — 82550 ASSAY OF CK (CPK): CPT | Performed by: EMERGENCY MEDICINE

## 2022-09-03 PROCEDURE — 85025 COMPLETE CBC W/AUTO DIFF WBC: CPT | Performed by: EMERGENCY MEDICINE

## 2022-09-03 PROCEDURE — 84466 ASSAY OF TRANSFERRIN: CPT | Performed by: NURSE PRACTITIONER

## 2022-09-03 PROCEDURE — 96361 HYDRATE IV INFUSION ADD-ON: CPT

## 2022-09-03 PROCEDURE — 83735 ASSAY OF MAGNESIUM: CPT | Performed by: EMERGENCY MEDICINE

## 2022-09-03 PROCEDURE — 99285 EMERGENCY DEPT VISIT HI MDM: CPT | Mod: 25

## 2022-09-03 PROCEDURE — 80164 ASSAY DIPROPYLACETIC ACD TOT: CPT | Performed by: NURSE PRACTITIONER

## 2022-09-03 PROCEDURE — U0002 COVID-19 LAB TEST NON-CDC: HCPCS | Performed by: EMERGENCY MEDICINE

## 2022-09-03 PROCEDURE — 96374 THER/PROPH/DIAG INJ IV PUSH: CPT

## 2022-09-03 PROCEDURE — 93005 ELECTROCARDIOGRAM TRACING: CPT | Performed by: INTERNAL MEDICINE

## 2022-09-03 PROCEDURE — 80053 COMPREHEN METABOLIC PANEL: CPT | Performed by: EMERGENCY MEDICINE

## 2022-09-03 PROCEDURE — 36415 COLL VENOUS BLD VENIPUNCTURE: CPT | Performed by: EMERGENCY MEDICINE

## 2022-09-03 PROCEDURE — 85018 HEMOGLOBIN: CPT | Mod: 91 | Performed by: NURSE PRACTITIONER

## 2022-09-03 PROCEDURE — 85045 AUTOMATED RETICULOCYTE COUNT: CPT | Performed by: NURSE PRACTITIONER

## 2022-09-03 RX ORDER — INSULIN ASPART 100 [IU]/ML
0-5 INJECTION, SOLUTION INTRAVENOUS; SUBCUTANEOUS EVERY 6 HOURS PRN
Status: DISCONTINUED | OUTPATIENT
Start: 2022-09-03 | End: 2022-09-06 | Stop reason: HOSPADM

## 2022-09-03 RX ORDER — LEVETIRACETAM 500 MG/1
500 TABLET ORAL 2 TIMES DAILY
Status: DISCONTINUED | OUTPATIENT
Start: 2022-09-03 | End: 2022-09-06 | Stop reason: HOSPADM

## 2022-09-03 RX ORDER — SODIUM CHLORIDE 9 MG/ML
INJECTION, SOLUTION INTRAVENOUS CONTINUOUS
Status: ACTIVE | OUTPATIENT
Start: 2022-09-03 | End: 2022-09-04

## 2022-09-03 RX ORDER — FLUOXETINE HYDROCHLORIDE 20 MG/1
40 CAPSULE ORAL 2 TIMES DAILY
Status: DISCONTINUED | OUTPATIENT
Start: 2022-09-03 | End: 2022-09-06 | Stop reason: HOSPADM

## 2022-09-03 RX ORDER — DIVALPROEX SODIUM 250 MG/1
500 TABLET, DELAYED RELEASE ORAL 2 TIMES DAILY
Status: DISCONTINUED | OUTPATIENT
Start: 2022-09-03 | End: 2022-09-06 | Stop reason: HOSPADM

## 2022-09-03 RX ORDER — QUETIAPINE FUMARATE 100 MG/1
100 TABLET, FILM COATED ORAL NIGHTLY
Status: DISCONTINUED | OUTPATIENT
Start: 2022-09-03 | End: 2022-09-06 | Stop reason: HOSPADM

## 2022-09-03 RX ORDER — MIRTAZAPINE 30 MG/1
30 TABLET, FILM COATED ORAL NIGHTLY
COMMUNITY
Start: 2022-08-17

## 2022-09-03 RX ORDER — GLUCAGON 1 MG
1 KIT INJECTION
Status: DISCONTINUED | OUTPATIENT
Start: 2022-09-03 | End: 2022-09-06 | Stop reason: HOSPADM

## 2022-09-03 RX ORDER — PANTOPRAZOLE SODIUM 40 MG/10ML
40 INJECTION, POWDER, LYOPHILIZED, FOR SOLUTION INTRAVENOUS
Status: DISCONTINUED | OUTPATIENT
Start: 2022-09-03 | End: 2022-09-03

## 2022-09-03 RX ORDER — NITROGLYCERIN 0.4 MG/1
0.4 TABLET SUBLINGUAL EVERY 5 MIN PRN
Status: DISCONTINUED | OUTPATIENT
Start: 2022-09-03 | End: 2022-09-06 | Stop reason: HOSPADM

## 2022-09-03 RX ORDER — FLUOXETINE HYDROCHLORIDE 40 MG/1
40 CAPSULE ORAL 2 TIMES DAILY
COMMUNITY
Start: 2022-08-22

## 2022-09-03 RX ORDER — MORPHINE SULFATE 2 MG/ML
1 INJECTION, SOLUTION INTRAMUSCULAR; INTRAVENOUS EVERY 6 HOURS PRN
Status: DISCONTINUED | OUTPATIENT
Start: 2022-09-03 | End: 2022-09-06 | Stop reason: HOSPADM

## 2022-09-03 RX ORDER — TALC
6 POWDER (GRAM) TOPICAL NIGHTLY PRN
Status: DISCONTINUED | OUTPATIENT
Start: 2022-09-03 | End: 2022-09-06 | Stop reason: HOSPADM

## 2022-09-03 RX ORDER — PANTOPRAZOLE SODIUM 40 MG/10ML
40 INJECTION, POWDER, LYOPHILIZED, FOR SOLUTION INTRAVENOUS 2 TIMES DAILY
Status: DISCONTINUED | OUTPATIENT
Start: 2022-09-03 | End: 2022-09-06 | Stop reason: HOSPADM

## 2022-09-03 RX ORDER — QUETIAPINE FUMARATE 100 MG/1
100 TABLET, FILM COATED ORAL NIGHTLY
COMMUNITY
Start: 2022-08-17

## 2022-09-03 RX ORDER — ONDANSETRON 2 MG/ML
4 INJECTION INTRAMUSCULAR; INTRAVENOUS EVERY 8 HOURS PRN
Status: DISCONTINUED | OUTPATIENT
Start: 2022-09-03 | End: 2022-09-06 | Stop reason: HOSPADM

## 2022-09-03 RX ORDER — METOPROLOL SUCCINATE 100 MG/1
1 TABLET, EXTENDED RELEASE ORAL 2 TIMES DAILY
COMMUNITY
Start: 2022-07-19

## 2022-09-03 RX ORDER — SODIUM CHLORIDE 0.9 % (FLUSH) 0.9 %
10 SYRINGE (ML) INJECTION
Status: DISCONTINUED | OUTPATIENT
Start: 2022-09-03 | End: 2022-09-06 | Stop reason: HOSPADM

## 2022-09-03 RX ORDER — ALOGLIPTIN 12.5 MG/1
1 TABLET, FILM COATED ORAL NIGHTLY
COMMUNITY

## 2022-09-03 RX ORDER — DIVALPROEX SODIUM 500 MG/1
500 TABLET, DELAYED RELEASE ORAL 2 TIMES DAILY
COMMUNITY
Start: 2022-08-22

## 2022-09-03 RX ORDER — METOPROLOL SUCCINATE 50 MG/1
100 TABLET, EXTENDED RELEASE ORAL 2 TIMES DAILY
Status: DISCONTINUED | OUTPATIENT
Start: 2022-09-03 | End: 2022-09-06 | Stop reason: HOSPADM

## 2022-09-03 RX ORDER — ATORVASTATIN CALCIUM 40 MG/1
40 TABLET, FILM COATED ORAL DAILY
Status: DISCONTINUED | OUTPATIENT
Start: 2022-09-04 | End: 2022-09-06 | Stop reason: HOSPADM

## 2022-09-03 RX ORDER — PANTOPRAZOLE SODIUM 40 MG/10ML
40 INJECTION, POWDER, LYOPHILIZED, FOR SOLUTION INTRAVENOUS 2 TIMES DAILY
Status: DISCONTINUED | OUTPATIENT
Start: 2022-09-04 | End: 2022-09-03

## 2022-09-03 RX ADMIN — METOPROLOL SUCCINATE 100 MG: 50 TABLET, FILM COATED, EXTENDED RELEASE ORAL at 08:09

## 2022-09-03 RX ADMIN — QUETIAPINE 100 MG: 100 TABLET ORAL at 08:09

## 2022-09-03 RX ADMIN — SODIUM CHLORIDE: 0.9 INJECTION, SOLUTION INTRAVENOUS at 06:09

## 2022-09-03 RX ADMIN — MELATONIN 6 MG: at 08:09

## 2022-09-03 RX ADMIN — PANTOPRAZOLE SODIUM 40 MG: 40 INJECTION, POWDER, FOR SOLUTION INTRAVENOUS at 04:09

## 2022-09-03 RX ADMIN — FLUOXETINE 40 MG: 20 CAPSULE ORAL at 08:09

## 2022-09-03 RX ADMIN — DIVALPROEX SODIUM 500 MG: 250 TABLET, DELAYED RELEASE ORAL at 08:09

## 2022-09-03 RX ADMIN — LEVETIRACETAM 500 MG: 500 TABLET, FILM COATED ORAL at 08:09

## 2022-09-03 RX ADMIN — SODIUM CHLORIDE 250 ML: 0.9 INJECTION, SOLUTION INTRAVENOUS at 02:09

## 2022-09-03 NOTE — H&P
[facility] Medicine History & Physical Examination   Patient Name: Tyler Todd  MRN: [id number]  Patient Class: Emergency   Admission Date: 9/3/2022 11:33 AM  Length of Stay: 0  Attending Physician:   Primary Care Provider: Sánchez Mast DO  Face-to-Face encounter date: 09/03/2022  Code Status: Full Code  MPOA:  Chief Complaint: Rectal Bleeding (X last night. Family noticed bright red blood yesterday evening then again this morning along with some darker blood. )        Patient information was obtained from patient, past medical records and ER records.   HISTORY OF PRESENT ILLNESS:   Tyler Todd is a 81 y.o.  male who  has a past medical history of Back pain, Cancer (2012), COPD (chronic obstructive pulmonary disease), Coronary artery disease (2002, 2004), Diabetes mellitus, type 2, Digestive disorder, Hiatal hernia, History of lung cancer (7/6/2021), Hyperlipidemia, Hypertension, Lung cancer, Neck pain, and Prostate CA.. The patient presented to [facility] on 9/3/2022 with a primary complaint of Rectal Bleeding (X last night. Family noticed bright red blood yesterday evening then again this morning along with some darker blood. )  .     81-year-old  male presents to the emergency room with bloody stool    Past medical history significant for COPD, hypertension hyperlipidemia multiple CVAs the last  was in 06,/2022 coronary artery disease status post cardiac stents, type 2 diabetes, prostate cancer and lung cancer    According to the patient's wife the patient was noted to have a small amount of maroon-colored stool in his diaper.  He does take Plavix and Eliquis for his atrial fibrillation and history is of multiple CVAs.  The wife states he has generalized weakness but denied any lightheadedness dizziness palpitations hematemesis hemoptysis a large quantity bloody stools.  She describes his symptoms as moderate in severity with no alleviating or  exacerbating factors.    The wife states that the patient is pretty much disabled after having multiple cerebral infarcts leaving him with dementia and physical debility.  She denied a recent colonoscopy    On my exam the patient is abdomen was soft nondistended nontender and was no abdominal pain  REVIEW OF SYSTEMS:   10 Point Review of System was performed and was found to be negative except for that mentioned already in the HPI and   Review of Systems (Negative unless checked off)  Review of Systems   Constitutional:  Positive for malaise/fatigue.   HENT: Negative.     Eyes: Negative.    Respiratory: Negative.     Cardiovascular: Negative.    Gastrointestinal:  Positive for blood in stool.   Genitourinary: Negative.    Musculoskeletal: Negative.    Skin: Negative.    Neurological:  Positive for weakness.        H/O mult CVA's with generalized weakness. Memory loss   Endo/Heme/Allergies: Negative.    Psychiatric/Behavioral:  Positive for memory loss.          PAST MEDICAL HISTORY:     Past Medical History:   Diagnosis Date    Back pain     Cancer 2012    lung cancer chemo and radiation    COPD (chronic obstructive pulmonary disease)     Coronary artery disease 2002, 2004    s/p 3 stents;  Dr. Interiano    Diabetes mellitus, type 2     Digestive disorder     Hiatal hernia     History of lung cancer 7/6/2021    Hyperlipidemia     Hypertension     Lung cancer     Neck pain     Prostate CA        PAST SURGICAL HISTORY:     Past Surgical History:   Procedure Laterality Date    ANKLE SURGERY Right 1970s    CORONARY ANGIOPLASTY WITH STENT PLACEMENT      CYSTOSCOPY W/ RETROGRADES Bilateral 2/24/2020    Procedure: CYSTOSCOPY, WITH RETROGRADE PYELOGRAM;  Surgeon: Nuha Soto MD;  Location: [facility];  Service: Urology;  Laterality: Bilateral;    ENDOBRONCHIAL ULTRASOUND N/A 6/2/2021    Procedure: ENDOBRONCHIAL ULTRASOUND (EBUS);  Surgeon: Rob Arrington MD;  Location: [facility];  Service: Pulmonary;  Laterality: N/A;     EPIDURAL STEROID INJECTION INTO CERVICAL SPINE N/A 2/19/2019    Procedure: Injection-steroid-epidural-cervical C7-T1;  Surgeon: Marcelino Monroe MD;  Location: [facility] OR;  Service: Pain Management;  Laterality: N/A;    INJECTION OF ANESTHETIC AGENT AROUND MEDIAL BRANCH NERVES INNERVATING LUMBAR FACET JOINT Bilateral 6/4/2018    Procedure: MEDIAL BRANCH, LUMBAR L3,4,5;  Surgeon: Marcelino Monroe MD;  Location: [facility] OR;  Service: Pain Management;  Laterality: Bilateral;  L3, 4, 5    MEDIASTINOSCOPY      RADIOFREQUENCY ABLATION OF LUMBAR MEDIAL BRANCH NERVE AT SINGLE LEVEL Bilateral 7/16/2018    Procedure: RADIOFREQUENCY ABLATION, NERVE, MEDIAL BRANCH, LUMBAR, 1 LEVEL;  Surgeon: Marcelino Monroe MD;  Location: [facility] OR;  Service: Pain Management;  Laterality: Bilateral;  L3, 4, 5  lumbar  JumpChat pain management generator  SN [id number]  80 degrees for 75 seconds x2    TRANSRECTAL ULTRASOUND OF PROSTATE WITH INSERTION OF GOLD FIDUCIAL MARKER N/A 2/24/2020    Procedure: ULTRASOUND, PROSTATE, RECTAL APPROACH, WITH GOLD FIDUCIAL MARKER INSERTION;  Surgeon: Nuha Soto MD;  Location: [facility] OR;  Service: Urology;  Laterality: N/A;       ALLERGIES:   Doxycycline, Sulfa (sulfonamide antibiotics), and Sulfasalazine    FAMILY HISTORY:     Family History   Problem Relation Age of Onset    Diabetes Mother     Peripheral vascular disease Mother     Heart attack Mother     Diabetes Father     Heart attack Father     Emphysema Father     COPD Father     Diabetes Sister        SOCIAL HISTORY:     Social History     Tobacco Use    Smoking status: Every Day     Packs/day: 1.00     Years: 57.00     Pack years: 57.00     Types: Cigarettes    Smokeless tobacco: Never   Substance Use Topics    Alcohol use: No        Social History     Substance and Sexual Activity   Sexual Activity Never        HOME MEDICATIONS:     Prior to Admission medications    Medication Sig Start Date End Date Taking? Authorizing Provider   alogliptin (NESINA) 12.5 mg Tab  "Take 1 tablet by mouth every evening.   Yes Historical Provider   atorvastatin (LIPITOR) 40 MG tablet Take 1 tablet (40 mg total) by mouth once daily. 6/14/22  Yes Lg Sultana MD   clopidogreL (PLAVIX) 75 mg tablet Take 1 tablet (75 mg total) by mouth once daily. 6/15/22  Yes Lg Sultana MD   divalproex (DEPAKOTE) 500 MG TbEC Take 500 mg by mouth 2 (two) times daily. 8/22/22  Yes Historical Provider   ELIQUIS 5 mg Tab Take 1 tablet (5 mg total) by mouth 2 (two) times daily. 6/14/22  Yes Lg Sultana MD   FLUoxetine 40 MG capsule Take 40 mg by mouth 2 (two) times daily. 8/22/22  Yes Historical Provider   levETIRAcetam (KEPPRA) 500 MG Tab Take 500 mg by mouth 2 (two) times daily. 5/12/22  Yes Historical Provider   metoprolol succinate (TOPROL-XL) 100 MG 24 hr tablet Take 1 tablet by mouth 2 (two) times a day. 7/19/22  Yes Historical Provider   mirtazapine (REMERON) 30 MG tablet Take 30 mg by mouth every evening. 8/17/22  Yes Historical Provider   acetaminophen (TYLENOL) 500 MG tablet Take 1,000 mg by mouth every 6 (six) hours as needed for Pain.    Historical Provider   irbesartan (AVAPRO) 300 MG tablet Take 1 tablet (300 mg total) by mouth once daily. 3/12/20   Norma Rowe MD   nitroGLYCERIN (NITROSTAT) 0.4 MG SL tablet Place 0.4 mg under the tongue every 5 (five) minutes as needed. As directed 11/14/11   Historical Provider   QUEtiapine (SEROQUEL) 100 MG Tab Take 100 mg by mouth every evening. 8/17/22   Historical Provider   repaglinide (PRANDIN) 1 MG tablet Take 1 mg by mouth 3 (three) times daily before meals. 8/30/21   Historical Provider   metoprolol succinate (TOPROL-XL) 100 MG 24 hr tablet Take 100 mg by mouth 2 (two) times daily. 2/7/21 9/3/22  Historical Provider         PHYSICAL EXAM:   /60 (BP Location: Right arm, Patient Position: Lying)   Pulse 72   Temp 98.1 °F (36.7 °C) (Oral)   Resp 16   Ht 5' 10" (1.778 m)   Wt 68 kg (150 lb)   SpO2 100%   BMI 21.52 kg/m²   Vitals " Reviewed  General appearance:  Fatigued-appearing male in no apparent distress.  Skin: No Rash.   Neuro: Motor and sensory exams grossly intact. Good tone. Power in all 4 extremities 5/5.   HENT: Atraumatic head. Moist mucous membranes of oral cavity.  Eyes: Normal extraocular movements.   Neck: Supple. No evidence of lymphadenopathy. No thyroidomegaly.  Lungs: Clear to auscultation bilaterally. No wheezing present.   Heart: Regular rate and rhythm. S1 and S2 present with no murmurs/gallop/rub. No pedal edema. No JVD present.   Abdomen: Soft, non-distended, non-tender. No rebound tenderness/guarding. No masses or organomegaly. Bowel sounds are normal. Bladder is not palpable.   Extremities: No cyanosis, clubbing, or edema.  Psych/mental status: Alert and disoriented Cooperative. Responds appropriately to questions.   EMERGENCY DEPARTMENT LABS AND IMAGING:   Following labs were Reviewed   Recent Labs   Lab 09/03/22  1159   WBC 5.70   HGB 9.0*   HCT 27.3*      CALCIUM 8.6*   ALBUMIN 2.9*   PROT 7.1      K 4.7   CO2 27      BUN 31*   CREATININE 1.5*   ALKPHOS 63   ALT 27   AST 38   BILITOT 0.5         BMP:   Recent Labs   Lab 09/03/22  1159   *      K 4.7      CO2 27   BUN 31*   CREATININE 1.5*   CALCIUM 8.6*   MG 1.8   , CMP   Recent Labs   Lab 09/03/22  1159      K 4.7      CO2 27   *   BUN 31*   CREATININE 1.5*   CALCIUM 8.6*   PROT 7.1   ALBUMIN 2.9*   BILITOT 0.5   ALKPHOS 63   AST 38   ALT 27   ANIONGAP 8   , CBC   Recent Labs   Lab 09/03/22  1159   WBC 5.70   HGB 9.0*   HCT 27.3*      , INR   Lab Results   Component Value Date    INR 1.1 07/20/2021    INR 1.4 07/18/2020    INR 1.0 05/28/2020   , Lipid Panel   Lab Results   Component Value Date    CHOL 107 (L) 06/11/2022    HDL 24 (L) 06/11/2022    LDLCALC 71.4 06/11/2022    TRIG 58 06/11/2022    CHOLHDL 22.4 06/11/2022   , Troponin   Recent Labs   Lab 09/03/22  1159   TROPONINI <0.030   , A1C:    Recent Labs   Lab 06/13/22  1449   HGBA1C 5.9*   , and All labs within the past 24 hours have been reviewed  Microbiology Results (last 7 days)       ** No results found for the last 168 hours. **          No orders to display     No results found.       Twelve lead EKG reveals a normal sinus rhythm with a normal axis this good R-wave progression this no significant ST or T-wave abnormalities rate 73  milliseconds  ASSESSMENT & PLAN:   Tyler Todd is a 81 y.o. male admitted for      Lower GI Bleeding  - IV Protonix b.i.d.  -trend H&H  -GI consult  -hold Plavix and Eliquis  -gentle IV hydration    2. Type II Diabetes  -low dose NovoLog insulin sliding scale  -sliding scale protocol  -diabetic cardiac diet when eating  -NPO after midnight for possible a.m. testing    3. Paroxysmal atrial fibrillation  -hold Eliquis for now  -continue continue beta-blockers    4. 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 Essential hypertension  -continue home medications to manage    6. Chronic kidney disease stage IIIb  -renal dose All medications  -avoid nephrotoxic medications    7. History of prostate and lung cancer  -currently in remission    8. Chronic normocytic anemia  -most likely multifactorial from chronic kidney disease and GI bleed  -will get iron studies    DVT Prophylaxis: will be placed on SCDs for DVT prophylaxis and will be advised to be as mobile as possible and sit in a chair as tolerated.   _________________________________________________________________________   Face-to-Face encounter date: 09/03/2022  Encounter included review of the medical records, interviewing and examining the patient face-to-face, discussion with family and other health care providers including emergency medicine physician, admission orders, interpreting lab/test results and formulating a plan of care.   Medical Decision Making  during this encounter was  [_] Low Complexity  [_] Moderate Complexity  [x] High Complexity  _________________________________________________________________________________    INPATIENT LIST OF MEDICATIONS   No current facility-administered medications for this encounter.    Current Outpatient Medications:     alogliptin (NESINA) 12.5 mg Tab, Take 1 tablet by mouth every evening., Disp: , Rfl:     atorvastatin (LIPITOR) 40 MG tablet, Take 1 tablet (40 mg total) by mouth once daily., Disp: 30 tablet, Rfl: 2    clopidogreL (PLAVIX) 75 mg tablet, Take 1 tablet (75 mg total) by mouth once daily., Disp: 30 tablet, Rfl: 2    divalproex (DEPAKOTE) 500 MG TbEC, Take 500 mg by mouth 2 (two) times daily., Disp: , Rfl:     ELIQUIS 5 mg Tab, Take 1 tablet (5 mg total) by mouth 2 (two) times daily., Disp: 60 tablet, Rfl: 2    FLUoxetine 40 MG capsule, Take 40 mg by mouth 2 (two) times daily., Disp: , Rfl:     levETIRAcetam (KEPPRA) 500 MG Tab, Take 500 mg by mouth 2 (two) times daily., Disp: , Rfl:     metoprolol succinate (TOPROL-XL) 100 MG 24 hr tablet, Take 1 tablet by mouth 2 (two) times a day., Disp: , Rfl:     mirtazapine (REMERON) 30 MG tablet, Take 30 mg by mouth every evening., Disp: , Rfl:     acetaminophen (TYLENOL) 500 MG tablet, Take 1,000 mg by mouth every 6 (six) hours as needed for Pain., Disp: , Rfl:     irbesartan (AVAPRO) 300 MG tablet, Take 1 tablet (300 mg total) by mouth once daily., Disp: , Rfl:     nitroGLYCERIN (NITROSTAT) 0.4 MG SL tablet, Place 0.4 mg under the tongue every 5 (five) minutes as needed. As directed, Disp: , Rfl:     QUEtiapine (SEROQUEL) 100 MG Tab, Take 100 mg by mouth every evening., Disp: , Rfl:     repaglinide (PRANDIN) 1 MG tablet, Take 1 mg by mouth 3 (three) times daily before meals., Disp: , Rfl:     Facility-Administered Medications Ordered in Other Encounters:     sodium chloride 0.9% injection, , , PRN, Marcelino Monroe MD, 4 mL at 02/19/19 1234      Scheduled Meds:  Continuous  Infusions:  PRN Meds:.      Candace Bermudez  [facility] Hospitalist NP  09/03/2022

## 2022-09-03 NOTE — ED NOTES
81 y.o. male to ED for evaluation of rectal bleeding. Wife states they noticed some bright red blood on the toilet seat yesterday and today she noticed it on the sheets. Patient denies abdominal pain, denies n/v/d, denies fever/chills, denies chest pain/ cough/ shortness of breath. Per wife patient has a history of colon cancer but has not been receiving treatment recently, she is unsure if he still has it. Patient awake, alert, and oriented x to person, place, and situation - disoriented to time. No apparent distress noted. VS currently stable. Patient assisted onto stretcher and changed into a gown. Patient placed on cardiac monitor, continuous pulse oximetry and automatic blood pressure cuff. Bed placed in low locked position, side rails up x 2, call light is within reach of patient orientation to room and explanation of wait provided to patient, alarms set and turned on for monitor and pulse ox, awaiting MD evaluation and orders, will continue to monitor.

## 2022-09-03 NOTE — Clinical Note
Diagnosis: GI bleed [[id number]]   Future Attending Provider: SIMIN DAMIAN [[id number]]   Admitting Provider:: SIMIN DAMIAN [[id number]]   Special Needs:: Continuous IV Infusion Other [10]   Special Needs:: Fall Risk [15]

## 2022-09-03 NOTE — ED PROVIDER NOTES
Encounter Date: 9/3/2022       History     Chief Complaint   Patient presents with    Rectal Bleeding     X last night. Family noticed bright red blood yesterday evening then again this morning along with some darker blood.      81-year-old male with past medical history including multiple previous strokes with cognitive disability/hyperlipidemia/hypertension/previous lung cancer/diabetes/coronary artery disease/COPD who presents for evaluation of blood in his stool.  Wife noted a small amount of blood on the toilet yesterday.  She was not sure where it had come from.  Today blood in his diaper was noted.  No history of GI bleeding by their report.  Uncertain how long it has been since he has had a colonoscopy.  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  No recent trauma but does have falls frequently.  The patient's appetite and activity level have been baseline for him.  He denies any pain or discomfort.  His wife has not noted fever or distress.  No other complaints.      Review of patient's allergies indicates:   Allergen Reactions    Doxycycline Diarrhea     Severe diarrhea    Sulfa (sulfonamide antibiotics) Rash     Other reaction(s): Itching    Sulfasalazine Rash     Rash and itching mild     Past Medical History:   Diagnosis Date    Back pain     Cancer 2012    lung cancer chemo and radiation    COPD (chronic obstructive pulmonary disease)     Coronary artery disease 2002, 2004    s/p 3 stents;  Dr. Interiano    Diabetes mellitus, type 2     Digestive disorder     Hiatal hernia     History of lung cancer 7/6/2021    Hyperlipidemia     Hypertension     Lung cancer     Neck pain     Prostate CA      Past Surgical History:   Procedure Laterality Date    ANKLE SURGERY Right 1970s    CORONARY ANGIOPLASTY WITH STENT  PLACEMENT      CYSTOSCOPY W/ RETROGRADES Bilateral 2/24/2020    Procedure: CYSTOSCOPY, WITH RETROGRADE PYELOGRAM;  Surgeon: Nuha Soto MD;  Location: [facility] OR;  Service: Urology;  Laterality: Bilateral;    ENDOBRONCHIAL ULTRASOUND N/A 6/2/2021    Procedure: ENDOBRONCHIAL ULTRASOUND (EBUS);  Surgeon: Rob Arrington MD;  Location: [facility];  Service: Pulmonary;  Laterality: N/A;    EPIDURAL STEROID INJECTION INTO CERVICAL SPINE N/A 2/19/2019    Procedure: Injection-steroid-epidural-cervical C7-T1;  Surgeon: Marcelino Monroe MD;  Location: [facility] OR;  Service: Pain Management;  Laterality: N/A;    INJECTION OF ANESTHETIC AGENT AROUND MEDIAL BRANCH NERVES INNERVATING LUMBAR FACET JOINT Bilateral 6/4/2018    Procedure: MEDIAL BRANCH, LUMBAR L3,4,5;  Surgeon: Marcelino Monroe MD;  Location: [facility] OR;  Service: Pain Management;  Laterality: Bilateral;  L3, 4, 5    MEDIASTINOSCOPY      RADIOFREQUENCY ABLATION OF LUMBAR MEDIAL BRANCH NERVE AT SINGLE LEVEL Bilateral 7/16/2018    Procedure: RADIOFREQUENCY ABLATION, NERVE, MEDIAL BRANCH, LUMBAR, 1 LEVEL;  Surgeon: Marcelino Monroe MD;  Location: [facility] OR;  Service: Pain Management;  Laterality: Bilateral;  L3, 4, 5  lumbar  [employer] pain management generator  SN [id number]  80 degrees for 75 seconds x2    TRANSRECTAL ULTRASOUND OF PROSTATE WITH INSERTION OF GOLD FIDUCIAL MARKER N/A 2/24/2020    Procedure: ULTRASOUND, PROSTATE, RECTAL APPROACH, WITH GOLD FIDUCIAL MARKER INSERTION;  Surgeon: Nuha Soto MD;  Location: [facility] OR;  Service: Urology;  Laterality: N/A;     Family History   Problem Relation Age of Onset    Diabetes Mother     Peripheral vascular disease Mother     Heart attack Mother     Diabetes Father     Heart attack Father     Emphysema Father     COPD Father     Diabetes Sister      Social History     Tobacco Use    Smoking status: Every Day     Packs/day: 1.00     Years: 57.00     Pack years: 57.00     Types: Cigarettes    Smokeless tobacco: Never   Substance Use  Topics    Alcohol use: No    Drug use: No     Review of Systems   Unable to perform ROS: Dementia     Physical Exam     Initial Vitals [09/03/22 1141]   BP Pulse Resp Temp SpO2   (!) 142/65 72 18 98 °F (36.7 °C) 100 %      MAP       --         Physical Exam    Constitutional: He is not diaphoretic. He is cooperative. He does not appear ill. No distress.   HENT:   Head: Normocephalic and atraumatic.   Nose: Nose normal. Right sinus exhibits no maxillary sinus tenderness and no frontal sinus tenderness. Left sinus exhibits no maxillary sinus tenderness and no frontal sinus tenderness.   Mouth/Throat: Uvula is midline, oropharynx is clear and moist and mucous membranes are normal. No trismus in the jaw. No uvula swelling. No oropharyngeal exudate, posterior oropharyngeal edema or posterior oropharyngeal erythema.   Eyes: Conjunctivae, EOM and lids are normal. Pupils are equal, round, and reactive to light.   Neck: Trachea normal and phonation normal. Neck supple. No stridor present. No tracheal tenderness present. No JVD present.   Normal range of motion.   Full passive range of motion without pain.     Cardiovascular:  Normal rate, regular rhythm, normal heart sounds, intact distal pulses and normal pulses.     Exam reveals no distant heart sounds, no friction rub and no decreased pulses.       No murmur heard.  Pulmonary/Chest: Effort normal and breath sounds normal. No respiratory distress. He has no decreased breath sounds. He has no wheezes. He has no rhonchi. He has no rales. He exhibits no tenderness.   Abdominal: Abdomen is soft and flat. Bowel sounds are normal. He exhibits no distension and no mass. There is no abdominal tenderness.   No right CVA tenderness.  No left CVA tenderness. There is no rebound and no guarding.   Genitourinary:    Testes and penis normal.   Rectum:      No rectal mass, anal fissure, tenderness, external hemorrhoid or abnormal anal tone.   Right testis shows no mass, no swelling and  no tenderness. Left testis shows no mass, no swelling and no tenderness. Uncircumcised.    Genitourinary Comments: No rectal tenderness, maroon-colored stool noted on visual exam/digital rectal exam with gross blood     Musculoskeletal:         General: No tenderness or edema.      Cervical back: Normal, full passive range of motion without pain, normal range of motion and neck supple. No rigidity, tenderness or bony tenderness. No spinous process tenderness or muscular tenderness. Normal range of motion.      Thoracic back: Normal. No tenderness. Normal range of motion.      Lumbar back: Normal. No tenderness or bony tenderness. Normal range of motion.      Comments: Pulses 2+ throughout, no extremity abnormalities     Lymphadenopathy:     He has no cervical adenopathy.   Neurological: He is alert and oriented to person, place, and time. He has normal strength. No cranial nerve deficit or sensory deficit. Gait normal.   Skin: Skin is warm and dry. Capillary refill takes less than 2 seconds. No rash noted.   Psychiatric: He has a normal mood and affect. His speech is normal and behavior is normal.       ED Course   Procedures  Labs Reviewed   CBC W/ AUTO DIFFERENTIAL - Abnormal; Notable for the following components:       Result Value    RBC 2.83 (*)     Hemoglobin 9.0 (*)     Hematocrit 27.3 (*)     MCH 31.8 (*)     RDW 17.2 (*)     Immature Granulocytes 0.7 (*)     Lymph # 0.5 (*)     Gran % 80.9 (*)     Lymph % 8.6 (*)     All other components within normal limits   COMPREHENSIVE METABOLIC PANEL - Abnormal; Notable for the following components:    Glucose 141 (*)     BUN 31 (*)     Creatinine 1.5 (*)     Calcium 8.6 (*)     Albumin 2.9 (*)     eGFR 47.1 (*)     All other components within normal limits   APTT - Abnormal; Notable for the following components:    aPTT 40.2 (*)     All other components within normal limits   LIPASE   TROPONIN I   CK   MAGNESIUM   SARS-COV-2 RNA AMPLIFICATION, QUAL   MAGNESIUM    TROPONIN I   CK   URINALYSIS, REFLEX TO URINE CULTURE   TYPE & SCREEN        ECG Results              EKG 12-lead (In process)  Result time 09/03/22 13:26:48      In process by Interface, Lab In [facility] (09/03/22 13:26:48)                   Narrative:    Test Reason : K92.2,    Vent. Rate : 073 BPM     Atrial Rate : 073 BPM     P-R Int : 182 ms          QRS Dur : 078 ms      QT Int : 446 ms       P-R-T Axes : 074 043 062 degrees     QTc Int : 491 ms    Normal sinus rhythm  Prolonged QT  Abnormal ECG  When compared with ECG of 13-JUN-2022 05:24,  Vent. rate has decreased BY  78 BPM  ST no longer depressed in Anterior leads  Nonspecific T wave abnormality no longer evident in Inferior leads  Nonspecific T wave abnormality no longer evident in Lateral leads    Referred By: [email or web address]   SELF           Confirmed By:                                   Imaging Results    None          Medications   sodium chloride 0.9% bolus 250 mL (250 mLs Intravenous New Bag 9/3/22 1442)     Medical Decision Making:   Clinical Tests:   Lab Tests: Reviewed  Radiological Study: Reviewed  Medical Tests: Reviewed  ED Management:  Patient currently hemodynamically stable.  No bloody bowel movements in the emergency room however lower GI bleed demonstrated by digital rectal exam--maroon stool  H&H stable compared with previous/BUN and creatinine slightly elevated--diuresis initiated.  I have discussed case with the hospitalist provider who has assumed care will admit.                    Clinical Impression:   Final diagnoses:  [K92.2] GI bleed        ED Disposition Condition    Admit                 Marry Zhang MD  09/03/22 9322

## 2022-09-04 LAB
ALBUMIN SERPL BCP-MCNC: 2.5 G/DL (ref 3.5–5.2)
ALP SERPL-CCNC: 55 U/L (ref 55–135)
ALT SERPL W/O P-5'-P-CCNC: 23 U/L (ref 10–44)
ANION GAP SERPL CALC-SCNC: 5 MMOL/L (ref 8–16)
AST SERPL-CCNC: 32 U/L (ref 10–40)
BASOPHILS # BLD AUTO: 0.01 K/UL (ref 0–0.2)
BASOPHILS NFR BLD: 0.3 % (ref 0–1.9)
BILIRUB SERPL-MCNC: 0.7 MG/DL (ref 0.1–1)
BLD PROD TYP BPU: NORMAL
BLOOD UNIT EXPIRATION DATE: NORMAL
BLOOD UNIT TYPE CODE: 5100
BLOOD UNIT TYPE: NORMAL
BUN SERPL-MCNC: 24 MG/DL (ref 8–23)
CALCIUM SERPL-MCNC: 7.6 MG/DL (ref 8.7–10.5)
CHLORIDE SERPL-SCNC: 108 MMOL/L (ref 95–110)
CO2 SERPL-SCNC: 26 MMOL/L (ref 23–29)
CODING SYSTEM: NORMAL
CREAT SERPL-MCNC: 1.3 MG/DL (ref 0.5–1.4)
DIFFERENTIAL METHOD: ABNORMAL
DISPENSE STATUS: NORMAL
EOSINOPHIL # BLD AUTO: 0.1 K/UL (ref 0–0.5)
EOSINOPHIL NFR BLD: 1.5 % (ref 0–8)
ERYTHROCYTE [DISTWIDTH] IN BLOOD BY AUTOMATED COUNT: 16.8 % (ref 11.5–14.5)
EST. GFR  (NO RACE VARIABLE): 55.9 ML/MIN/1.73 M^2
GLUCOSE SERPL-MCNC: 74 MG/DL (ref 70–110)
GLUCOSE SERPL-MCNC: 77 MG/DL (ref 70–110)
GLUCOSE SERPL-MCNC: 84 MG/DL (ref 70–110)
GLUCOSE SERPL-MCNC: 87 MG/DL (ref 70–110)
HCT VFR BLD AUTO: 20.7 % (ref 40–54)
HCT VFR BLD AUTO: 21.8 % (ref 40–54)
HCT VFR BLD AUTO: 22.9 % (ref 40–54)
HGB BLD-MCNC: 6.7 G/DL (ref 14–18)
HGB BLD-MCNC: 7 G/DL (ref 14–18)
HGB BLD-MCNC: 7.7 G/DL (ref 14–18)
IMM GRANULOCYTES # BLD AUTO: 0.02 K/UL (ref 0–0.04)
IMM GRANULOCYTES NFR BLD AUTO: 0.6 % (ref 0–0.5)
LYMPHOCYTES # BLD AUTO: 0.6 K/UL (ref 1–4.8)
LYMPHOCYTES NFR BLD: 16.1 % (ref 18–48)
MCH RBC QN AUTO: 31.8 PG (ref 27–31)
MCHC RBC AUTO-ENTMCNC: 32.4 G/DL (ref 32–36)
MCV RBC AUTO: 98 FL (ref 82–98)
MONOCYTES # BLD AUTO: 0.5 K/UL (ref 0.3–1)
MONOCYTES NFR BLD: 15 % (ref 4–15)
NEUTROPHILS # BLD AUTO: 2.3 K/UL (ref 1.8–7.7)
NEUTROPHILS NFR BLD: 66.5 % (ref 38–73)
NRBC BLD-RTO: 0 /100 WBC
NUM UNITS TRANS PACKED RBC: NORMAL
PLATELET # BLD AUTO: 127 K/UL (ref 150–450)
PMV BLD AUTO: 11.2 FL (ref 9.2–12.9)
POTASSIUM SERPL-SCNC: 4.6 MMOL/L (ref 3.5–5.1)
PROT SERPL-MCNC: 6.1 G/DL (ref 6–8.4)
RBC # BLD AUTO: 2.11 M/UL (ref 4.6–6.2)
SODIUM SERPL-SCNC: 139 MMOL/L (ref 136–145)
WBC # BLD AUTO: 3.41 K/UL (ref 3.9–12.7)

## 2022-09-04 PROCEDURE — 36430 TRANSFUSION BLD/BLD COMPNT: CPT

## 2022-09-04 PROCEDURE — P9016 RBC LEUKOCYTES REDUCED: HCPCS | Performed by: INTERNAL MEDICINE

## 2022-09-04 PROCEDURE — 12000002 HC ACUTE/MED SURGE SEMI-PRIVATE ROOM

## 2022-09-04 PROCEDURE — C9113 INJ PANTOPRAZOLE SODIUM, VIA: HCPCS | Performed by: NURSE PRACTITIONER

## 2022-09-04 PROCEDURE — 85018 HEMOGLOBIN: CPT | Performed by: NURSE PRACTITIONER

## 2022-09-04 PROCEDURE — 85014 HEMATOCRIT: CPT | Performed by: NURSE PRACTITIONER

## 2022-09-04 PROCEDURE — 25000003 PHARM REV CODE 250: Performed by: NURSE PRACTITIONER

## 2022-09-04 PROCEDURE — 85025 COMPLETE CBC W/AUTO DIFF WBC: CPT | Performed by: NURSE PRACTITIONER

## 2022-09-04 PROCEDURE — 96376 TX/PRO/DX INJ SAME DRUG ADON: CPT

## 2022-09-04 PROCEDURE — 36415 COLL VENOUS BLD VENIPUNCTURE: CPT | Performed by: NURSE PRACTITIONER

## 2022-09-04 PROCEDURE — 63600175 PHARM REV CODE 636 W HCPCS: Performed by: NURSE PRACTITIONER

## 2022-09-04 PROCEDURE — 80053 COMPREHEN METABOLIC PANEL: CPT | Performed by: NURSE PRACTITIONER

## 2022-09-04 PROCEDURE — 25000003 PHARM REV CODE 250: Performed by: INTERNAL MEDICINE

## 2022-09-04 RX ORDER — HYDROCODONE BITARTRATE AND ACETAMINOPHEN 500; 5 MG/1; MG/1
TABLET ORAL
Status: DISCONTINUED | OUTPATIENT
Start: 2022-09-04 | End: 2022-09-06 | Stop reason: HOSPADM

## 2022-09-04 RX ORDER — POLYETHYLENE GLYCOL 3350, SODIUM SULFATE ANHYDROUS, SODIUM BICARBONATE, SODIUM CHLORIDE, POTASSIUM CHLORIDE 236; 22.74; 6.74; 5.86; 2.97 G/4L; G/4L; G/4L; G/4L; G/4L
4000 POWDER, FOR SOLUTION ORAL ONCE
Status: COMPLETED | OUTPATIENT
Start: 2022-09-04 | End: 2022-09-04

## 2022-09-04 RX ORDER — HYDROCODONE BITARTRATE AND ACETAMINOPHEN 500; 5 MG/1; MG/1
TABLET ORAL
Status: DISCONTINUED | OUTPATIENT
Start: 2022-09-04 | End: 2022-09-04 | Stop reason: SDUPTHER

## 2022-09-04 RX ADMIN — METOPROLOL SUCCINATE 100 MG: 50 TABLET, FILM COATED, EXTENDED RELEASE ORAL at 09:09

## 2022-09-04 RX ADMIN — ATORVASTATIN CALCIUM 40 MG: 40 TABLET, FILM COATED ORAL at 08:09

## 2022-09-04 RX ADMIN — POLYETHYLENE GLYCOL 3350, SODIUM SULFATE ANHYDROUS, SODIUM BICARBONATE, SODIUM CHLORIDE, POTASSIUM CHLORIDE 4000 ML: 236; 22.74; 6.74; 5.86; 2.97 POWDER, FOR SOLUTION ORAL at 03:09

## 2022-09-04 RX ADMIN — FLUOXETINE 40 MG: 20 CAPSULE ORAL at 09:09

## 2022-09-04 RX ADMIN — MELATONIN 6 MG: at 09:09

## 2022-09-04 RX ADMIN — QUETIAPINE 100 MG: 100 TABLET ORAL at 09:09

## 2022-09-04 RX ADMIN — PANTOPRAZOLE SODIUM 40 MG: 40 INJECTION, POWDER, FOR SOLUTION INTRAVENOUS at 08:09

## 2022-09-04 RX ADMIN — METOPROLOL SUCCINATE 100 MG: 50 TABLET, FILM COATED, EXTENDED RELEASE ORAL at 08:09

## 2022-09-04 RX ADMIN — FLUOXETINE 40 MG: 20 CAPSULE ORAL at 08:09

## 2022-09-04 RX ADMIN — PANTOPRAZOLE SODIUM 40 MG: 40 INJECTION, POWDER, FOR SOLUTION INTRAVENOUS at 09:09

## 2022-09-04 RX ADMIN — DIVALPROEX SODIUM 500 MG: 250 TABLET, DELAYED RELEASE ORAL at 08:09

## 2022-09-04 RX ADMIN — DIVALPROEX SODIUM 500 MG: 250 TABLET, DELAYED RELEASE ORAL at 09:09

## 2022-09-04 RX ADMIN — LEVETIRACETAM 500 MG: 500 TABLET, FILM COATED ORAL at 09:09

## 2022-09-04 RX ADMIN — LEVETIRACETAM 500 MG: 500 TABLET, FILM COATED ORAL at 08:09

## 2022-09-04 RX ADMIN — SODIUM CHLORIDE: 0.9 INJECTION, SOLUTION INTRAVENOUS at 03:09

## 2022-09-04 NOTE — CONSULTS
GASTROENTEROLOGY INPATIENT CONSULT NOTE  Patient Name: Tyler Todd  Patient MRN: [id number]  Patient : 1940    Admit Date: 9/3/2022  Service date: 2022    Reason for Consult:  Anemia    PCP: Sánchez Mast DO    Chief Complaint   Patient presents with    Rectal Bleeding     X last night. Family noticed bright red blood yesterday evening then again this morning along with some darker blood.        HPI: Patient is a 81 y.o. male with PMHx  anemia with profound anemia hemoglobin is 6.  Has known history of lung cancer.  Also has been on blood thinners Eliquis and Plavix.  Has seen a small amount of red blood in his stool.    Past Medical History:  Past Medical History:   Diagnosis Date    Back pain     Cancer     lung cancer chemo and radiation    COPD (chronic obstructive pulmonary disease)     Coronary artery disease ,     s/p 3 stents;  Dr. Interiano    Diabetes mellitus, type 2     Digestive disorder     Hiatal hernia     History of lung cancer 2021    Hyperlipidemia     Hypertension     Lung cancer     Neck pain     Prostate CA         Past Surgical History:  Past Surgical History:   Procedure Laterality Date    ANKLE SURGERY Right 1970s    CORONARY ANGIOPLASTY WITH STENT PLACEMENT      CYSTOSCOPY W/ RETROGRADES Bilateral 2020    Procedure: CYSTOSCOPY, WITH RETROGRADE PYELOGRAM;  Surgeon: Nuha Soto MD;  Location: [facility] OR;  Service: Urology;  Laterality: Bilateral;    ENDOBRONCHIAL ULTRASOUND N/A 2021    Procedure: ENDOBRONCHIAL ULTRASOUND (EBUS);  Surgeon: Rob Arrington MD;  Location: [facility] ENDO;  Service: Pulmonary;  Laterality: N/A;    EPIDURAL STEROID INJECTION INTO CERVICAL SPINE N/A 2019    Procedure: Injection-steroid-epidural-cervical C7-T1;  Surgeon: Marcelino Monroe MD;  Location: [facility] OR;  Service: Pain Management;  Laterality: N/A;    INJECTION OF ANESTHETIC AGENT AROUND MEDIAL BRANCH NERVES INNERVATING LUMBAR FACET JOINT Bilateral 2018    Procedure:  MEDIAL BRANCH, LUMBAR L3,4,5;  Surgeon: Marcelino Monroe MD;  Location: [facility] OR;  Service: Pain Management;  Laterality: Bilateral;  L3, 4, 5    MEDIASTINOSCOPY      RADIOFREQUENCY ABLATION OF LUMBAR MEDIAL BRANCH NERVE AT SINGLE LEVEL Bilateral 7/16/2018    Procedure: RADIOFREQUENCY ABLATION, NERVE, MEDIAL BRANCH, LUMBAR, 1 LEVEL;  Surgeon: Marcelino Monroe MD;  Location: [facility] OR;  Service: Pain Management;  Laterality: Bilateral;  L3, 4, 5  lumbar  [employer] pain management generator   [id number]  80 degrees for 75 seconds x2    TRANSRECTAL ULTRASOUND OF PROSTATE WITH INSERTION OF GOLD FIDUCIAL MARKER N/A 2/24/2020    Procedure: ULTRASOUND, PROSTATE, RECTAL APPROACH, WITH GOLD FIDUCIAL MARKER INSERTION;  Surgeon: Nuha Soto MD;  Location: [facility] OR;  Service: Urology;  Laterality: N/A;        Home Medications:  Medications Prior to Admission   Medication Sig Dispense Refill Last Dose    alogliptin (NESINA) 12.5 mg Tab Take 1 tablet by mouth every evening.   9/2/2022 at 21:00    atorvastatin (LIPITOR) 40 MG tablet Take 1 tablet (40 mg total) by mouth once daily. 30 tablet 2 9/3/2022 at 10:00    clopidogreL (PLAVIX) 75 mg tablet Take 1 tablet (75 mg total) by mouth once daily. 30 tablet 2 9/3/2022 at 10:00    divalproex (DEPAKOTE) 500 MG TbEC Take 500 mg by mouth 2 (two) times daily.   9/3/2022 at 10:00    ELIQUIS 5 mg Tab Take 1 tablet (5 mg total) by mouth 2 (two) times daily. 60 tablet 2 9/3/2022 at 10:00    FLUoxetine 40 MG capsule Take 40 mg by mouth 2 (two) times daily.   9/3/2022 at 10:00    levETIRAcetam (KEPPRA) 500 MG Tab Take 500 mg by mouth 2 (two) times daily.   9/3/2022 at 10:00    metoprolol succinate (TOPROL-XL) 100 MG 24 hr tablet Take 1 tablet by mouth 2 (two) times a day.   9/3/2022 at 10:00    mirtazapine (REMERON) 30 MG tablet Take 30 mg by mouth every evening.   9/2/2022 at 21:00    acetaminophen (TYLENOL) 500 MG tablet Take 1,000 mg by mouth every 6 (six) hours as needed for Pain.        irbesartan (AVAPRO) 300 MG tablet Take 1 tablet (300 mg total) by mouth once daily.       nitroGLYCERIN (NITROSTAT) 0.4 MG SL tablet Place 0.4 mg under the tongue every 5 (five) minutes as needed. As directed       QUEtiapine (SEROQUEL) 100 MG Tab Take 100 mg by mouth every evening.   Unknown    repaglinide (PRANDIN) 1 MG tablet Take 1 mg by mouth 3 (three) times daily before meals.          Inpatient Medications:   atorvastatin  40 mg Oral Daily    divalproex  500 mg Oral BID    FLUoxetine  40 mg Oral BID    levETIRAcetam  500 mg Oral BID    metoprolol succinate  100 mg Oral BID    pantoprazole  40 mg Intravenous BID    QUEtiapine  100 mg Oral QHS     sodium chloride, dextrose 50%, glucagon (human recombinant), insulin aspart U-100, melatonin, morphine, nitroGLYCERIN, ondansetron, sodium chloride 0.9%    Review of patient's allergies indicates:   Allergen Reactions    Doxycycline Diarrhea     Severe diarrhea    Sulfa (sulfonamide antibiotics) Rash     Other reaction(s): Itching    Sulfasalazine Rash     Rash and itching mild       Social History:   Social History     Occupational History    Not on file   Tobacco Use    Smoking status: Every Day     Packs/day: 1.00     Years: 57.00     Pack years: 57.00     Types: Cigarettes    Smokeless tobacco: Never   Substance and Sexual Activity    Alcohol use: No    Drug use: No    Sexual activity: Never       Family History:   Family History   Problem Relation Age of Onset    Diabetes Mother     Peripheral vascular disease Mother     Heart attack Mother     Diabetes Father     Heart attack Father     Emphysema Father     COPD Father     Diabetes Sister        Review of Systems:  A 10 point review of systems was performed and was normal, except as mentioned in the HPI, including constitutional, HEENT, heme, lymph, cardiovascular, respiratory, gastrointestinal, genitourinary, neurologic, endocrine, psychiatric and musculoskeletal.      OBJECTIVE:    Physical Exam:  24 Hour  "Vital Sign Ranges: Temp:  [97.7 °F (36.5 °C)-98.6 °F (37 °C)] 97.8 °F (36.6 °C)  Pulse:  [61-70] 61  Resp:  [18-20] 18  SpO2:  [98 %-100 %] 98 %  BP: (137-175)/(65-88) 145/68  Most recent vitals: BP (!) 145/68   Pulse 61   Temp 97.8 °F (36.6 °C) (Oral)   Resp 18   Ht 5' 10" (1.778 m)   Wt 65 kg (143 lb 4.8 oz)   SpO2 98%   BMI 20.56 kg/m²    GEN:  Thin  gentleman no acute distress  HEENT: PERRL, sclera anicteric, oral mucosa pink and moist without lesion  NECK: trachea midline; Good ROM  CV: regular rate and rhythm, no murmurs or gallops  RESP: clear to auscultation bilaterally, no wheezes, rhonci or rales  ABD: soft, non-tender, non-distended, normal bowel sounds  EXT: no swelling or edema, 2+ pulses distally  SKIN: no rashes or jaundice  PSYCH: normal affect    Labs:   Recent Labs     09/03/22  1159 09/04/22  0611   WBC 5.70 3.41*   MCV 97 98    127*     Recent Labs     09/03/22  1159 09/04/22  0611    139   K 4.7 4.6    108   CO2 27 26   BUN 31* 24*   * 74     No results for input(s): ALB in the last 72 hours.    Invalid input(s): ALKP, SGOT, SGPT, TBIL, DBIL, TPRO  No results for input(s): PT, INR, PTT in the last 72 hours.      Radiology Review:  No orders to display         IMPRESSION / RECOMMENDATIONS:  Profound anemia which may be related to his underlying malignancy.  But has been seen red blood in his stool.  Denies melena.  Plans an upper endoscopy and colonoscopy tomorrow.  Will leave him on clear liquids.  Order prepped for him.  He is being transfused today.    Thank you for this consult.    Gonzales Colbert  9/4/2022  12:46 PM       "

## 2022-09-04 NOTE — NURSING
Spoke to Dr Colbert via telephone. Notified him of pts H/H and dx. He agrees with transfusing PRBC's. He will review case.

## 2022-09-04 NOTE — PLAN OF CARE
[facility]  Initial Discharge Assessment       Primary Care Provider: Sánchez Mast DO    Admission Diagnosis: GI bleed [K92.2]    Admission Date: 9/3/2022  Expected Discharge Date:     Discharge Barriers Identified: None    Assessment completed at bedside with Pt and Batsheva Todd, spouse, [phone]. Pt lives at home and has [facility] services and a sitter through [employer]. Pt uses a rolling walker, bath bench, and a bedside commode. Pt's wife and sitterKanchan (stepped outside during assessment, provided clarification after) are asking for a wheelchair and/or rollator as Pt does not use the rolling walker properly and often falls. Pt's wife and sitter also advised that he has [employer] insurance. They will provide information if unable to obtain through records or home health.     Payor: MEDICARE / Plan: MEDICARE PART A & B / Product Type: Government /     Extended Emergency Contact Information  Primary Emergency Contact: Batsheva Todd  Address: [address] [place] [address] [facility]  Mobile Phone: [phone]  Relation: Spouse  Preferred language: English   needed? No  Secondary Emergency Contact: Kailey Tinajero  Mobile Phone: [phone]  Relation: Sister   needed? No    Discharge Plan A: Home Health, Home with family  Discharge Plan B: Home Health, Home with family      Hanna Spyra [facility] [place], [place] - [address] [place]  [address] [place]  [place] [address]  Phone: [phone] Fax: [phone]      Initial Assessment (most recent)       Adult Discharge Assessment - 09/04/22 1331          Discharge Assessment    Assessment Type Discharge Planning Assessment     Confirmed/corrected address, phone number and insurance Yes     Confirmed Demographics Correct on Facesheet     Source of Information patient     Communicated SAY with patient/caregiver Date not available/Unable to determine     Reason For Admission  lower GI bleed     Lives With spouse     Facility Arrived From: home     Do you expect to return to your current living situation? Yes     Do you have help at home or someone to help you manage your care at home? Yes     Who are your caregiver(s) and their phone number(s)? Batsheva Todd, spouse, [phone]     Prior to hospitilization cognitive status: No Deficits     Current cognitive status: No Deficits     Walking or Climbing Stairs Difficulty ambulation difficulty, requires equipment     Mobility Management walker, rolling     Dressing/Bathing Difficulty bathing difficulty, assistance 1 person     Dressing/Bathing Management spouse or nurse     Equipment Currently Used at Home walker, rolling;bath bench;bedside commode     Readmission within 30 days? No     Patient currently being followed by outpatient case management? No     Do you currently have service(s) that help you manage your care at home? Yes     How Many hours does patient receive services -- (P)    2x/ week, about an hour PT/OT; 5-6x/week sitter    [facility]; contract sitters [employer] (P)      Is the pt/caregiver preference to resume services with current agency Yes     Do you take prescription medications? Yes     Do you have prescription coverage? Yes     Coverage Medicare     Do you have any problems affording any of your prescribed medications? No     Is the patient taking medications as prescribed? yes     Who is going to help you get home at discharge? Batsheva Todd, spouse, [phone]     How do you get to doctors appointments? family or friend will provide     Are you on dialysis? No     Do you take coumadin? No     Discharge Plan A Home Health;Home with family     Discharge Plan B Home Health;Home with family     DME Needed Upon Discharge  rollator;wheelchair (P)      Discharge Plan discussed with: Patient;Spouse/sig other     Name(s) and Number(s) Batsheva Todd, spouse,  [phone]     Discharge Barriers Identified None

## 2022-09-04 NOTE — PROGRESS NOTES
[facility] Medicine  Progress Note    Patient Name: Tyler Todd  MRN: [id number]  Patient Class: IP- Inpatient   Admission Date: 9/3/2022  Length of Stay: 0 days  Attending Physician: Isaiah Joseph MD  Primary Care Provider: Sánchez Mast DO        Subjective:     Principal Problem:Lower GI bleed    Interval History:     Patient was seen examined  Patient is not very reactive from previous strokes and discussed with family members  Getting blood transfusion and EGD today      Review of Systems  Objective:     Vital Signs (Most Recent):  Temp: 97.6 °F (36.4 °C) (09/04/22 1505)  Pulse: 62 (09/04/22 1505)  Resp: 18 (09/04/22 1405)  BP: (!) 147/70 (09/04/22 1505)  SpO2: 99 % (09/04/22 1505)   Vital Signs (24h Range):  Temp:  [97.6 °F (36.4 °C)-98.7 °F (37.1 °C)] 97.6 °F (36.4 °C)  Pulse:  [61-70] 62  Resp:  [18-20] 18  SpO2:  [98 %-99 %] 99 %  BP: (137-175)/(67-88) 147/70     Weight: 65 kg (143 lb 4.8 oz)  Body mass index is 20.56 kg/m².    Intake/Output Summary (Last 24 hours) at 9/4/2022 1623  Last data filed at 9/4/2022 0500  Gross per 24 hour   Intake 2120 ml   Output --   Net 2120 ml      Physical Exam    General: Patient resting comfortably in no acute distress. Appears as stated age. Calm  Eyes: EOM intact. No conjunctivae injection. No scleral icterus.  ENT: Hearing grossly intact. No discharge from ears. No nasal discharge.   CVS: RRR. No LE edema BL.  Lungs:  No accessory muscle use. No acute respiratory distress  Neuro: non focal , Follows commands. Responds appropriately       Significant Labs: All pertinent labs within the past 24 hours have been reviewed.  Bilirubin:   Recent Labs   Lab 09/03/22  1159 09/04/22  0611   BILITOT 0.5 0.7     Blood Culture: No results for input(s): LABBLOO in the last 48 hours.  BMP:   Recent Labs   Lab 09/03/22  1159 09/04/22  0611   * 74    139   K 4.7 4.6    108   CO2 27 26   BUN 31* 24*   CREATININE 1.5* 1.3   CALCIUM  8.6* 7.6*   MG 1.8  --        Significant Imaging: I have reviewed all pertinent imaging results/findings within the past 24 hours.    Assessment/Plan:      Active Diagnoses:    Diagnosis Date Noted POA    PRINCIPAL PROBLEM:  Lower GI bleed [K92.2] 09/03/2022 Yes    Paroxysmal atrial fibrillation [I48.0] 03/10/2020 Yes    Diabetes mellitus type II, uncontrolled [E11.65] 11/19/2012 Yes      Problems Resolved During this Admission:     ASSESSMENT AND PLAN     Lower GI Bleeding   - IV Protonix b.i.d.  For scope tomorrow   -hold Plavix and Eliquis  -gentle IV hydration     2. Type II Diabetes  -low dose NovoLog insulin sliding scale       3. Paroxysmal atrial fibrillation history   -cont to hold  Eliquis for now. Long term will be problem . May need permanent DC  -continue continue beta-blockers     4. 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 Essential hypertension  -continue home medications to manage     6. Chronic kidney disease stage IIIb  -renal dose All medications  -avoid nephrotoxic medications     7. History of prostate and lung cancer  -currently in remission     8. Chronic normocytic anemia   Low iron noted      DVT Prophylaxis: will be placed on SCDs     D/w dr anderson     VTE Risk Mitigation (From admission, onward)           Ordered     IP VTE LOW RISK PATIENT  Once         09/03/22 1550     Place sequential compression device  Until discontinued         09/03/22 1550                       Isaiah Joseph MD  Department of Hospital Medicine   [facility]

## 2022-09-05 LAB
GLUCOSE SERPL-MCNC: 100 MG/DL (ref 70–110)
GLUCOSE SERPL-MCNC: 71 MG/DL (ref 70–110)
GLUCOSE SERPL-MCNC: 73 MG/DL (ref 70–110)
GLUCOSE SERPL-MCNC: 76 MG/DL (ref 70–110)
GLUCOSE SERPL-MCNC: 81 MG/DL (ref 70–110)
GLUCOSE SERPL-MCNC: 97 MG/DL (ref 70–110)
HCT VFR BLD AUTO: 33.1 % (ref 40–54)
HGB BLD-MCNC: 11.3 G/DL (ref 14–18)

## 2022-09-05 PROCEDURE — 45378 DIAGNOSTIC COLONOSCOPY: CPT | Performed by: INTERNAL MEDICINE

## 2022-09-05 PROCEDURE — 43235 EGD DIAGNOSTIC BRUSH WASH: CPT | Performed by: INTERNAL MEDICINE

## 2022-09-05 PROCEDURE — 25000003 PHARM REV CODE 250: Performed by: NURSE PRACTITIONER

## 2022-09-05 PROCEDURE — 85018 HEMOGLOBIN: CPT | Performed by: NURSE PRACTITIONER

## 2022-09-05 PROCEDURE — C9113 INJ PANTOPRAZOLE SODIUM, VIA: HCPCS | Performed by: NURSE PRACTITIONER

## 2022-09-05 PROCEDURE — 12000002 HC ACUTE/MED SURGE SEMI-PRIVATE ROOM

## 2022-09-05 PROCEDURE — 63600175 PHARM REV CODE 636 W HCPCS: Performed by: NURSE PRACTITIONER

## 2022-09-05 PROCEDURE — 36415 COLL VENOUS BLD VENIPUNCTURE: CPT | Performed by: NURSE PRACTITIONER

## 2022-09-05 PROCEDURE — 85014 HEMATOCRIT: CPT | Performed by: NURSE PRACTITIONER

## 2022-09-05 PROCEDURE — 63600175 PHARM REV CODE 636 W HCPCS: Performed by: INTERNAL MEDICINE

## 2022-09-05 RX ORDER — MEPERIDINE HYDROCHLORIDE 100 MG/ML
INJECTION INTRAMUSCULAR; INTRAVENOUS; SUBCUTANEOUS
Status: DISCONTINUED | OUTPATIENT
Start: 2022-09-05 | End: 2022-09-05 | Stop reason: HOSPADM

## 2022-09-05 RX ORDER — MIDAZOLAM HYDROCHLORIDE 1 MG/ML
INJECTION INTRAMUSCULAR; INTRAVENOUS
Status: DISCONTINUED | OUTPATIENT
Start: 2022-09-05 | End: 2022-09-05 | Stop reason: HOSPADM

## 2022-09-05 RX ADMIN — METOPROLOL SUCCINATE 100 MG: 50 TABLET, FILM COATED, EXTENDED RELEASE ORAL at 12:09

## 2022-09-05 RX ADMIN — METOPROLOL SUCCINATE 100 MG: 50 TABLET, FILM COATED, EXTENDED RELEASE ORAL at 08:09

## 2022-09-05 RX ADMIN — PANTOPRAZOLE SODIUM 40 MG: 40 INJECTION, POWDER, FOR SOLUTION INTRAVENOUS at 12:09

## 2022-09-05 RX ADMIN — PANTOPRAZOLE SODIUM 40 MG: 40 INJECTION, POWDER, FOR SOLUTION INTRAVENOUS at 08:09

## 2022-09-05 RX ADMIN — LEVETIRACETAM 500 MG: 500 TABLET, FILM COATED ORAL at 12:09

## 2022-09-05 RX ADMIN — DIVALPROEX SODIUM 500 MG: 250 TABLET, DELAYED RELEASE ORAL at 08:09

## 2022-09-05 RX ADMIN — LEVETIRACETAM 500 MG: 500 TABLET, FILM COATED ORAL at 08:09

## 2022-09-05 RX ADMIN — QUETIAPINE 100 MG: 100 TABLET ORAL at 08:09

## 2022-09-05 RX ADMIN — DIVALPROEX SODIUM 500 MG: 250 TABLET, DELAYED RELEASE ORAL at 12:09

## 2022-09-05 RX ADMIN — ATORVASTATIN CALCIUM 40 MG: 40 TABLET, FILM COATED ORAL at 12:09

## 2022-09-05 RX ADMIN — FLUOXETINE 40 MG: 20 CAPSULE ORAL at 08:09

## 2022-09-05 RX ADMIN — FLUOXETINE 40 MG: 20 CAPSULE ORAL at 12:09

## 2022-09-05 NOTE — PROVATION PATIENT INSTRUCTIONS
Discharge Summary/Instructions after an Endoscopic Procedure  Patient Name: Tyler Ramirez  Patient MRN: [id number]  Patient YOB: 1940  Monday, September 5, 2022  Gonzales Colbert MD  RESTRICTIONS:  During your procedure today, you received medications for sedation.  These   medications may affect your judgment, balance and coordination.  Therefore,   for 24 hours, you have the following restrictions:   - DO NOT drive a car, operate machinery, make legal/financial decisions,   sign important papers or drink alcohol.    ACTIVITY:  Today: no heavy lifting, straining or running due to procedural   sedation/anesthesia.  The following day: return to full activity including work.  DIET:  Eat and drink normally unless instructed otherwise.     TREATMENT FOR COMMON SIDE EFFECTS:  - Mild abdominal pain, nausea, belching, bloating or excessive gas:  rest,   eat lightly and use a heating pad.  - Sore Throat: treat with throat lozenges and/or gargle with warm salt   water.  - Because air was used during the procedure, expelling large amounts of air   from your rectum or belching is normal.  - If a bowel prep was taken, you may not have a bowel movement for 1-3 days.    This is normal.  SYMPTOMS TO WATCH FOR AND REPORT TO YOUR PHYSICIAN:  1. Abdominal pain or bloating, other than gas cramps.  2. Chest pain.  3. Back pain.  4. Signs of infection such as: chills or fever occurring within 24 hours   after the procedure.  5. Rectal bleeding, which would show as bright red, maroon, or black stools.   (A tablespoon of blood from the rectum is not serious, especially if   hemorrhoids are present.)  6. Vomiting.  7. Weakness or dizziness.  GO DIRECTLY TO THE NEAREST EMERGENCY ROOM IF YOU HAVE ANY OF THE FOLLOWING:      Difficulty breathing              Chills and/or fever over 101 F   Persistent vomiting and/or vomiting blood   Severe abdominal pain   Severe chest pain   Black, tarry stools   Bleeding- more than one  tablespoon   Any other symptom or condition that you feel may need urgent attention  Your doctor recommends these additional instructions:  If any biopsies were taken, your doctors clinic will contact you in 1 to 2   weeks with any results.  - Discharge patient to home (ambulatory).   - Repeat colonoscopy is not recommended for surveillance.  For questions, problems or results please call your physician - Gonzales Colbert MD at Work:  [phone].  [facility], EMERGENCY ROOM PHONE NUMBER: [phone]  IF A COMPLICATION OR EMERGENCY SITUATION ARISES AND YOU ARE UNABLE TO REACH   YOUR PHYSICIAN - GO DIRECTLY TO THE EMERGENCY ROOM.  Gonzales Colbert MD  9/5/2022 10:38:05 AM  This report has been verified and signed electronically.  Dear patient,  As a result of recent federal legislation (The Federal Cures Act), you may   receive lab or pathology results from your procedure in your [facility]   account before your physician is able to contact you. Your physician or   their representative will relay the results to you with their   recommendations at their soonest availability.  Thank you,  PROVATION

## 2022-09-05 NOTE — HOSPITAL COURSE
Patient previous history of coronary artery disease status post stent and multiple strokes,lung cancer came in with severe anemia possible upper/lower GI bleeding.  Stool occult blood is positive and gastroenterology did EGD and found no bleeding  on Petechial mucosa in the cecum and non bleeding   Erythematous duodenopathy.. patient got blood transfusion later hemoglobin stable and discharged stable condition.  Most likely etiology of anemia is from chronic iron-deficiency anemia  and worsening with nutritional status and lung cancer as well.

## 2022-09-05 NOTE — PROVATION PATIENT INSTRUCTIONS
Discharge Summary/Instructions after an Endoscopic Procedure  Patient Name: Tyler Ramirez  Patient MRN: [id number]  Patient YOB: 1940  Monday, September 5, 2022  Gonzales Colbert MD  RESTRICTIONS:  During your procedure today, you received medications for sedation.  These   medications may affect your judgment, balance and coordination.  Therefore,   for 24 hours, you have the following restrictions:   - DO NOT drive a car, operate machinery, make legal/financial decisions,   sign important papers or drink alcohol.    ACTIVITY:  Today: no heavy lifting, straining or running due to procedural   sedation/anesthesia.  The following day: return to full activity including work.  DIET:  Eat and drink normally unless instructed otherwise.     TREATMENT FOR COMMON SIDE EFFECTS:  - Mild abdominal pain, nausea, belching, bloating or excessive gas:  rest,   eat lightly and use a heating pad.  - Sore Throat: treat with throat lozenges and/or gargle with warm salt   water.  - Because air was used during the procedure, expelling large amounts of air   from your rectum or belching is normal.  - If a bowel prep was taken, you may not have a bowel movement for 1-3 days.    This is normal.  SYMPTOMS TO WATCH FOR AND REPORT TO YOUR PHYSICIAN:  1. Abdominal pain or bloating, other than gas cramps.  2. Chest pain.  3. Back pain.  4. Signs of infection such as: chills or fever occurring within 24 hours   after the procedure.  5. Rectal bleeding, which would show as bright red, maroon, or black stools.   (A tablespoon of blood from the rectum is not serious, especially if   hemorrhoids are present.)  6. Vomiting.  7. Weakness or dizziness.  GO DIRECTLY TO THE NEAREST EMERGENCY ROOM IF YOU HAVE ANY OF THE FOLLOWING:      Difficulty breathing              Chills and/or fever over 101 F   Persistent vomiting and/or vomiting blood   Severe abdominal pain   Severe chest pain   Black, tarry stools   Bleeding- more than one  tablespoon   Any other symptom or condition that you feel may need urgent attention  Your doctor recommends these additional instructions:  If any biopsies were taken, your doctors clinic will contact you in 1 to 2   weeks with any results.  - Perform a colonoscopy.   - Use a proton pump inhibitor PO daily.   - Return patient to hospital [facility] for ongoing care.  For questions, problems or results please call your physician - Gonzales Colbert MD at Work:  [phone].  [facility], EMERGENCY ROOM PHONE NUMBER: [phone]  IF A COMPLICATION OR EMERGENCY SITUATION ARISES AND YOU ARE UNABLE TO REACH   YOUR PHYSICIAN - GO DIRECTLY TO THE EMERGENCY ROOM.  Gonzales Colbert MD  9/5/2022 10:33:34 AM  This report has been verified and signed electronically.  Dear patient,  As a result of recent federal legislation (The Federal Cures Act), you may   receive lab or pathology results from your procedure in your [facility]   account before your physician is able to contact you. Your physician or   their representative will relay the results to you with their   recommendations at their soonest availability.  Thank you,  PROVATION

## 2022-09-05 NOTE — BRIEF OP NOTE
Upper endoscopy and colonoscopy done without difficulty mild duodenitis.  Recommendation proton pump inhibitor.  Mild inflammation in the rectum and right colon with friable mucosa but otherwise negative exam.  No significant bleeding site demonstrated.  Advanced diet.  Transfuse as necessary.  Suspect anemia is related to chronic illness rather

## 2022-09-05 NOTE — NURSING
Secure chat to MD Joseph notifying him of hypertension and requesting medication. BP is 176/90. Oncoming RN notified of hypertension, no PRN meds for BP currently ordered. Charge RN, Jose Shine also notified of hypertension.

## 2022-09-06 VITALS
TEMPERATURE: 98 F | WEIGHT: 143.31 LBS | DIASTOLIC BLOOD PRESSURE: 78 MMHG | RESPIRATION RATE: 18 BRPM | SYSTOLIC BLOOD PRESSURE: 161 MMHG | OXYGEN SATURATION: 97 % | HEART RATE: 66 BPM | HEIGHT: 70 IN | BODY MASS INDEX: 20.52 KG/M2

## 2022-09-06 LAB
ERYTHROCYTE [DISTWIDTH] IN BLOOD BY AUTOMATED COUNT: 16.2 % (ref 11.5–14.5)
GLUCOSE SERPL-MCNC: 122 MG/DL (ref 70–110)
GLUCOSE SERPL-MCNC: 76 MG/DL (ref 70–110)
GLUCOSE SERPL-MCNC: 96 MG/DL (ref 70–110)
HCT VFR BLD AUTO: 37.2 % (ref 40–54)
HGB BLD-MCNC: 12.3 G/DL (ref 14–18)
MCH RBC QN AUTO: 31.1 PG (ref 27–31)
MCHC RBC AUTO-ENTMCNC: 33.1 G/DL (ref 32–36)
MCV RBC AUTO: 94 FL (ref 82–98)
PLATELET # BLD AUTO: 123 K/UL (ref 150–450)
PMV BLD AUTO: 11 FL (ref 9.2–12.9)
RBC # BLD AUTO: 3.95 M/UL (ref 4.6–6.2)
WBC # BLD AUTO: 5.84 K/UL (ref 3.9–12.7)

## 2022-09-06 PROCEDURE — C9113 INJ PANTOPRAZOLE SODIUM, VIA: HCPCS | Performed by: NURSE PRACTITIONER

## 2022-09-06 PROCEDURE — 36415 COLL VENOUS BLD VENIPUNCTURE: CPT | Performed by: INTERNAL MEDICINE

## 2022-09-06 PROCEDURE — 63600175 PHARM REV CODE 636 W HCPCS: Performed by: NURSE PRACTITIONER

## 2022-09-06 PROCEDURE — 85027 COMPLETE CBC AUTOMATED: CPT | Performed by: INTERNAL MEDICINE

## 2022-09-06 PROCEDURE — 25000003 PHARM REV CODE 250: Performed by: NURSE PRACTITIONER

## 2022-09-06 PROCEDURE — 25000003 PHARM REV CODE 250: Performed by: INTERNAL MEDICINE

## 2022-09-06 RX ORDER — LISINOPRIL 5 MG/1
5 TABLET ORAL DAILY
Status: DISCONTINUED | OUTPATIENT
Start: 2022-09-06 | End: 2022-09-06 | Stop reason: HOSPADM

## 2022-09-06 RX ORDER — IRBESARTAN 300 MG/1
150 TABLET ORAL DAILY
Qty: 90 TABLET | Refills: 0 | Status: SHIPPED | OUTPATIENT
Start: 2022-09-06

## 2022-09-06 RX ADMIN — PANTOPRAZOLE SODIUM 40 MG: 40 INJECTION, POWDER, FOR SOLUTION INTRAVENOUS at 08:09

## 2022-09-06 RX ADMIN — LISINOPRIL 5 MG: 5 TABLET ORAL at 08:09

## 2022-09-06 RX ADMIN — METOPROLOL SUCCINATE 100 MG: 50 TABLET, FILM COATED, EXTENDED RELEASE ORAL at 08:09

## 2022-09-06 RX ADMIN — LEVETIRACETAM 500 MG: 500 TABLET, FILM COATED ORAL at 08:09

## 2022-09-06 RX ADMIN — ATORVASTATIN CALCIUM 40 MG: 40 TABLET, FILM COATED ORAL at 08:09

## 2022-09-06 RX ADMIN — DIVALPROEX SODIUM 500 MG: 250 TABLET, DELAYED RELEASE ORAL at 08:09

## 2022-09-06 RX ADMIN — FLUOXETINE 40 MG: 20 CAPSULE ORAL at 08:09

## 2022-09-06 NOTE — PLAN OF CARE
[facility]  Discharge Final Note    Primary Care Provider: Sánchez Mast DO    Expected Discharge Date: 9/6/2022     met with Pt at bedside to confirm Pt's discharge plans. Pt's spouse (Batsheva Todd (Spouse)   [phone] (Mobile) also present at time and volunteered to participate. Pt's spouse verbalized plan for Pt to discharge home with home health through [facility] care.  confirmed with CHAD Rose via family transport.  called [facility] ([phone]) to verify Pt will continue services and spoke to Asia. Per Asia, Pt to resume services 9/7.  called Pt's PCP office to schedule a follow-up appointment. Pt's PCP office will call Pt to schedule follow-up appointment. Pt has no other needs to be addressed by case management. Pt cleared to discharge by case management.      Final Discharge Note (most recent)       Final Note - 09/06/22 1444          Final Note    Assessment Type Final Discharge Note     Anticipated Discharge Disposition Home-Health Care [facility]     What phone number can be called within the next 1-3 days to see how you are doing after discharge? [phone]     Hospital Resources/Appts/Education Provided Provided patient/caregiver with written discharge plan information;Appointments scheduled by Navigator/Coordinator        Post-Acute Status    Post-Acute Authorization E     HME Status --   Pt received rolling walker in 3/2020; not eligible until 2025, per Josephine ([facility])    Discharge Delays None known at this time                     Important Message from Medicare             Contact Info       Sánchez Mast DO   Specialty: Hematology and Oncology   Relationship: PCP - General  Consulting Physician    [address]  [address]  [facility], [facility] [address]   Phone: [phone]       Next Steps: Schedule an appointment as soon as possible for a visit in 1 month(s)     Instructions: Your primary care provider's office will call you to schedule a follow-up appointment.

## 2022-09-06 NOTE — DISCHARGE SUMMARY
[facility] Medicine  Discharge Summary      Patient Name: Tyler Todd  MRN: [id number]  Patient Class: IP- Inpatient  Admission Date: 9/3/2022  Hospital Length of Stay: 2 days  Discharge Date and Time:  09/06/2022 3:34 PM  Attending Physician: Simin Joseph MD   Discharging Provider: Simin Joseph MD  Primary Care Provider: Sánchez Mast DO      HPI:   No notes on file    Procedure(s) (LRB):  EGD (ESOPHAGOGASTRODUODENOSCOPY) (Left)  COLONOSCOPY (N/A)  COLONOSCOPY (Left)      Hospital Course:   Patient previous history of coronary artery disease status post stent and multiple strokes,lung cancer came in with severe anemia possible upper/lower GI bleeding.  Stool occult blood is positive and gastroenterology did EGD and found no bleeding  on Petechial mucosa in the cecum and non bleeding   Erythematous duodenopathy.. patient got blood transfusion later hemoglobin stable and discharged stable condition.  Most likely etiology of anemia is from chronic iron-deficiency anemia  and worsening with nutritional status and lung cancer as well.       Goals of Care Treatment Preferences:  Code Status: Full Code      Consults:   Consults (From admission, onward)        Status Ordering Provider     Inpatient consult to Gastroenterology  Once        Provider:  Gonzales Colbert MD    Completed SIMIN JOSEPH     IP consult to case management  Once        Provider:  (Not yet assigned)    Completed SIMIN JOSEPH     Inpatient consult to Hospitalist  Once        Provider:  DIDI Jones ANGIE M.          No new Assessment & Plan notes have been filed under this hospital service since the last note was generated.  Service: Hospital Medicine    Final Active Diagnoses:    Diagnosis Date Noted POA    PRINCIPAL PROBLEM:  Lower GI bleed [K92.2] 09/03/2022 Yes    Paroxysmal atrial fibrillation [I48.0] 03/10/2020 Yes    Diabetes mellitus type II, uncontrolled [E11.65] 11/19/2012 Yes  "     Problems Resolved During this Admission:       Discharged Condition: good    Disposition: Home or Self Care    Follow Up:   Follow-up Information     Sánchez Mast DO. Schedule an appointment as soon as possible for a visit in 1 month(s).    Specialty: Hematology and Oncology  Why: Your primary care provider's office will call you to schedule a follow-up appointment.  Contact information:  [address]  [address]  [facility], [place] [id number] [phone]                       Patient Instructions:      WALKER FOR HOME USE     Order Specific Question Answer Comments   Type of Walker: Rollator with brakes and/or seat    With wheels? Yes    Height: 5' 10" (1.778 m)    Weight: 65 kg (143 lb 4.8 oz)    Length of need (1-99 months): 99    Does patient have medical equipment at home? walker, rolling    Does patient have medical equipment at home? bath bench    Does patient have medical equipment at home? bedside commode    Please check all that apply: Patient's condition impairs ambulation.    Please check all that apply: Patient needs help to get in and out of chair.      Ambulatory referral/consult to Home Health   Standing Status: Future   Referral Priority: Routine Referral Type: Home Health   Referral Reason: Specialty Services Required   Requested Specialty: Home Health Services   Number of Visits Requested: 1     Diet Cardiac     Activity as tolerated       Significant Diagnostic Studies: Labs:   CMP No results for input(s): NA, K, CL, CO2, GLU, BUN, CREATININE, CALCIUM, PROT, ALBUMIN, BILITOT, ALKPHOS, AST, ALT, ANIONGAP, ESTGFRAFRICA, EGFRNONAA in the last 48 hours. and CBC   Recent Labs   Lab 09/05/22  0058 09/06/22  0852   WBC  --  5.84   HGB 11.3* 12.3*   HCT 33.1* 37.2*   PLT  --  123*       Pending Diagnostic Studies:     None         Medications:  Reconciled Home Medications:      Medication List      START taking these medications    walker Misc  1 application by " Misc.(Non-Drug; Combo Route) route once daily at 6am.        CHANGE how you take these medications    irbesartan 300 MG tablet  Commonly known as: AVAPRO  Take 0.5 tablets (150 mg total) by mouth once daily.  What changed: how much to take        CONTINUE taking these medications    acetaminophen 500 MG tablet  Commonly known as: TYLENOL  Take 1,000 mg by mouth every 6 (six) hours as needed for Pain.     alogliptin 12.5 mg Tab  Commonly known as: NESINA  Take 1 tablet by mouth every evening.     atorvastatin 40 MG tablet  Commonly known as: LIPITOR  Take 1 tablet (40 mg total) by mouth once daily.     clopidogreL 75 mg tablet  Commonly known as: PLAVIX  Take 1 tablet (75 mg total) by mouth once daily.     divalproex 500 MG Tbec  Commonly known as: DEPAKOTE  Take 500 mg by mouth 2 (two) times daily.     ELIQUIS 5 mg Tab  Generic drug: apixaban  Take 1 tablet (5 mg total) by mouth 2 (two) times daily.     FLUoxetine 40 MG capsule  Take 40 mg by mouth 2 (two) times daily.     levETIRAcetam 500 MG Tab  Commonly known as: KEPPRA  Take 500 mg by mouth 2 (two) times daily.     metoprolol succinate 100 MG 24 hr tablet  Commonly known as: TOPROL-XL  Take 1 tablet by mouth 2 (two) times a day.     mirtazapine 30 MG tablet  Commonly known as: REMERON  Take 30 mg by mouth every evening.     nitroGLYCERIN 0.4 MG SL tablet  Commonly known as: NITROSTAT  Place 0.4 mg under the tongue every 5 (five) minutes as needed. As directed     QUEtiapine 100 MG Tab  Commonly known as: SEROQUEL  Take 100 mg by mouth every evening.        STOP taking these medications    repaglinide 1 MG tablet  Commonly known as: PRANDIN            Indwelling Lines/Drains at time of discharge:   Lines/Drains/Airways     None               General: Patient resting comfortably in no acute distress. Appears as stated age. Calm  Eyes: EOM intact. No conjunctivae injection. No scleral icterus.  ENT: Hearing grossly intact. No discharge from ears. No nasal  discharge.   CVS: R. No accessory muscle use. No acute respiratory distress  Neuro: non focal , Follows commands. Responds appropriately  Time spent on the discharge of patient: 33 minutes         Isaiah Joseph MD  Department of Hospital Medicine  [facility] the [facility]

## 2022-09-06 NOTE — PLAN OF CARE
Problem: Adult Inpatient Plan of Care  Goal: Plan of Care Review  Outcome: Ongoing, Progressing  Goal: Patient-Specific Goal (Individualized)  Outcome: Ongoing, Progressing  Goal: Absence of Hospital-Acquired Illness or Injury  Outcome: Ongoing, Progressing  Goal: Optimal Comfort and Wellbeing  Outcome: Ongoing, Progressing  Goal: Readiness for Transition of Care  Outcome: Ongoing, Progressing     Problem: Diabetes Comorbidity  Goal: Blood Glucose Level Within Targeted Range  Outcome: Ongoing, Progressing     Problem: Fluid and Electrolyte Imbalance (Acute Kidney Injury/Impairment)  Goal: Fluid and Electrolyte Balance  Outcome: Ongoing, Progressing     Problem: Oral Intake Inadequate (Acute Kidney Injury/Impairment)  Goal: Optimal Nutrition Intake  Outcome: Ongoing, Progressing     Problem: Renal Function Impairment (Acute Kidney Injury/Impairment)  Goal: Effective Renal Function  Outcome: Ongoing, Progressing     Problem: Skin Injury Risk Increased  Goal: Skin Health and Integrity  Outcome: Ongoing, Progressing     Problem: Adjustment to Illness (Gastrointestinal Bleeding)  Goal: Optimal Coping with Acute Illness  Outcome: Ongoing, Progressing     Problem: Bleeding (Gastrointestinal Bleeding)  Goal: Hemostasis  Outcome: Ongoing, Progressing     Problem: Mobility Impairment  Goal: Optimal Mobility  Outcome: Ongoing, Progressing     Problem: Fall Injury Risk  Goal: Absence of Fall and Fall-Related Injury  Outcome: Ongoing, Progressing

## 2022-09-06 NOTE — NURSING
Patient's blood pressure has trended upward this morning resulting in 171/75 with no PRN medication to give. MD Hilario notified of patient's BP. No reply has been received. Will relay to day shift nurse.

## 2022-09-06 NOTE — NURSING
Patient had an initial bp of 170/87. Scheduled Metoprolol given. BP reassessed only 20 minutes after given medication which resulted 166/85. BP trending down. Will monitor throughout night. If patient remains hypertensive will communicate with MD for a reccommended prn medication.

## 2022-09-06 NOTE — PROGRESS NOTES
Discharge education and instructions provided, all questions answered; understanding voiced; family and caretaker at bedside; PIV(s) removed; telemetry removed; safety intact with NAD; pt transported off unit to vehicle with safety intact

## 2022-09-07 LAB
BLD PROD TYP BPU: NORMAL
BLD PROD TYP BPU: NORMAL
BLOOD UNIT EXPIRATION DATE: NORMAL
BLOOD UNIT EXPIRATION DATE: NORMAL
BLOOD UNIT TYPE CODE: 5100
BLOOD UNIT TYPE CODE: 5100
BLOOD UNIT TYPE: NORMAL
BLOOD UNIT TYPE: NORMAL
CODING SYSTEM: NORMAL
CODING SYSTEM: NORMAL
DISPENSE STATUS: NORMAL
DISPENSE STATUS: NORMAL
NUM UNITS TRANS PACKED RBC: NORMAL
NUM UNITS TRANS PACKED RBC: NORMAL

## 2022-10-03 ENCOUNTER — HOSPITAL ENCOUNTER (EMERGENCY)
Facility: HOSPITAL | Age: 82
Discharge: HOME | End: 2022-10-03
Attending: EMERGENCY MEDICINE
Payer: MEDICARE

## 2022-10-03 VITALS
RESPIRATION RATE: 15 BRPM | HEIGHT: 70 IN | HEART RATE: 79 BPM | OXYGEN SATURATION: 96 % | WEIGHT: 145 LBS | BODY MASS INDEX: 20.76 KG/M2 | TEMPERATURE: 98 F | SYSTOLIC BLOOD PRESSURE: 168 MMHG | DIASTOLIC BLOOD PRESSURE: 78 MMHG

## 2022-10-03 DIAGNOSIS — E86.0 DEHYDRATION: Primary | ICD-10-CM

## 2022-10-03 DIAGNOSIS — E16.2 HYPOGLYCEMIA: ICD-10-CM

## 2022-10-03 DIAGNOSIS — E11.9 TYPE 2 DIABETES MELLITUS WITHOUT COMPLICATION, WITHOUT LONG-TERM CURRENT USE OF INSULIN: ICD-10-CM

## 2022-10-03 LAB
ALBUMIN SERPL BCP-MCNC: 3.1 G/DL (ref 3.5–5.2)
ALP SERPL-CCNC: 84 U/L (ref 55–135)
ALT SERPL W/O P-5'-P-CCNC: 24 U/L (ref 10–44)
ANION GAP SERPL CALC-SCNC: 7 MMOL/L (ref 8–16)
AST SERPL-CCNC: 26 U/L (ref 10–40)
BASOPHILS # BLD AUTO: 0.02 K/UL (ref 0–0.2)
BASOPHILS NFR BLD: 0.3 % (ref 0–1.9)
BILIRUB SERPL-MCNC: 0.4 MG/DL (ref 0.1–1)
BNP SERPL-MCNC: 172 PG/ML (ref 0–99)
BUN SERPL-MCNC: 35 MG/DL (ref 8–23)
CALCIUM SERPL-MCNC: 8.6 MG/DL (ref 8.7–10.5)
CHLORIDE SERPL-SCNC: 103 MMOL/L (ref 95–110)
CO2 SERPL-SCNC: 26 MMOL/L (ref 23–29)
CREAT SERPL-MCNC: 1.5 MG/DL (ref 0.5–1.4)
DIFFERENTIAL METHOD: ABNORMAL
EOSINOPHIL # BLD AUTO: 0.1 K/UL (ref 0–0.5)
EOSINOPHIL NFR BLD: 0.7 % (ref 0–8)
ERYTHROCYTE [DISTWIDTH] IN BLOOD BY AUTOMATED COUNT: 15.9 % (ref 11.5–14.5)
EST. GFR  (NO RACE VARIABLE): 46.8 ML/MIN/1.73 M^2
GLUCOSE SERPL-MCNC: 122 MG/DL (ref 70–110)
GLUCOSE SERPL-MCNC: 129 MG/DL (ref 70–110)
GLUCOSE SERPL-MCNC: 47 MG/DL (ref 70–110)
GLUCOSE SERPL-MCNC: 56 MG/DL (ref 70–110)
HCT VFR BLD AUTO: 34.9 % (ref 40–54)
HGB BLD-MCNC: 11.3 G/DL (ref 14–18)
IMM GRANULOCYTES # BLD AUTO: 0.06 K/UL (ref 0–0.04)
IMM GRANULOCYTES NFR BLD AUTO: 0.8 % (ref 0–0.5)
INR PPP: 1.4
LYMPHOCYTES # BLD AUTO: 0.8 K/UL (ref 1–4.8)
LYMPHOCYTES NFR BLD: 11 % (ref 18–48)
MCH RBC QN AUTO: 31.1 PG (ref 27–31)
MCHC RBC AUTO-ENTMCNC: 32.4 G/DL (ref 32–36)
MCV RBC AUTO: 96 FL (ref 82–98)
MONOCYTES # BLD AUTO: 0.7 K/UL (ref 0.3–1)
MONOCYTES NFR BLD: 9.9 % (ref 4–15)
NEUTROPHILS # BLD AUTO: 5.5 K/UL (ref 1.8–7.7)
NEUTROPHILS NFR BLD: 77.3 % (ref 38–73)
NRBC BLD-RTO: 0 /100 WBC
PLATELET # BLD AUTO: 167 K/UL (ref 150–450)
PMV BLD AUTO: 11.7 FL (ref 9.2–12.9)
POTASSIUM SERPL-SCNC: 4.2 MMOL/L (ref 3.5–5.1)
PROT SERPL-MCNC: 7.9 G/DL (ref 6–8.4)
PROTHROMBIN TIME: 15.8 SEC (ref 11.4–13.7)
RBC # BLD AUTO: 3.63 M/UL (ref 4.6–6.2)
SODIUM SERPL-SCNC: 136 MMOL/L (ref 136–145)
TROPONIN I SERPL DL<=0.01 NG/ML-MCNC: <0.03 NG/ML
WBC # BLD AUTO: 7.09 K/UL (ref 3.9–12.7)

## 2022-10-03 PROCEDURE — 84484 ASSAY OF TROPONIN QUANT: CPT | Performed by: NURSE PRACTITIONER

## 2022-10-03 PROCEDURE — 96361 HYDRATE IV INFUSION ADD-ON: CPT

## 2022-10-03 PROCEDURE — 85025 COMPLETE CBC W/AUTO DIFF WBC: CPT | Performed by: NURSE PRACTITIONER

## 2022-10-03 PROCEDURE — 82962 GLUCOSE BLOOD TEST: CPT | Mod: 91

## 2022-10-03 PROCEDURE — 80053 COMPREHEN METABOLIC PANEL: CPT | Performed by: NURSE PRACTITIONER

## 2022-10-03 PROCEDURE — 36415 COLL VENOUS BLD VENIPUNCTURE: CPT | Performed by: NURSE PRACTITIONER

## 2022-10-03 PROCEDURE — 96360 HYDRATION IV INFUSION INIT: CPT

## 2022-10-03 PROCEDURE — 93005 ELECTROCARDIOGRAM TRACING: CPT | Performed by: GENERAL PRACTICE

## 2022-10-03 PROCEDURE — 93010 EKG 12-LEAD: ICD-10-PCS | Mod: ,,, | Performed by: GENERAL PRACTICE

## 2022-10-03 PROCEDURE — 93010 ELECTROCARDIOGRAM REPORT: CPT | Mod: ,,, | Performed by: GENERAL PRACTICE

## 2022-10-03 PROCEDURE — 83880 ASSAY OF NATRIURETIC PEPTIDE: CPT | Performed by: NURSE PRACTITIONER

## 2022-10-03 PROCEDURE — 85610 PROTHROMBIN TIME: CPT | Performed by: NURSE PRACTITIONER

## 2022-10-03 PROCEDURE — 25000003 PHARM REV CODE 250

## 2022-10-03 PROCEDURE — 99284 EMERGENCY DEPT VISIT MOD MDM: CPT | Mod: 25

## 2022-10-03 PROCEDURE — 63600175 PHARM REV CODE 636 W HCPCS: Performed by: EMERGENCY MEDICINE

## 2022-10-03 RX ORDER — BENZONATATE 100 MG/1
200 CAPSULE ORAL 3 TIMES DAILY PRN
Qty: 30 CAPSULE | Refills: 1 | Status: SHIPPED | OUTPATIENT
Start: 2022-10-03 | End: 2022-11-02

## 2022-10-03 RX ORDER — MIRTAZAPINE 45 MG/1
45 TABLET, FILM COATED ORAL DAILY
Status: ON HOLD | COMMUNITY
Start: 2022-09-16 | End: 2022-10-26 | Stop reason: HOSPADM

## 2022-10-03 RX ORDER — DEXTROSE 50 % IN WATER (D50W) INTRAVENOUS SYRINGE
25
Status: COMPLETED | OUTPATIENT
Start: 2022-10-03 | End: 2022-10-03

## 2022-10-03 RX ORDER — MECLIZINE HCL 25MG 25 MG/1
25 TABLET, CHEWABLE ORAL 2 TIMES DAILY
COMMUNITY
Start: 2021-12-14

## 2022-10-03 RX ORDER — ATORVASTATIN CALCIUM 20 MG/1
20 TABLET, FILM COATED ORAL DAILY
Status: ON HOLD | COMMUNITY
Start: 2022-09-19 | End: 2022-10-26 | Stop reason: HOSPADM

## 2022-10-03 RX ADMIN — DEXTROSE MONOHYDRATE 50 ML: 25 INJECTION, SOLUTION INTRAVENOUS at 08:10

## 2022-10-03 RX ADMIN — DEXTROSE 50 % IN WATER (D50W) INTRAVENOUS SYRINGE 50 ML: at 08:10

## 2022-10-03 RX ADMIN — SODIUM CHLORIDE, SODIUM LACTATE, POTASSIUM CHLORIDE, AND CALCIUM CHLORIDE 1000 ML: .6; .31; .03; .02 INJECTION, SOLUTION INTRAVENOUS at 09:10

## 2022-10-03 RX ADMIN — SODIUM CHLORIDE, SODIUM LACTATE, POTASSIUM CHLORIDE, AND CALCIUM CHLORIDE 1000 ML: .6; .31; .03; .02 INJECTION, SOLUTION INTRAVENOUS at 08:10

## 2022-10-04 NOTE — DISCHARGE INSTRUCTIONS
You must eat regularly and drink lots of fluids.  Return to emergency department for worsening symptoms or any problems.  Follow-up with your primary care provider and check your blood sugar daily and if your blood sugar is  more than 200 then you can take diabetes medicine otherwise  hold your diabetes medicine as long as your blood sugar is less than 200.  Follow-up with your primary care provider for further management.  Return to emergency department for worsening symptoms or any problems

## 2022-10-04 NOTE — ED PROVIDER NOTES
Encounter Date: 10/3/2022       History     Chief Complaint   Patient presents with    Fatigue     Fatigue over the past week. Hx of anemia.       82-year-old male presents emergency department with home health nurse saying his blood counts were low and his kidney function was getting worse and was told to come here.  Patient has no complaints.  Patient has multiple previous strokes in the past and can walk with a walker.  Patient not very ambulatory.  Denies chest pain or shortness of breath abdominal pain or any new focal weakness.    Review of patient's allergies indicates:   Allergen Reactions    Doxycycline Diarrhea     Severe diarrhea    Sulfa (sulfonamide antibiotics) Rash     Other reaction(s): Itching    Sulfasalazine Rash     Rash and itching mild     Past Medical History:   Diagnosis Date    Back pain     Cancer 2012    lung cancer chemo and radiation    COPD (chronic obstructive pulmonary disease)     Coronary artery disease 2002, 2004    s/p 3 stents;  Dr. Interiano    Diabetes mellitus, type 2     Digestive disorder     Hiatal hernia     History of lung cancer 7/6/2021    Hyperlipidemia     Hypertension     Lung cancer     Neck pain     Prostate CA      Past Surgical History:   Procedure Laterality Date    ANKLE SURGERY Right 1970s    COLONOSCOPY N/A 9/5/2022    Procedure: COLONOSCOPY;  Surgeon: Gonzales Colbert MD;  Location: [facility];  Service: Endoscopy;  Laterality: N/A;    COLONOSCOPY Left 9/5/2022    Procedure: COLONOSCOPY;  Surgeon: Gonzales Colbert MD;  Location: [facility];  Service: Endoscopy;  Laterality: Left;    CORONARY ANGIOPLASTY WITH STENT PLACEMENT      CYSTOSCOPY W/ RETROGRADES Bilateral 2/24/2020    Procedure: CYSTOSCOPY, WITH RETROGRADE PYELOGRAM;  Surgeon: Nuha Soto MD;  Location: [facility] OR;  Service: Urology;  Laterality: Bilateral;    ENDOBRONCHIAL ULTRASOUND N/A 6/2/2021    Procedure: ENDOBRONCHIAL ULTRASOUND (EBUS);  Surgeon: Rob Arrington MD;  Location: [facility];   Service: Pulmonary;  Laterality: N/A;    EPIDURAL STEROID INJECTION INTO CERVICAL SPINE N/A 2/19/2019    Procedure: Injection-steroid-epidural-cervical C7-T1;  Surgeon: Marcelino Monroe MD;  Location: [facility] OR;  Service: Pain Management;  Laterality: N/A;    ESOPHAGOGASTRODUODENOSCOPY Left 9/5/2022    Procedure: EGD (ESOPHAGOGASTRODUODENOSCOPY);  Surgeon: Gonzales Colbert MD;  Location: [facility];  Service: Endoscopy;  Laterality: Left;    INJECTION OF ANESTHETIC AGENT AROUND MEDIAL BRANCH NERVES INNERVATING LUMBAR FACET JOINT Bilateral 6/4/2018    Procedure: MEDIAL BRANCH, LUMBAR L3,4,5;  Surgeon: Marcelino Monroe MD;  Location: [facility] OR;  Service: Pain Management;  Laterality: Bilateral;  L3, 4, 5    MEDIASTINOSCOPY      RADIOFREQUENCY ABLATION OF LUMBAR MEDIAL BRANCH NERVE AT SINGLE LEVEL Bilateral 7/16/2018    Procedure: RADIOFREQUENCY ABLATION, NERVE, MEDIAL BRANCH, LUMBAR, 1 LEVEL;  Surgeon: Marcelino Monroe MD;  Location: [facility] OR;  Service: Pain Management;  Laterality: Bilateral;  L3, 4, 5  lumbar  [employer] pain management generator  SN [id number]  80 degrees for 75 seconds x2    TRANSRECTAL ULTRASOUND OF PROSTATE WITH INSERTION OF GOLD FIDUCIAL MARKER N/A 2/24/2020    Procedure: ULTRASOUND, PROSTATE, RECTAL APPROACH, WITH GOLD FIDUCIAL MARKER INSERTION;  Surgeon: Nuha Soto MD;  Location: [facility];  Service: Urology;  Laterality: N/A;     Family History   Problem Relation Age of Onset    Diabetes Mother     Peripheral vascular disease Mother     Heart attack Mother     Diabetes Father     Heart attack Father     Emphysema Father     COPD Father     Diabetes Sister      Social History     Tobacco Use    Smoking status: Every Day     Packs/day: 1.00     Years: 57.00     Pack years: 57.00     Types: Cigarettes    Smokeless tobacco: Never   Substance Use Topics    Alcohol use: No    Drug use: No     Review of Systems   Constitutional: Negative.    HENT: Negative.     Eyes: Negative.    Respiratory: Negative.      Cardiovascular: Negative.    Gastrointestinal: Negative.    Endocrine: Negative.    Genitourinary: Negative.    Musculoskeletal: Negative.    Skin: Negative.    Allergic/Immunologic: Negative.    Neurological: Negative.    Hematological: Negative.    Psychiatric/Behavioral: Negative.     All other systems reviewed and are negative.    Physical Exam     Initial Vitals [10/03/22 1752]   BP Pulse Resp Temp SpO2   121/78 69 15 97.7 °F (36.5 °C) 98 %      MAP       --         Physical Exam    Nursing note and vitals reviewed.  Constitutional: He appears well-developed and well-nourished.   HENT:   Head: Normocephalic and atraumatic.   Nose: Nose normal.   Eyes: Conjunctivae and EOM are normal.   Neck: No tracheal deviation present.   Normal range of motion.  Cardiovascular:  Normal rate, regular rhythm, normal heart sounds and intact distal pulses.     Exam reveals no friction rub.       No murmur heard.  Pulmonary/Chest: Breath sounds normal. No respiratory distress. He has no wheezes. He has no rales.   Abdominal: Abdomen is soft. He exhibits no distension. There is no abdominal tenderness.   Musculoskeletal:         General: Normal range of motion.      Cervical back: Normal range of motion.     Neurological: He is alert and oriented to person, place, and time. GCS score is 15. GCS eye subscore is 4. GCS verbal subscore is 5. GCS motor subscore is 6.   No focal weakness however had multiple previous strokes and needs assistance to walk and would need a walker.  Can move all extremities without difficulty   Skin: Skin is warm and dry. Capillary refill takes less than 2 seconds.   Psychiatric: He has a normal mood and affect. Thought content normal.       ED Course   Procedures  Labs Reviewed   CBC W/ AUTO DIFFERENTIAL - Abnormal; Notable for the following components:       Result Value    RBC 3.63 (*)     Hemoglobin 11.3 (*)     Hematocrit 34.9 (*)     MCH 31.1 (*)     RDW 15.9 (*)     Immature Granulocytes 0.8 (*)      Immature Grans (Abs) 0.06 (*)     Lymph # 0.8 (*)     Gran % 77.3 (*)     Lymph % 11.0 (*)     All other components within normal limits   COMPREHENSIVE METABOLIC PANEL - Abnormal; Notable for the following components:    Glucose 56 (*)     BUN 35 (*)     Creatinine 1.5 (*)     Calcium 8.6 (*)     Albumin 3.1 (*)     Anion Gap 7 (*)     eGFR 46.8 (*)     All other components within normal limits    Narrative:        Glucose critical result(s) called and verbal readback obtained   from Tawnya Santana RN ER  by MS1 10/03/2022 20:11   PROTIME-INR - Abnormal; Notable for the following components:    PT 15.8 (*)     All other components within normal limits   POCT GLUCOSE - Abnormal; Notable for the following components:    POC Glucose 47 (*)     All other components within normal limits   TROPONIN I   B-TYPE NATRIURETIC PEPTIDE   TROPONIN I   URINALYSIS, REFLEX TO URINE CULTURE   B-TYPE NATRIURETIC PEPTIDE     EKG Readings: (Independently Interpreted)   Initial Reading: No STEMI. Rhythm: Normal Sinus Rhythm. Ectopy: No Ectopy. Conduction: Normal.    QT prolongation noted     Imaging Results              X-Ray Chest AP Portable (In process)                   X-Rays:   Independently Interpreted Readings:   Other Readings:   No acute pulmonary infiltrates noted  Medications   lactated ringers bolus 1,000 mL (1,000 mLs Intravenous New Bag 10/3/22 2030)   lactated ringers bolus 1,000 mL (has no administration in time range)   dextrose 50 % in water (D50W) injection 25 g (50 mLs Intravenous Given 10/3/22 2021)     Medical Decision Making:   Differential Diagnosis:    82-year-old male with generalized weakness.  Patient also had abnormal labs.  Screening labs reviewed and patient does have evidence of dehydration.  Hypoglycemia noted.  Patient and family endorsed the patient not eating and drinking well and not getting out of the house much.  Patient encouraged to eat and drink.  Patient is diabetes medication to be withheld  [Disease: _____________________] : Disease: [unfilled] [M: ___] : M[unfilled] until blood sugars started going up again and if blood sugar is stable and low can be monitored and be managed without medication.  Screening labs reviewed and patient have repeat blood work done in 3 days.  Patient advised to drink fluids and to eat small meals.  Discharged with instructions and return precautions and follow-up.  Outpatient physical therapy referral done.  Discharged with return precautions and instructions on follow-up  Clinical Tests:   Lab Tests: Reviewed  Radiological Study: Reviewed  Medical Tests: Reviewed                        Clinical Impression:   Final diagnoses:  [E86.0] Dehydration (Primary)  [E16.2] Hypoglycemia  [E11.9] Type 2 diabetes mellitus without complication, without long-term current use of insulin      ED Disposition Condition    Discharge Stable          ED Prescriptions       Medication Sig Dispense Start Date End Date Auth. Provider    benzonatate (TESSALON PERLES) 100 MG capsule Take 2 capsules (200 mg total) by mouth 3 (three) times daily as needed for Cough. 30 capsule 10/3/2022 11/2/2022 Rosy Dykes MD          Follow-up Information       Follow up With Specialties Details Why Contact Info    Andres Diane MD Internal Medicine In 2 days  [address] [place] [address] [phone]               Rosy Dykes MD  10/03/22 2708     [AJCC Stage: ____] : AJCC Stage: [unfilled] [de-identified] : Ms. Haas is a 67 year old female with recently diagnosed endometrial cancer. She had an abnormal Pap smear on May 10, 2017, which showed HGSIL, positive HPV 16. On May 30, 2017, she underwent colposcopic examination. Cervical biopsies were performed with anterior cervical biopsy showing LGSIL and smaller fragments, suspicious for HGSIL; posterior cervical biopsy showing small focus of HGSIL. 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 This is secondary \par to a hypoenhancing mass in this region measuring 1.8 x 0.9 x 1.9 cm.The left ovary/left adnexal region is enlarged and heterogeneous in appearance measuring 4.1 x 3.6 x 2.6 cm. It is in close proximity to the sigmoid colon which demonstrates extensive diverticulosis and a focal fluid collection inseparable from the wall of the sigmoid colon adjacent to the left ovary. This focal fluid collection measures 2.3 x 1.8 cm and demonstrates prominent enhancement of the tissue surrounding it which is inseparable from enhancement adjacent to the left ovary. Patient is reportedly asymptomatic but these findings may represent subacute sigmoid diverticulitis with intramural/serosal sigmoid fluid collection/abscess which secondarily involves the left ovary. Neoplastic disease in this region is felt to be less likely.There is a 1.4 cm enhancing polypoid lesion inseparable from the cecum likely arising from the serosal surface which is suspicious for a tumor implant. Lymph nodes within pelvis are normal in size.  There is an abnormal appearing left inguinal lymph node measuring 2.4 x 2.1 cm.   \par \par 6/23/17 PET CT -  FDG avid left inguinal lymph node, 2.5 x 2.1 cm (SUV 10.6; image 210). FDG avid left para-aortic lymph node, 1.4 x 1.3 cm (SUV 10.9; image 158).\par 1.3 x 1.2 cm soft tissue nodule along the serosal surface of the cecum (SUV 13.0; image 209), as well as an additional nodule 1.2 x 1.1 cm (image 208; SUV 11.6). FDG avidity in the sigmoid colon in an area of diverticulosis SUV 12.2 (image 203).Focus of FDG avidity in the endometrial cavity (SUV 6.7; \par image 210) .\par \par s/p cytoreduction on 12/18/17.  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 \par Denies N/V/D/C.\par Memory is not so good. \par Denies back pain. \par No issues with walking. \par No numbness or tingling in hands or feet. \par \par 6/27/20 CT c/a/p\par IMPRESSION:\par 1.  New compression fracture of the superior endplate of L2.\par 2.  Nonobstructing bilateral nephrolithiasis.\par 3.  No evidence of recurrent or metastatic disease.\par

## 2022-10-10 ENCOUNTER — OFFICE VISIT (OUTPATIENT)
Dept: FAMILY MEDICINE | Facility: CLINIC | Age: 82
End: 2022-10-10
Payer: MEDICARE

## 2022-10-10 VITALS
DIASTOLIC BLOOD PRESSURE: 82 MMHG | HEIGHT: 70 IN | TEMPERATURE: 99 F | OXYGEN SATURATION: 94 % | WEIGHT: 136 LBS | BODY MASS INDEX: 19.47 KG/M2 | HEART RATE: 85 BPM | SYSTOLIC BLOOD PRESSURE: 120 MMHG

## 2022-10-10 DIAGNOSIS — R50.9 FEVER, UNSPECIFIED FEVER CAUSE: ICD-10-CM

## 2022-10-10 DIAGNOSIS — R11.0 NAUSEA: ICD-10-CM

## 2022-10-10 DIAGNOSIS — R05.9 COUGH, UNSPECIFIED TYPE: Primary | ICD-10-CM

## 2022-10-10 DIAGNOSIS — R63.0 DECREASED APPETITE: ICD-10-CM

## 2022-10-10 DIAGNOSIS — J02.9 PHARYNGITIS, UNSPECIFIED ETIOLOGY: ICD-10-CM

## 2022-10-10 LAB
CTP QC/QA: YES
FLUAV AG NPH QL: NEGATIVE
FLUBV AG NPH QL: NEGATIVE
MOLECULAR STREP A: NEGATIVE
SARS-COV-2 RDRP RESP QL NAA+PROBE: NEGATIVE

## 2022-10-10 PROCEDURE — 96372 THER/PROPH/DIAG INJ SC/IM: CPT | Mod: PBBFAC,PN

## 2022-10-10 PROCEDURE — 99214 OFFICE O/P EST MOD 30 MIN: CPT | Mod: S$PBB,,, | Performed by: FAMILY MEDICINE

## 2022-10-10 PROCEDURE — 99999 PR PBB SHADOW E&M-EST. PATIENT-LVL IV: ICD-10-PCS | Mod: PBBFAC,,, | Performed by: FAMILY MEDICINE

## 2022-10-10 PROCEDURE — 87651 STREP A DNA AMP PROBE: CPT | Mod: PBBFAC,PN | Performed by: FAMILY MEDICINE

## 2022-10-10 PROCEDURE — 87635 SARS-COV-2 COVID-19 AMP PRB: CPT | Mod: PBBFAC,PN | Performed by: FAMILY MEDICINE

## 2022-10-10 PROCEDURE — 99214 PR OFFICE/OUTPT VISIT, EST, LEVL IV, 30-39 MIN: ICD-10-PCS | Mod: S$PBB,,, | Performed by: FAMILY MEDICINE

## 2022-10-10 PROCEDURE — 99214 OFFICE O/P EST MOD 30 MIN: CPT | Mod: PBBFAC,PN | Performed by: FAMILY MEDICINE

## 2022-10-10 PROCEDURE — 87804 INFLUENZA ASSAY W/OPTIC: CPT | Mod: 59,PBBFAC,PN | Performed by: FAMILY MEDICINE

## 2022-10-10 PROCEDURE — 99999 PR PBB SHADOW E&M-EST. PATIENT-LVL IV: CPT | Mod: PBBFAC,,, | Performed by: FAMILY MEDICINE

## 2022-10-10 RX ORDER — AMOXICILLIN AND CLAVULANATE POTASSIUM 875; 125 MG/1; MG/1
1 TABLET, FILM COATED ORAL 2 TIMES DAILY
Qty: 10 TABLET | Refills: 0 | Status: ON HOLD | OUTPATIENT
Start: 2022-10-10 | End: 2022-10-26 | Stop reason: HOSPADM

## 2022-10-10 RX ORDER — METHYLPREDNISOLONE ACETATE 40 MG/ML
40 INJECTION, SUSPENSION INTRA-ARTICULAR; INTRALESIONAL; INTRAMUSCULAR; SOFT TISSUE
Status: COMPLETED | OUTPATIENT
Start: 2022-10-10 | End: 2022-10-10

## 2022-10-10 RX ORDER — CEFTRIAXONE 1 G/1
1 INJECTION, POWDER, FOR SOLUTION INTRAMUSCULAR; INTRAVENOUS
Status: COMPLETED | OUTPATIENT
Start: 2022-10-10 | End: 2022-10-10

## 2022-10-10 RX ADMIN — CEFTRIAXONE SODIUM 1 G: 1 INJECTION, POWDER, FOR SOLUTION INTRAMUSCULAR; INTRAVENOUS at 05:10

## 2022-10-10 RX ADMIN — METHYLPREDNISOLONE ACETATE 40 MG: 40 INJECTION, SUSPENSION INTRA-ARTICULAR; INTRALESIONAL; INTRAMUSCULAR; SOFT TISSUE at 05:10

## 2022-10-10 NOTE — PROGRESS NOTES
Subjective:       Patient ID: Tyler Todd is a 82 y.o. male.    Chief Complaint: Establish Care, Diabetes, and Hypertension    Patisantiago with a long history of multiple medical problems is here today for acute cough and sore throat. His caregiver from [employer] is with him. (Elena Beasley). Caregiver states decreased appetitie, subjective fever, cough and congestion. Patient does have dementia, but told me he's been feeling bad for about a week. He is also weak. He normally ambulates with cane, but is too weak to ambulate. He has a history of aspiration pneumonia, diet controlled diabetes      Diabetes  Hypoglycemia symptoms include dizziness. Associated symptoms include fatigue and weakness.   Hypertension      Review of Systems   Constitutional:  Positive for activity change, appetite change, fatigue and fever.   HENT:  Positive for congestion, postnasal drip, sinus pressure and sore throat.    Eyes: Negative.    Respiratory:  Positive for cough.         History of COPD. No longer smokes   Cardiovascular: Negative.    Gastrointestinal: Negative.    Endocrine: Negative.    Genitourinary: Negative.    Musculoskeletal:         Over all weakness and decreased strength   Skin: Negative.    Allergic/Immunologic: Negative.    Neurological:  Positive for dizziness, weakness and light-headedness.   Psychiatric/Behavioral:          History of dementia     Patient Active Problem List   Diagnosis    Chronic prostatitis    Type 2 diabetes mellitus without complication    Early satiety    Unspecified dental caries    Prostatitis, unspecified    Urethral stricture due to unspecified infection    Incomplete bladder emptying    Thrombocytopenia    Carcinoma of lung    COPD (chronic obstructive pulmonary disease)    CKD (chronic kidney disease), stage III    Hyperlipidemia    Diabetes mellitus type II, uncontrolled    Elevated PSA    Other spondylosis, lumbar region    Cervical radiculitis    Atypical chest pain    CAD  (coronary artery disease)    Nicotine abuse    Encounter for smoking cessation counseling    Adenocarcinoma of prostate    Prostate cancer    Paroxysmal atrial fibrillation    Epididymoorchitis    Elevated liver function tests    Acute renal failure superimposed on stage 3 chronic kidney disease    Orchitis and epididymitis    COPD exacerbation    Tobacco dependence in remission    Mass of upper lobe of right lung    Mediastinal lymphadenopathy    History of lung cancer    Squamous cell carcinoma lung, right    Falls frequently    Anemia in stage 3 chronic kidney disease    Ischemic embolic stroke    Ischemic vascular dementia    Lower GI bleed       Objective:      Physical Exam  Constitutional:       Comments: Dementia but aware of place person and situation. He is weak and looks ill   HENT:      Head: Normocephalic and atraumatic.      Comments: Erythematous posterior oropharynx, with discolored post nasal drainage  Cardiovascular:      Rate and Rhythm: Normal rate and regular rhythm.   Pulmonary:      Comments: Distant breath sounds consistent with long term COPD, but no rhonchi rales or wheezes  Abdominal:      Palpations: Abdomen is soft.   Musculoskeletal:      Comments: Ambulating by wheelchair (due to weakness) so hard to assess rom   Skin:     General: Skin is warm and dry.   Neurological:      Mental Status: Mental status is at baseline.       Lab Results   Component Value Date    WBC 7.09 10/03/2022    HGB 11.3 (L) 10/03/2022    HCT 34.9 (L) 10/03/2022     10/03/2022    CHOL 107 (L) 06/11/2022    TRIG 58 06/11/2022    HDL 24 (L) 06/11/2022    ALT 24 10/03/2022    AST 26 10/03/2022     10/03/2022    K 4.2 10/03/2022     10/03/2022    CREATININE 1.5 (H) 10/03/2022    BUN 35 (H) 10/03/2022    CO2 26 10/03/2022    TSH 0.936 02/02/2021    PSA 5.6 (H) 07/20/2020    INR 1.4 10/03/2022    HGBA1C 5.9 (H) 06/13/2022     The ASCVD Risk score (Katty DK, et al., 2019) failed to calculate for the  "following reasons:    The 2019 ASCVD risk score is only valid for ages 40 to 79    The patient has a prior MI or stroke diagnosis  Visit Vitals  /82 (BP Location: Right arm, Patient Position: Sitting, BP Method: Medium (Manual))   Pulse 85   Temp 98.8 °F (37.1 °C)   Ht 5' 10" (1.778 m)   Wt 61.7 kg (136 lb)   SpO2 (!) 94%   BMI 19.51 kg/m²      Assessment:       1. Cough, unspecified type    2. Pharyngitis, unspecified etiology    3. Fever, unspecified fever cause    4. Nausea    5. Decreased appetite          Plan:       1. Cough, unspecified type  -     CBC Auto Differential; Future; Expected date: 10/10/2022  -     POCT Influenza A/B  -     POCT COVID-19 Rapid Screening  -     X-Ray Chest PA And Lateral    2. Pharyngitis, unspecified etiology  -     POCT Strep A, Molecular    3. Fever, unspecified fever cause  -     POCT URINE DIPSTICK WITHOUT MICROSCOPE  -     POCT Influenza A/B  -     POCT COVID-19 Rapid Screening  -     X-Ray Chest PA And Lateral    4. Nausea  -     POCT URINE DIPSTICK WITHOUT MICROSCOPE    5. Decreased appetite  -     POCT URINE DIPSTICK WITHOUT MICROSCOPE     No follow-ups on file.      Future Appointments       Date Specialty Appt Notes    10/11/2022 Radiology cxr               "

## 2022-10-11 ENCOUNTER — TELEPHONE (OUTPATIENT)
Dept: FAMILY MEDICINE | Facility: CLINIC | Age: 82
End: 2022-10-11
Payer: MEDICARE

## 2022-10-11 ENCOUNTER — HOSPITAL ENCOUNTER (OUTPATIENT)
Dept: RADIOLOGY | Facility: CLINIC | Age: 82
Discharge: HOME | End: 2022-10-11
Attending: FAMILY MEDICINE
Payer: MEDICARE

## 2022-10-11 ENCOUNTER — LAB VISIT (OUTPATIENT)
Dept: LAB | Facility: CLINIC | Age: 82
End: 2022-10-11
Payer: MEDICARE

## 2022-10-11 DIAGNOSIS — R05.9 COUGH, UNSPECIFIED TYPE: ICD-10-CM

## 2022-10-11 LAB
BASOPHILS # BLD AUTO: 0.03 K/UL (ref 0–0.2)
BASOPHILS NFR BLD: 0.4 % (ref 0–1.9)
DIFFERENTIAL METHOD: ABNORMAL
EOSINOPHIL # BLD AUTO: 0 K/UL (ref 0–0.5)
EOSINOPHIL NFR BLD: 0.2 % (ref 0–8)
ERYTHROCYTE [DISTWIDTH] IN BLOOD BY AUTOMATED COUNT: 16 % (ref 11.5–14.5)
HCT VFR BLD AUTO: 30.3 % (ref 40–54)
HGB BLD-MCNC: 10 G/DL (ref 14–18)
IMM GRANULOCYTES # BLD AUTO: 0.07 K/UL (ref 0–0.04)
IMM GRANULOCYTES NFR BLD AUTO: 0.9 % (ref 0–0.5)
LYMPHOCYTES # BLD AUTO: 0.5 K/UL (ref 1–4.8)
LYMPHOCYTES NFR BLD: 6.1 % (ref 18–48)
MCH RBC QN AUTO: 31.5 PG (ref 27–31)
MCHC RBC AUTO-ENTMCNC: 33 G/DL (ref 32–36)
MCV RBC AUTO: 96 FL (ref 82–98)
MONOCYTES # BLD AUTO: 0.9 K/UL (ref 0.3–1)
MONOCYTES NFR BLD: 10.7 % (ref 4–15)
NEUTROPHILS # BLD AUTO: 6.7 K/UL (ref 1.8–7.7)
NEUTROPHILS NFR BLD: 81.7 % (ref 38–73)
NRBC BLD-RTO: 0 /100 WBC
PLATELET # BLD AUTO: 136 K/UL (ref 150–450)
PMV BLD AUTO: 11.7 FL (ref 9.2–12.9)
RBC # BLD AUTO: 3.17 M/UL (ref 4.6–6.2)
WBC # BLD AUTO: 8.14 K/UL (ref 3.9–12.7)

## 2022-10-11 PROCEDURE — 71046 X-RAY EXAM CHEST 2 VIEWS: CPT | Mod: TC,RHCUB | Performed by: FAMILY MEDICINE

## 2022-10-11 PROCEDURE — 71046 XR CHEST PA AND LATERAL: ICD-10-PCS | Mod: 26,,, | Performed by: RADIOLOGY

## 2022-10-11 PROCEDURE — 85025 COMPLETE CBC W/AUTO DIFF WBC: CPT | Performed by: FAMILY MEDICINE

## 2022-10-11 PROCEDURE — 71046 X-RAY EXAM CHEST 2 VIEWS: CPT | Mod: 26,,, | Performed by: RADIOLOGY

## 2022-10-11 NOTE — TELEPHONE ENCOUNTER
Called patient - relayed information to spouse, patient, and caretaker. They inquired about CBC - relayed to them that this is still processing.

## 2022-10-11 NOTE — TELEPHONE ENCOUNTER
----- Message from Kaye Billings, Patient Care Assistant sent at 10/11/2022  4:22 PM CDT -----  Regarding: results  Type:  Test Results    Who Called:  pt   Name of Test (Lab/Mammo/Etc):  x ray   Date of Test:  10/11/22  Ordering Provider:  shweta   Where the test was performed:  [facility]   Best Call Back Number:        Additional Information:  please call pt to advise. Thanks!

## 2022-10-12 NOTE — PROGRESS NOTES
Patient's CBC  shows decreasing Hgb and Hct, please fend copy to his oncologist/hematologist, and notify patient

## 2022-10-20 ENCOUNTER — TELEPHONE (OUTPATIENT)
Dept: FAMILY MEDICINE | Facility: CLINIC | Age: 82
End: 2022-10-20
Payer: MEDICARE

## 2022-10-20 RX ORDER — DIVALPROEX SODIUM 500 MG/1
500 TABLET, DELAYED RELEASE ORAL 2 TIMES DAILY
Qty: 60 TABLET | Refills: 0 | Status: CANCELLED | OUTPATIENT
Start: 2022-10-20

## 2022-10-20 RX ORDER — CLOPIDOGREL BISULFATE 75 MG/1
75 TABLET ORAL DAILY
Qty: 30 TABLET | Refills: 0 | Status: CANCELLED | OUTPATIENT
Start: 2022-10-20

## 2022-10-20 NOTE — TELEPHONE ENCOUNTER
Pt admitted to the hospital on 10-24-22      Spoke to Carol,pt needs appt,bring in all bottles of medications and bring a family member with patient.Scheduled appt next week,but will needs depakote and plavix rx's before then. Please send to pharmacy      ----- Message from Kaye Billings, Patient Care Assistant sent at 10/20/2022 11:08 AM CDT -----  Regarding: refill  Contact: carol dodd's care giver  Type:  RX Refill Request    Who Called:  carol pt's care giver  Refill or New Rx:  refill  RX Name and Strength:  clopidogreL (PLAVIX) 75 mg tablet divalproex (DEPAKOTE) 500 MG TbEC  FLUoxetine 40 MG capsule amoxicillin-clavulanate 875-125mg (AUGMENTIN) 875-125 mg per tablet    How is the patient currently taking it? 1xday   Is this a 30 day or 90 day RX:  30  Preferred Pharmacy with phone number:    [facility] [address] [facility] [address] [place] [id number] [address] [address]  Phone: [phone] Fax: [phone]    Local or Mail Order:  local   Ordering Provider:  Johnathan Branham Call Back Number:  [phone]    Additional Information: please call  carol dodd's care giver to advise. Thanks!

## 2022-10-24 ENCOUNTER — HOSPITAL ENCOUNTER (OUTPATIENT)
Facility: HOSPITAL | Age: 82
Discharge: HOME HEALTH | End: 2022-10-26
Attending: EMERGENCY MEDICINE | Admitting: FAMILY MEDICINE
Payer: MEDICARE

## 2022-10-24 DIAGNOSIS — E86.0 DEHYDRATION: ICD-10-CM

## 2022-10-24 DIAGNOSIS — N17.9 AKI (ACUTE KIDNEY INJURY): ICD-10-CM

## 2022-10-24 DIAGNOSIS — A41.9 SEPSIS, DUE TO UNSPECIFIED ORGANISM, UNSPECIFIED WHETHER ACUTE ORGAN DYSFUNCTION PRESENT: Primary | ICD-10-CM

## 2022-10-24 DIAGNOSIS — R50.9 FEVER, UNSPECIFIED FEVER CAUSE: ICD-10-CM

## 2022-10-24 DIAGNOSIS — R06.02 SHORTNESS OF BREATH: ICD-10-CM

## 2022-10-24 DIAGNOSIS — R53.1 GENERALIZED WEAKNESS: ICD-10-CM

## 2022-10-24 DIAGNOSIS — R07.9 CHEST PAIN: ICD-10-CM

## 2022-10-24 LAB
ALBUMIN SERPL BCP-MCNC: 3 G/DL (ref 3.5–5.2)
ALP SERPL-CCNC: 74 U/L (ref 55–135)
ALT SERPL W/O P-5'-P-CCNC: 33 U/L (ref 10–44)
ANION GAP SERPL CALC-SCNC: 9 MMOL/L (ref 8–16)
ANISOCYTOSIS BLD QL SMEAR: SLIGHT
AST SERPL-CCNC: 27 U/L (ref 10–40)
BASOPHILS NFR BLD: 0 % (ref 0–1.9)
BILIRUB SERPL-MCNC: 0.5 MG/DL (ref 0.1–1)
BNP SERPL-MCNC: 277 PG/ML (ref 0–99)
BUN SERPL-MCNC: 47 MG/DL (ref 8–23)
CALCIUM SERPL-MCNC: 8.9 MG/DL (ref 8.7–10.5)
CHLORIDE SERPL-SCNC: 106 MMOL/L (ref 95–110)
CO2 SERPL-SCNC: 27 MMOL/L (ref 23–29)
CREAT SERPL-MCNC: 1.8 MG/DL (ref 0.5–1.4)
DIFFERENTIAL METHOD: ABNORMAL
EOSINOPHIL NFR BLD: 0 % (ref 0–8)
ERYTHROCYTE [DISTWIDTH] IN BLOOD BY AUTOMATED COUNT: 16.9 % (ref 11.5–14.5)
EST. GFR  (NO RACE VARIABLE): 37.6 ML/MIN/1.73 M^2
GLUCOSE SERPL-MCNC: 163 MG/DL (ref 70–110)
GLUCOSE SERPL-MCNC: 175 MG/DL (ref 70–110)
HCT VFR BLD AUTO: 29.7 % (ref 40–54)
HGB BLD-MCNC: 9.8 G/DL (ref 14–18)
IMM GRANULOCYTES # BLD AUTO: ABNORMAL K/UL (ref 0–0.04)
IMM GRANULOCYTES NFR BLD AUTO: ABNORMAL % (ref 0–0.5)
INFLUENZA A, MOLECULAR: NEGATIVE
INFLUENZA B, MOLECULAR: NEGATIVE
LACTATE SERPL-SCNC: 2.3 MMOL/L (ref 0.5–1.9)
LYMPHOCYTES NFR BLD: 4 % (ref 18–48)
MCH RBC QN AUTO: 31.9 PG (ref 27–31)
MCHC RBC AUTO-ENTMCNC: 33 G/DL (ref 32–36)
MCV RBC AUTO: 97 FL (ref 82–98)
MONOCYTES NFR BLD: 6 % (ref 4–15)
NEUTROPHILS NFR BLD: 71 % (ref 38–73)
NEUTS BAND NFR BLD MANUAL: 19 %
NRBC BLD-RTO: 0 /100 WBC
PLATELET # BLD AUTO: 175 K/UL (ref 150–450)
PMV BLD AUTO: 11 FL (ref 9.2–12.9)
POTASSIUM SERPL-SCNC: 4.8 MMOL/L (ref 3.5–5.1)
PROT SERPL-MCNC: 7.3 G/DL (ref 6–8.4)
RBC # BLD AUTO: 3.07 M/UL (ref 4.6–6.2)
SARS-COV-2 RDRP RESP QL NAA+PROBE: NEGATIVE
SODIUM SERPL-SCNC: 142 MMOL/L (ref 136–145)
SPECIMEN SOURCE: NORMAL
TROPONIN I SERPL DL<=0.01 NG/ML-MCNC: <0.03 NG/ML
WBC # BLD AUTO: 23.3 K/UL (ref 3.9–12.7)

## 2022-10-24 PROCEDURE — 80053 COMPREHEN METABOLIC PANEL: CPT | Performed by: STUDENT IN AN ORGANIZED HEALTH CARE EDUCATION/TRAINING PROGRAM

## 2022-10-24 PROCEDURE — 87040 BLOOD CULTURE FOR BACTERIA: CPT | Mod: 59 | Performed by: STUDENT IN AN ORGANIZED HEALTH CARE EDUCATION/TRAINING PROGRAM

## 2022-10-24 PROCEDURE — 84484 ASSAY OF TROPONIN QUANT: CPT | Performed by: STUDENT IN AN ORGANIZED HEALTH CARE EDUCATION/TRAINING PROGRAM

## 2022-10-24 PROCEDURE — 63600175 PHARM REV CODE 636 W HCPCS: Performed by: STUDENT IN AN ORGANIZED HEALTH CARE EDUCATION/TRAINING PROGRAM

## 2022-10-24 PROCEDURE — 82962 GLUCOSE BLOOD TEST: CPT

## 2022-10-24 PROCEDURE — 99285 EMERGENCY DEPT VISIT HI MDM: CPT | Mod: 25

## 2022-10-24 PROCEDURE — 96365 THER/PROPH/DIAG IV INF INIT: CPT

## 2022-10-24 PROCEDURE — U0002 COVID-19 LAB TEST NON-CDC: HCPCS | Performed by: STUDENT IN AN ORGANIZED HEALTH CARE EDUCATION/TRAINING PROGRAM

## 2022-10-24 PROCEDURE — 85027 COMPLETE CBC AUTOMATED: CPT | Performed by: STUDENT IN AN ORGANIZED HEALTH CARE EDUCATION/TRAINING PROGRAM

## 2022-10-24 PROCEDURE — 93010 EKG 12-LEAD: ICD-10-PCS | Mod: ,,, | Performed by: SPECIALIST

## 2022-10-24 PROCEDURE — 93005 ELECTROCARDIOGRAM TRACING: CPT | Performed by: SPECIALIST

## 2022-10-24 PROCEDURE — 51798 US URINE CAPACITY MEASURE: CPT

## 2022-10-24 PROCEDURE — 85007 BL SMEAR W/DIFF WBC COUNT: CPT | Performed by: STUDENT IN AN ORGANIZED HEALTH CARE EDUCATION/TRAINING PROGRAM

## 2022-10-24 PROCEDURE — 96361 HYDRATE IV INFUSION ADD-ON: CPT

## 2022-10-24 PROCEDURE — 87502 INFLUENZA DNA AMP PROBE: CPT | Performed by: STUDENT IN AN ORGANIZED HEALTH CARE EDUCATION/TRAINING PROGRAM

## 2022-10-24 PROCEDURE — 83880 ASSAY OF NATRIURETIC PEPTIDE: CPT | Performed by: STUDENT IN AN ORGANIZED HEALTH CARE EDUCATION/TRAINING PROGRAM

## 2022-10-24 PROCEDURE — 93010 ELECTROCARDIOGRAM REPORT: CPT | Mod: ,,, | Performed by: SPECIALIST

## 2022-10-24 PROCEDURE — 83605 ASSAY OF LACTIC ACID: CPT | Performed by: STUDENT IN AN ORGANIZED HEALTH CARE EDUCATION/TRAINING PROGRAM

## 2022-10-24 RX ORDER — CEFEPIME HYDROCHLORIDE 1 G/50ML
2 INJECTION, SOLUTION INTRAVENOUS ONCE
Status: COMPLETED | OUTPATIENT
Start: 2022-10-25 | End: 2022-10-24

## 2022-10-24 RX ORDER — VANCOMYCIN HCL IN 5 % DEXTROSE 1G/250ML
15 PLASTIC BAG, INJECTION (ML) INTRAVENOUS ONCE
Status: COMPLETED | OUTPATIENT
Start: 2022-10-25 | End: 2022-10-25

## 2022-10-24 RX ADMIN — SODIUM CHLORIDE, SODIUM LACTATE, POTASSIUM CHLORIDE, AND CALCIUM CHLORIDE 1000 ML: .6; .31; .03; .02 INJECTION, SOLUTION INTRAVENOUS at 11:10

## 2022-10-24 RX ADMIN — CEFEPIME HYDROCHLORIDE 2 G: 2 INJECTION, SOLUTION INTRAVENOUS at 11:10

## 2022-10-24 RX ADMIN — SODIUM CHLORIDE, SODIUM LACTATE, POTASSIUM CHLORIDE, AND CALCIUM CHLORIDE 1000 ML: .6; .31; .03; .02 INJECTION, SOLUTION INTRAVENOUS at 09:10

## 2022-10-25 PROBLEM — A41.9 SEPSIS: Status: ACTIVE | Noted: 2022-10-25

## 2022-10-25 PROBLEM — J18.9 PNEUMONIA: Status: ACTIVE | Noted: 2022-10-25

## 2022-10-25 LAB
ADENOVIRUS: NOT DETECTED
ALBUMIN SERPL BCP-MCNC: 2.5 G/DL (ref 3.5–5.2)
ALP SERPL-CCNC: 67 U/L (ref 55–135)
ALT SERPL W/O P-5'-P-CCNC: 33 U/L (ref 10–44)
ANION GAP SERPL CALC-SCNC: 3 MMOL/L (ref 8–16)
AST SERPL-CCNC: 27 U/L (ref 10–40)
BILIRUB SERPL-MCNC: 0.7 MG/DL (ref 0.1–1)
BILIRUB UR QL STRIP: NEGATIVE
BORDETELLA PARAPERTUSSIS (IS1001): NOT DETECTED
BORDETELLA PERTUSSIS (PTXP): NOT DETECTED
BUN SERPL-MCNC: 42 MG/DL (ref 8–23)
CALCIUM SERPL-MCNC: 8 MG/DL (ref 8.7–10.5)
CHLAMYDIA PNEUMONIAE: NOT DETECTED
CHLORIDE SERPL-SCNC: 107 MMOL/L (ref 95–110)
CLARITY UR: CLEAR
CO2 SERPL-SCNC: 28 MMOL/L (ref 23–29)
COLOR UR: YELLOW
CORONAVIRUS 229E, COMMON COLD VIRUS: NOT DETECTED
CORONAVIRUS HKU1, COMMON COLD VIRUS: NOT DETECTED
CORONAVIRUS NL63, COMMON COLD VIRUS: NOT DETECTED
CORONAVIRUS OC43, COMMON COLD VIRUS: NOT DETECTED
CREAT SERPL-MCNC: 1.4 MG/DL (ref 0.5–1.4)
ERYTHROCYTE [DISTWIDTH] IN BLOOD BY AUTOMATED COUNT: 17 % (ref 11.5–14.5)
EST. GFR  (NO RACE VARIABLE): 50.8 ML/MIN/1.73 M^2
ESTIMATED AVG GLUCOSE: 134 MG/DL (ref 68–131)
FLUBV RNA NPH QL NAA+NON-PROBE: NOT DETECTED
GLUCOSE SERPL-MCNC: 214 MG/DL (ref 70–110)
GLUCOSE SERPL-MCNC: 221 MG/DL (ref 70–110)
GLUCOSE SERPL-MCNC: 72 MG/DL (ref 70–110)
GLUCOSE SERPL-MCNC: 74 MG/DL (ref 70–110)
GLUCOSE SERPL-MCNC: 80 MG/DL (ref 70–110)
GLUCOSE UR QL STRIP: NEGATIVE
HBA1C MFR BLD: 6.3 % (ref 4.5–6.2)
HCT VFR BLD AUTO: 25.8 % (ref 40–54)
HGB BLD-MCNC: 8.4 G/DL (ref 14–18)
HGB UR QL STRIP: NEGATIVE
HPIV1 RNA NPH QL NAA+NON-PROBE: NOT DETECTED
HPIV2 RNA NPH QL NAA+NON-PROBE: NOT DETECTED
HPIV3 RNA NPH QL NAA+NON-PROBE: NOT DETECTED
HPIV4 RNA NPH QL NAA+NON-PROBE: NOT DETECTED
HUMAN METAPNEUMOVIRUS: NOT DETECTED
INFLUENZA A (SUBTYPES H1,H1-2009,H3): NOT DETECTED
KETONES UR QL STRIP: ABNORMAL
LACTATE SERPL-SCNC: 1.2 MMOL/L (ref 0.5–1.9)
LACTATE SERPL-SCNC: 2.5 MMOL/L (ref 0.5–1.9)
LEUKOCYTE ESTERASE UR QL STRIP: NEGATIVE
MCH RBC QN AUTO: 31.8 PG (ref 27–31)
MCHC RBC AUTO-ENTMCNC: 32.6 G/DL (ref 32–36)
MCV RBC AUTO: 98 FL (ref 82–98)
MRSA SCREEN BY PCR: NEGATIVE
MYCOPLASMA PNEUMONIAE: NOT DETECTED
NITRITE UR QL STRIP: NEGATIVE
PH UR STRIP: 6 [PH] (ref 5–8)
PLATELET # BLD AUTO: 159 K/UL (ref 150–450)
PMV BLD AUTO: 11.2 FL (ref 9.2–12.9)
POTASSIUM SERPL-SCNC: 4.4 MMOL/L (ref 3.5–5.1)
PROCALCITONIN SERPL IA-MCNC: 2.3 NG/ML (ref 0–0.5)
PROT SERPL-MCNC: 6.2 G/DL (ref 6–8.4)
PROT UR QL STRIP: ABNORMAL
RBC # BLD AUTO: 2.64 M/UL (ref 4.6–6.2)
RESPIRATORY INFECTION PANEL SOURCE: NORMAL
RSV RNA NPH QL NAA+NON-PROBE: NOT DETECTED
RV+EV RNA NPH QL NAA+NON-PROBE: NOT DETECTED
SARS-COV-2 RNA RESP QL NAA+PROBE: NOT DETECTED
SODIUM SERPL-SCNC: 138 MMOL/L (ref 136–145)
SP GR UR STRIP: 1.02 (ref 1–1.03)
URN SPEC COLLECT METH UR: ABNORMAL
UROBILINOGEN UR STRIP-ACNC: NEGATIVE EU/DL
VALPROATE SERPL-MCNC: 26.2 UG/ML (ref 50–100)
VANCOMYCIN SERPL-MCNC: 6.4 UG/ML
WBC # BLD AUTO: 16.63 K/UL (ref 3.9–12.7)

## 2022-10-25 PROCEDURE — 83036 HEMOGLOBIN GLYCOSYLATED A1C: CPT | Performed by: FAMILY MEDICINE

## 2022-10-25 PROCEDURE — 84145 PROCALCITONIN (PCT): CPT | Performed by: STUDENT IN AN ORGANIZED HEALTH CARE EDUCATION/TRAINING PROGRAM

## 2022-10-25 PROCEDURE — 63600175 PHARM REV CODE 636 W HCPCS: Performed by: INTERNAL MEDICINE

## 2022-10-25 PROCEDURE — G0378 HOSPITAL OBSERVATION PER HR: HCPCS

## 2022-10-25 PROCEDURE — 36415 COLL VENOUS BLD VENIPUNCTURE: CPT | Performed by: FAMILY MEDICINE

## 2022-10-25 PROCEDURE — 51701 INSERT BLADDER CATHETER: CPT

## 2022-10-25 PROCEDURE — 25000003 PHARM REV CODE 250: Performed by: INTERNAL MEDICINE

## 2022-10-25 PROCEDURE — 25000003 PHARM REV CODE 250: Performed by: EMERGENCY MEDICINE

## 2022-10-25 PROCEDURE — 85027 COMPLETE CBC AUTOMATED: CPT | Performed by: INTERNAL MEDICINE

## 2022-10-25 PROCEDURE — 96366 THER/PROPH/DIAG IV INF ADDON: CPT | Mod: 59

## 2022-10-25 PROCEDURE — 96372 THER/PROPH/DIAG INJ SC/IM: CPT | Mod: 59 | Performed by: FAMILY MEDICINE

## 2022-10-25 PROCEDURE — 80164 ASSAY DIPROPYLACETIC ACD TOT: CPT | Performed by: FAMILY MEDICINE

## 2022-10-25 PROCEDURE — 80202 ASSAY OF VANCOMYCIN: CPT | Performed by: FAMILY MEDICINE

## 2022-10-25 PROCEDURE — 63600175 PHARM REV CODE 636 W HCPCS: Performed by: FAMILY MEDICINE

## 2022-10-25 PROCEDURE — 97535 SELF CARE MNGMENT TRAINING: CPT

## 2022-10-25 PROCEDURE — 87633 RESP VIRUS 12-25 TARGETS: CPT | Performed by: FAMILY MEDICINE

## 2022-10-25 PROCEDURE — 92610 EVALUATE SWALLOWING FUNCTION: CPT

## 2022-10-25 PROCEDURE — 96367 TX/PROPH/DG ADDL SEQ IV INF: CPT | Mod: 59

## 2022-10-25 PROCEDURE — 83605 ASSAY OF LACTIC ACID: CPT | Mod: 91 | Performed by: EMERGENCY MEDICINE

## 2022-10-25 PROCEDURE — 87641 MR-STAPH DNA AMP PROBE: CPT | Performed by: FAMILY MEDICINE

## 2022-10-25 PROCEDURE — 25000003 PHARM REV CODE 250: Performed by: STUDENT IN AN ORGANIZED HEALTH CARE EDUCATION/TRAINING PROGRAM

## 2022-10-25 PROCEDURE — 97166 OT EVAL MOD COMPLEX 45 MIN: CPT

## 2022-10-25 PROCEDURE — 97161 PT EVAL LOW COMPLEX 20 MIN: CPT

## 2022-10-25 PROCEDURE — 83605 ASSAY OF LACTIC ACID: CPT | Performed by: FAMILY MEDICINE

## 2022-10-25 PROCEDURE — 81003 URINALYSIS AUTO W/O SCOPE: CPT | Performed by: STUDENT IN AN ORGANIZED HEALTH CARE EDUCATION/TRAINING PROGRAM

## 2022-10-25 PROCEDURE — 97530 THERAPEUTIC ACTIVITIES: CPT

## 2022-10-25 PROCEDURE — 36415 COLL VENOUS BLD VENIPUNCTURE: CPT | Performed by: STUDENT IN AN ORGANIZED HEALTH CARE EDUCATION/TRAINING PROGRAM

## 2022-10-25 PROCEDURE — 63600175 PHARM REV CODE 636 W HCPCS: Performed by: STUDENT IN AN ORGANIZED HEALTH CARE EDUCATION/TRAINING PROGRAM

## 2022-10-25 PROCEDURE — 96367 TX/PROPH/DG ADDL SEQ IV INF: CPT

## 2022-10-25 PROCEDURE — 80053 COMPREHEN METABOLIC PANEL: CPT | Performed by: INTERNAL MEDICINE

## 2022-10-25 PROCEDURE — 25000003 PHARM REV CODE 250: Performed by: FAMILY MEDICINE

## 2022-10-25 RX ORDER — HYDROCODONE BITARTRATE AND ACETAMINOPHEN 5; 325 MG/1; MG/1
1 TABLET ORAL EVERY 4 HOURS PRN
Status: DISCONTINUED | OUTPATIENT
Start: 2022-10-25 | End: 2022-10-26 | Stop reason: HOSPADM

## 2022-10-25 RX ORDER — TALC
6 POWDER (GRAM) TOPICAL NIGHTLY PRN
Status: DISCONTINUED | OUTPATIENT
Start: 2022-10-25 | End: 2022-10-26 | Stop reason: HOSPADM

## 2022-10-25 RX ORDER — MIRTAZAPINE 15 MG/1
30 TABLET, FILM COATED ORAL NIGHTLY
Status: DISCONTINUED | OUTPATIENT
Start: 2022-10-25 | End: 2022-10-26 | Stop reason: HOSPADM

## 2022-10-25 RX ORDER — IBUPROFEN 200 MG
24 TABLET ORAL
Status: DISCONTINUED | OUTPATIENT
Start: 2022-10-25 | End: 2022-10-26 | Stop reason: HOSPADM

## 2022-10-25 RX ORDER — POLYETHYLENE GLYCOL 3350 17 G/17G
17 POWDER, FOR SOLUTION ORAL 2 TIMES DAILY PRN
Status: DISCONTINUED | OUTPATIENT
Start: 2022-10-25 | End: 2022-10-26 | Stop reason: HOSPADM

## 2022-10-25 RX ORDER — MEGESTROL ACETATE 20 MG/1
40 TABLET ORAL DAILY
Status: DISCONTINUED | OUTPATIENT
Start: 2022-10-25 | End: 2022-10-26 | Stop reason: HOSPADM

## 2022-10-25 RX ORDER — AMOXICILLIN 250 MG
1 CAPSULE ORAL 2 TIMES DAILY PRN
Status: DISCONTINUED | OUTPATIENT
Start: 2022-10-25 | End: 2022-10-26 | Stop reason: HOSPADM

## 2022-10-25 RX ORDER — IPRATROPIUM BROMIDE AND ALBUTEROL SULFATE 2.5; .5 MG/3ML; MG/3ML
3 SOLUTION RESPIRATORY (INHALATION) EVERY 4 HOURS PRN
Status: DISCONTINUED | OUTPATIENT
Start: 2022-10-25 | End: 2022-10-26 | Stop reason: HOSPADM

## 2022-10-25 RX ORDER — GLUCAGON 1 MG
1 KIT INJECTION
Status: DISCONTINUED | OUTPATIENT
Start: 2022-10-25 | End: 2022-10-26 | Stop reason: HOSPADM

## 2022-10-25 RX ORDER — FLUOXETINE HYDROCHLORIDE 20 MG/1
40 CAPSULE ORAL 2 TIMES DAILY
Status: DISCONTINUED | OUTPATIENT
Start: 2022-10-25 | End: 2022-10-26 | Stop reason: HOSPADM

## 2022-10-25 RX ORDER — SIMETHICONE 80 MG
1 TABLET,CHEWABLE ORAL 4 TIMES DAILY PRN
Status: DISCONTINUED | OUTPATIENT
Start: 2022-10-25 | End: 2022-10-26 | Stop reason: HOSPADM

## 2022-10-25 RX ORDER — CLOPIDOGREL BISULFATE 75 MG/1
75 TABLET ORAL DAILY
Status: DISCONTINUED | OUTPATIENT
Start: 2022-10-25 | End: 2022-10-26 | Stop reason: HOSPADM

## 2022-10-25 RX ORDER — IBUPROFEN 200 MG
16 TABLET ORAL
Status: DISCONTINUED | OUTPATIENT
Start: 2022-10-25 | End: 2022-10-26 | Stop reason: HOSPADM

## 2022-10-25 RX ORDER — LIDOCAINE HYDROCHLORIDE 20 MG/ML
JELLY TOPICAL
Status: COMPLETED | OUTPATIENT
Start: 2022-10-25 | End: 2022-10-25

## 2022-10-25 RX ORDER — ONDANSETRON 2 MG/ML
4 INJECTION INTRAMUSCULAR; INTRAVENOUS EVERY 6 HOURS PRN
Status: DISCONTINUED | OUTPATIENT
Start: 2022-10-25 | End: 2022-10-26 | Stop reason: HOSPADM

## 2022-10-25 RX ORDER — MORPHINE SULFATE 4 MG/ML
4 INJECTION, SOLUTION INTRAMUSCULAR; INTRAVENOUS EVERY 4 HOURS PRN
Status: DISCONTINUED | OUTPATIENT
Start: 2022-10-25 | End: 2022-10-26 | Stop reason: HOSPADM

## 2022-10-25 RX ORDER — QUETIAPINE FUMARATE 100 MG/1
100 TABLET, FILM COATED ORAL NIGHTLY
Status: DISCONTINUED | OUTPATIENT
Start: 2022-10-25 | End: 2022-10-26 | Stop reason: HOSPADM

## 2022-10-25 RX ORDER — DRONABINOL 2.5 MG/1
2.5 CAPSULE ORAL DAILY
Status: DISCONTINUED | OUTPATIENT
Start: 2022-10-25 | End: 2022-10-26 | Stop reason: HOSPADM

## 2022-10-25 RX ORDER — ATORVASTATIN CALCIUM 20 MG/1
20 TABLET, FILM COATED ORAL DAILY
Status: DISCONTINUED | OUTPATIENT
Start: 2022-10-25 | End: 2022-10-26 | Stop reason: HOSPADM

## 2022-10-25 RX ORDER — DIVALPROEX SODIUM 250 MG/1
500 TABLET, DELAYED RELEASE ORAL 2 TIMES DAILY
Status: DISCONTINUED | OUTPATIENT
Start: 2022-10-25 | End: 2022-10-26 | Stop reason: HOSPADM

## 2022-10-25 RX ORDER — BENZONATATE 100 MG/1
200 CAPSULE ORAL 3 TIMES DAILY
Status: DISCONTINUED | OUTPATIENT
Start: 2022-10-25 | End: 2022-10-26 | Stop reason: HOSPADM

## 2022-10-25 RX ORDER — NALOXONE HCL 0.4 MG/ML
0.02 VIAL (ML) INJECTION
Status: DISCONTINUED | OUTPATIENT
Start: 2022-10-25 | End: 2022-10-26 | Stop reason: HOSPADM

## 2022-10-25 RX ORDER — SODIUM CHLORIDE 0.9 % (FLUSH) 0.9 %
10 SYRINGE (ML) INJECTION
Status: DISCONTINUED | OUTPATIENT
Start: 2022-10-25 | End: 2022-10-26 | Stop reason: HOSPADM

## 2022-10-25 RX ORDER — LEVETIRACETAM 500 MG/1
500 TABLET ORAL 2 TIMES DAILY
Status: DISCONTINUED | OUTPATIENT
Start: 2022-10-25 | End: 2022-10-26 | Stop reason: HOSPADM

## 2022-10-25 RX ORDER — SODIUM CHLORIDE 9 MG/ML
INJECTION, SOLUTION INTRAVENOUS CONTINUOUS
Status: DISCONTINUED | OUTPATIENT
Start: 2022-10-25 | End: 2022-10-26 | Stop reason: HOSPADM

## 2022-10-25 RX ORDER — ACETAMINOPHEN 325 MG/1
650 TABLET ORAL EVERY 8 HOURS PRN
Status: DISCONTINUED | OUTPATIENT
Start: 2022-10-25 | End: 2022-10-26 | Stop reason: HOSPADM

## 2022-10-25 RX ORDER — INSULIN ASPART 100 [IU]/ML
1-10 INJECTION, SOLUTION INTRAVENOUS; SUBCUTANEOUS
Status: DISCONTINUED | OUTPATIENT
Start: 2022-10-25 | End: 2022-10-26 | Stop reason: HOSPADM

## 2022-10-25 RX ADMIN — LEVETIRACETAM 500 MG: 500 TABLET, FILM COATED ORAL at 09:10

## 2022-10-25 RX ADMIN — APIXABAN 2.5 MG: 2.5 TABLET, FILM COATED ORAL at 11:10

## 2022-10-25 RX ADMIN — INSULIN ASPART 2 UNITS: 100 INJECTION, SOLUTION INTRAVENOUS; SUBCUTANEOUS at 09:10

## 2022-10-25 RX ADMIN — BENZONATATE 200 MG: 100 CAPSULE ORAL at 02:10

## 2022-10-25 RX ADMIN — FLUOXETINE 40 MG: 20 CAPSULE ORAL at 11:10

## 2022-10-25 RX ADMIN — BENZONATATE 200 MG: 100 CAPSULE ORAL at 11:10

## 2022-10-25 RX ADMIN — PIPERACILLIN SODIUM AND TAZOBACTAM SODIUM 3.38 G: 3; .375 INJECTION, POWDER, LYOPHILIZED, FOR SOLUTION INTRAVENOUS at 05:10

## 2022-10-25 RX ADMIN — VANCOMYCIN HYDROCHLORIDE 1000 MG: 1 INJECTION, POWDER, LYOPHILIZED, FOR SOLUTION INTRAVENOUS at 12:10

## 2022-10-25 RX ADMIN — MEGESTROL ACETATE 40 MG: 20 TABLET ORAL at 02:10

## 2022-10-25 RX ADMIN — LEVETIRACETAM 500 MG: 500 TABLET, FILM COATED ORAL at 11:10

## 2022-10-25 RX ADMIN — PIPERACILLIN SODIUM AND TAZOBACTAM SODIUM 3.38 G: 3; .375 INJECTION, POWDER, LYOPHILIZED, FOR SOLUTION INTRAVENOUS at 02:10

## 2022-10-25 RX ADMIN — FLUOXETINE 40 MG: 20 CAPSULE ORAL at 09:10

## 2022-10-25 RX ADMIN — LIDOCAINE HYDROCHLORIDE 10 ML: 20 JELLY TOPICAL at 12:10

## 2022-10-25 RX ADMIN — QUETIAPINE 100 MG: 100 TABLET ORAL at 09:10

## 2022-10-25 RX ADMIN — DIVALPROEX SODIUM 500 MG: 250 TABLET, DELAYED RELEASE ORAL at 09:10

## 2022-10-25 RX ADMIN — DRONABINOL 2.5 MG: 2.5 CAPSULE ORAL at 05:10

## 2022-10-25 RX ADMIN — APIXABAN 2.5 MG: 2.5 TABLET, FILM COATED ORAL at 09:10

## 2022-10-25 RX ADMIN — BENZONATATE 200 MG: 100 CAPSULE ORAL at 09:10

## 2022-10-25 RX ADMIN — MIRTAZAPINE 30 MG: 15 TABLET, FILM COATED ORAL at 09:10

## 2022-10-25 RX ADMIN — ATORVASTATIN CALCIUM 20 MG: 20 TABLET, FILM COATED ORAL at 09:10

## 2022-10-25 RX ADMIN — SODIUM CHLORIDE: 0.9 INJECTION, SOLUTION INTRAVENOUS at 07:10

## 2022-10-25 RX ADMIN — PIPERACILLIN SODIUM AND TAZOBACTAM SODIUM 3.38 G: 3; .375 INJECTION, POWDER, LYOPHILIZED, FOR SOLUTION INTRAVENOUS at 09:10

## 2022-10-25 RX ADMIN — DIVALPROEX SODIUM 500 MG: 250 TABLET, DELAYED RELEASE ORAL at 11:10

## 2022-10-25 RX ADMIN — CLOPIDOGREL BISULFATE 75 MG: 75 TABLET, FILM COATED ORAL at 11:10

## 2022-10-25 NOTE — PROGRESS NOTES
[facility] Medicine  Progress Note    Patient Name: Tyler Todd  MRN: [id number]  Patient Class: OP- Observation   Admission Date: 10/24/2022  Length of Stay: 0 days  Attending Physician: Isaiah Joseph MD  Primary Care Provider: Court Mann MD        Subjective:     Principal Problem:Sepsis        HPI:  No notes on file    Overview/Hospital Course:  Seen earlier  D/w all family members in length  Deny CP or abdominal pain or SOB. U cath was placed in ER and DC .  No fever since admission . PCR panel neg. WBC better . Anemia noted. Can history discussed .      Interval History:     Review of Systems  As per subjective   Objective:     Vital Signs (Most Recent):  Temp: 98.5 °F (36.9 °C) (10/25/22 1556)  Pulse: 70 (10/25/22 1556)  Resp: 16 (10/25/22 1556)  BP: (!) 104/51 (10/25/22 1556)  SpO2: 97 % (10/25/22 1556)   Vital Signs (24h Range):  Temp:  [97.7 °F (36.5 °C)-98.5 °F (36.9 °C)] 98.5 °F (36.9 °C)  Pulse:  [63-71] 70  Resp:  [16-18] 16  SpO2:  [97 %-100 %] 97 %  BP: ()/(48-84) 104/51     Weight: 67.6 kg (149 lb 0.5 oz)  Body mass index is 21.38 kg/m².    Intake/Output Summary (Last 24 hours) at 10/25/2022 1645  Last data filed at 10/25/2022 1319  Gross per 24 hour   Intake 2298 ml   Output 775 ml   Net 1523 ml      Physical Exam  Constitutional:       General: He is not in acute distress.     Appearance: He is well-developed.   HENT:      Head: Normocephalic.   Eyes:      Pupils: Pupils are equal, round, and reactive to light.   Cardiovascular:      Rate and Rhythm: Normal rate and regular rhythm.   Pulmonary:      Effort: Pulmonary effort is normal. No respiratory distress.   Abdominal:      General: Abdomen is flat. There is no distension.      Tenderness: There is no abdominal tenderness.   Musculoskeletal:         General: Normal range of motion.      Cervical back: Neck supple.   Skin:     Findings: No rash.   Neurological:      Mental Status: He is alert. He is  disoriented.   Psychiatric:         Mood and Affect: Mood normal.       Significant Labs: All pertinent labs within the past 24 hours have been reviewed.  CBC:   Recent Labs   Lab 10/24/22  2055 10/25/22  0817   WBC 23.30* 16.63*   HGB 9.8* 8.4*   HCT 29.7* 25.8*    159     CMP:   Recent Labs   Lab 10/24/22  2055 10/25/22  0817    138   K 4.8 4.4    107   CO2 27 28   * 72   BUN 47* 42*   CREATININE 1.8* 1.4   CALCIUM 8.9 8.0*   PROT 7.3 6.2   ALBUMIN 3.0* 2.5*   BILITOT 0.5 0.7   ALKPHOS 74 67   AST 27 27   ALT 33 33   ANIONGAP 9 3*     Cardiac Markers:   Recent Labs   Lab 10/24/22  2055   *       Significant Imaging: I have reviewed all pertinent imaging results/findings within the past 24 hours.      Assessment/Plan:      Active Hospital Problems    Diagnosis    *Sepsis    Suspected Pneumonia    Ischemic vascular dementia    Tobacco dependence in remission    Paroxysmal atrial fibrillation    CKD (chronic kidney disease), stage III    Carcinoma of lung    COPD (chronic obstructive pulmonary disease)    Type 2 diabetes mellitus without complication    Chronic prostatitis       Plan   continue antibiotics for now and deescalate tomorrow   Follow cultures   Pt is very dehydrated and severe wt loss reported . Continue IVF   repeat CXR tomorrow   lactic acid back to normal . procal is high   - SSI  D/w family possible persuade of hospice and they are not ready   Pt oncologist is dr duke and follow up  for weight loss ++  If culture neg tomorrow , possible DC tomorrow . NOE improved      VTE Risk Mitigation (From admission, onward)           Ordered     apixaban tablet 2.5 mg  2 times daily         10/25/22 0539     IP VTE HIGH RISK PATIENT  Once         10/25/22 0520     Place sequential compression device  Until discontinued         10/25/22 0520                    Discharge Planning   SAY: 10/26/2022     Code Status: Full Code   Is the patient medically ready for discharge?:      Reason for patient still in hospital (select all that apply): Treatment  Discharge Plan A: Home Health, Home with family                  Isaiah Joseph MD  Department of Hospital Medicine   [facility]

## 2022-10-25 NOTE — ED NOTES
ATTEMPTED TO STRAIGHT CATH PT. RESISTANCE MET, PT NOT TOLERATING WELL. MD NOTIFIED AND URO JET ORDERED. BLADDER SCAN SHOWS 225 OF URINE NOTED AND PT DENIES THE URGE TO URINATE.

## 2022-10-25 NOTE — PROGRESS NOTES
Pharmacist Renal Dose Adjustment Note    Tyler Todd is a 82 y.o. male being treated with Apixaban.     Patient Data:    Vital Signs (Most Recent):  Temp: 98.2 °F (36.8 °C) (10/25/22 0324)  Pulse: 68 (10/25/22 0300)  Resp: 18 (10/24/22 1938)  BP: (!) 110/56 (10/25/22 0300)  SpO2: 97 % (10/25/22 0300)   Vital Signs (72h Range):  Temp:  [98.2 °F (36.8 °C)-98.4 °F (36.9 °C)]   Pulse:  [66-71]   Resp:  [18]   BP: ()/(48-58)   SpO2:  [97 %-99 %]      Recent Labs   Lab 10/24/22  2055   CREATININE 1.8*     Serum creatinine: 1.8 mg/dL (H) 10/24/22 2055  Estimated creatinine clearance: 28.6 mL/min (A)    Apixaban 5 mg twice a day will be changed to Apixaban 2.5 mg twice a day per pharmacy protocol.    Pharmacist's Name: Silva Ferris  Pharmacist's Extension: [phone]

## 2022-10-25 NOTE — CONSULTS
Right heel dark discoloration 2.5x2.2cm, left heel 2.5x3cm no open wounds, heels boggy, no complaints of pain or tenderness.  Elevated and heel protectors in place.                  Toenails are long and unkept.  Need podiatry consult upon discharge

## 2022-10-25 NOTE — PLAN OF CARE
Problem: Skin Injury Risk Increased  Goal: Skin Health and Integrity  Intervention: Promote and Optimize Oral Intake  Flowsheets (Taken 10/25/2022 1718)  Oral Nutrition Promotion:   calorie-dense foods provided   calorie-dense liquids provided   other (see comments)     Problem: Oral Intake Inadequate  Goal: Improved Oral Intake  Outcome: Ongoing, Progressing  Intervention: Promote and Optimize Oral Intake  Flowsheets (Taken 10/25/2022 1718)  Oral Nutrition Promotion:   calorie-dense foods provided   calorie-dense liquids provided   other (see comments)     Recommendations  1.) Continue textured modified diet per SLP.  2.) Will add Ensure Pudding with meals.   3.) Suggest addition of MVI.   4.) Daily weights     Goals:   1.) Pt to consume/tolerate >50% of meals and ONS.   2.) Weight to stablizie during admission.  Nutrition Goal Status: new

## 2022-10-25 NOTE — PROGRESS NOTES
VANCOMYCIN PHARMACOKINETIC NOTE:  Vancomycin Day # 1    Objective/Assessment:    Diagnosis/Indication for Vancomycin:Bacteremia and Sepsis      82 y.o., male; Actual Body Weight = 61.7 kg (136 lb).    The patient has the following labs:  10/25/2022 Estimated Creatinine Clearance: 28.6 mL/min (A) (based on SCr of 1.8 mg/dL (H)). Lab Results   Component Value Date    BUN 47 (H) 10/24/2022     Lab Results   Component Value Date    WBC 23.30 (H) 10/24/2022          Plan:  Adjust vancomycin dose and/or frequency based on the patient's actual weight and renal function:  Initiate Vancomycin 1000 mg IV x 1 dose with subsequent dose following random level.        Vancomycin random level has been ordered for 23:00 on 10/25     Pharmacy will manage vancomycin therapy, monitor serum vancomycin levels, monitor renal function and adjust regimen as necessary.    Thank you for allowing us to participate in this patient's care.     Silva Ferris 10/25/2022   Department of Pharmacy  Ext [phone]

## 2022-10-25 NOTE — SUBJECTIVE & OBJECTIVE
Interval History:     Review of Systems  As per subjective   Objective:     Vital Signs (Most Recent):  Temp: 98.5 °F (36.9 °C) (10/25/22 1556)  Pulse: 70 (10/25/22 1556)  Resp: 16 (10/25/22 1556)  BP: (!) 104/51 (10/25/22 1556)  SpO2: 97 % (10/25/22 1556)   Vital Signs (24h Range):  Temp:  [97.7 °F (36.5 °C)-98.5 °F (36.9 °C)] 98.5 °F (36.9 °C)  Pulse:  [63-71] 70  Resp:  [16-18] 16  SpO2:  [97 %-100 %] 97 %  BP: ()/(48-84) 104/51     Weight: 67.6 kg (149 lb 0.5 oz)  Body mass index is 21.38 kg/m².    Intake/Output Summary (Last 24 hours) at 10/25/2022 1645  Last data filed at 10/25/2022 1319  Gross per 24 hour   Intake 2298 ml   Output 775 ml   Net 1523 ml      Physical Exam  Constitutional:       General: He is not in acute distress.     Appearance: He is well-developed.   HENT:      Head: Normocephalic.   Eyes:      Pupils: Pupils are equal, round, and reactive to light.   Cardiovascular:      Rate and Rhythm: Normal rate and regular rhythm.   Pulmonary:      Effort: Pulmonary effort is normal. No respiratory distress.   Abdominal:      General: Abdomen is flat. There is no distension.      Tenderness: There is no abdominal tenderness.   Musculoskeletal:         General: Normal range of motion.      Cervical back: Neck supple.   Skin:     Findings: No rash.   Neurological:      Mental Status: He is alert. He is disoriented.   Psychiatric:         Mood and Affect: Mood normal.       Significant Labs: All pertinent labs within the past 24 hours have been reviewed.  CBC:   Recent Labs   Lab 10/24/22  2055 10/25/22  0817   WBC 23.30* 16.63*   HGB 9.8* 8.4*   HCT 29.7* 25.8*    159     CMP:   Recent Labs   Lab 10/24/22  2055 10/25/22  0817    138   K 4.8 4.4    107   CO2 27 28   * 72   BUN 47* 42*   CREATININE 1.8* 1.4   CALCIUM 8.9 8.0*   PROT 7.3 6.2   ALBUMIN 3.0* 2.5*   BILITOT 0.5 0.7   ALKPHOS 74 67   AST 27 27   ALT 33 33   ANIONGAP 9 3*     Cardiac Markers:   Recent Labs   Lab  10/24/22  2055   *       Significant Imaging: I have reviewed all pertinent imaging results/findings within the past 24 hours.

## 2022-10-25 NOTE — PT/OT/SLP EVAL
Occupational Therapy   Evaluation    Name: Tyler Todd  MRN: [id number]  Admitting Diagnosis:  Sepsis  Recent Surgery: * No surgery found *      Recommendations:     Discharge Recommendations: home health OT  Discharge Equipment Recommendations:  none  Barriers to discharge:  None    Assessment:     Tyler Todd is a 82 y.o. male with a medical diagnosis of Sepsis.  Pt agreeable to OT evaluation this AM. Performance deficits affecting function: weakness, impaired endurance, impaired self care skills, impaired functional mobility, gait instability, impaired balance, decreased coordination, decreased upper extremity function, decreased lower extremity function, decreased safety awareness.      Rehab Prognosis: Fair; patient would benefit from acute skilled OT services to address these deficits and reach maximum level of function.       Plan:     Patient to be seen 5 x/week to address the above listed problems via self-care/home management, therapeutic activities, therapeutic exercises  Plan of Care Expires: 11/25/22  Plan of Care Reviewed with: patient    Subjective     Chief Complaint: fatigue  Patient/Family Comments/goals: to return home    Occupational Profile:  Living Environment: pt lives with spouse in a 1 story home with a ramp to enter. Pt has a walk-in shower and standard height toilets   Previous level of function: Assist needed with dressing and bathing; Mod I with remaining ADLs and mobility; wife cooks, cleans, and provides transportation  Roles and Routines:   Equipment Used at Home:  walker, rolling, wheelchair ([facility] in his walk-in shower)  Assistance upon Discharge: yes, from wife    Pain/Comfort:  Pain Rating 1: 0/10    Patients cultural, spiritual, Buddhism conflicts given the current situation:      Objective:     Communicated with: nursing prior to session.  Patient found HOB elevated with bed alarm, telemetry, peripheral IV upon OT entry  to room.    General Precautions: Standard, fall   Orthopedic Precautions:N/A   Braces: N/A  Respiratory Status: Room air    Occupational Performance:    Bed Mobility:    Patient completed Supine to Sit with minimum assistance  Patient completed Sit to Supine with contact guard assistance    Functional Mobility/Transfers:  Patient completed Sit <> Stand Transfer with minimum assistance  with  rolling walker   Functional Mobility: pt took a few side steps towards HOB with minimum assistance with RW     Activities of Daily Living:  Grooming: stand by assistance seated EOB   Toileting: mily      Cognitive/Visual Perceptual:  Cognitive/Psychosocial Skills:     -       Oriented to: Person, Place, Time, and Situation   -       Follows Commands/attention:Follows one-step commands  -       Communication: clear/fluent  -       Memory: No Deficits noted  -       Safety awareness/insight to disability: impaired   -       Mood/Affect/Coping skills/emotional control: Appropriate to situation, Cooperative, and Pleasant    Physical Exam:  Balance:    -       SBA seated balance; Min A standing balance  Upper Extremity Range of Motion:     -       Right Upper Extremity: WFL  -       Left Upper Extremity: WFL  Upper Extremity Strength:    -       Right Upper Extremity: WFL  -       Left Upper Extremity: WFL   Strength:    -       Right Upper Extremity: fair   -       Left Upper Extremity: fair   Fine/Gross Motor Coordination: slightly impaired due to bilateral UE tremors.    AMPAC 6 Click ADL:  AMPAC Total Score: 16    Treatment & Education:  Pt educated on role of OT/POC, importance of OOB/EOB activity, use of call bell, and safety during ADLs, transfers, and functional mobility.    Patient left HOB elevated with all lines intact, call button in reach, and bed alarm on    GOALS:   Multidisciplinary Problems       Occupational Therapy Goals          Problem: Occupational Therapy    Goal Priority Disciplines Outcome  Interventions   Occupational Therapy Goal     OT, PT/OT     Description: Goals to be met by: 11/25/22     Patient will increase functional independence with ADLs by performing:    UE Dressing with Supervision.  LE Dressing with Minimal Assistance and Assistive Devices as needed.  Grooming while seated with Supervision.  Toileting from bedside commode with Supervision for hygiene and clothing management.   Toilet transfer to bedside commode with Supervision.                         History:     Past Medical History:   Diagnosis Date    Back pain     Cancer 2012    lung cancer chemo and radiation    COPD (chronic obstructive pulmonary disease)     Coronary artery disease 2002, 2004    s/p 3 stents;  Dr. Interiano    Diabetes mellitus, type 2     Digestive disorder     Hiatal hernia     History of lung cancer 7/6/2021    Hyperlipidemia     Hypertension     Lung cancer     Neck pain     Prostate CA          Past Surgical History:   Procedure Laterality Date    ANKLE SURGERY Right 1970s    COLONOSCOPY N/A 9/5/2022    Procedure: COLONOSCOPY;  Surgeon: Gonzales Colbert MD;  Location: [facility];  Service: Endoscopy;  Laterality: N/A;    COLONOSCOPY Left 9/5/2022    Procedure: COLONOSCOPY;  Surgeon: Gonzales Colbert MD;  Location: [facility];  Service: Endoscopy;  Laterality: Left;    CORONARY ANGIOPLASTY WITH STENT PLACEMENT      CYSTOSCOPY W/ RETROGRADES Bilateral 2/24/2020    Procedure: CYSTOSCOPY, WITH RETROGRADE PYELOGRAM;  Surgeon: Nuha Soto MD;  Location: [facility];  Service: Urology;  Laterality: Bilateral;    ENDOBRONCHIAL ULTRASOUND N/A 6/2/2021    Procedure: ENDOBRONCHIAL ULTRASOUND (EBUS);  Surgeon: Rob Arrington MD;  Location: [facility];  Service: Pulmonary;  Laterality: N/A;    EPIDURAL STEROID INJECTION INTO CERVICAL SPINE N/A 2/19/2019    Procedure: Injection-steroid-epidural-cervical C7-T1;  Surgeon: Marcelino Monroe MD;  Location: [facility] OR;  Service: Pain Management;  Laterality: N/A;     ESOPHAGOGASTRODUODENOSCOPY Left 9/5/2022    Procedure: EGD (ESOPHAGOGASTRODUODENOSCOPY);  Surgeon: Gonzales Colbert MD;  Location: [facility];  Service: Endoscopy;  Laterality: Left;    INJECTION OF ANESTHETIC AGENT AROUND MEDIAL BRANCH NERVES INNERVATING LUMBAR FACET JOINT Bilateral 6/4/2018    Procedure: MEDIAL BRANCH, LUMBAR L3,4,5;  Surgeon: Marcelino Monroe MD;  Location: [facility] OR;  Service: Pain Management;  Laterality: Bilateral;  L3, 4, 5    MEDIASTINOSCOPY      RADIOFREQUENCY ABLATION OF LUMBAR MEDIAL BRANCH NERVE AT SINGLE LEVEL Bilateral 7/16/2018    Procedure: RADIOFREQUENCY ABLATION, NERVE, MEDIAL BRANCH, LUMBAR, 1 LEVEL;  Surgeon: Marcelino Monroe MD;  Location: [facility] OR;  Service: Pain Management;  Laterality: Bilateral;  L3, 4, 5  lumbar  [employer] pain management generator  SN [id number]  80 degrees for 75 seconds x2    TRANSRECTAL ULTRASOUND OF PROSTATE WITH INSERTION OF GOLD FIDUCIAL MARKER N/A 2/24/2020    Procedure: ULTRASOUND, PROSTATE, RECTAL APPROACH, WITH GOLD FIDUCIAL MARKER INSERTION;  Surgeon: Nuha Soto MD;  Location: [facility];  Service: Urology;  Laterality: N/A;       Time Tracking:     OT Date of Treatment: 10/25/22  OT Start Time: 0855  OT Stop Time: 0909  OT Total Time (min): 14 min    Billable Minutes:Evaluation 6  Self Care/Home Management 8    10/25/2022

## 2022-10-25 NOTE — ED PROVIDER NOTES
Encounter Date: 10/24/2022       History     Chief Complaint   Patient presents with    Weakness    Fever     Pt reports to ED with caregiver for fever and weakness starting today.       Patient is an 82 year old man with a hx of stroke, CAD, lung cancer, prostate cancer, diabetes, BIB his wife and nursing caretaker for evaluation of generalized weakness, decreased PO intake, and fever as high as 101, for the last 24 hours. Fever broke with acetaminophen and then returned, broke again with ibuprofen. Note that patient requires 24/7 care-taking 2/2 dementia and residual deficits from multiple stroke (at baseline, right facial droop). Patient is able to answer simple questions. He states that he is not experiencing headache, nausea/vomiting, chest pain, abdominal pain, shortness of breath. Does state that he's experiencing cough. No reported rashes/wounds/decubitus ulcers per family. Patient is ambulatory at baseline.     Review of patient's allergies indicates:   Allergen Reactions    Doxycycline Diarrhea     Severe diarrhea    Sulfa (sulfonamide antibiotics) Rash     Other reaction(s): Itching    Sulfasalazine Rash     Rash and itching mild     Past Medical History:   Diagnosis Date    Back pain     Cancer 2012    lung cancer chemo and radiation    COPD (chronic obstructive pulmonary disease)     Coronary artery disease 2002, 2004    s/p 3 stents;  Dr. Interiano    Diabetes mellitus, type 2     Digestive disorder     Hiatal hernia     History of lung cancer 7/6/2021    Hyperlipidemia     Hypertension     Lung cancer     Neck pain     Prostate CA      Past Surgical History:   Procedure Laterality Date    ANKLE SURGERY Right 1970s    COLONOSCOPY N/A 9/5/2022    Procedure: COLONOSCOPY;  Surgeon: Gonzales Colbert MD;  Location: [facility];  Service: Endoscopy;  Laterality: N/A;    COLONOSCOPY Left 9/5/2022    Procedure: COLONOSCOPY;  Surgeon: Gonzales Colbert MD;  Location: [facility];  Service: Endoscopy;   Laterality: Left;    CORONARY ANGIOPLASTY WITH STENT PLACEMENT      CYSTOSCOPY W/ RETROGRADES Bilateral 2/24/2020    Procedure: CYSTOSCOPY, WITH RETROGRADE PYELOGRAM;  Surgeon: Nuha Soto MD;  Location: [facility] OR;  Service: Urology;  Laterality: Bilateral;    ENDOBRONCHIAL ULTRASOUND N/A 6/2/2021    Procedure: ENDOBRONCHIAL ULTRASOUND (EBUS);  Surgeon: Rob Arrington MD;  Location: [facility] ENDO;  Service: Pulmonary;  Laterality: N/A;    EPIDURAL STEROID INJECTION INTO CERVICAL SPINE N/A 2/19/2019    Procedure: Injection-steroid-epidural-cervical C7-T1;  Surgeon: Marcelino Monroe MD;  Location: [facility];  Service: Pain Management;  Laterality: N/A;    ESOPHAGOGASTRODUODENOSCOPY Left 9/5/2022    Procedure: EGD (ESOPHAGOGASTRODUODENOSCOPY);  Surgeon: Gonzales Colbert MD;  Location: [facility];  Service: Endoscopy;  Laterality: Left;    INJECTION OF ANESTHETIC AGENT AROUND MEDIAL BRANCH NERVES INNERVATING LUMBAR FACET JOINT Bilateral 6/4/2018    Procedure: MEDIAL BRANCH, LUMBAR L3,4,5;  Surgeon: Marcelino Monroe MD;  Location: [facility];  Service: Pain Management;  Laterality: Bilateral;  L3, 4, 5    MEDIASTINOSCOPY      RADIOFREQUENCY ABLATION OF LUMBAR MEDIAL BRANCH NERVE AT SINGLE LEVEL Bilateral 7/16/2018    Procedure: RADIOFREQUENCY ABLATION, NERVE, MEDIAL BRANCH, LUMBAR, 1 LEVEL;  Surgeon: Marcelino Monroe MD;  Location: [facility];  Service: Pain Management;  Laterality: Bilateral;  L3, 4, 5  lumbar  [employer] pain management generator  SN [id number]  80 degrees for 75 seconds x2    TRANSRECTAL ULTRASOUND OF PROSTATE WITH INSERTION OF GOLD FIDUCIAL MARKER N/A 2/24/2020    Procedure: ULTRASOUND, PROSTATE, RECTAL APPROACH, WITH GOLD FIDUCIAL MARKER INSERTION;  Surgeon: Nuha Soto MD;  Location: [facility] OR;  Service: Urology;  Laterality: N/A;     Family History   Problem Relation Age of Onset    Diabetes Mother     Peripheral vascular disease Mother     Heart attack Mother     Diabetes Father     Heart attack Father      Emphysema Father     COPD Father     Diabetes Sister      Social History     Tobacco Use    Smoking status: Every Day     Packs/day: 1.00     Years: 57.00     Pack years: 57.00     Types: Cigarettes    Smokeless tobacco: Never   Substance Use Topics    Alcohol use: No    Drug use: No     Review of Systems   Constitutional:  Positive for activity change, appetite change, fatigue and fever.        Generalized weakness   HENT:  Negative for sore throat.    Eyes:  Negative for visual disturbance.   Respiratory:  Positive for cough. Negative for shortness of breath and wheezing.    Cardiovascular:  Negative for chest pain, palpitations and leg swelling.   Gastrointestinal:  Negative for diarrhea, nausea and vomiting.   Genitourinary:  Negative for difficulty urinating and dysuria.   Musculoskeletal:  Negative for back pain and gait problem.   Skin:  Negative for rash.   Neurological:  Positive for facial asymmetry (baseline). Negative for weakness.   Hematological:  Does not bruise/bleed easily.     Physical Exam     Initial Vitals [10/24/22 1938]   BP Pulse Resp Temp SpO2   (!) 99/57 67 18 98.4 °F (36.9 °C) 98 %      MAP       --         Physical Exam    Nursing note and vitals reviewed.  Constitutional: He appears well-developed and well-nourished. He is not diaphoretic. No distress.   HENT:   Head: Normocephalic and atraumatic.   Eyes: Conjunctivae and EOM are normal. Pupils are equal, round, and reactive to light.   Neck: Neck supple. No JVD present.   Normal range of motion.  Cardiovascular:  Normal rate.           Pulmonary/Chest: Breath sounds normal. No respiratory distress. He has no wheezes. He has no rhonchi. He has no rales.   Abdominal: Abdomen is soft. He exhibits no distension. There is no abdominal tenderness. There is no rebound and no guarding.   Musculoskeletal:         General: No tenderness or edema. Normal range of motion.      Cervical back: Normal range of motion and neck supple.     Neurological:  He is alert and oriented to person, place, and time. GCS score is 15. GCS eye subscore is 4. GCS verbal subscore is 5. GCS motor subscore is 6.   Baseline right facial droop  No dysarthria  No BUE or BLE weakness  No pronator drift     Skin: Skin is warm and dry. No rash noted.       ED Course   Procedures  Labs Reviewed   CBC W/ AUTO DIFFERENTIAL - Abnormal; Notable for the following components:       Result Value    WBC 23.30 (*)     RBC 3.07 (*)     Hemoglobin 9.8 (*)     Hematocrit 29.7 (*)     MCH 31.9 (*)     RDW 16.9 (*)     Lymph % 4.0 (*)     All other components within normal limits   COMPREHENSIVE METABOLIC PANEL - Abnormal; Notable for the following components:    Glucose 175 (*)     BUN 47 (*)     Creatinine 1.8 (*)     Albumin 3.0 (*)     eGFR 37.6 (*)     All other components within normal limits   B-TYPE NATRIURETIC PEPTIDE - Abnormal; Notable for the following components:     (*)     All other components within normal limits   LACTIC ACID, PLASMA - Abnormal; Notable for the following components:    Lactate (Lactic Acid) 2.3 (*)     All other components within normal limits    Narrative:       Lactid acid critical result(s) called and verbal readback obtained   from Jesus Chanel RN ER  by MS1 10/24/2022 21:53   POCT GLUCOSE - Abnormal; Notable for the following components:    POC Glucose 163 (*)     All other components within normal limits   CULTURE, BLOOD   CULTURE, BLOOD   SARS-COV-2 RNA AMPLIFICATION, QUAL   TROPONIN I   INFLUENZA A AND B ANTIGEN    Narrative:     Specimen Source->Nasopharyngeal Swab   URINALYSIS, REFLEX TO URINE CULTURE   LACTIC ACID, PLASMA   POCT GLUCOSE MONITORING CONTINUOUS     EKG Readings: (Independently Interpreted)   Initial Reading: No STEMI. Previous EKG: Compared with most recent EKG Rhythm: Normal Sinus Rhythm. Ectopy: No Ectopy. Conduction: Normal. Axis: Normal.   NSR without DOMITILA, STD, STEMI equivalent. No T wave inversions. QTC prolonged, which is per  "patient's baseline. Last 2 EKG show Qtc 502 and 492, respectively.      Imaging Results              X-Ray Chest AP Portable (In process)                   X-Rays:   Independently Interpreted Readings:   Chest X-Ray: Normal heart size. Normal vessels. No focal consolidation. Mild cephalization.    Medications   vancomycin in dextrose 5 % 1 gram/250 mL IVPB 1,000 mg (1,000 mg Intravenous New Bag 10/25/22 0031)   lactated ringers bolus 1,000 mL (0 mLs Intravenous Stopped 10/24/22 2217)   lactated ringers bolus 1,000 mL (0 mLs Intravenous Stopped 10/25/22 0018)   cefepime in dextrose 5 % IVPB 2 g (0 g Intravenous Stopped 10/24/22 2349)   LIDOcaine HCl 2% urojet (10 mLs Mucous Membrane Given 10/25/22 0031)     Medical Decision Making:   History:   Old Medical Records: I decided to obtain old medical records.  Old Records Summarized: records from previous admission(s).  Initial Assessment:   This is an 82 year old man with a hx of multiple strokes, who requires 24/7 caretaking 2/2 dementia, BIB his wife and caretaker for generalized weakness, decreased PO intake, 1 day of fever, and cough.   Differential Diagnosis:   Sepsis, pneumonia, UTI, viral syndrome, CHF, NOE. Unlikely stroke given presence of fever and cough as primary complaints, new focal deficits on exam.   Independently Interpreted Test(s):   I have ordered and independently interpreted X-rays - see prior notes.  I have ordered and independently interpreted EKG Reading(s) - see prior notes  Clinical Tests:   Lab Tests: Ordered and Reviewed  Radiological Study: Ordered and Reviewed  Medical Tests: Ordered and Reviewed  Sepsis Perfusion Assessment: "I attest a sepsis perfusion exam was performed within 6 hours of sepsis, severe sepsis, or septic shock presentation, following fluid resuscitation."  ED Management:  Labs significant for NOE (baseline creatinine 1.5, today it is 1.8). Leukocytosis with bands, lactic acidosis. COVID and flu negative. No pneumonia on " CXR. UA pending (patient dehydrated and with minimal urine output presently). Fluid resuscitated, started broad spectrum antibiotics with vanc and cefepime. Medicine consulted for admission.            ED Course as of 10/25/22 0112   Mon Oct 24, 2022   2256 WBC(!): 23.30 [EL]   2257 Bands: 19.0 [EL]   2257 Lactate, Eusebio(!!): 2.3 [EL]      ED Course User Index  [EL] Court Monzon MD                 Clinical Impression:   Final diagnoses:  [R06.02] Shortness of breath  [A41.9] Sepsis, due to unspecified organism, unspecified whether acute organ dysfunction present (Primary)  [R50.9] Fever, unspecified fever cause  [N17.9] NOE (acute kidney injury)  [E86.0] Dehydration  [R53.1] Generalized weakness      ED Disposition Condition    Admit Stable                Court Monzon MD  Resident  10/25/22 0112

## 2022-10-25 NOTE — H&P
Pending sale to [facility]   History & Physical   Patient Name: Tyler Todd  MRN: [id number]  Admission Date: 10/24/2022  7:48 PM  Attending Physician: Virginia Medrano MD  Primary Care Provider: Court Mann MD  Face-to-Face encounter date: 10/25/2022    Patient information was obtained from patient, past medical records, ER physician, and ER records.     HISTORY OF PRESENT ILLNESS:     Tyler Todd is a 82 y.o. Black or  male   With PMH of vascular dementia,  Previous CVA with residual L weakness,  CAD, lung cancer, prostate cancer, DM2,  who presents with fever.    Onset 1 day ago  Occurring intermittently  Alleviated with tylenol and ibuprofen  Associated with productive cough  No Sob  No CP  No dizziness  No HA  No diaphoresis  No abdominal pain  No dysuria  No skin lesions    REVIEW OF SYSTEMS:     All systems reviewed and are negative except as noted per above.    PAST MEDICAL HISTORY:     Past Medical History:   Diagnosis Date    Back pain     Cancer 2012    lung cancer chemo and radiation    COPD (chronic obstructive pulmonary disease)     Coronary artery disease 2002, 2004    s/p 3 stents;  Dr. Interiano    Diabetes mellitus, type 2     Digestive disorder     Hiatal hernia     History of lung cancer 7/6/2021    Hyperlipidemia     Hypertension     Lung cancer     Neck pain     Prostate CA        PAST SURGICAL HISTORY:     Past Surgical History:   Procedure Laterality Date    ANKLE SURGERY Right 1970s    COLONOSCOPY N/A 9/5/2022    Procedure: COLONOSCOPY;  Surgeon: Gonzales Colbert MD;  Location: [facility];  Service: Endoscopy;  Laterality: N/A;    COLONOSCOPY Left 9/5/2022    Procedure: COLONOSCOPY;  Surgeon: Gonzales Colbert MD;  Location: [facility];  Service: Endoscopy;  Laterality: Left;    CORONARY ANGIOPLASTY WITH STENT PLACEMENT      CYSTOSCOPY W/ RETROGRADES Bilateral 2/24/2020    Procedure: CYSTOSCOPY, WITH RETROGRADE PYELOGRAM;  Surgeon: Nuha  MD Charles;  Location: [facility] OR;  Service: Urology;  Laterality: Bilateral;    ENDOBRONCHIAL ULTRASOUND N/A 6/2/2021    Procedure: ENDOBRONCHIAL ULTRASOUND (EBUS);  Surgeon: Rob Arrington MD;  Location: [facility];  Service: Pulmonary;  Laterality: N/A;    EPIDURAL STEROID INJECTION INTO CERVICAL SPINE N/A 2/19/2019    Procedure: Injection-steroid-epidural-cervical C7-T1;  Surgeon: Marcelino Monroe MD;  [facility];  Service: Pain Management;  Laterality: N/A;    ESOPHAGOGASTRODUODENOSCOPY Left 9/5/2022    Procedure: EGD (ESOPHAGOGASTRODUODENOSCOPY);  Surgeon: Gonzales Colbert MD;  Location: [facility];  Service: Endoscopy;  Laterality: Left;    INJECTION OF ANESTHETIC AGENT AROUND MEDIAL BRANCH NERVES INNERVATING LUMBAR FACET JOINT Bilateral 6/4/2018    Procedure: MEDIAL BRANCH, LUMBAR L3,4,5;  Surgeon: Marcelino Monroe MD;  [facility];  Service: Pain Management;  Laterality: Bilateral;  L3, 4, 5    MEDIASTINOSCOPY      RADIOFREQUENCY ABLATION OF LUMBAR MEDIAL BRANCH NERVE AT SINGLE LEVEL Bilateral 7/16/2018    Procedure: RADIOFREQUENCY ABLATION, NERVE, MEDIAL BRANCH, LUMBAR, 1 LEVEL;  Surgeon: Marcelino Monroe MD;  Location[facility] OR;  Service: Pain Management;  Laterality: Bilateral;  L3, 4, 5  lumbar  [employer] pain management generator  SN [id number]  80 degrees for 75 seconds x2    TRANSRECTAL ULTRASOUND OF PROSTATE WITH INSERTION OF GOLD FIDUCIAL MARKER N/A 2/24/2020    Procedure: ULTRASOUND, PROSTATE, RECTAL APPROACH, WITH GOLD FIDUCIAL MARKER INSERTION;  Surgeon: Nuha Soto MD;  Location: [facility] OR;  Service: Urology;  Laterality: N/A;       ALLERGIES:   Doxycycline, Sulfa (sulfonamide antibiotics), and Sulfasalazine    FAMILY HISTORY:     Family History   Problem Relation Age of Onset    Diabetes Mother     Peripheral vascular disease Mother     Heart attack Mother     Diabetes Father     Heart attack Father     Emphysema Father     COPD Father     Diabetes Sister        SOCIAL HISTORY:     Social  History     Tobacco Use    Smoking status: Every Day     Packs/day: 1.00     Years: 57.00     Pack years: 57.00     Types: Cigarettes    Smokeless tobacco: Never   Substance Use Topics    Alcohol use: No        Social History     Substance and Sexual Activity   Sexual Activity Never        HOME MEDICATIONS:     Prior to Admission medications    Medication Sig Start Date End Date Taking? Authorizing Provider   acetaminophen (TYLENOL) 500 MG tablet Take 1,000 mg by mouth every 6 (six) hours as needed for Pain.    Historical Provider   alogliptin (NESINA) 12.5 mg Tab Take 1 tablet by mouth every evening.    Historical Provider   amoxicillin-clavulanate 875-125mg (AUGMENTIN) 875-125 mg per tablet Take 1 tablet by mouth 2 (two) times daily. 10/10/22   Court Mann MD   atorvastatin (LIPITOR) 20 MG tablet Take 20 mg by mouth once daily. 9/19/22   Historical Provider   atorvastatin (LIPITOR) 40 MG tablet Take 1 tablet (40 mg total) by mouth once daily. 6/14/22   Lg Sultana MD   benzonatate (TESSALON PERLES) 100 MG capsule Take 2 capsules (200 mg total) by mouth 3 (three) times daily as needed for Cough.  Patient not taking: Reported on 10/10/2022 10/3/22 11/2/22  Rosy Dykes MD   clopidogreL (PLAVIX) 75 mg tablet Take 1 tablet (75 mg total) by mouth once daily. 6/15/22   Lg Sultana MD   divalproex (DEPAKOTE) 500 MG TbEC Take 500 mg by mouth 2 (two) times daily. 8/22/22   Historical Provider   ELIQUIS 5 mg Tab Take 1 tablet (5 mg total) by mouth 2 (two) times daily. 6/14/22   Lg Sultana MD   FLUoxetine 40 MG capsule Take 40 mg by mouth 2 (two) times daily. 8/22/22   Historical Provider   irbesartan (AVAPRO) 300 MG tablet Take 0.5 tablets (150 mg total) by mouth once daily. 9/6/22   Isaiah Joseph MD   levETIRAcetam (KEPPRA) 500 MG Tab Take 500 mg by mouth 2 (two) times daily. 5/12/22   Historical Provider   meclizine (ANTIVERT) 25 MG tablet Take 25 mg by mouth 2 (two) times a day. 12/14/21    Historical Provider   metoprolol succinate (TOPROL-XL) 100 MG 24 hr tablet Take 1 tablet by mouth 2 (two) times a day. 7/19/22   Historical Provider   mirtazapine (REMERON) 30 MG tablet Take 30 mg by mouth every evening. 8/17/22   Historical Provider   mirtazapine (REMERON) 45 MG tablet Take 45 mg by mouth once daily. 9/16/22   Historical Provider   nitroGLYCERIN (NITROSTAT) 0.4 MG SL tablet Place 0.4 mg under the tongue every 5 (five) minutes as needed. As directed 11/14/11   Historical Provider   QUEtiapine (SEROQUEL) 100 MG Tab Take 100 mg by mouth every evening. 8/17/22   Historical Provider   walker Misc 1 application by Misc.(Non-Drug; Combo Route) route once daily at 6am. 9/6/22   Isaiah Joseph MD       PHYSICAL EXAM:     BP (!) 110/56   Pulse 68   Temp 98.2 °F (36.8 °C) (Oral)   Resp 18   Wt 61.7 kg (136 lb)   SpO2 97%   BMI 19.51 kg/m²     Gen: alert, responsive  HEENT:  Eyes - no pallor  External ears with no lesions  Nares patent  Mouth - lips chapped  CV: RRR, SBP 140s at bedside  Lungs: CTA B/L on my exam  Abd: +BS, soft, NT, ND  Ext: no atrophy or edema  Skin: warm, dry  Neuro: alert, responding appropriately, follows commands  Psych: pleasant     LABS AND IMAGING:     Labs Reviewed   CBC W/ AUTO DIFFERENTIAL - Abnormal; Notable for the following components:       Result Value    WBC 23.30 (*)     RBC 3.07 (*)     Hemoglobin 9.8 (*)     Hematocrit 29.7 (*)     MCH 31.9 (*)     RDW 16.9 (*)     Lymph % 4.0 (*)     All other components within normal limits   COMPREHENSIVE METABOLIC PANEL - Abnormal; Notable for the following components:    Glucose 175 (*)     BUN 47 (*)     Creatinine 1.8 (*)     Albumin 3.0 (*)     eGFR 37.6 (*)     All other components within normal limits   B-TYPE NATRIURETIC PEPTIDE - Abnormal; Notable for the following components:     (*)     All other components within normal limits   LACTIC ACID, PLASMA - Abnormal; Notable for the following components:    Lactate  (Lactic Acid) 2.3 (*)     All other components within normal limits    Narrative:       Lactid acid critical result(s) called and verbal readback obtained   from Jesus Chanel RN ER  by MS1 10/24/2022 21:53   URINALYSIS, REFLEX TO URINE CULTURE - Abnormal; Notable for the following components:    Protein, UA Trace (*)     Ketones, UA Trace (*)     All other components within normal limits    Narrative:     Specimen Source->Urine   LACTIC ACID, PLASMA - Abnormal; Notable for the following components:    Lactate (Lactic Acid) 2.5 (*)     All other components within normal limits    Narrative:       Lactid acid critical result(s) called and verbal readback obtained   from Paloma Santiago RN ER  by MS1 10/25/2022 01:48   POCT GLUCOSE - Abnormal; Notable for the following components:    POC Glucose 163 (*)     All other components within normal limits   CULTURE, BLOOD   CULTURE, BLOOD   CULTURE, RESPIRATORY   RESPIRATORY VIRAL PANEL PCR, PEDS UNDER 7 MTHS   MRSA SCREEN BY PCR   SARS-COV-2 RNA AMPLIFICATION, QUAL   TROPONIN I   INFLUENZA A AND B ANTIGEN    Narrative:     Specimen Source->Nasopharyngeal Swab   PROCALCITONIN   LACTIC ACID, PLASMA   POCT GLUCOSE MONITORING CONTINUOUS     Imaging Results              X-Ray Chest AP Portable (In process)                         ASSESSMENT & PLAN:   Tyler Todd is a 82 y.o. male admitted for    Active Hospital Problems    Diagnosis  POA    *Sepsis [A41.9]  Yes    Suspected Pneumonia [J18.9]  Yes    Ischemic vascular dementia [F01.50]  Yes    Tobacco dependence in remission [F17.201]  Yes    Paroxysmal atrial fibrillation [I48.0]  Yes    CKD (chronic kidney disease), stage III [N18.30]  Yes     Chronic    Carcinoma of lung [C34.90]  Yes     Chronic    COPD (chronic obstructive pulmonary disease) [J44.9]  Yes     Chronic    Type 2 diabetes mellitus without complication [E11.9]  Yes    Chronic prostatitis [N41.1]  Yes      Resolved Hospital Problems   No resolved problems to  display.        Plan    Sepsis with relative hypotension  Concern for CAP vs viral illness  - responding well to fluid hydration  - continue vanc, start zosyn  - infection workup in progress  - Bcx, RespCx, Resp viral panel  - negative flu and covid  - continuous pulse oximetery  - repeat CXR in AM after hydration   - trending lactic acid    DM2  - SSI    Chronic conditions as noted above/below; home medications reviewed personally by me and restarted as appropriate  Electrolyte derangement:  Trending BMP; Mg; replacement prn  DVT ppx: home grover Medrano MD  [facility] Hospitalist  10/25/2022

## 2022-10-25 NOTE — PLAN OF CARE
[facility]  Initial Discharge Assessment       Primary Care Provider: Court Mann MD    Admission Diagnosis: Sepsis, due to unspecified organism, unspecified whether acute organ dysfunction present [A41.9]    Admission Date: 10/24/2022    DC assessment completed with patient at bedside.  Information verified as correct on facesheet.   Denies HH/HD/Coumadin.  Patient uses wheelchair and walker at home.  DC plan is home with home health. Patient states he does not want to go to a facility.  Family to provide transport on discharge.         Payor: MEDICARE / Plan: MEDICARE PART A & B / Product Type: Government /     Extended Emergency Contact Information  Primary Emergency Contact: Batsheva Todd  Address: [address]           [place] [address] [address]  Mobile Phone: [phone]  Relation: Spouse  Preferred language: English   needed? No  Secondary Emergency Contact: TinajeroWinsomeKailey  Mobile Phone: [phone]  Relation: Sister   needed? No    Discharge Plan A: Home Health, Home with family  Discharge Plan B: Home Health      [facility] [place], [place] - [address]  [address] [place]  [place] [address]  Phone: [phone] Fax: [phone]      Initial Assessment (most recent)       Adult Discharge Assessment - 10/25/22 1149          Discharge Assessment    Assessment Type Discharge Planning Assessment     Confirmed/corrected address, phone number and insurance Yes     Confirmed Demographics Correct on Facesheet     Source of Information patient     Does patient/caregiver understand observation status Yes     Communicated SAY with patient/caregiver Yes     Reason For Admission sepsis     Lives With spouse     Facility Arrived From: home     Do you expect to return to your current living situation? Yes     Do you have help at home or someone to help you manage your care at home? Yes     Who are your caregiver(s) and their phone  number(s)? spouse     Prior to hospitilization cognitive status: Alert/Oriented     Current cognitive status: Alert/Oriented     Walking or Climbing Stairs Difficulty ambulation difficulty, requires equipment     Equipment Currently Used at Home wheelchair;walker, rolling     Readmission within 30 days? No     Patient currently being followed by outpatient case management? No     Do you currently have service(s) that help you manage your care at home? No     Do you have prescription coverage? Yes     Coverage medicare     Who is going to help you get home at discharge? spouse     How do you get to doctors appointments? family or friend will provide     Are you on dialysis? No     Do you take coumadin? No     Discharge Plan A Home Health;Home with family     Discharge Plan B Home Health     Discharge Plan discussed with: Patient

## 2022-10-25 NOTE — PROGRESS NOTES
"[facility]  Adult Nutrition   Progress Note (Initial Assessment)     SUMMARY     Recommendations  1.) Continue textured modified diet per SLP.  2.) Will add Ensure Pudding with meals.   3.) Suggest addition of MVI.   4.) Daily weights    Goals:   1.) Pt to consume/tolerate >50% of meals and ONS.   2.) Weight to stablizie during admission.  Nutrition Goal Status: new    Dietitian Rounds Brief  Pt admitted to [facility] wth fever. Found to have sepsis at this time. SLP swallow evaluation today: Soft & Bite Sized Diet - IDDSI Level 6, Nectar Thick. Will add thickened packets with meals. Noted wound documented, no open wounds noted. Pt sleeping but easily wakes to name. He reports not consuming any dinner or breakfast yesterday d/t not being served anything. He reports feeling ok and states he has a good appetite. He denies GI distress. LBM 10/25. NFPE completed with moderate temporal and clavicle wasting, otherwise appears normal. ubw 78kg (12/2021); CBW 67.6kg relfecting 14% wt loss <1 year, not significant. At this time, no malnutrition identified.    Diet order:   Current Diet Order: Soft & Bite Sized Diet - IDDSI Level 6, [place] Thick          Evaluation of Received Nutrient/Fluid Intake  Energy Calories Required: not meeting needs  Protein Required: not meeting needs  Fluid Required: not meeting needs  Tolerance: other (see comments) (TBD)     % Intake of Estimated Energy Needs: 25 - 50 %  % Meal Intake: 25 - 50 %      Intake/Output Summary (Last 24 hours) at 10/25/2022 1716  Last data filed at 10/25/2022 1644  Gross per 24 hour   Intake 3123 ml   Output 775 ml   Net 2348 ml        Anthropometrics  Temp: 98.5 °F (36.9 °C)  Height Method: Stated  Height: 5' 10" (177.8 cm)  Height (inches): 70 in  Weight Method: Bed Scale  Weight: 67.6 kg (149 lb 0.5 oz)  Weight (lb): 149.03 lb  Ideal Body Weight (IBW), Male: 166 lb  % Ideal Body Weight, Male (lb): 89.78 %  BMI (Calculated): 21.4  BMI Grade: 18.5-24.9 - " normal       Estimated/Assessed Needs  Weight Used For Calorie Calculations: 67.6 kg (149 lb 0.5 oz)  Energy Calorie Requirements (kcal): 2129-1125 (25-30)  Energy Need Method: Kcal/kg  Protein Requirements: 54-68 (0.8-1.0)  Weight Used For Protein Calculations: 67.6 kg (149 lb 0.5 oz)  Fluid Requirements (mL): 0856-6952  Estimated Fluid Requirement Method: RDA Method  RDA Method (mL): 1690       Reason for Assessment  Reason For Assessment: identified at risk by screening criteria (MST 4; wound)  Diagnosis: infection/sepsis  Relevant Medical History: COPD, HTN, HLD, DM2, CAD, prostate & lung cancer, hiatal hernia, dementia  Nutrition Discharge Planning: Soft & Bite Sized Diet - IDDSI Level 6, [place] Thick.    Nutrition/Diet History  Spiritual, Cultural Beliefs, Mormonism Practices, Values that Affect Care: no  Food Allergies: NKFA  Factors Affecting Nutritional Intake: difficulty/impaired swallowing    Nutrition Risk Screen  Nutrition Risk Screen: dysphagia or difficulty swallowing     MST Score: 4  Have you recently lost weight without trying?: Yes: 34 lbs or more  Weight loss score: 4  Have you been eating poorly because of a decreased appetite?: No  Appetite score: 0       Weight History:  Wt Readings from Last 5 Encounters:   10/25/22 67.6 kg (149 lb 0.5 oz)   10/10/22 61.7 kg (136 lb)   10/03/22 65.8 kg (145 lb)   09/03/22 65 kg (143 lb 4.8 oz)   06/15/22 66.5 kg (146 lb 9.7 oz)        Lab/Procedures/Meds: Pertinent Labs/Meds Reviewed    Medications:Pertinent Medications Reviewed  Scheduled Meds:   apixaban  2.5 mg Oral BID    atorvastatin  20 mg Oral Daily    benzonatate  200 mg Oral TID    clopidogreL  75 mg Oral Daily    divalproex  500 mg Oral BID    dronabinoL  2.5 mg Oral Daily    FLUoxetine  40 mg Oral BID    levETIRAcetam  500 mg Oral BID    megestroL  40 mg Oral Daily    mirtazapine  30 mg Oral QHS    piperacillin-tazobactam (ZOSYN) IVPB  3.375 g Intravenous Q8H    QUEtiapine  100 mg Oral QHS      Continuous Infusions:   sodium chloride 0.9% 75 mL/hr at 10/25/22 0714     PRN Meds:.acetaminophen, albuterol-ipratropium, dextrose 10%, dextrose 10%, glucagon (human recombinant), glucose, glucose, HYDROcodone-acetaminophen, insulin aspart U-100, melatonin, morphine, naloxone, ondansetron, polyethylene glycol, senna-docusate 8.6-50 mg, simethicone, sodium chloride 0.9%, Pharmacy to dose Vancomycin consult **AND** vancomycin - pharmacy to dose    Labs: Pertinent Labs Reviewed  Clinical Chemistry:  Recent Labs   Lab 10/25/22  0817      K 4.4      CO2 28   GLU 72   BUN 42*   CREATININE 1.4   CALCIUM 8.0*   PROT 6.2   ALBUMIN 2.5*   BILITOT 0.7   ALKPHOS 67   AST 27   ALT 33   ANIONGAP 3*     CBC:   Recent Labs   Lab 10/25/22  0817   WBC 16.63*   RBC 2.64*   HGB 8.4*   HCT 25.8*      MCV 98   MCH 31.8*   MCHC 32.6     Cardiac Profile:  Recent Labs   Lab 10/24/22  2055   *   TROPONINI <0.030     Diabetes:  Recent Labs   Lab 10/25/22  0631   HGBA1C 6.3*       Monitor and Evaluation  Food and Nutrient Intake: energy intake, food and beverage intake  Food and Nutrient Adminstration: diet order  Knowledge/Beliefs/Attitudes: food and nutrition knowledge/skill  Physical Activity and Function: nutrition-related ADLs and IADLs  Anthropometric Measurements: weight, weight change  Biochemical Data, Medical Tests and Procedures: electrolyte and renal panel, gastrointestinal profile, glucose/endocrine profile  Nutrition-Focused Physical Findings: overall appearance     Nutrition Risk  Level of Risk/Frequency of Follow-up: moderate     Nutrition Follow-Up  RD Follow-up?: Yes      Khadijah Davila RD 10/25/2022 5:16 PM

## 2022-10-25 NOTE — PT/OT/SLP EVAL
Physical Therapy Evaluation    Patient Name:  Tyler Todd   MRN:  [id number]    Recommendations:     Discharge Recommendations:  home health PT   Discharge Equipment Recommendations: none   Barriers to discharge:  increase assist with mobility    Assessment:     Tyler Todd is a 82 y.o. male admitted with a medical diagnosis of Sepsis.  He presents with the following impairments/functional limitations:  weakness, impaired endurance, impaired self care skills, impaired functional mobility, gait instability, impaired balance, decreased safety awareness.    Pt found supine in bed. Pt performed bed mobility with CGA, and STS transfer with Madhu & RW. The patient then ambulated 15ft in the room with RW & CGA. Pt reports that he believes he is currently operating at his baseline functional status. However, pt will benefit from acute care services to address his weakness and impaired functional mobility.     Rehab Prognosis: Fair; patient would benefit from acute skilled PT services to address these deficits and reach maximum level of function.    Recent Surgery: * No surgery found *      Plan:     During this hospitalization, patient to be seen 5 x/week to address the identified rehab impairments via gait training, therapeutic activities, therapeutic exercises, neuromuscular re-education and progress toward the following goals:    Plan of Care Expires:  11/25/22    Subjective     Chief Complaint: none stated  Patient/Family Comments/goals: none stated  Pain/Comfort:  Pain Rating 1: 0/10    Patients cultural, spiritual, Mormon conflicts given the current situation: no    Living Environment:  Pt lives with his wife in a single story home with a ramp to enter. He currently uses a RW to ambulate short distances and a wheelchair for traveling long distances. (-)   Prior to admission, patients level of function was mod independent.  Equipment used at home: walker, rolling, wheelchair.  DME owned (not currently  used): none.  Upon discharge, patient will have assistance from wife.    Objective:     Communicated with RN prior to session.  Patient found HOB elevated with peripheral IV, telemetry  upon PT entry to room.    General Precautions: Standard, fall   Orthopedic Precautions:N/A   Braces: N/A  Respiratory Status: Room air    Exams:  RLE ROM: WFL  RLE Strength: 4-/5 flexion, 5/5 knee extension, 5/5 ankle dorsiflexion  LLE ROM: WFL  LLE Strength: 4-/5 flexion, 5/5 knee extension, 5/5 ankle dorsiflexion    Functional Mobility:  Bed Mobility:     Supine to Sit: contact guard assistance  Sit to Supine: contact guard assistance  Transfers:     Sit to Stand:  minimum assistance with rolling walker  Gait: 15ft with RW & CGA      AM-PAC 6 CLICK MOBILITY  Total Score:17       Treatment & Education:  Pt educated on POC, discharge recommendation, proper form for transfers, RW management, need for assist with mobility, use of call bell to seek assistance as needed and fall prevention.    Patient left HOB elevated with all lines intact, call button in reach, bed alarm on, and RN notified.    GOALS:   Multidisciplinary Problems       Physical Therapy Goals          Problem: Physical Therapy    Goal Priority Disciplines Outcome Goal Variances Interventions   Physical Therapy Goal     PT, PT/OT      Description: Goals to be met by: 2022     Patient will increase functional independence with mobility by performin. Supine to sit with Supervision  2. Sit to stand transfer with Supervision  3. Bed to chair transfer with Supervision using Rolling Walker  4. Gait  x 150 feet with Supervision using Rolling Walker.                          History:     Past Medical History:   Diagnosis Date    Back pain     Cancer     lung cancer chemo and radiation    COPD (chronic obstructive pulmonary disease)     Coronary artery disease ,     s/p 3 stents;  Dr. Interiano    Diabetes mellitus, type 2     Digestive disorder     Hiatal  hernia     History of lung cancer 7/6/2021    Hyperlipidemia     Hypertension     Lung cancer     Neck pain     Prostate CA        Past Surgical History:   Procedure Laterality Date    ANKLE SURGERY Right 1970s    COLONOSCOPY N/A 9/5/2022    Procedure: COLONOSCOPY;  Surgeon: Gonzales Colbert MD;  Location: [facility];  Service: Endoscopy;  Laterality: N/A;    COLONOSCOPY Left 9/5/2022    Procedure: COLONOSCOPY;  Surgeon: Gonzales Colbert MD;  Location: [facility];  Service: Endoscopy;  Laterality: Left;    CORONARY ANGIOPLASTY WITH STENT PLACEMENT      CYSTOSCOPY W/ RETROGRADES Bilateral 2/24/2020    Procedure: CYSTOSCOPY, WITH RETROGRADE PYELOGRAM;  Surgeon: Nuha Soto MD;  Location: [facility] OR;  Service: Urology;  Laterality: Bilateral;    ENDOBRONCHIAL ULTRASOUND N/A 6/2/2021    Procedure: ENDOBRONCHIAL ULTRASOUND (EBUS);  Surgeon: Rob Arrington MD;  Location: [facility];  Service: Pulmonary;  Laterality: N/A;    EPIDURAL STEROID INJECTION INTO CERVICAL SPINE N/A 2/19/2019    Procedure: Injection-steroid-epidural-cervical C7-T1;  Surgeon: Marcelino Monroe MD;  Location: [facility];  Service: Pain Management;  Laterality: N/A;    ESOPHAGOGASTRODUODENOSCOPY Left 9/5/2022    Procedure: EGD (ESOPHAGOGASTRODUODENOSCOPY);  Surgeon: Gonzales Colbert MD;  Location: [facility];  Service: Endoscopy;  Laterality: Left;    INJECTION OF ANESTHETIC AGENT AROUND MEDIAL BRANCH NERVES INNERVATING LUMBAR FACET JOINT Bilateral 6/4/2018    Procedure: MEDIAL BRANCH, LUMBAR L3,4,5;  Surgeon: Marcelino Monroe MD;  Location: [facility];  Service: Pain Management;  Laterality: Bilateral;  L3, 4, 5    MEDIASTINOSCOPY      RADIOFREQUENCY ABLATION OF LUMBAR MEDIAL BRANCH NERVE AT SINGLE LEVEL Bilateral 7/16/2018    Procedure: RADIOFREQUENCY ABLATION, NERVE, MEDIAL BRANCH, LUMBAR, 1 LEVEL;  Surgeon: Marcelino Monroe MD;  Location: [facility];  Service: Pain Management;  Laterality: Bilateral;  L3, 4, 5  lumbar  [employer] pain management  generator   [id number]  80 degrees for 75 seconds x2    TRANSRECTAL ULTRASOUND OF PROSTATE WITH INSERTION OF GOLD FIDUCIAL MARKER N/A 2/24/2020    Procedure: ULTRASOUND, PROSTATE, RECTAL APPROACH, WITH GOLD FIDUCIAL MARKER INSERTION;  Surgeon: Nuha Soto MD;  Location: [facility];  Service: Urology;  Laterality: N/A;       Time Tracking:     PT Received On: 10/25/22  PT Start Time: 0957     PT Stop Time: 1013  PT Total Time (min): 16 min     Billable Minutes: Evaluation 8 and Therapeutic Activity 8      10/25/2022

## 2022-10-25 NOTE — ED NOTES
PT CATHETER LEAKING AROUND URETHRA. BALLOON CHECKED AND PT BEDDING CHANGED. GOWN CHANGED AND PT COMFORTABLE IN BED. NAD NOTED. NO NEEDS VOICED

## 2022-10-25 NOTE — PLAN OF CARE
MADRIGAL explained to patient.  Patient verbalized understanding and signed MADRIGAL form.     10/25/22 1208   MADRIGAL Message   Medicare Outpatient and Observation Notification regarding financial responsibility Given to patient/caregiver;Explained to patient/caregiver;Signed/date by patient/caregiver   Date MADRIGAL was signed 10/25/22   Time MADRIGAL was signed 1001

## 2022-10-25 NOTE — PT/OT/SLP EVAL
Speech Language Pathology Evaluation  Bedside Swallow    Patient Name:  Tyler Todd   MRN:  [id number]  Admitting Diagnosis: Sepsis    Recommendations:                 General Recommendations:  Dysphagia therapy  Diet recommendations:  Soft & Bite Sized Diet - IDDSI Level 6, [place] Thick   Aspiration Precautions:  MINI sips of thin liquids only, Avoid mixed consistencies, Add appropriate levels of thickener to Nutritional shake supplements and V8 in order to ensure liquid is at a nectar-thick level, 1 bite/sip at a time, Assistance with meals and Assistance with thickening liquids, Double swallow with each bite/sip, Eliminate distractions, HOB to 90 degrees, Meds crushed in puree, Meds whole buried in puree, No straws, Remain upright 30 minutes post meal, and Strict aspiration precautions   General Precautions: Standard, aspiration, nectar thick  Communication strategies:  none    History:     Past Medical History:   Diagnosis Date    Back pain     Cancer 2012    lung cancer chemo and radiation    COPD (chronic obstructive pulmonary disease)     Coronary artery disease 2002, 2004    s/p 3 stents;  Dr. Interiano    Diabetes mellitus, type 2     Digestive disorder     Hiatal hernia     History of lung cancer 7/6/2021    Hyperlipidemia     Hypertension     Lung cancer     Neck pain     Prostate CA        Past Surgical History:   Procedure Laterality Date    ANKLE SURGERY Right 1970s    COLONOSCOPY N/A 9/5/2022    Procedure: COLONOSCOPY;  Surgeon: Gonzales Colbert MD;  Location: [facility];  Service: Endoscopy;  Laterality: N/A;    COLONOSCOPY Left 9/5/2022    Procedure: COLONOSCOPY;  Surgeon: Gonzales Colbert MD;  Location: [facility];  Service: Endoscopy;  Laterality: Left;    CORONARY ANGIOPLASTY WITH STENT PLACEMENT      CYSTOSCOPY W/ RETROGRADES Bilateral 2/24/2020    Procedure: CYSTOSCOPY, WITH RETROGRADE PYELOGRAM;  Surgeon: Nuha Soto MD;  Location: [facility];  Service: Urology;  Laterality: Bilateral;     ENDOBRONCHIAL ULTRASOUND N/A 6/2/2021    Procedure: ENDOBRONCHIAL ULTRASOUND (EBUS);  Surgeon: Rob Arrington MD;  Location: [facility];  Service: Pulmonary;  Laterality: N/A;    EPIDURAL STEROID INJECTION INTO CERVICAL SPINE N/A 2/19/2019    Procedure: Injection-steroid-epidural-cervical C7-T1;  Surgeon: Marcelino Monroe MD;  [facility]: [facility];  Service: Pain Management;  Laterality: N/A;    ESOPHAGOGASTRODUODENOSCOPY Left 9/5/2022    Procedure: EGD (ESOPHAGOGASTRODUODENOSCOPY);  Surgeon: Gonzales Colbert MD;  Location: [facility];  Service: Endoscopy;  Laterality: Left;    INJECTION OF ANESTHETIC AGENT AROUND MEDIAL BRANCH NERVES INNERVATING LUMBAR FACET JOINT Bilateral 6/4/2018    Procedure: MEDIAL BRANCH, LUMBAR L3,4,5;  Surgeon: Marcelino Monroe MD;  Location: [facility];  Service: Pain Management;  Laterality: Bilateral;  L3, 4, 5    MEDIASTINOSCOPY      RADIOFREQUENCY ABLATION OF LUMBAR MEDIAL BRANCH NERVE AT SINGLE LEVEL Bilateral 7/16/2018    Procedure: RADIOFREQUENCY ABLATION, NERVE, MEDIAL BRANCH, LUMBAR, 1 LEVEL;  Surgeon: Marcelino Monroe MD;  Location: [facility];  Service: Pain Management;  Laterality: Bilateral;  L3, 4, 5  lumbar  [employer] pain management generator  SN [id number]  80 degrees for 75 seconds x2    TRANSRECTAL ULTRASOUND OF PROSTATE WITH INSERTION OF GOLD FIDUCIAL MARKER N/A 2/24/2020    Procedure: ULTRASOUND, PROSTATE, RECTAL APPROACH, WITH GOLD FIDUCIAL MARKER INSERTION;  Surgeon: Nuha Soto MD;  Location: [facility];  Service: Urology;  Laterality: N/A;       Social History: Patient lives with spouse and receives assistance from home-sitter.    Prior Intubation HX:  N/A    Modified Barium Swallow:   Impressions  MBSS completed. Pt presents with mild-moderate oropharyngeal dysphagia. Single episode of aspiration noted with thin liquids via cup with reactive cough noted. Consistent BOT, valleculae and pyriform sinus stasis noted across all trials. Pt able to reduce residue with subsequent  swallow but unable to clear. Please see flowsheet above for further details. REC downgrading diet to mechanical soft textures (IDDSI 5) with NECTAR thick liquids. No fresh/canned fruits x bananas, no mixed consistencies. POC updated.       Chest X-Rays:   Clinical history is fever     There are persistent linear interstitial opacities within the right upper lobe compatible with post radiation changes. There are no new infiltrates or pleural effusions. The cardiomediastinal silhouette is normal in size. There are no acute osseous abnormalities.     IMPRESSION: Persistent linear interstitial opacity in the right upper lobe compatible with postradiation changes     No new infiltrates    Prior diet: Regular food and Nectar Thick liquids.  Pt.'s spouse reported pt frequently takes Ensure, and/or V8 shakes, but does not add thickener to shakes.    Occupation/hobbies/homemaking: N/A.    History and Physical:  Tyler Todd is a 82 y.o. Black or  male   With PMH of vascular dementia,  Previous CVA with residual L weakness,  CAD, lung cancer, prostate cancer, DM2,  who presents with fever.  Onset 1 day ago  Occurring intermittently  Alleviated with tylenol and ibuprofen  Associated with productive cough  No Sob  No CP  No dizziness  No HA  No diaphoresis  No abdominal pain  No dysuria  No skin lesions    Subjective     Pt seen at b/s.  Pt.'s spouse and home-sitter were both present and provided information on prior diet-level, dysphagia hx, and previously recommended swallowing precautions.  Pt.'s spouse also reported a medical hx that included seven CVA's.  Patient goals:  Unable to elicit.     Pain/Comfort:  Pain Rating 1: 0/10    Respiratory Status: Room air    Objective:     Oral Musculature Evaluation  Oral Musculature: general weakness  Dentition: present and adequate  Mucosal Quality: good  Mandibular Strength and Mobility: WFL  Oral Labial Strength and Mobility: WFL  Lingual Strength and Mobility:   (impaired coordination)  Velar Elevation: WFL  Buccal Strength and Mobility: WFL  Volitional Cough: Fair  Volitional Swallow: Fair  Voice Prior to PO Intake: WFL    Bedside Swallow Eval:   Consistencies Assessed:  Thin liquids :  Mini isolated sips and Small isolated sips  Nectar thick liquids :  Mini isolated sips and Small isolated sips  Puree    Mixed consistencies    Soft solids    Solids        Oral Phase:   WFL  Timing of oral mastication and oral clearance were WFL.  However, pt known to eat at an impulsive rate, and take large bites.  Also lingual coordination during oral-motor examination is limited.    Pharyngeal Phase:   Following thin liquid trials, inconsistent:  coughing/choking  throat clearing    Nectar thick liquid trials, inconsistent:  Wet / gurgly voice quality with regular-to-large sip intake    Nectar thick liquid trials, with cued MINI-small sip intake:  Consistently NO overt s/s of aspiration/penetration.    Food boluses:  NO overt s/s of aspiration/penetration    Compensatory Strategies  Note:  A double-swallow pattern (previously trained swallowing precautions) was used the majority of the time with both liquid and food boluses.  Only during times of distraction (e.g., watching TV) did pt not execute it.    Treatment: Initiated extensive training and education on aforementioned swallowing precautions to pt, pt.'s wife, pt.'s home sitter, and RN.    Assessment:     Tyler Todd is a 82 y.o. male with a recent medical diagnosis of fever and medical workup.  PCXR:  No new lung infiltrates  Pt known to our SLP services with recent MBSS (06/2022) recommending an IDDSI-6/NECTAR thick liquid liquid diet, with strict swallowing precautions, secondary to:  an oral/pharyngeal dysphagia, s/p multiple CVA's, and chemo and radiation tx for lung cancer.  Today's clinical swallow evaluation revealed:  ongoing mild oral/ mild-moderate pharyngeal dysphagia and a known hx of impulsive, large bolus feeding  pattern.  Rec:  Return to previous diet modification:  IDDSI-6(Soft) / NECTAR thick liquids with strict swallow precautions (mentioned above), including supervision with meals to curb impulsivity with self-feeding.        Goals:   Multidisciplinary Problems       SLP Goals          Problem: SLP    Goal Priority Disciplines Outcome   SLP Goal     SLP Ongoing, Progressing   Description: 1.  Pt will demonstrate no overt s/s of aspiration, >95% of the time, with IDDSI-6 (Soft)/ NECTAR Thick liquids.  2.  Pt/CG/RN will demonstrate recommended swallowing precautions with MOD Ind.                         Plan:     Patient to be seen:  3 x/week   Plan of Care expires:  11/15/22  Plan of Care reviewed with:  patient, spouse, caregiver   SLP Follow-Up:  Yes       Discharge recommendations:  home health speech therapy   Barriers to Discharge:  None    Time Tracking:     SLP Treatment Date:   10/25/22  Speech Start Time:  1140  Speech Stop Time:  1220     Speech Total Time (min):  40 min    Billable Minutes: Eval 40mins     10/25/2022

## 2022-10-25 NOTE — HOSPITAL COURSE
Tyler Todd is a 82 y.o. Black or  male  With PMH of vascular dementia, Previous CVA with residual L weakness,CAD, lung cancer, prostate cancer, DM2 was admitted with CAP.  He was severely dehydrated and WBC severely elevated and admitted with possible sepsis and started IV antibiotics and high dose IVF .  COVID, flu and respiratory PCR panels are negative but chest x-ray with possible pneumonia/aspiration pneumonia.  All cultures are negative and he is afebrile since admission and discharged with Levaquin.  Family reports severe weight loss over a year and reviewed his previous cancer history and advised to follow-up with Dr. Mast oncology as quick as possible.  He was assessed by speech therapist and he passed and dysphagia diet recommended. His WBC back to normal at [facility] .

## 2022-10-26 VITALS
RESPIRATION RATE: 16 BRPM | OXYGEN SATURATION: 98 % | DIASTOLIC BLOOD PRESSURE: 58 MMHG | HEIGHT: 70 IN | BODY MASS INDEX: 21.34 KG/M2 | WEIGHT: 149.06 LBS | TEMPERATURE: 98 F | HEART RATE: 75 BPM | SYSTOLIC BLOOD PRESSURE: 116 MMHG

## 2022-10-26 LAB
ANION GAP SERPL CALC-SCNC: 4 MMOL/L (ref 8–16)
BASOPHILS # BLD AUTO: 0.02 K/UL (ref 0–0.2)
BASOPHILS NFR BLD: 0.2 % (ref 0–1.9)
BUN SERPL-MCNC: 31 MG/DL (ref 8–23)
CALCIUM SERPL-MCNC: 8 MG/DL (ref 8.7–10.5)
CHLORIDE SERPL-SCNC: 108 MMOL/L (ref 95–110)
CO2 SERPL-SCNC: 29 MMOL/L (ref 23–29)
CREAT SERPL-MCNC: 1.5 MG/DL (ref 0.5–1.4)
DIFFERENTIAL METHOD: ABNORMAL
EOSINOPHIL # BLD AUTO: 0 K/UL (ref 0–0.5)
EOSINOPHIL NFR BLD: 0.3 % (ref 0–8)
ERYTHROCYTE [DISTWIDTH] IN BLOOD BY AUTOMATED COUNT: 17.1 % (ref 11.5–14.5)
EST. GFR  (NO RACE VARIABLE): 46.8 ML/MIN/1.73 M^2
GLUCOSE SERPL-MCNC: 137 MG/DL (ref 70–110)
HCT VFR BLD AUTO: 23.8 % (ref 40–54)
HGB BLD-MCNC: 7.9 G/DL (ref 14–18)
IMM GRANULOCYTES # BLD AUTO: 0.09 K/UL (ref 0–0.04)
IMM GRANULOCYTES NFR BLD AUTO: 0.8 % (ref 0–0.5)
LYMPHOCYTES # BLD AUTO: 0.8 K/UL (ref 1–4.8)
LYMPHOCYTES NFR BLD: 7.4 % (ref 18–48)
MAGNESIUM SERPL-MCNC: 1.8 MG/DL (ref 1.6–2.6)
MCH RBC QN AUTO: 32.1 PG (ref 27–31)
MCHC RBC AUTO-ENTMCNC: 33.2 G/DL (ref 32–36)
MCV RBC AUTO: 97 FL (ref 82–98)
MONOCYTES # BLD AUTO: 0.9 K/UL (ref 0.3–1)
MONOCYTES NFR BLD: 7.9 % (ref 4–15)
NEUTROPHILS # BLD AUTO: 9 K/UL (ref 1.8–7.7)
NEUTROPHILS NFR BLD: 83.4 % (ref 38–73)
NRBC BLD-RTO: 0 /100 WBC
PLATELET # BLD AUTO: 138 K/UL (ref 150–450)
PMV BLD AUTO: 11.6 FL (ref 9.2–12.9)
POTASSIUM SERPL-SCNC: 4.3 MMOL/L (ref 3.5–5.1)
PROCALCITONIN SERPL IA-MCNC: 1.72 NG/ML (ref 0–0.5)
RBC # BLD AUTO: 2.46 M/UL (ref 4.6–6.2)
SODIUM SERPL-SCNC: 141 MMOL/L (ref 136–145)
WBC # BLD AUTO: 10.76 K/UL (ref 3.9–12.7)

## 2022-10-26 PROCEDURE — 99900035 HC TECH TIME PER 15 MIN (STAT)

## 2022-10-26 PROCEDURE — 63600175 PHARM REV CODE 636 W HCPCS: Performed by: INTERNAL MEDICINE

## 2022-10-26 PROCEDURE — G0378 HOSPITAL OBSERVATION PER HR: HCPCS

## 2022-10-26 PROCEDURE — 36415 COLL VENOUS BLD VENIPUNCTURE: CPT | Performed by: FAMILY MEDICINE

## 2022-10-26 PROCEDURE — 96367 TX/PROPH/DG ADDL SEQ IV INF: CPT

## 2022-10-26 PROCEDURE — 80048 BASIC METABOLIC PNL TOTAL CA: CPT | Performed by: FAMILY MEDICINE

## 2022-10-26 PROCEDURE — 63700000 PHARM REV CODE 250 ALT 637 W/O HCPCS: Performed by: INTERNAL MEDICINE

## 2022-10-26 PROCEDURE — 63600175 PHARM REV CODE 636 W HCPCS: Performed by: FAMILY MEDICINE

## 2022-10-26 PROCEDURE — 25000003 PHARM REV CODE 250: Performed by: FAMILY MEDICINE

## 2022-10-26 PROCEDURE — 25000003 PHARM REV CODE 250: Performed by: INTERNAL MEDICINE

## 2022-10-26 PROCEDURE — 97110 THERAPEUTIC EXERCISES: CPT

## 2022-10-26 PROCEDURE — 92526 ORAL FUNCTION THERAPY: CPT

## 2022-10-26 PROCEDURE — 99900031 HC PATIENT EDUCATION (STAT)

## 2022-10-26 PROCEDURE — 84145 PROCALCITONIN (PCT): CPT | Performed by: FAMILY MEDICINE

## 2022-10-26 PROCEDURE — 85025 COMPLETE CBC W/AUTO DIFF WBC: CPT | Performed by: FAMILY MEDICINE

## 2022-10-26 PROCEDURE — 97530 THERAPEUTIC ACTIVITIES: CPT | Mod: 59

## 2022-10-26 PROCEDURE — 83735 ASSAY OF MAGNESIUM: CPT | Performed by: FAMILY MEDICINE

## 2022-10-26 PROCEDURE — 96366 THER/PROPH/DIAG IV INF ADDON: CPT

## 2022-10-26 PROCEDURE — 94761 N-INVAS EAR/PLS OXIMETRY MLT: CPT

## 2022-10-26 RX ORDER — CEFUROXIME AXETIL 500 MG/1
500 TABLET ORAL EVERY 12 HOURS
Qty: 14 TABLET | Refills: 0 | Status: SHIPPED | OUTPATIENT
Start: 2022-10-26

## 2022-10-26 RX ORDER — AZITHROMYCIN 500 MG/1
500 TABLET, FILM COATED ORAL DAILY
Qty: 4 TABLET | Refills: 0 | Status: SHIPPED | OUTPATIENT
Start: 2022-10-27

## 2022-10-26 RX ORDER — AZITHROMYCIN 250 MG/1
500 TABLET, FILM COATED ORAL DAILY
Status: DISCONTINUED | OUTPATIENT
Start: 2022-10-26 | End: 2022-10-26 | Stop reason: HOSPADM

## 2022-10-26 RX ADMIN — FLUOXETINE 40 MG: 20 CAPSULE ORAL at 09:10

## 2022-10-26 RX ADMIN — CLOPIDOGREL BISULFATE 75 MG: 75 TABLET, FILM COATED ORAL at 09:10

## 2022-10-26 RX ADMIN — MEGESTROL ACETATE 40 MG: 20 TABLET ORAL at 09:10

## 2022-10-26 RX ADMIN — PIPERACILLIN SODIUM AND TAZOBACTAM SODIUM 3.38 G: 3; .375 INJECTION, POWDER, LYOPHILIZED, FOR SOLUTION INTRAVENOUS at 05:10

## 2022-10-26 RX ADMIN — CEFTRIAXONE SODIUM 1 G: 1 INJECTION, POWDER, FOR SOLUTION INTRAMUSCULAR; INTRAVENOUS at 09:10

## 2022-10-26 RX ADMIN — AZITHROMYCIN MONOHYDRATE 500 MG: 250 TABLET ORAL at 09:10

## 2022-10-26 RX ADMIN — VANCOMYCIN HYDROCHLORIDE 1250 MG: 1.25 INJECTION, POWDER, LYOPHILIZED, FOR SOLUTION INTRAVENOUS at 01:10

## 2022-10-26 RX ADMIN — LEVETIRACETAM 500 MG: 500 TABLET, FILM COATED ORAL at 09:10

## 2022-10-26 RX ADMIN — APIXABAN 2.5 MG: 2.5 TABLET, FILM COATED ORAL at 09:10

## 2022-10-26 RX ADMIN — DIVALPROEX SODIUM 500 MG: 250 TABLET, DELAYED RELEASE ORAL at 09:10

## 2022-10-26 RX ADMIN — DRONABINOL 2.5 MG: 2.5 CAPSULE ORAL at 09:10

## 2022-10-26 RX ADMIN — BENZONATATE 200 MG: 100 CAPSULE ORAL at 09:10

## 2022-10-26 NOTE — PLAN OF CARE
Problem: SLP  Goal: SLP Goal  Description: 1.  Pt will demonstrate no overt s/s of aspiration, >95% of the time, with IDDSI-6 (Soft)/ NECTAR Thick liquids.  2.  Pt/CG/RN will demonstrate recommended swallowing precautions with MOD Ind.    Outcome: Ongoing, Progressing

## 2022-10-26 NOTE — PT/OT/SLP PROGRESS
Speech Language Pathology Treatment    Patient Name:  Tyler Todd   MRN:  [id number]  Admitting Diagnosis: Sepsis    Recommendations:                 General Recommendations:  Dysphagia therapy  Diet recommendations:  Soft & Bite Sized Diet - IDDSI Level 6, [place] Thick   Aspiration Precautions:  MINI sips of thin liquids only, Avoid mixed consistencies, Add appropriate levels of thickener to Nutritional shake supplements and V8 in order to ensure liquid is at a nectar-thick level, 1 bite/sip at a time, Assistance with meals and Assistance with thickening liquids, Double swallow with each bite/sip, Eliminate distractions, HOB to 90 degrees, Meds crushed in puree, Meds whole buried in puree, No straws, Remain upright 30 minutes post meal, and Strict aspiration precautions   General Precautions: Standard, aspiration, nectar thick  Communication strategies:  none    Subjective     Pt awake/alert, cooperative with ST tx, spouse and sitter present for portion of session.  Patient goals: To do more therapy     Pain/Comfort:  Pain Rating 1: 0/10 (none reported)    Respiratory Status: Room air    Objective:     Has the patient been evaluated by SLP for swallowing?   Yes  Keep patient NPO? No   Current Respiratory Status:        ST instructed pt in direct dysphagia tx with mechanical soft textures and nectar-thick liquids.  Pt required consistent cues to utilize safe swallow precautions, pt unable to verbalize previously trained precautions.  Pt exhibited cough x3 with solids.  ST educated pt and spouse/sitter re: importance of consistent use of safe swallow precautions 2' inc risk for aspiration.  Spouse and sitter verbalized understanding.    Assessment:     Tyler Todd is a 82 y.o. male with a recent medical diagnosis of fever and medical workup.  PCXR:  No new lung infiltrates  Pt known to our SLP services with recent MBSS (06/2022) recommending an IDDSI-6/NECTAR thick liquid liquid diet, with strict swallowing  precautions, secondary to:  an oral/pharyngeal dysphagia, s/p multiple CVA's, and chemo and radiation tx for lung cancer.  Today's clinical swallow evaluation revealed:  ongoing mild oral/ mild-moderate pharyngeal dysphagia and a known hx of impulsive, large bolus feeding pattern.  Rec:  Return to previous diet modification:  IDDSI-6(Soft) / NECTAR thick liquids with strict swallow precautions (mentioned above), including supervision with meals to curb impulsivity with self-feeding.    Goals:   Multidisciplinary Problems       SLP Goals          Problem: SLP    Goal Priority Disciplines Outcome   SLP Goal     SLP Ongoing, Progressing   Description: 1.  Pt will demonstrate no overt s/s of aspiration, >95% of the time, with IDDSI-6 (Soft)/ NECTAR Thick liquids.  2.  Pt/CG/RN will demonstrate recommended swallowing precautions with MOD Ind.                         Plan:     Patient to be seen:  3 x/week   Plan of Care expires:  11/15/22  Plan of Care reviewed with:  patient, spouse, other (see comments) (sitter)   SLP Follow-Up:  Yes       Discharge recommendations:  home health speech therapy   Barriers to Discharge:   TBD    Time Tracking:     SLP Treatment Date:   10/26/22  Speech Start Time:  1020  Speech Stop Time:  1043     Speech Total Time (min):  23 min    Billable Minutes: Treatment Swallowing Dysfunction 23 minutes    10/26/2022

## 2022-10-26 NOTE — PLAN OF CARE
Problem: Infection  Goal: Absence of Infection Signs and Symptoms  Outcome: Met     Problem: Adult Inpatient Plan of Care  Goal: Plan of Care Review  Outcome: Met  Goal: Patient-Specific Goal (Individualized)  Outcome: Met  Goal: Absence of Hospital-Acquired Illness or Injury  Outcome: Met  Goal: Optimal Comfort and Wellbeing  Outcome: Met  Goal: Readiness for Transition of Care  Outcome: Met     Problem: Adjustment to Illness (Sepsis/Septic Shock)  Goal: Optimal Coping  Outcome: Met

## 2022-10-26 NOTE — PROGRESS NOTES
Therapy with Vancomycin complete and / or consult / order discontinued by   thant on 7/3 @ 4046       Pharmacy will sign off, please re-consult as needed.  Thank you for allowing us to participate in this patient's care.  Jorge Orozco 10/26/2022 7:37 AM  Dept of Pharmacy  Ext [phone]

## 2022-10-26 NOTE — PROGRESS NOTES
Pharmacokinetic Assessment Follow Up: IV Vancomycin    Patient brief summary:  Tyler Todd is a 82 y.o. male initiated on antimicrobial therapy with IV Vancomycin for treatment of Sepsis    The patient's current regimen is intermittent dosing based on random levels due to CrCl < 30.      Actual Body Weight = 67.6 kg (149 lb 0.5 oz).    Renal Function:   Estimated Creatinine Clearance: 40.2 mL/min (based on SCr of 1.4 mg/dL).      Vancomycin serum concentration assessment(s):    The random level was drawn correctly and can be used to guide therapy at this time. The measurement is below the desired definitive target range of 10 to 15 mcg/mL.    Vancomycin Regimen Plan:    Re-dose Vancomycin 1250 mg IV once with random level scheduled for 00:00 on 10/27.     Drug levels (last 3 results):  Recent Labs   Lab Result Units 10/25/22  2242   Vancomycin, Random ug/mL 6.4       Pharmacy will continue to follow and monitor vancomycin.    Please contact pharmacy at extension [phone] for questions regarding this assessment.    Thank you for the consult,   Silva Ferris

## 2022-10-26 NOTE — PT/OT/SLP PROGRESS
Physical Therapy Treatment    Patient Name:  Tyler Todd   MRN:  [id number]    Recommendations:     Discharge Recommendations:  home health PT   Discharge Equipment Recommendations: none   Barriers to discharge:  increase assist with mobility    Assessment:     Tyler Todd is a 82 y.o. male admitted with a medical diagnosis of Sepsis.  He presents with the following impairments/functional limitations:  weakness, impaired endurance, impaired self care skills, impaired functional mobility, gait instability, impaired balance, decreased lower extremity function, decreased safety awareness.    Pt found with HOB elevated. Performed bed mobility with Madhu, and STS transfer with RW & Madhu. Pt then ambulated 15 ft with RW & CGA. Pt continues to present with poor safety awareness, as evident by poor RW management and impulsivity. Pt tolerated standing with RW & CGA for 5 minutes for brief change.     Rehab Prognosis: Fair; patient would benefit from acute skilled PT services to address these deficits and reach maximum level of function.    Recent Surgery: * No surgery found *      Plan:     During this hospitalization, patient to be seen 5 x/week to address the identified rehab impairments via gait training, therapeutic activities, therapeutic exercises, neuromuscular re-education and progress toward the following goals:    Plan of Care Expires:  11/25/22    Subjective     Chief Complaint: none stated  Patient/Family Comments/goals: none stated  Pain/Comfort:  Pain Rating 1: 0/10      Objective:     Communicated with RN prior to session.  Patient found supine with telemetry, peripheral IV upon PT entry to room.     General Precautions: Standard, fall   Orthopedic Precautions:N/A   Braces: N/A  Respiratory Status: Room air     Functional Mobility:  Bed Mobility:     Supine to Sit: minimum assistance  Sit to Supine: minimum assistance  Transfers:     Sit to Stand:  minimum assistance with rolling walker  Gait: 15 ft with RW  & CGA      AM-PAC 6 CLICK MOBILITY          Treatment & Education:  Pt educated on POC, discharge recommendation, RW management, proper form for transfers, need for assist with mobility, use of call bell to seek assistance as needed and fall prevention.    Patient left HOB elevated with all lines intact, call button in reach, bed alarm on, and RN notified..    GOALS:   Multidisciplinary Problems       Physical Therapy Goals          Problem: Physical Therapy    Goal Priority Disciplines Outcome Goal Variances Interventions   Physical Therapy Goal     PT, PT/OT      Description: Goals to be met by: 2022     Patient will increase functional independence with mobility by performin. Supine to sit with Supervision  2. Sit to stand transfer with Supervision  3. Bed to chair transfer with Supervision using Rolling Walker  4. Gait  x 150 feet with Supervision using Rolling Walker.                          Time Tracking:     PT Received On: 10/26/22  PT Start Time: 1002     PT Stop Time: 1021  PT Total Time (min): 19 min     Billable Minutes: Therapeutic Activity 19    Treatment Type: Treatment  PT/PTA: PT     PTA Visit Number: 0     10/26/2022

## 2022-10-26 NOTE — PT/OT/SLP PROGRESS
Occupational Therapy   Treatment    Name: Tyler Todd  MRN: [id number]  Admitting Diagnosis:  Sepsis       Recommendations:     Discharge Recommendations: home health OT  Discharge Equipment Recommendations:  none  Barriers to discharge:  None    Assessment:     Tyler Todd is a 82 y.o. male with a medical diagnosis of Sepsis.  Pt agreeable to OT therapy session this AM. Performance deficits affecting function are weakness, impaired endurance, impaired self care skills, impaired functional mobility, gait instability, impaired balance, impaired cognition, decreased coordination, decreased upper extremity function, decreased lower extremity function, decreased safety awareness, impaired coordination.     Rehab Prognosis:  Fair; patient would benefit from acute skilled OT services to address these deficits and reach maximum level of function.       Plan:     Patient to be seen 5 x/week to address the above listed problems via self-care/home management, therapeutic activities, therapeutic exercises  Plan of Care Expires: 11/25/22  Plan of Care Reviewed with: patient, spouse, caregiver    Subjective     Pain/Comfort:  Pain Rating 1: 0/10    Objective:     Communicated with: nursing prior to session.  Patient found HOB elevated with bed alarm, telemetry, peripheral IV upon OT entry to room.    General Precautions: Standard, aspiration, fall   Orthopedic Precautions:N/A   Braces: N/A  Respiratory Status: Room air     Occupational Performance:     Bed Mobility:    Patient completed Supine to Sit with stand by assistance  Patient completed Sit to Supine with stand by assistance     Functional Mobility/Transfers:  Patient completed Sit <> Stand Transfer with minimum assistance  with  hand-held assist     Activities of Daily Living:  Grooming: stand by assistance seated EOB for oral care with swab; pt required max verbal cues to complete task as patient did not understand what was being asked of him, but once OT visually  demonstrated the task, pt able to complete      Therapeutic Exercise:  BUE ROM exercises x 10 reps with dowel stick in all major planes with Min A for correct technique and posture; pt tolerated well    Treatment & Education:  Pt educated on role of OT/POC, importance of OOB/EOB activity, use of call bell, UE exercises, and safety during ADLs and transfers,.    Patient left HOB elevated with all lines intact, call button in reach, bed alarm on, and spouse and caregiver present    GOALS:   Multidisciplinary Problems       Occupational Therapy Goals          Problem: Occupational Therapy    Goal Priority Disciplines Outcome Interventions   Occupational Therapy Goal     OT, PT/OT     Description: Goals to be met by: 11/25/22     Patient will increase functional independence with ADLs by performing:    UE Dressing with Supervision.  LE Dressing with Minimal Assistance and Assistive Devices as needed.  Grooming while seated with Supervision.  Toileting from bedside commode with Supervision for hygiene and clothing management.   Toilet transfer to bedside commode with Supervision.                         Time Tracking:     OT Date of Treatment: 10/26/22  OT Start Time: 1102  OT Stop Time: 1121  OT Total Time (min): 19 min    Billable Minutes:Therapeutic Exercise 19    OT/ELIZABETH: OT          10/26/2022

## 2022-10-26 NOTE — PLAN OF CARE
DC orders and chart reviewed. No discharge needs noted.  Patient cleared for discharge from .     CM met with patient and wife at bedside.  Home Health discussed and they agreed.  Patient choice form scanned into media manager.    [facility]  Unable to schedule follow up due to patient being discharged before cleared from .           10/26/22 1523   Final Note   Assessment Type Final Discharge Note   Anticipated Discharge Disposition Home-Health   What phone number can be called within the next 1-3 days to see how you are doing after discharge? [phone]   Hospital Resources/Appts/Education Provided Post-Acute resouces added to AVS   Post-Acute Status   Post-Acute Authorization Home Health   Home Health Status Set-up Complete/Auth obtained

## 2022-10-26 NOTE — DISCHARGE SUMMARY
[facility] Medicine  Discharge Summary      Patient Name: Tyler Todd  MRN: [id number]  Patient Class: OP- Observation  Admission Date: 10/24/2022  Hospital Length of Stay: 0 days  Discharge Date and Time:  10/26/2022 2:20 PM  Attending Physician: Isaiah Joseph MD   Discharging Provider: Isaiah Joseph MD  Primary Care Provider: Court Mann MD      HPI:   No notes on file    * No surgery found *      Hospital Course:   Tyler Todd is a 82 y.o. Black or  male  With PMH of vascular dementia, Previous CVA with residual L weakness,CAD, lung cancer, prostate cancer, DM2 was admitted with CAP.  He was severely dehydrated and WBC severely elevated and admitted with possible sepsis and started IV antibiotics and high dose IVF .  COVID, flu and respiratory PCR panels are negative but chest x-ray with possible pneumonia/aspiration pneumonia.  All cultures are negative and he is afebrile since admission and discharged with Levaquin.  Family reports severe weight loss over a year and reviewed his previous cancer history and advised to follow-up with Dr. Mast oncology as quick as possible.  He was assessed by speech therapist and he passed and dysphagia diet recommended. His WBC back to normal at DC .             Goals of Care Treatment Preferences:  Code Status: Full Code      Consults:   Consults (From admission, onward)        Status Ordering Provider     Inpatient consult to Internal Medicine  Once        Provider:  Virginia Medrano MD    Acknowledged VIRGINIA MEDRANO          No new Assessment & Plan notes have been filed under this hospital service since the last note was generated.  Service: Hospital Medicine    Final Active Diagnoses:    Diagnosis Date Noted POA    PRINCIPAL PROBLEM:  Sepsis [A41.9] 10/25/2022 Yes    Suspected Pneumonia [J18.9] 10/25/2022 Yes    Ischemic vascular dementia [F01.50] 06/12/2022 Yes    Tobacco dependence in remission [F17.201]  06/04/2020 Yes    Paroxysmal atrial fibrillation [I48.0] 03/10/2020 Yes    CKD (chronic kidney disease), stage III [N18.30] 11/19/2012 Yes     Chronic    Carcinoma of lung [C34.90] 08/09/2012 Yes     Chronic    COPD (chronic obstructive pulmonary disease) [J44.9] 08/09/2012 Yes     Chronic    Type 2 diabetes mellitus without complication [E11.9] 08/15/2010 Yes    Chronic prostatitis [N41.1] 06/02/2010 Yes      Problems Resolved During this Admission:       Discharged Condition: good    Disposition: Home or Self Care    Follow Up:   Follow-up Information     Court Mann MD Follow up in 1 month(s).    Specialty: Family Medicine  Contact information:  [address]  [place] [address]  [phone]             Sánchez Mast,  Follow up in 1 week(s).    Specialty: Hematology and Oncology  Contact information:  [address]  [address]  [facility], [place] [address] [phone]                       Patient Instructions:      Diet Cardiac     Activity as tolerated       Significant Diagnostic Studies: Labs:   CMP   Recent Labs   Lab 10/24/22  2055 10/25/22  0817 10/26/22  0425    138 141   K 4.8 4.4 4.3    107 108   CO2 27 28 29   * 72 137*   BUN 47* 42* 31*   CREATININE 1.8* 1.4 1.5*   CALCIUM 8.9 8.0* 8.0*   PROT 7.3 6.2  --    ALBUMIN 3.0* 2.5*  --    BILITOT 0.5 0.7  --    ALKPHOS 74 67  --    AST 27 27  --    ALT 33 33  --    ANIONGAP 9 3* 4*    and CBC   Recent Labs   Lab 10/24/22  2055 10/25/22  0817 10/26/22  0425   WBC 23.30* 16.63* 10.76   HGB 9.8* 8.4* 7.9*   HCT 29.7* 25.8* 23.8*    159 138*       Pending Diagnostic Studies:     None         Medications:  Reconciled Home Medications:      Medication List      START taking these medications    azithromycin 500 MG tablet  Commonly known as: ZITHROMAX  Take 1 tablet (500 mg total) by mouth once daily.  Start taking on: October 27, 2022     cefUROXime 500 MG tablet  Commonly known  as: CEFTIN  Take 1 tablet (500 mg total) by mouth every 12 (twelve) hours.        CHANGE how you take these medications    apixaban 2.5 mg Tab  Commonly known as: ELIQUIS  Take 1 tablet (2.5 mg total) by mouth 2 (two) times daily.  What changed:   · medication strength  · how much to take     mirtazapine 30 MG tablet  Commonly known as: REMERON  Take 30 mg by mouth every evening.  What changed: Another medication with the same name was removed. Continue taking this medication, and follow the directions you see here.        CONTINUE taking these medications    acetaminophen 500 MG tablet  Commonly known as: TYLENOL  Take 1,000 mg by mouth every 6 (six) hours as needed for Pain.     alogliptin 12.5 mg Tab  Commonly known as: NESINA  Take 1 tablet by mouth every evening.     benzonatate 100 MG capsule  Commonly known as: TESSALON PERLES  Take 2 capsules (200 mg total) by mouth 3 (three) times daily as needed for Cough.     clopidogreL 75 mg tablet  Commonly known as: PLAVIX  Take 1 tablet (75 mg total) by mouth once daily.     divalproex 500 MG Tbec  Commonly known as: DEPAKOTE  Take 500 mg by mouth 2 (two) times daily.     FLUoxetine 40 MG capsule  Take 40 mg by mouth 2 (two) times daily.     irbesartan 300 MG tablet  Commonly known as: AVAPRO  Take 0.5 tablets (150 mg total) by mouth once daily.     levETIRAcetam 500 MG Tab  Commonly known as: KEPPRA  Take 500 mg by mouth 2 (two) times daily.     meclizine 25 MG tablet  Commonly known as: ANTIVERT  Take 25 mg by mouth 2 (two) times a day.     metoprolol succinate 100 MG 24 hr tablet  Commonly known as: TOPROL-XL  Take 1 tablet by mouth 2 (two) times a day.     nitroGLYCERIN 0.4 MG SL tablet  Commonly known as: NITROSTAT  Place 0.4 mg under the tongue every 5 (five) minutes as needed. As directed     QUEtiapine 100 MG Tab  Commonly known as: SEROQUEL  Take 100 mg by mouth every evening.     walker Misc  1 application by Misc.(Non-Drug; Combo Route) route once daily at  6am.        STOP taking these medications    amoxicillin-clavulanate 875-125mg 875-125 mg per tablet  Commonly known as: AUGMENTIN     atorvastatin 20 MG tablet  Commonly known as: LIPITOR     atorvastatin 40 MG tablet  Commonly known as: LIPITOR            Indwelling Lines/Drains at time of discharge:   Lines/Drains/Airways     None               General: Patient resting comfortably in no acute distress. Appears as stated age. Calm  Eyes: EOM intact. No conjunctivae injection. No scleral icterus.  ENT: Hearing grossly intact. No discharge from ears. No nasal discharge.   CVS: RRR. No LE edema BL.  Lungs: CTA BL, no wheezing or crackles. Good breath sounds. No accessory muscle use. No acute respiratory distress  Neuro: non focal , Follows commands. Responds appropriately      Time spent on the discharge of patient: 33 minutes         Isaiah Joseph MD  Department of Hospital Medicine  [facility]

## 2022-10-26 NOTE — CARE UPDATE
10/26/22 1015   Patient Assessment/Suction   Level of Consciousness (AVPU) alert   All Lung Fields Breath Sounds clear   PRE-TX-O2   O2 Device (Oxygen Therapy) room air   SpO2 96 %   Pulse Oximetry Type Intermittent   $ Pulse Oximetry - Multiple Charge Pulse Oximetry - Multiple   Pulse 71   Resp 15   Aerosol Therapy   $ Aerosol Therapy Charges PRN treatment not required   Education   $ Education Bronchodilator;15 min   Respiratory Evaluation   $ Care Plan Tech Time 15 min

## 2022-10-28 NOTE — PT/OT/SLP DISCHARGE
Occupational Therapy Discharge Summary    Tyler Todd  MRN: [id number]   Principal Problem: Sepsis      Patient Discharged from acute Occupational Therapy on 10/26/22.  Please refer to prior OT note dated 10/26/22 for functional status.    Assessment:      Patient appropriate for care in another setting.    Objective:     GOALS:   Multidisciplinary Problems       Occupational Therapy Goals          Problem: Occupational Therapy    Goal Priority Disciplines Outcome Interventions   Occupational Therapy Goal     OT, PT/OT     Description: Goals to be met by: 11/25/22     Patient will increase functional independence with ADLs by performing:    UE Dressing with Supervision.  LE Dressing with Minimal Assistance and Assistive Devices as needed.  Grooming while seated with Supervision.  Toileting from bedside commode with Supervision for hygiene and clothing management.   Toilet transfer to bedside commode with Supervision.                         Reasons for Discontinuation of Therapy Services  Transfer to alternate level of care.      Plan:     Patient Discharged to: Home with Home Health Service    10/28/2022

## 2022-10-29 ENCOUNTER — HOSPITAL ENCOUNTER (EMERGENCY)
Facility: HOSPITAL | Age: 82
Discharge: HOME | End: 2022-10-29
Attending: EMERGENCY MEDICINE
Payer: MEDICARE

## 2022-10-29 VITALS
HEIGHT: 70 IN | RESPIRATION RATE: 21 BRPM | HEART RATE: 69 BPM | WEIGHT: 160 LBS | DIASTOLIC BLOOD PRESSURE: 59 MMHG | OXYGEN SATURATION: 99 % | TEMPERATURE: 99 F | BODY MASS INDEX: 22.9 KG/M2 | SYSTOLIC BLOOD PRESSURE: 112 MMHG

## 2022-10-29 DIAGNOSIS — J10.1 INFLUENZA A: Primary | ICD-10-CM

## 2022-10-29 LAB
ALBUMIN SERPL BCP-MCNC: 2.7 G/DL (ref 3.5–5.2)
ALP SERPL-CCNC: 64 U/L (ref 55–135)
ALT SERPL W/O P-5'-P-CCNC: 33 U/L (ref 10–44)
ANION GAP SERPL CALC-SCNC: 8 MMOL/L (ref 8–16)
AST SERPL-CCNC: 24 U/L (ref 10–40)
BACTERIA #/AREA URNS HPF: NEGATIVE /HPF
BASOPHILS # BLD AUTO: 0.02 K/UL (ref 0–0.2)
BASOPHILS NFR BLD: 0.2 % (ref 0–1.9)
BILIRUB SERPL-MCNC: 0.7 MG/DL (ref 0.1–1)
BILIRUB UR QL STRIP: NEGATIVE
BUN SERPL-MCNC: 35 MG/DL (ref 8–23)
CALCIUM SERPL-MCNC: 8.4 MG/DL (ref 8.7–10.5)
CHLORIDE SERPL-SCNC: 104 MMOL/L (ref 95–110)
CLARITY UR: CLEAR
CO2 SERPL-SCNC: 26 MMOL/L (ref 23–29)
COLOR UR: YELLOW
CREAT SERPL-MCNC: 1.4 MG/DL (ref 0.5–1.4)
DIFFERENTIAL METHOD: ABNORMAL
EOSINOPHIL # BLD AUTO: 0 K/UL (ref 0–0.5)
EOSINOPHIL NFR BLD: 0.4 % (ref 0–8)
ERYTHROCYTE [DISTWIDTH] IN BLOOD BY AUTOMATED COUNT: 17.1 % (ref 11.5–14.5)
EST. GFR  (NO RACE VARIABLE): 50.8 ML/MIN/1.73 M^2
GLUCOSE SERPL-MCNC: 129 MG/DL (ref 70–110)
GLUCOSE UR QL STRIP: NEGATIVE
HCT VFR BLD AUTO: 26.1 % (ref 40–54)
HGB BLD-MCNC: 8.7 G/DL (ref 14–18)
HGB UR QL STRIP: ABNORMAL
HYALINE CASTS #/AREA URNS LPF: 6 /LPF
IMM GRANULOCYTES # BLD AUTO: 0.06 K/UL (ref 0–0.04)
IMM GRANULOCYTES NFR BLD AUTO: 0.7 % (ref 0–0.5)
INFLUENZA A, MOLECULAR: POSITIVE
INFLUENZA B, MOLECULAR: NEGATIVE
KETONES UR QL STRIP: ABNORMAL
LACTATE SERPL-SCNC: 1.6 MMOL/L (ref 0.5–1.9)
LEUKOCYTE ESTERASE UR QL STRIP: NEGATIVE
LYMPHOCYTES # BLD AUTO: 0.3 K/UL (ref 1–4.8)
LYMPHOCYTES NFR BLD: 3.5 % (ref 18–48)
MAGNESIUM SERPL-MCNC: 1.9 MG/DL (ref 1.6–2.6)
MCH RBC QN AUTO: 32.5 PG (ref 27–31)
MCHC RBC AUTO-ENTMCNC: 33.3 G/DL (ref 32–36)
MCV RBC AUTO: 97 FL (ref 82–98)
MICROSCOPIC COMMENT: ABNORMAL
MONOCYTES # BLD AUTO: 0.6 K/UL (ref 0.3–1)
MONOCYTES NFR BLD: 6.7 % (ref 4–15)
NEUTROPHILS # BLD AUTO: 7.2 K/UL (ref 1.8–7.7)
NEUTROPHILS NFR BLD: 88.5 % (ref 38–73)
NITRITE UR QL STRIP: NEGATIVE
NRBC BLD-RTO: 0 /100 WBC
PH UR STRIP: 7 [PH] (ref 5–8)
PLATELET # BLD AUTO: 131 K/UL (ref 150–450)
PMV BLD AUTO: 11.2 FL (ref 9.2–12.9)
POTASSIUM SERPL-SCNC: 4.6 MMOL/L (ref 3.5–5.1)
PROCALCITONIN SERPL IA-MCNC: 0.27 NG/ML (ref 0–0.5)
PROT SERPL-MCNC: 6.9 G/DL (ref 6–8.4)
PROT UR QL STRIP: ABNORMAL
RBC # BLD AUTO: 2.68 M/UL (ref 4.6–6.2)
RBC #/AREA URNS HPF: 42 /HPF (ref 0–4)
SARS-COV-2 RDRP RESP QL NAA+PROBE: NEGATIVE
SODIUM SERPL-SCNC: 138 MMOL/L (ref 136–145)
SP GR UR STRIP: 1.03 (ref 1–1.03)
SPECIMEN SOURCE: ABNORMAL
SQUAMOUS #/AREA URNS HPF: 2 /HPF
TROPONIN I SERPL DL<=0.01 NG/ML-MCNC: <0.03 NG/ML
TSH SERPL DL<=0.005 MIU/L-ACNC: 1.91 UIU/ML (ref 0.34–5.6)
URN SPEC COLLECT METH UR: ABNORMAL
UROBILINOGEN UR STRIP-ACNC: NEGATIVE EU/DL
WBC # BLD AUTO: 8.19 K/UL (ref 3.9–12.7)
WBC #/AREA URNS HPF: 1 /HPF (ref 0–5)

## 2022-10-29 PROCEDURE — 84443 ASSAY THYROID STIM HORMONE: CPT | Performed by: STUDENT IN AN ORGANIZED HEALTH CARE EDUCATION/TRAINING PROGRAM

## 2022-10-29 PROCEDURE — 93010 ELECTROCARDIOGRAM REPORT: CPT | Mod: ,,, | Performed by: SPECIALIST

## 2022-10-29 PROCEDURE — 83735 ASSAY OF MAGNESIUM: CPT | Performed by: STUDENT IN AN ORGANIZED HEALTH CARE EDUCATION/TRAINING PROGRAM

## 2022-10-29 PROCEDURE — 81001 URINALYSIS AUTO W/SCOPE: CPT | Performed by: STUDENT IN AN ORGANIZED HEALTH CARE EDUCATION/TRAINING PROGRAM

## 2022-10-29 PROCEDURE — 36415 COLL VENOUS BLD VENIPUNCTURE: CPT | Performed by: EMERGENCY MEDICINE

## 2022-10-29 PROCEDURE — 80053 COMPREHEN METABOLIC PANEL: CPT | Performed by: STUDENT IN AN ORGANIZED HEALTH CARE EDUCATION/TRAINING PROGRAM

## 2022-10-29 PROCEDURE — 93010 EKG 12-LEAD: ICD-10-PCS | Mod: ,,, | Performed by: SPECIALIST

## 2022-10-29 PROCEDURE — 93005 ELECTROCARDIOGRAM TRACING: CPT | Performed by: SPECIALIST

## 2022-10-29 PROCEDURE — 83605 ASSAY OF LACTIC ACID: CPT | Performed by: STUDENT IN AN ORGANIZED HEALTH CARE EDUCATION/TRAINING PROGRAM

## 2022-10-29 PROCEDURE — 84145 PROCALCITONIN (PCT): CPT | Performed by: EMERGENCY MEDICINE

## 2022-10-29 PROCEDURE — U0002 COVID-19 LAB TEST NON-CDC: HCPCS | Performed by: STUDENT IN AN ORGANIZED HEALTH CARE EDUCATION/TRAINING PROGRAM

## 2022-10-29 PROCEDURE — 85025 COMPLETE CBC W/AUTO DIFF WBC: CPT | Performed by: STUDENT IN AN ORGANIZED HEALTH CARE EDUCATION/TRAINING PROGRAM

## 2022-10-29 PROCEDURE — 99284 EMERGENCY DEPT VISIT MOD MDM: CPT | Mod: 25

## 2022-10-29 PROCEDURE — 84484 ASSAY OF TROPONIN QUANT: CPT | Performed by: STUDENT IN AN ORGANIZED HEALTH CARE EDUCATION/TRAINING PROGRAM

## 2022-10-29 PROCEDURE — 87502 INFLUENZA DNA AMP PROBE: CPT | Performed by: STUDENT IN AN ORGANIZED HEALTH CARE EDUCATION/TRAINING PROGRAM

## 2022-10-29 PROCEDURE — 87040 BLOOD CULTURE FOR BACTERIA: CPT | Performed by: STUDENT IN AN ORGANIZED HEALTH CARE EDUCATION/TRAINING PROGRAM

## 2022-10-29 RX ORDER — OSELTAMIVIR PHOSPHATE 75 MG/1
75 CAPSULE ORAL 2 TIMES DAILY
Qty: 10 CAPSULE | Refills: 0 | Status: SHIPPED | OUTPATIENT
Start: 2022-10-29 | End: 2022-11-03

## 2022-10-29 RX ORDER — MIRTAZAPINE 45 MG/1
45 TABLET, FILM COATED ORAL NIGHTLY
COMMUNITY

## 2022-10-29 NOTE — ED PROVIDER NOTES
Encounter Date: 10/29/2022       History     Chief Complaint   Patient presents with    Weakness     Patient complains of weakness for the last week.     82-year-old male past medical history significant for CAD status post 3 stents insulin-dependent diabetes, COPD, lung cancer hypertension, hyperlipidemia presents emergency room today for generalized illness.  Patient tells me that he feels fine.  He states his caretaker sent him in today secondary to elevated blood sugars.  He lives with his frail wife and caretaker.  He has no chest pain, shortness breath, nausea or vomiting.  No abdominal pain.  He does not feel particularly weak compared to his baseline.    Review of patient's allergies indicates:   Allergen Reactions    Doxycycline Diarrhea     Severe diarrhea    Sulfa (sulfonamide antibiotics) Rash     Other reaction(s): Itching    Sulfasalazine Rash     Rash and itching mild     Past Medical History:   Diagnosis Date    Back pain     Cancer 2012    lung cancer chemo and radiation    COPD (chronic obstructive pulmonary disease)     Coronary artery disease 2002, 2004    s/p 3 stents;  Dr. Interiano    Diabetes mellitus, type 2     Digestive disorder     Hiatal hernia     History of lung cancer 7/6/2021    Hyperlipidemia     Hypertension     Lung cancer     Neck pain     Prostate CA      Past Surgical History:   Procedure Laterality Date    ANKLE SURGERY Right 1970s    COLONOSCOPY N/A 9/5/2022    Procedure: COLONOSCOPY;  Surgeon: Gonzales Colbert MD;  Location: [facility];  Service: Endoscopy;  Laterality: N/A;    COLONOSCOPY Left 9/5/2022    Procedure: COLONOSCOPY;  Surgeon: Gonzales Colbert MD;  Location: [facility];  Service: Endoscopy;  Laterality: Left;    CORONARY ANGIOPLASTY WITH STENT PLACEMENT      CYSTOSCOPY W/ RETROGRADES Bilateral 2/24/2020    Procedure: CYSTOSCOPY, WITH RETROGRADE PYELOGRAM;  Surgeon: Nuha Soto MD;  Location: [facility];  Service: Urology;  Laterality: Bilateral;     ENDOBRONCHIAL ULTRASOUND N/A 6/2/2021    Procedure: ENDOBRONCHIAL ULTRASOUND (EBUS);  Surgeon: Rob Arrington MD;  Location: [facility];  Service: Pulmonary;  Laterality: N/A;    EPIDURAL STEROID INJECTION INTO CERVICAL SPINE N/A 2/19/2019    Procedure: Injection-steroid-epidural-cervical C7-T1;  Surgeon: Marcelino Monroe MD;  [facility];  Service: Pain Management;  Laterality: N/A;    ESOPHAGOGASTRODUODENOSCOPY Left 9/5/2022    Procedure: EGD (ESOPHAGOGASTRODUODENOSCOPY);  Surgeon: Gonzales Colbert MD;  Location: [facility];  Service: Endoscopy;  Laterality: Left;    INJECTION OF ANESTHETIC AGENT AROUND MEDIAL BRANCH NERVES INNERVATING LUMBAR FACET JOINT Bilateral 6/4/2018    Procedure: MEDIAL BRANCH, LUMBAR L3,4,5;  Surgeon: Marcelino Monroe MD;  [facility];  Service: Pain Management;  Laterality: Bilateral;  L3, 4, 5    MEDIASTINOSCOPY      RADIOFREQUENCY ABLATION OF LUMBAR MEDIAL BRANCH NERVE AT SINGLE LEVEL Bilateral 7/16/2018    Procedure: RADIOFREQUENCY ABLATION, NERVE, MEDIAL BRANCH, LUMBAR, 1 LEVEL;  Surgeon: Marcelino Monroe MD;  [facility];  Service: Pain Management;  Laterality: Bilateral;  L3, 4, 5  lumbar  Dacentec pain management generator  SN [id number]  80 degrees for 75 seconds x2    TRANSRECTAL ULTRASOUND OF PROSTATE WITH INSERTION OF GOLD FIDUCIAL MARKER N/A 2/24/2020    Procedure: ULTRASOUND, PROSTATE, RECTAL APPROACH, WITH GOLD FIDUCIAL MARKER INSERTION;  Surgeon: Nuha Soto MD;  Location: [facility];  Service: Urology;  Laterality: N/A;     Family History   Problem Relation Age of Onset    Diabetes Mother     Peripheral vascular disease Mother     Heart attack Mother     Diabetes Father     Heart attack Father     Emphysema Father     COPD Father     Diabetes Sister      Social History     Tobacco Use    Smoking status: Every Day     Packs/day: 1.00     Years: 57.00     Pack years: 57.00     Types: Cigarettes    Smokeless tobacco: Never   Substance Use Topics    Alcohol use: No     Drug use: No     Review of Systems   Constitutional:  Negative for chills, fatigue and fever.   HENT:  Negative for congestion, hearing loss, sore throat and trouble swallowing.    Eyes:  Negative for visual disturbance.   Respiratory:  Negative for cough, chest tightness and shortness of breath.    Cardiovascular:  Negative for chest pain.   Gastrointestinal:  Negative for abdominal pain and nausea.   Endocrine: Negative for polyuria.   Genitourinary:  Negative for difficulty urinating.   Musculoskeletal:  Negative for arthralgias and myalgias.   Skin:  Negative for rash.   Neurological:  Negative for dizziness and headaches.   Psychiatric/Behavioral:  The patient is not nervous/anxious.      Physical Exam     Initial Vitals [10/29/22 1825]   BP Pulse Resp Temp SpO2   (!) 120/58 72 20 99.5 °F (37.5 °C) 97 %      MAP       --         Physical Exam    Nursing note and vitals reviewed.  Constitutional:   Chronically ill-appearing but in no distress and not acutely ill   HENT:   Head: Normocephalic and atraumatic.   Right Ear: External ear normal.   Left Ear: External ear normal.   Nose: Nose normal.   Mouth/Throat: Oropharynx is clear and moist.   Eyes: Conjunctivae and EOM are normal.   Neck: Neck supple.   Cardiovascular:  Normal rate, regular rhythm, normal heart sounds and intact distal pulses.           Pulmonary/Chest: Breath sounds normal. No respiratory distress. He has no wheezes. He has no rales.   Abdominal: Abdomen is soft. He exhibits no distension. There is no abdominal tenderness.   Musculoskeletal:      Cervical back: Neck supple.      Comments: Patient has right-sided motor weakness upper and lower extremities.  This is from a previous stroke.     Neurological: He is alert and oriented to person, place, and time. GCS score is 15. GCS eye subscore is 4. GCS verbal subscore is 5. GCS motor subscore is 6.   Skin: Skin is warm and dry. Capillary refill takes less than 2 seconds.   Psychiatric: He has a  normal mood and affect. His behavior is normal. Judgment and thought content normal.       ED Course   Procedures  Labs Reviewed   CBC W/ AUTO DIFFERENTIAL - Abnormal; Notable for the following components:       Result Value    RBC 2.68 (*)     Hemoglobin 8.7 (*)     Hematocrit 26.1 (*)     MCH 32.5 (*)     RDW 17.1 (*)     Platelets 131 (*)     Immature Granulocytes 0.7 (*)     Immature Grans (Abs) 0.06 (*)     Lymph # 0.3 (*)     Gran % 88.5 (*)     Lymph % 3.5 (*)     All other components within normal limits   COMPREHENSIVE METABOLIC PANEL - Abnormal; Notable for the following components:    Glucose 129 (*)     BUN 35 (*)     Calcium 8.4 (*)     Albumin 2.7 (*)     eGFR 50.8 (*)     All other components within normal limits   URINALYSIS, REFLEX TO URINE CULTURE - Abnormal; Notable for the following components:    Protein, UA 1+ (*)     Ketones, UA Trace (*)     Occult Blood UA 2+ (*)     All other components within normal limits    Narrative:     Specimen Source->Urine   URINALYSIS MICROSCOPIC - Abnormal; Notable for the following components:    RBC, UA 42 (*)     Hyaline Casts, UA 6 (*)     All other components within normal limits    Narrative:     Specimen Source->Urine   INFLUENZA A AND B ANTIGEN - Abnormal; Notable for the following components:    Influenza A, Molecular Positive (*)     All other components within normal limits    Narrative:     Specimen Source->Nasopharyngeal Swab  FLU A  result(s) called and verbal readback obtained from KELSEA AZAR   ED by FF1 10/29/2022 20:54   CULTURE, BLOOD   CULTURE, BLOOD   TSH   TROPONIN I   MAGNESIUM   LACTIC ACID, PLASMA   PROCALCITONIN   PROCALCITONIN   SARS-COV-2 RNA AMPLIFICATION, QUAL   POCT GLUCOSE MONITORING CONTINUOUS        ECG Results              EKG 12-lead (In process)  Result time 10/29/22 18:18:30      In process by Interface, Lab In [facility] (10/29/22 18:18:30)                   Narrative:    Test Reason : R53.83,    Vent. Rate : 073 BPM     Atrial  Rate : 073 BPM     P-R Int : 158 ms          QRS Dur : 074 ms      QT Int : 436 ms       P-R-T Axes : 074 059 068 degrees     QTc Int : 480 ms    Normal sinus rhythm  Prolonged QT  Abnormal ECG  When compared with ECG of 24-OCT-2022 21:09,  No significant change was found    Referred By:             Confirmed By:                                   Imaging Results              X-Ray Chest AP Portable (Final result)  Result time 10/29/22 19:55:25      Final result by Ga Watkins MD (10/29/22 19:55:25)                   Narrative:    Chest single view    CLINICAL DATA: Weakness    FINDINGS: AP view is compared to October 25.    Heart size is normal. The mediastinum is unremarkable. No acute infiltrates or effusions are identified. Ill-defined right upper lobe pulmonary parenchymal opacity is unchanged and presumably on the basis of treated neoplasm. Osseous structures are unremarkable.    IMPRESSION:  1. Stable appearance of the chest compared to October 25. No acute radiographic abnormalities.    Electronically signed by:  Ga Watkins MD  10/29/2022 7:55 PM CDT Workstation: [id number]                                     Medications - No data to display  Medical Decision Making:   Initial Assessment:   Nontoxic appearing  ED Management:  82-year-old male presents emergency room for 3 days fatigue now complicated by fever at home.  Patient recently in the hospital for severe dehydration, currently on antibiotics for UTI versus pulmonary infection.  Patient is not hypoxic, no respiratory distress.  He has no symptoms on my exam.  Exam significant only for right-sided weakness which is from an old stroke.  Workup today is largely unremarkable.  EKG normal sinus rhythm with prolonged QT, no acute ischemic changes.  Patient has stable but improving anemia with an H&H of 8.726.1.  Mild hypocalcemia at 8.4 hypoalbuminemia.  No evidence of infection.  No evidence of UTI.  Normal TSH, normal troponin, normal  magnesium, normal lactic, normal procalcitonin.  Patient was found to be influenza A positive.  Overall, he is nontoxic and well-appearing.  At this point, patient has had a very thorough exam which does not reveal any emergent or surgical pathology necessitating admission for consultation from the emergency room.  He will be discharged home to his caregiver and wife with recommendation for symptomatic care, antipyretics and prescription for Tamiflu given comorbidities and new fever less than 24 hours.  On discharge he is stable, afebrile.      Disposition:  Improved, discharged  Plan to discharge home with appropriate follow-up, including primary care manager.    I discussed the findings and plan of care with this patient.  All questions were answered to the patient's satisfaction.  Disposition plan as above.  Verbal and written discharge instructions provided to the patient on discharge.  Return precautions discussed prior to discharge.     I discuss this patient case with the cosigning physician, who evaluated the patient and guided plan of care. This note was written using the assistance of a dictation program and may contain grammatical errors.            ED Course as of 10/29/22 2114   Sat Oct 29, 2022   2000 Patient re-evaluated.  Continues to be asymptomatic.  Caregiver at bedside at this time and tells me that patient has been more fatigued over the last couple of days.  Planning of temperature elevating despite Tylenol.  T-max of 100.2° F rectal. [AN]   2106 Influenza antigen Nasopharyngeal Swab(!!) [AN]      ED Course User Index  [AN] Noman Hunt PA-C                 Clinical Impression:   Final diagnoses:  [J10.1] Influenza A (Primary)        ED Disposition Condition    Discharge Stable          ED Prescriptions       Medication Sig Dispense Start Date End Date Auth. Provider    oseltamivir (TAMIFLU) 75 MG capsule Take 1 capsule (75 mg total) by mouth 2 (two) times daily. for 5 days 10 capsule  10/29/2022 11/3/2022 Noman Hunt PA-C          Follow-up Information       Follow up With Specialties Details Why Contact Info Additional Information    Court Mann MD Family Medicine Schedule an appointment as soon as possible for a visit in 1 week  [address]  [place] [address]  [phone]       [facility] - Emergency Dept Emergency Medicine Go to  As needed, If symptoms worsen [address] [place] [id number] [phone] 1st floor             Noman Hunt PA-C  10/29/22 2112

## 2022-10-30 LAB
BACTERIA BLD CULT: NORMAL
BACTERIA BLD CULT: NORMAL

## 2022-10-30 NOTE — ED NOTES
DIDI Tineo gave this RN a VO to see id pt was able to stand/ambulate. Pt was able to stand with 2 assist at beside. Pt was unable to ambulate without his walker. Pt.'s personal home health assistant helped assist the pt to stand. She stated he had improved from  his previous weakness status he was having earlier, which is the reason he came to the ED today. RN notified DIDI Tineo.

## 2022-11-03 LAB
BACTERIA BLD CULT: NORMAL
BACTERIA BLD CULT: NORMAL

## 2022-11-10 ENCOUNTER — OFFICE VISIT (OUTPATIENT)
Dept: FAMILY MEDICINE | Facility: CLINIC | Age: 82
End: 2022-11-10
Payer: MEDICARE

## 2022-11-10 VITALS
BODY MASS INDEX: 18.04 KG/M2 | SYSTOLIC BLOOD PRESSURE: 124 MMHG | OXYGEN SATURATION: 96 % | DIASTOLIC BLOOD PRESSURE: 68 MMHG | WEIGHT: 126 LBS | HEIGHT: 70 IN | TEMPERATURE: 98 F | HEART RATE: 76 BPM

## 2022-11-10 DIAGNOSIS — C34.90 MALIGNANT NEOPLASM OF LUNG, UNSPECIFIED LATERALITY, UNSPECIFIED PART OF LUNG: Primary | ICD-10-CM

## 2022-11-10 DIAGNOSIS — R54 AGE-RELATED PHYSICAL DEBILITY: ICD-10-CM

## 2022-11-10 DIAGNOSIS — C61 PROSTATE CANCER: ICD-10-CM

## 2022-11-10 PROCEDURE — 99999 PR PBB SHADOW E&M-EST. PATIENT-LVL III: CPT | Mod: PBBFAC,,, | Performed by: FAMILY MEDICINE

## 2022-11-10 PROCEDURE — 99213 OFFICE O/P EST LOW 20 MIN: CPT | Mod: PBBFAC,PN | Performed by: FAMILY MEDICINE

## 2022-11-10 PROCEDURE — 99999 PR PBB SHADOW E&M-EST. PATIENT-LVL III: ICD-10-PCS | Mod: PBBFAC,,, | Performed by: FAMILY MEDICINE

## 2022-11-10 PROCEDURE — 99213 OFFICE O/P EST LOW 20 MIN: CPT | Mod: S$PBB,,, | Performed by: FAMILY MEDICINE

## 2022-11-10 PROCEDURE — 99213 PR OFFICE/OUTPT VISIT, EST, LEVL III, 20-29 MIN: ICD-10-PCS | Mod: S$PBB,,, | Performed by: FAMILY MEDICINE

## 2022-11-10 RX ORDER — PREGABALIN 100 MG/1
1 CAPSULE ORAL 2 TIMES DAILY
COMMUNITY
Start: 2022-01-23

## 2022-11-10 NOTE — PROGRESS NOTES
Subjective:       Patient ID: Tyler Todd is a 82 y.o. male.    Chief Complaint: Follow-up and Cough (Pt has had mucous in his lungs per family member and has had a productive cough for quite some time. )    Here to get referred to hospice. He has a history of prostate cancer, type 2 diabetes, CKD, COPD, Lung cancer and CAD. He has been hospitalized numerous times this year, most recently for pneumonia and Flu A and is now at a point that he wishes to stop fighting. His wife is in the room with him and she agrees.     He wishes to use [facility]     Follow-up  Associated symptoms include coughing and weakness.   Cough    Review of Systems   Constitutional:         Losing weight , does not have a good appetite  Very week   HENT: Negative.     Eyes: Negative.    Respiratory:  Positive for cough.         Recently in hospital for Flu A and Pneumonia (approx 1 week ago)   Cardiovascular: Negative.    Gastrointestinal: Negative.    Endocrine:        Diabetes, now off meds as his glucose has normalized   Genitourinary:         Urinary incontinence   Musculoskeletal:         Very weak, due to chronic illness   Skin:         Skin breakdown (very early) on coccyx and scrotal area   Allergic/Immunologic: Negative.    Neurological:  Positive for tremors and weakness.        Decreased function of hands bilaterally secondary to strokes   Hematological: Negative.    Psychiatric/Behavioral:  Positive for confusion.      Patient Active Problem List   Diagnosis    Chronic prostatitis    Type 2 diabetes mellitus without complication    Early satiety    Unspecified dental caries    Prostatitis, unspecified    Urethral stricture due to unspecified infection    Incomplete bladder emptying    Thrombocytopenia    Carcinoma of lung    COPD (chronic obstructive pulmonary disease)    CKD (chronic kidney disease), stage III    Hyperlipidemia    Diabetes mellitus type II, uncontrolled    Elevated PSA    Other spondylosis, lumbar region     Cervical radiculitis    Atypical chest pain    CAD (coronary artery disease)    Nicotine abuse    Encounter for smoking cessation counseling    Adenocarcinoma of prostate    Prostate cancer    Paroxysmal atrial fibrillation    Epididymoorchitis    Elevated liver function tests    Acute renal failure superimposed on stage 3 chronic kidney disease    Orchitis and epididymitis    COPD exacerbation    Tobacco dependence in remission    Mass of upper lobe of right lung    Mediastinal lymphadenopathy    History of lung cancer    Squamous cell carcinoma lung, right    Falls frequently    Anemia in stage 3 chronic kidney disease    Ischemic embolic stroke    Ischemic vascular dementia    Lower GI bleed    Sepsis    Suspected Pneumonia       Objective:      Physical Exam  Constitutional:       Comments: Emaciated, ill appearing elderly male, wheelchair bound   HENT:      Head: Normocephalic and atraumatic.      Nose: Nose normal.      Mouth/Throat:      Mouth: Mucous membranes are moist.   Eyes:      Extraocular Movements: Extraocular movements intact.      Pupils: Pupils are equal, round, and reactive to light.   Neck:      Comments: No carotid bruits  Cardiovascular:      Rate and Rhythm: Normal rate and regular rhythm.      Pulses: Normal pulses.      Heart sounds: Normal heart sounds.   Pulmonary:      Effort: Pulmonary effort is normal.      Breath sounds: Normal breath sounds.   Abdominal:      Palpations: Abdomen is soft.   Musculoskeletal:      Cervical back: Neck supple.   Skin:     General: Skin is warm and dry.   Neurological:      Mental Status: He is oriented to person, place, and time. Mental status is at baseline.   Psychiatric:         Mood and Affect: Mood normal.      Comments: Debilitated and seems acceptable to hospice. His caregiver states he is easygoing and quiet most of the time       Lab Results   Component Value Date    WBC 8.19 10/29/2022    HGB 8.7 (L) 10/29/2022    HCT 26.1 (L) 10/29/2022    PLT  "131 (L) 10/29/2022    CHOL 107 (L) 06/11/2022    TRIG 58 06/11/2022    HDL 24 (L) 06/11/2022    ALT 33 10/29/2022    AST 24 10/29/2022     10/29/2022    K 4.6 10/29/2022     10/29/2022    CREATININE 1.4 10/29/2022    BUN 35 (H) 10/29/2022    CO2 26 10/29/2022    TSH 1.910 10/29/2022    PSA 5.6 (H) 07/20/2020    INR 1.4 10/03/2022    HGBA1C 6.3 (H) 10/25/2022     The ASCVD Risk score (Katty SANCHES, et al., 2019) failed to calculate for the following reasons:    The 2019 ASCVD risk score is only valid for ages 40 to 79    The patient has a prior MI or stroke diagnosis  Visit Vitals  /68 (BP Location: Left arm, Patient Position: Sitting, BP Method: Large (Manual))   Pulse 76   Temp 97.8 °F (36.6 °C) (Oral)   Ht 5' 10" (1.778 m)   Wt 57.2 kg (126 lb)   SpO2 96%   BMI 18.08 kg/m²      Assessment:       1. Malignant neoplasm of lung, unspecified laterality, unspecified part of lung    2. Prostate cancer    3. Age-related physical debility        Plan:       1. Malignant neoplasm of lung, unspecified laterality, unspecified part of lung  -     Cancel: Ambulatory referral/consult to Hospice; Future; Expected date: 11/17/2022  -     Ambulatory referral/consult to Hospice; Future; Expected date: 11/17/2022    2. Prostate cancer  -     Cancel: Ambulatory referral/consult to Hospice; Future; Expected date: 11/17/2022  -     Ambulatory referral/consult to Hospice; Future; Expected date: 11/17/2022    3. Age-related physical debility  -     Cancel: Ambulatory referral/consult to Hospice; Future; Expected date: 11/17/2022  -     Ambulatory referral/consult to Hospice; Future; Expected date: 11/17/2022     Follow up in about 3 months (around 2/10/2023).             "

## 2022-12-05 PROBLEM — K92.2 LOWER GI BLEED: Status: RESOLVED | Noted: 2022-09-03 | Resolved: 2022-12-05

## 2023-01-30 PROBLEM — A41.9 SEPSIS: Status: RESOLVED | Noted: 2022-10-25 | Resolved: 2023-01-30

## 2023-01-30 PROBLEM — J18.9 PNEUMONIA: Status: RESOLVED | Noted: 2022-10-25 | Resolved: 2023-01-30

## (undated) DEVICE — CONTAINER SPECIMEN STRL 4OZ

## (undated) DEVICE — SEE MEDLINE ITEM 157185

## (undated) DEVICE — PAD ELECTROSURGICAL PAT PLATE

## (undated) DEVICE — SEE MEDLINE ITEM 152487

## (undated) DEVICE — LUBRICANT SURGILUBE 2 OZ

## (undated) DEVICE — SOL WATER STRL IRR 1000ML

## (undated) DEVICE — CANNULA CVD 100MM X 20G

## (undated) DEVICE — SCRUB HIBICLENS 4% CHG 4OZ

## (undated) DEVICE — GLOVE PROTEXIS PI CLASSIC 6.5

## (undated) DEVICE — BAG PATIENT BELONGING

## (undated) DEVICE — APPLICATOR CHLORAPREP CLR 10.5

## (undated) DEVICE — SYR 10CC LUER LOCK

## (undated) DEVICE — SYS LABEL CORRECT MED

## (undated) DEVICE — SOL IRR WATER STRL 3000 ML

## (undated) DEVICE — GLOVE PROTEXIS PI CLASSIC 7.5

## (undated) DEVICE — SYR ONLY LUER LOCK 20CC

## (undated) DEVICE — SLEEVE SCD EXPRESS CALF MEDIUM

## (undated) DEVICE — BAG LINGEMAN DRAIN UROLOGY

## (undated) DEVICE — SPONGE SUPER KERLIX 6X6.75IN

## (undated) DEVICE — SEE MEDLINE ITEM 157117

## (undated) DEVICE — NDL SAFETY 25G X 1.5 ECLIPSE

## (undated) DEVICE — SYR GLASS 5CC LUER LOK

## (undated) DEVICE — CATH CONE TIP

## (undated) DEVICE — SHEET DRAPE MEDIUM

## (undated) DEVICE — SYR DISP LL 5CC

## (undated) DEVICE — GLOVE SURG ULTRA TOUCH 7.5

## (undated) DEVICE — NDL TUOHY EPIDURAL 20G X 3.5

## (undated) DEVICE — TUBING MINIBORE EXTENSION

## (undated) DEVICE — SET IRR URLGY 2LINE UNIV SPIKE

## (undated) DEVICE — NDL HYPODERMIC BLUNT 18G 1.5IN

## (undated) DEVICE — NDL SPINAL SPINOCAN 22GX3.5